# Patient Record
Sex: MALE | Race: WHITE | Employment: OTHER | URBAN - METROPOLITAN AREA
[De-identification: names, ages, dates, MRNs, and addresses within clinical notes are randomized per-mention and may not be internally consistent; named-entity substitution may affect disease eponyms.]

---

## 2019-04-09 ENCOUNTER — HOSPITAL ENCOUNTER (OUTPATIENT)
Age: 24
Setting detail: SPECIMEN
Discharge: HOME | End: 2019-04-09
Payer: COMMERCIAL

## 2019-04-09 ENCOUNTER — OFFICE VISIT (OUTPATIENT)
Dept: FAMILY MEDICINE CLINIC | Age: 24
End: 2019-04-09
Payer: COMMERCIAL

## 2019-04-09 VITALS
WEIGHT: 213 LBS | BODY MASS INDEX: 34.23 KG/M2 | DIASTOLIC BLOOD PRESSURE: 81 MMHG | HEIGHT: 66 IN | HEART RATE: 118 BPM | TEMPERATURE: 97.3 F | SYSTOLIC BLOOD PRESSURE: 130 MMHG

## 2019-04-09 DIAGNOSIS — E78.2 MIXED HYPERLIPIDEMIA: ICD-10-CM

## 2019-04-09 DIAGNOSIS — E55.9 VITAMIN D DEFICIENCY: ICD-10-CM

## 2019-04-09 DIAGNOSIS — E55.9 VITAMIN D DEFICIENCY: Primary | ICD-10-CM

## 2019-04-09 LAB — VITAMIN D 25-HYDROXY: 24.9 NG/ML (ref 30–100)

## 2019-04-09 PROCEDURE — 1036F TOBACCO NON-USER: CPT | Performed by: STUDENT IN AN ORGANIZED HEALTH CARE EDUCATION/TRAINING PROGRAM

## 2019-04-09 PROCEDURE — G8417 CALC BMI ABV UP PARAM F/U: HCPCS | Performed by: STUDENT IN AN ORGANIZED HEALTH CARE EDUCATION/TRAINING PROGRAM

## 2019-04-09 PROCEDURE — 99203 OFFICE O/P NEW LOW 30 MIN: CPT | Performed by: STUDENT IN AN ORGANIZED HEALTH CARE EDUCATION/TRAINING PROGRAM

## 2019-04-09 PROCEDURE — G8427 DOCREV CUR MEDS BY ELIG CLIN: HCPCS | Performed by: STUDENT IN AN ORGANIZED HEALTH CARE EDUCATION/TRAINING PROGRAM

## 2019-04-09 PROCEDURE — 90715 TDAP VACCINE 7 YRS/> IM: CPT | Performed by: STUDENT IN AN ORGANIZED HEALTH CARE EDUCATION/TRAINING PROGRAM

## 2019-04-09 RX ORDER — MELATONIN
1000 DAILY
Qty: 90 TABLET | Refills: 0 | Status: SHIPPED | OUTPATIENT
Start: 2019-04-09

## 2019-04-09 RX ORDER — ATORVASTATIN CALCIUM 10 MG/1
10 TABLET, FILM COATED ORAL DAILY
Qty: 90 TABLET | Refills: 1 | Status: SHIPPED | OUTPATIENT
Start: 2019-04-09 | End: 2019-10-04 | Stop reason: SDUPTHER

## 2019-04-09 ASSESSMENT — ENCOUNTER SYMPTOMS
CONSTIPATION: 0
SORE THROAT: 0
DIARRHEA: 0
TROUBLE SWALLOWING: 0
NAUSEA: 0
BACK PAIN: 1
ABDOMINAL PAIN: 0
SHORTNESS OF BREATH: 0
COLOR CHANGE: 0
CHEST TIGHTNESS: 0
COUGH: 0
FACIAL SWELLING: 0
VOMITING: 0

## 2019-04-09 ASSESSMENT — PATIENT HEALTH QUESTIONNAIRE - PHQ9
SUM OF ALL RESPONSES TO PHQ QUESTIONS 1-9: 0
SUM OF ALL RESPONSES TO PHQ QUESTIONS 1-9: 0
SUM OF ALL RESPONSES TO PHQ9 QUESTIONS 1 & 2: 0
2. FEELING DOWN, DEPRESSED OR HOPELESS: 0
1. LITTLE INTEREST OR PLEASURE IN DOING THINGS: 0

## 2019-04-09 NOTE — PROGRESS NOTES
Subjective:   Nancy Harmon is a 22 y.o. male with  has no past medical history on file. HPI    Pt is a 22 y.o. M with a sig PMH of ADHD, and mental delay presenting to the clinic to establish care. Pt follows with [facility] for psych meds   Metadate   Risperidone  Recent labs (11/18) revealed hyperlipidemia   Total chol: 224   LDL: 166  Pt has + paternal FH. Reports poor diet consisting mostly of processed foods.   Pt was counseled on lifestyle modifications  Social History     Socioeconomic History    Marital status: Single     Spouse name: Not on file    Number of children: Not on file    Years of education: Not on file    Highest education level: Not on file   Occupational History    Not on file   Social Needs    Financial resource strain: Not on file    Food insecurity:     Worry: Not on file     Inability: Not on file    Transportation needs:     Medical: Not on file     Non-medical: Not on file   Tobacco Use    Smoking status: Never Smoker    Smokeless tobacco: Never Used   Substance and Sexual Activity    Alcohol use: No    Drug use: No    Sexual activity: Never   Lifestyle    Physical activity:     Days per week: Not on file     Minutes per session: Not on file    Stress: Not on file   Relationships    Social connections:     Talks on phone: Not on file     Gets together: Not on file     Attends Jainism service: Not on file     Active member of club or organization: Not on file     Attends meetings of clubs or organizations: Not on file     Relationship status: Not on file    Intimate partner violence:     Fear of current or ex partner: Not on file     Emotionally abused: Not on file     Physically abused: Not on file     Forced sexual activity: Not on file   Other Topics Concern    Not on file   Social History Narrative    Not on file     Also reports having low Vit D levels, 18.5   Reports staying mostly indoors with little to no sun exposure  Pt also reports some mild back pain with phys activity that is alleviated with ibuprofen    ROS as below    Review of Systems   Constitutional: Negative for chills, fatigue and fever. HENT: Negative for ear discharge, ear pain, facial swelling, sore throat and trouble swallowing. Respiratory: Negative for cough, chest tightness and shortness of breath. Cardiovascular: Negative for chest pain and leg swelling. Gastrointestinal: Negative for abdominal pain, constipation, diarrhea, nausea and vomiting. Musculoskeletal: Positive for back pain. Negative for neck pain and neck stiffness. Skin: Negative for color change, rash and wound. Neurological: Negative for dizziness, speech difficulty and light-headedness. Objective:    /81 (Site: Right Upper Arm, Position: Sitting, Cuff Size: Medium Adult)   Pulse 118   Temp 97.3 °F (36.3 °C) (Oral)   Ht 5' 6\" (1.676 m)   Wt 213 lb (96.6 kg)   BMI 34.38 kg/m²    BP Readings from Last 3 Encounters:   04/09/19 130/81     Physical Exam  BP Readings from Last 3 Encounters:   04/09/19 130/81     /81 (Site: Right Upper Arm, Position: Sitting, Cuff Size: Medium Adult)   Pulse 118   Temp 97.3 °F (36.3 °C) (Oral)   Ht 5' 6\" (1.676 m)   Wt 213 lb (96.6 kg)   BMI 34.38 kg/m²   No results found for: WBC, HGB, HCT, PLT, CHOL, TRIG, HDL, LDLDIRECT, ALT, AST, NA, K, CL, CREATININE, BUN, CO2, TSH, PSA, INR, GLUF, LABA1C, LABMICR  No results found for: CALCIUM, PHOS  No results found for: LDLCALC, LDLCHOLESTEROL, LDLDIRECT    Assessment and Plan:    1. Vitamin D deficiency  Follow-up in 3 months  - Vitamin D 25 Hydroxy; Future  - Cholecalciferol (VITAMIN D3) 1000 units TABS; Take 1 tablet by mouth daily  Dispense: 90 tablet; Refill: 0    2. Mixed hyperlipidemia  Counseled on lifestyle modifications  - atorvastatin (LIPITOR) 10 MG tablet; Take 1 tablet by mouth daily  Dispense: 90 tablet;  Refill: 1      Alistair received counseling on the following healthy behaviors: nutrition, exercise and medication adherence  Reviewed prior labs and health maintenance  Continue current medications, diet and exercise. Discussed use, benefit, and side effects of prescribed medications. Barriers to medication compliance addressed. Patient given educational materials - see patient instructions  Was a self-tracking handout given in paper form or via [employer]? No    Requested Prescriptions     Signed Prescriptions Disp Refills    atorvastatin (LIPITOR) 10 MG tablet 90 tablet 1     Sig: Take 1 tablet by mouth daily    Cholecalciferol (VITAMIN D3) 1000 units TABS 90 tablet 0     Sig: Take 1 tablet by mouth daily       All patient questions answered. Patient voiced understanding. Quality Measures    Body mass index is 34.38 kg/m². Elevated. Weight control planned discussed Healthy diet and regular exercise. BP: 130/81 Blood pressure is normal. Treatment plan consists of No treatment change needed. No results found for: LDLCALC, LDLCHOLESTEROL, LDLDIRECT (goal LDL reduction with dx if diabetes is 50% LDL reduction)      PHQ Scores 4/9/2019   PHQ2 Score 0   PHQ9 Score 0     Interpretation of Total Score Depression Severity: 1-4 = Minimal depression, 5-9 = Mild depression, 10-14 = Moderate depression, 15-19 = Moderately severe depression, 20-27 = Severe depression    Requested Prescriptions     Signed Prescriptions Disp Refills    atorvastatin (LIPITOR) 10 MG tablet 90 tablet 1     Sig: Take 1 tablet by mouth daily    Cholecalciferol (VITAMIN D3) 1000 units TABS 90 tablet 0     Sig: Take 1 tablet by mouth daily       There are no discontinued medications.

## 2019-04-09 NOTE — PROGRESS NOTES
Attending Physician Statement  I have discussed the care of [facility], including pertinent history and exam findings,  with the resident. I have reviewed the key elements of all parts of the encounter with the resident. I agree with the assessment, plan and orders as documented by the resident.   (GE Modifier)

## 2019-10-04 DIAGNOSIS — E78.2 MIXED HYPERLIPIDEMIA: ICD-10-CM

## 2019-10-04 RX ORDER — ATORVASTATIN CALCIUM 10 MG/1
TABLET, FILM COATED ORAL
Qty: 90 TABLET | Refills: 0 | Status: SHIPPED | OUTPATIENT
Start: 2019-10-04 | End: 2020-01-07

## 2019-10-12 ENCOUNTER — APPOINTMENT (OUTPATIENT)
Dept: GENERAL RADIOLOGY | Age: 24
End: 2019-10-12
Payer: COMMERCIAL

## 2019-10-12 ENCOUNTER — HOSPITAL ENCOUNTER (EMERGENCY)
Age: 24
Discharge: HOME | End: 2019-10-12
Attending: EMERGENCY MEDICINE
Payer: COMMERCIAL

## 2019-10-12 VITALS
WEIGHT: 200 LBS | BODY MASS INDEX: 32.14 KG/M2 | HEART RATE: 78 BPM | RESPIRATION RATE: 14 BRPM | DIASTOLIC BLOOD PRESSURE: 71 MMHG | TEMPERATURE: 98.1 F | SYSTOLIC BLOOD PRESSURE: 129 MMHG | HEIGHT: 66 IN | OXYGEN SATURATION: 98 %

## 2019-10-12 DIAGNOSIS — S22.32XA CLOSED FRACTURE OF ONE RIB OF LEFT SIDE, INITIAL ENCOUNTER: Primary | ICD-10-CM

## 2019-10-12 PROCEDURE — 71046 X-RAY EXAM CHEST 2 VIEWS: CPT

## 2019-10-12 PROCEDURE — 99284 EMERGENCY DEPT VISIT MOD MDM: CPT

## 2019-10-12 PROCEDURE — 6370000000 HC RX 637 (ALT 250 FOR IP): Performed by: EMERGENCY MEDICINE

## 2019-10-12 RX ORDER — LIDOCAINE 50 MG/G
1 PATCH TOPICAL DAILY
Qty: 30 PATCH | Refills: 0 | Status: SHIPPED | OUTPATIENT
Start: 2019-10-12 | End: 2019-10-18

## 2019-10-12 RX ORDER — IBUPROFEN 800 MG/1
800 TABLET ORAL ONCE
Status: COMPLETED | OUTPATIENT
Start: 2019-10-12 | End: 2019-10-12

## 2019-10-12 RX ORDER — IBUPROFEN 800 MG/1
800 TABLET ORAL EVERY 8 HOURS PRN
Qty: 30 TABLET | Refills: 0 | Status: SHIPPED | OUTPATIENT
Start: 2019-10-12

## 2019-10-12 RX ORDER — LIDOCAINE 4 G/G
1 PATCH TOPICAL ONCE
Status: DISCONTINUED | OUTPATIENT
Start: 2019-10-12 | End: 2019-10-12 | Stop reason: HOSPADM

## 2019-10-12 RX ADMIN — IBUPROFEN 800 MG: 800 TABLET ORAL at 09:38

## 2019-10-12 ASSESSMENT — PAIN SCALES - GENERAL
PAINLEVEL_OUTOF10: 8
PAINLEVEL_OUTOF10: 8

## 2019-10-12 ASSESSMENT — PAIN DESCRIPTION - LOCATION: LOCATION: CHEST

## 2019-10-14 ENCOUNTER — TELEPHONE (OUTPATIENT)
Dept: FAMILY MEDICINE CLINIC | Age: 24
End: 2019-10-14

## 2019-10-15 ASSESSMENT — ENCOUNTER SYMPTOMS
BACK PAIN: 0
NAUSEA: 0
EYE REDNESS: 0
RHINORRHEA: 0
COUGH: 0
SINUS PAIN: 0
CHOKING: 0
VOMITING: 0
ABDOMINAL PAIN: 0
SHORTNESS OF BREATH: 0

## 2019-10-18 ENCOUNTER — HOSPITAL ENCOUNTER (OUTPATIENT)
Age: 24
Discharge: HOME | End: 2019-10-18
Payer: COMMERCIAL

## 2019-10-18 ENCOUNTER — OFFICE VISIT (OUTPATIENT)
Dept: FAMILY MEDICINE CLINIC | Age: 24
End: 2019-10-18
Payer: COMMERCIAL

## 2019-10-18 VITALS
SYSTOLIC BLOOD PRESSURE: 103 MMHG | HEART RATE: 69 BPM | TEMPERATURE: 97.7 F | DIASTOLIC BLOOD PRESSURE: 67 MMHG | WEIGHT: 212.6 LBS | BODY MASS INDEX: 34.31 KG/M2

## 2019-10-18 DIAGNOSIS — H61.23 BILATERAL IMPACTED CERUMEN: ICD-10-CM

## 2019-10-18 DIAGNOSIS — Z79.899 LONG TERM CURRENT USE OF ANTIPSYCHOTIC MEDICATION: ICD-10-CM

## 2019-10-18 DIAGNOSIS — E55.9 HYPOVITAMINOSIS D: ICD-10-CM

## 2019-10-18 DIAGNOSIS — Z13.220 SCREENING FOR HYPERLIPIDEMIA: ICD-10-CM

## 2019-10-18 DIAGNOSIS — E78.5 DYSLIPIDEMIA: ICD-10-CM

## 2019-10-18 DIAGNOSIS — R07.81 RIB PAIN ON LEFT SIDE: Primary | ICD-10-CM

## 2019-10-18 DIAGNOSIS — E66.3 OVERWEIGHT: ICD-10-CM

## 2019-10-18 LAB
ALBUMIN SERPL-MCNC: 4.3 G/DL (ref 3.5–5.2)
ALBUMIN/GLOBULIN RATIO: 1.3 (ref 1–2.5)
ALP BLD-CCNC: 96 U/L (ref 40–129)
ALT SERPL-CCNC: 25 U/L (ref 5–41)
ANION GAP SERPL CALCULATED.3IONS-SCNC: 10 MMOL/L (ref 9–17)
AST SERPL-CCNC: 17 U/L
BILIRUB SERPL-MCNC: 0.8 MG/DL (ref 0.3–1.2)
BILIRUBIN DIRECT: 0.14 MG/DL
BILIRUBIN, INDIRECT: 0.66 MG/DL (ref 0–1)
BUN BLDV-MCNC: 14 MG/DL (ref 6–20)
CALCIUM SERPL-MCNC: 9.7 MG/DL (ref 8.6–10.4)
CHLORIDE BLD-SCNC: 103 MMOL/L (ref 98–107)
CHOLESTEROL/HDL RATIO: 4.6
CHOLESTEROL: 176 MG/DL
CO2: 24 MMOL/L (ref 20–31)
CREAT SERPL-MCNC: 0.56 MG/DL (ref 0.7–1.2)
ESTIMATED AVERAGE GLUCOSE: 103 MG/DL
GFR AFRICAN AMERICAN: >60 ML/MIN
GFR NON-AFRICAN AMERICAN: >60 ML/MIN
GFR SERPL CREATININE-BSD FRML MDRD: ABNORMAL ML/MIN/{1.73_M2}
GFR SERPL CREATININE-BSD FRML MDRD: ABNORMAL ML/MIN/{1.73_M2}
GLUCOSE BLD-MCNC: 87 MG/DL (ref 70–99)
HBA1C MFR BLD: 5.2 % (ref 4–6)
HDLC SERPL-MCNC: 38 MG/DL
LDL CHOLESTEROL: 115 MG/DL (ref 0–130)
POTASSIUM SERPL-SCNC: 4.4 MMOL/L (ref 3.7–5.3)
SODIUM BLD-SCNC: 137 MMOL/L (ref 135–144)
TOTAL PROTEIN: 7.7 G/DL (ref 6.4–8.3)
TRIGL SERPL-MCNC: 113 MG/DL
VITAMIN D 25-HYDROXY: 52.3 NG/ML (ref 30–100)
VLDLC SERPL CALC-MCNC: ABNORMAL MG/DL (ref 1–30)

## 2019-10-18 PROCEDURE — 99211 OFF/OP EST MAY X REQ PHY/QHP: CPT | Performed by: FAMILY MEDICINE

## 2019-10-18 PROCEDURE — 90686 IIV4 VACC NO PRSV 0.5 ML IM: CPT | Performed by: FAMILY MEDICINE

## 2019-10-18 PROCEDURE — 99213 OFFICE O/P EST LOW 20 MIN: CPT | Performed by: STUDENT IN AN ORGANIZED HEALTH CARE EDUCATION/TRAINING PROGRAM

## 2019-10-18 PROCEDURE — G8482 FLU IMMUNIZE ORDER/ADMIN: HCPCS | Performed by: STUDENT IN AN ORGANIZED HEALTH CARE EDUCATION/TRAINING PROGRAM

## 2019-10-18 PROCEDURE — 1036F TOBACCO NON-USER: CPT | Performed by: STUDENT IN AN ORGANIZED HEALTH CARE EDUCATION/TRAINING PROGRAM

## 2019-10-18 PROCEDURE — G8417 CALC BMI ABV UP PARAM F/U: HCPCS | Performed by: STUDENT IN AN ORGANIZED HEALTH CARE EDUCATION/TRAINING PROGRAM

## 2019-10-18 PROCEDURE — G8427 DOCREV CUR MEDS BY ELIG CLIN: HCPCS | Performed by: STUDENT IN AN ORGANIZED HEALTH CARE EDUCATION/TRAINING PROGRAM

## 2019-11-17 PROBLEM — Z13.220 SCREENING FOR HYPERLIPIDEMIA: Status: RESOLVED | Noted: 2019-10-18 | Resolved: 2019-11-17

## 2020-01-07 RX ORDER — ATORVASTATIN CALCIUM 10 MG/1
TABLET, FILM COATED ORAL
Qty: 90 TABLET | Refills: 0 | Status: SHIPPED | OUTPATIENT
Start: 2020-01-07 | End: 2020-04-07

## 2020-01-07 NOTE — TELEPHONE ENCOUNTER
E-scribe request for atorvastatin. Please review and e-scribe if applicable.      Last Visit Date:  10-18-19  Next Visit Date:  Visit date not found    Hemoglobin A1C (%)   Date Value   10/18/2019 5.2             ( goal A1C is < 7)   No results found for: LABMICR  LDL Cholesterol (mg/dL)   Date Value   10/18/2019 115       (goal LDL is <100)   AST (U/L)   Date Value   10/18/2019 17     ALT (U/L)   Date Value   10/18/2019 25     BUN (mg/dL)   Date Value   10/18/2019 14     BP Readings from Last 3 Encounters:   10/18/19 103/67   10/12/19 129/71   04/09/19 130/81          (goal 120/80)        Patient Active Problem List:     Hypovitaminosis D     Rib pain on left side      ----Tomma Kicks

## 2020-04-07 RX ORDER — ATORVASTATIN CALCIUM 10 MG/1
TABLET, FILM COATED ORAL
Qty: 90 TABLET | Refills: 0 | Status: SHIPPED | OUTPATIENT
Start: 2020-04-07 | End: 2020-07-08

## 2020-07-06 NOTE — TELEPHONE ENCOUNTER
Please address the medication refill and close the encounter. If I can be of assistance, please route to the applicable pool. Thank you. Last visit: 10/18/19  Last Med refill: 04/07/20  Does patient have enough medication for 72 hours: No:     Next Visit Date:  No future appointments.     Health Maintenance   Topic Date Due    Varicella vaccine (1 of 2 - 2-dose childhood series) 03/15/1997    HPV vaccine (1 - Male 2-dose series) 03/15/2007    HIV screen  03/15/2011    Annual Wellness Visit (AWV)  06/23/2019    Flu vaccine (1) 09/01/2020    Lipid screen  10/18/2020    DTaP/Tdap/Td vaccine (8 - Td) 04/09/2029    Hepatitis B vaccine  Completed    Hib vaccine  Completed    Meningococcal (ACWY) vaccine  Completed    Hepatitis A vaccine  Aged Out    Pneumococcal 0-64 years Vaccine  Aged Out       Hemoglobin A1C (%)   Date Value   10/18/2019 5.2             ( goal A1C is < 7)   No results found for: LABMICR  LDL Cholesterol (mg/dL)   Date Value   10/18/2019 115       (goal LDL is <100)   AST (U/L)   Date Value   10/18/2019 17     ALT (U/L)   Date Value   10/18/2019 25     BUN (mg/dL)   Date Value   10/18/2019 14     BP Readings from Last 3 Encounters:   10/18/19 103/67   10/12/19 129/71   04/09/19 130/81          (goal 120/80)    All Future Testing planned in [facility]              Patient Active Problem List:     Hypovitaminosis D     Rib pain on left side

## 2020-07-08 RX ORDER — ATORVASTATIN CALCIUM 10 MG/1
TABLET, FILM COATED ORAL
Qty: 90 TABLET | Refills: 0 | Status: SHIPPED | OUTPATIENT
Start: 2020-07-08 | End: 2020-10-08

## 2020-10-07 NOTE — TELEPHONE ENCOUNTER
Last Visit Date:  10-18-19  Next Visit Date:  Visit date not found    Hemoglobin A1C (%)   Date Value   10/18/2019 5.2             ( goal A1C is < 7)   No results found for: LABMICR  LDL Cholesterol (mg/dL)   Date Value   10/18/2019 115       (goal LDL is <100)   AST (U/L)   Date Value   10/18/2019 17     ALT (U/L)   Date Value   10/18/2019 25     BUN (mg/dL)   Date Value   10/18/2019 14     BP Readings from Last 3 Encounters:   10/18/19 103/67   10/12/19 129/71   04/09/19 130/81          (goal 120/80)        Patient Active Problem List:     Hypovitaminosis D     Rib pain on left side      ----Monika Cook

## 2020-10-08 RX ORDER — ATORVASTATIN CALCIUM 10 MG/1
TABLET, FILM COATED ORAL
Qty: 90 TABLET | Refills: 0 | Status: SHIPPED | OUTPATIENT
Start: 2020-10-08 | End: 2021-01-12 | Stop reason: SDUPTHER

## 2021-02-09 DIAGNOSIS — E78.2 MIXED HYPERLIPIDEMIA: ICD-10-CM

## 2021-02-09 NOTE — TELEPHONE ENCOUNTER
Last visit: 10/18/19  Last Med refill: 1/12/2021  Does patient have enough medication for 72 hours: Yes    Next Visit Date:  No future appointments.     Health Maintenance   Topic Date Due    Hepatitis C screen  1996    Varicella vaccine (1 of 2 - 2-dose childhood series) 03/15/1997    HPV vaccine (1 - Male 2-dose series) 03/15/2007    HIV screen  03/15/2011    Annual Wellness Visit (AWV)  06/23/2019    Flu vaccine (1) 09/01/2020    Lipid screen  10/18/2020    DTaP/Tdap/Td vaccine (8 - Td) 04/09/2029    Hepatitis B vaccine  Completed    Hib vaccine  Completed    Meningococcal (ACWY) vaccine  Completed    Hepatitis A vaccine  Aged Out    Pneumococcal 0-64 years Vaccine  Aged Out       Hemoglobin A1C (%)   Date Value   10/18/2019 5.2             ( goal A1C is < 7)   No results found for: LABMICR  LDL Cholesterol (mg/dL)   Date Value   10/18/2019 115       (goal LDL is <100)   AST (U/L)   Date Value   10/18/2019 17     ALT (U/L)   Date Value   10/18/2019 25     BUN (mg/dL)   Date Value   10/18/2019 14     BP Readings from Last 3 Encounters:   10/18/19 103/67   10/12/19 129/71   04/09/19 130/81          (goal 120/80)    All Future Testing planned in [facility]              Patient Active Problem List:     Hypovitaminosis D     Rib pain on left side

## 2021-02-11 RX ORDER — ATORVASTATIN CALCIUM 10 MG/1
TABLET, FILM COATED ORAL
Qty: 30 TABLET | Refills: 0 | Status: SHIPPED | OUTPATIENT
Start: 2021-02-11 | End: 2021-03-11

## 2021-03-11 DIAGNOSIS — E78.2 MIXED HYPERLIPIDEMIA: ICD-10-CM

## 2021-03-11 RX ORDER — ATORVASTATIN CALCIUM 10 MG/1
TABLET, FILM COATED ORAL
Qty: 30 TABLET | Refills: 0 | Status: SHIPPED | OUTPATIENT
Start: 2021-03-11 | End: 2021-04-08

## 2021-04-08 DIAGNOSIS — E78.2 MIXED HYPERLIPIDEMIA: ICD-10-CM

## 2021-04-08 RX ORDER — ATORVASTATIN CALCIUM 10 MG/1
TABLET, FILM COATED ORAL
Qty: 30 TABLET | Refills: 0 | Status: SHIPPED | OUTPATIENT
Start: 2021-04-08 | End: 2021-05-05

## 2021-04-08 NOTE — TELEPHONE ENCOUNTER
Last visit:   Last Med refill:   Does patient have enough medication for 72 hours: No:     Next Visit Date:  No future appointments. Health Maintenance   Topic Date Due    Hepatitis C screen  Never done    Varicella vaccine (1 of 2 - 2-dose childhood series) Never done    HPV vaccine (1 - Male 2-dose series) Never done    HIV screen  Never done    COVID-19 Vaccine (1) Never done   [employer] Visit (AWV)  Never done    Lipid screen  10/18/2020    Flu vaccine (Season Ended) 09/01/2021    DTaP/Tdap/Td vaccine (8 - Td) 04/09/2029    Hepatitis B vaccine  Completed    Hib vaccine  Completed    Meningococcal (ACWY) vaccine  Completed    Hepatitis A vaccine  Aged Out    Pneumococcal 0-64 years Vaccine  Aged Out       Hemoglobin A1C (%)   Date Value   10/18/2019 5.2             ( goal A1C is < 7)   No results found for: LABMICR  LDL Cholesterol (mg/dL)   Date Value   10/18/2019 115       (goal LDL is <100)   AST (U/L)   Date Value   10/18/2019 17     ALT (U/L)   Date Value   10/18/2019 25     BUN (mg/dL)   Date Value   10/18/2019 14     BP Readings from Last 3 Encounters:   10/18/19 103/67   10/12/19 129/71   04/09/19 130/81          (goal 120/80)    All Future Testing planned in [facility]              Patient Active Problem List:     Hypovitaminosis D     Rib pain on left side           Please address the medication refill and close the encounter. If I can be of assistance, please route to the applicable pool. Thank you.

## 2023-03-09 ENCOUNTER — OFFICE VISIT (OUTPATIENT)
Dept: FAMILY MEDICINE CLINIC | Age: 28
End: 2023-03-09

## 2023-03-09 ENCOUNTER — HOSPITAL ENCOUNTER (EMERGENCY)
Age: 28
Discharge: HOME | End: 2023-03-09
Attending: EMERGENCY MEDICINE
Payer: COMMERCIAL

## 2023-03-09 VITALS
WEIGHT: 230 LBS | RESPIRATION RATE: 18 BRPM | TEMPERATURE: 98.2 F | OXYGEN SATURATION: 96 % | HEIGHT: 66 IN | BODY MASS INDEX: 36.96 KG/M2 | SYSTOLIC BLOOD PRESSURE: 144 MMHG | DIASTOLIC BLOOD PRESSURE: 88 MMHG | HEART RATE: 110 BPM

## 2023-03-09 VITALS
BODY MASS INDEX: 36.96 KG/M2 | SYSTOLIC BLOOD PRESSURE: 138 MMHG | WEIGHT: 230 LBS | DIASTOLIC BLOOD PRESSURE: 89 MMHG | HEART RATE: 125 BPM | HEIGHT: 66 IN

## 2023-03-09 DIAGNOSIS — Z00.00 HEALTHCARE MAINTENANCE: ICD-10-CM

## 2023-03-09 DIAGNOSIS — F20.1: Primary | ICD-10-CM

## 2023-03-09 DIAGNOSIS — F31.9 BIPOLAR 1 DISORDER (HCC): ICD-10-CM

## 2023-03-09 DIAGNOSIS — R44.3 HALLUCINATION: Primary | ICD-10-CM

## 2023-03-09 DIAGNOSIS — E78.2 MIXED HYPERLIPIDEMIA: ICD-10-CM

## 2023-03-09 PROCEDURE — 99282 EMERGENCY DEPT VISIT SF MDM: CPT

## 2023-03-09 RX ORDER — RISPERIDONE 3 MG/1
3 TABLET ORAL 2 TIMES DAILY
Status: ON HOLD | COMMUNITY
End: 2023-03-21

## 2023-03-09 SDOH — ECONOMIC STABILITY: FOOD INSECURITY: WITHIN THE PAST 12 MONTHS, YOU WORRIED THAT YOUR FOOD WOULD RUN OUT BEFORE YOU GOT MONEY TO BUY MORE.: NEVER TRUE

## 2023-03-09 SDOH — ECONOMIC STABILITY: FOOD INSECURITY: WITHIN THE PAST 12 MONTHS, THE FOOD YOU BOUGHT JUST DIDN'T LAST AND YOU DIDN'T HAVE MONEY TO GET MORE.: NEVER TRUE

## 2023-03-09 SDOH — ECONOMIC STABILITY: INCOME INSECURITY: HOW HARD IS IT FOR YOU TO PAY FOR THE VERY BASICS LIKE FOOD, HOUSING, MEDICAL CARE, AND HEATING?: NOT HARD AT ALL

## 2023-03-09 SDOH — ECONOMIC STABILITY: HOUSING INSECURITY
IN THE LAST 12 MONTHS, WAS THERE A TIME WHEN YOU DID NOT HAVE A STEADY PLACE TO SLEEP OR SLEPT IN A SHELTER (INCLUDING NOW)?: NO

## 2023-03-09 ASSESSMENT — ENCOUNTER SYMPTOMS
RHINORRHEA: 0
EYE REDNESS: 0
ABDOMINAL PAIN: 0
NAUSEA: 0
CONSTIPATION: 0
SHORTNESS OF BREATH: 0
VOMITING: 0
SORE THROAT: 0
COUGH: 0
DIARRHEA: 0

## 2023-03-09 ASSESSMENT — PAIN - FUNCTIONAL ASSESSMENT: PAIN_FUNCTIONAL_ASSESSMENT: NONE - DENIES PAIN

## 2023-03-09 ASSESSMENT — LIFESTYLE VARIABLES
HOW MANY STANDARD DRINKS CONTAINING ALCOHOL DO YOU HAVE ON A TYPICAL DAY: PATIENT DOES NOT DRINK
HOW OFTEN DO YOU HAVE A DRINK CONTAINING ALCOHOL: NEVER

## 2023-03-09 ASSESSMENT — PATIENT HEALTH QUESTIONNAIRE - PHQ9
2. FEELING DOWN, DEPRESSED OR HOPELESS: 0
SUM OF ALL RESPONSES TO PHQ QUESTIONS 1-9: 0
1. LITTLE INTEREST OR PLEASURE IN DOING THINGS: 0
SUM OF ALL RESPONSES TO PHQ QUESTIONS 1-9: 0
SUM OF ALL RESPONSES TO PHQ9 QUESTIONS 1 & 2: 0

## 2023-03-09 NOTE — PROGRESS NOTES
Attending Physician Statement  I  have discussed the care of Génesis Vines including pertinent history and exam findings with the resident. I agree with the assessment, plan and orders as documented by the resident. /89 (Site: Right Upper Arm, Position: Sitting)   Pulse (!) 125   Ht 5' 6\" (1.676 m)   Wt 230 lb (104.3 kg)   BMI 37.12 kg/m²    BP Readings from Last 3 Encounters:   03/09/23 138/89   10/18/19 103/67   10/12/19 129/71     Wt Readings from Last 3 Encounters:   03/09/23 230 lb (104.3 kg)   10/18/19 212 lb 9.6 oz (96.4 kg)   10/12/19 200 lb (90.7 kg)          Diagnosis Orders   1. Chronic disorganized schizophrenia (HCC)  Comprehensive Metabolic Panel      2. Bipolar 1 disorder (HCC)  Comprehensive Metabolic Panel    TSH      3. Mixed hyperlipidemia  Lipid Panel      4. Body mass index (BMI) of 37.0 to 37.9 in adult  Comprehensive Metabolic Panel      5. Diabetes mellitus screening        6.  Healthcare maintenance  HIV Screen    Hepatitis C Antibody          Shwetha Robin DO 3/9/2023 12:07 PM
Visit Information    Have you changed or started any medications since your last visit including any over-the-counter medicines, vitamins, or herbal medicines? no   Are you having any side effects from any of your medications? -  no  Have you stopped taking any of your medications? Is so, why? -  no    Have you seen any other physician or provider since your last visit? No  Have you had any other diagnostic tests since your last visit? No  Have you been seen in the emergency room and/or had an admission to a hospital since we last saw you? No  Have you had your routine dental cleaning in the past 6 months? no    Have you activated your [facility] account? If not, what are your barriers?  Yes     Patient Care Team:  Lio Spears MD as PCP - General    Medical History Review  Past Medical, Family, and Social History reviewed and does not contribute to the patient presenting condition    Health Maintenance   Topic Date Due    COVID-19 Vaccine (1) Never done    Varicella vaccine (1 of 2 - 2-dose childhood series) Never done    HPV vaccine (1 - Male 2-dose series) Never done    Depression Screen  Never done    HIV screen  Never done    Hepatitis C screen  Never done    Flu vaccine (1) 08/01/2022    DTaP/Tdap/Td vaccine (8 - Td or Tdap) 04/09/2029    Hib vaccine  Completed    Meningococcal (ACWY) vaccine  Completed    Hepatitis A vaccine  Aged Out    Pneumococcal 0-64 years Vaccine  Aged Out
Thought Content: Thought content normal. Thought content does not include homicidal or suicidal ideation. An electronic signature was used to authenticate this note.     --Celia He MD

## 2023-03-09 NOTE — PATIENT INSTRUCTIONS
Thank you for letting us take care of you today. We hope all your questions were addressed. If a question was overlooked or something else comes to mind after you return home, please contact a member of your Care Team listed below. Your Care Team at [place] [address] is Team #2  Rafael Diaz DO (Faculty)  Sandi Grove (Faculty)  Nalia Melendez MD (Resident)  Aura Robles MD (Resident)  Sweta Gresham MD (Resident)  Kris Guzmán MD (Resident)  Marcus Mejia., BRADY Flannery.,  MA  Kourtney Lopez., SATYAN  Chiquis Elizondo., [facility] office)  Angella Monterroso, [address] (Clinical Practice Manager)  Deja Best, [address] [place] [address] (Clinical Pharmacist)     Office phone number: [phone]    If you need to get in right away due to illness, please be advised we have \"Same Day\" appointments available Monday-Friday. Please call us at [phone] option #3 to schedule your \"Same Day\" appointment.

## 2023-03-10 NOTE — ED NOTES
SW assisted pt creating a safety plan. SW discussed with pt warning signs, coping skills, and people/agencies who pt can reach out for help when feeling suicidal. SW made pt aware of ways to make pt's environment safer in times of crisis. Pt provided with the National Suicide Prevention Line: 4-596-050-503-066-8373 or the text number 763768 and strongly encouraged to utilize this resource if needed.       Electronically signed by RAY Lee on 3/9/2023 at 7:44 PM

## 2023-03-10 NOTE — ED NOTES
Provisional Diagnosis:   Patient presents to the ED for a mental health evaluation. Psychosocial and Contextual Factors:       C-SSRS Summary:  Patient denies suicidal ideations. Patient:   Family:   Agency:     Substance Abuse:  None identified     Present Suicidal Behavior:  None identified    Verbal:     Attempt:    Past Suicidal Behavior: None identified     Verbal:    Attempt:      Self-Injurious/Self-Mutilation:None identified      Violence Current or Past: None identified      Trauma Identified:  None identified      Protective Factors:    Patient has stable housing. Patient has insurance. Risk Factors:    None identified      Clinical Summary:      Patient presented to the emergency department for a mental health evaluation. Patient denies suicidal ideations. Patient denies homicidal ideations. Patient reports that he stays in his \"room all day playing video games\". Patient lives at home with his grandmother and cousin. Patient states, \"I am feeling better with life\". When physician asked patient if he    Patient states that he is having hallucinations of the \"batman game\". Patient denies auditory hallucinations. Level of Care Disposition:    Writer consulted with  physician and patient will discharge home with a safely plan.

## 2023-03-10 NOTE — ED PROVIDER NOTES
[address]  Emergency Department Encounter  Emergency Medicine Resident     Pt Name:Alistair Hopkins  MRN: [id number]  [place] 1995  Date of evaluation: 3/9/23  PCP:  Cullen Rod MD  Note Started: 7:05 PM EST      CHIEF COMPLAINT       Chief Complaint   Patient presents with    Mental Health Problem       HISTORY OF PRESENT ILLNESS  (Location/Symptom, Timing/Onset, Context/Setting, Quality, Duration, Modifying Factors, Severity.)      Frank Justin is a 33 y.o. male who presents with complaints of auditory hallucination described as \"Batman\" speaking to them. Denied any command hallucinations. Denied any homicidal or suicidal ideation. Has been taking his medications as directed. Discussed case also with patient grandparents who brought him in today who noted patient has not been himself lately and has had more hallucinations than usual.  Notes patient had some vomiting on Sunday. Has been taking his medications sporadically. PAST MEDICAL / SURGICAL / SOCIAL / FAMILY HISTORY      has a past medical history of Fracture. Reviewed with patient     has a past surgical history that includes eye surgery and Tympanostomy tube placement.   Reviewed with patient    Social History     Socioeconomic History    Marital status: Single     Spouse name: Not on file    Number of children: Not on file    Years of education: Not on file    Highest education level: Not on file   Occupational History    Not on file   Tobacco Use    Smoking status: Never    Smokeless tobacco: Never   Substance and Sexual Activity    Alcohol use: No    Drug use: No    Sexual activity: Never   Other Topics Concern    Not on file   Social History Narrative    Not on file     Social Determinants of Health     Financial Resource Strain: Low Risk     Difficulty of Paying Living Expenses: Not hard at all   Food Insecurity: No Food Insecurity    Worried About [employer] in the Last Year: Never true Ran Out of Food in the Last Year: Never true   Transportation Needs: Unknown    Lack of Transportation (Medical): Not on file    Lack of Transportation (Non-Medical): No   Physical Activity: Not on file   Stress: Not on file   Social Connections: Not on file   Intimate Partner Violence: Not on file   Housing Stability: Unknown    Unable to Pay for Housing in the Last Year: Not on file    Number of Places Lived in the Last Year: Not on file    Unstable Housing in the Last Year: No       No family history on file. Allergies:  Chocolate    Home Medications:  Prior to Admission medications    Medication Sig Start Date End Date Taking? Authorizing Provider   risperiDONE (RISPERDAL) 3 MG tablet Take 3 mg by mouth 2 times daily    Historical Provider, MD   atorvastatin (LIPITOR) 10 MG tablet TAKE ONE TABLET BY MOUTH DAILY 5/5/21 6/4/21  Radha Boogie MD   Cholecalciferol (VITAMIN D3) 1000 units TABS Take 1 tablet by mouth daily  Patient taking differently: Take by mouth daily 4/9/19   Yue Moctezuma MD       REVIEW OF SYSTEMS    (2-9 systems for level 4, 10 or more for level 5)      Review of Systems   Constitutional:  Negative for chills and fever. HENT:  Negative for congestion and rhinorrhea. Eyes:  Negative for photophobia and visual disturbance. Respiratory:  Negative for shortness of breath and wheezing. Cardiovascular:  Negative for chest pain and palpitations. Gastrointestinal:  Negative for abdominal pain, nausea and vomiting. Genitourinary:  Negative for dysuria and frequency. Musculoskeletal:  Negative for back pain and neck pain. Skin:  Negative for rash and wound. Neurological:  Negative for dizziness and headaches.      PHYSICAL EXAM   (up to 7 for level 4, 8 or more for level 5)      INITIAL VITALS:   BP (!) 144/88   Pulse (!) 110   Temp 98.2 °F (36.8 °C) (Oral)   Resp 18   Ht 5' 6\" (1.676 m)   Wt 230 lb (104.3 kg)   SpO2 96%   BMI 37.12 kg/m²     Physical Exam  Vitals and nursing note reviewed. Constitutional:       General: He is not in acute distress. HENT:      Head: Atraumatic. Right Ear: External ear normal.      Left Ear: External ear normal.      Nose: Nose normal.      Mouth/Throat:      Mouth: Mucous membranes are moist.      Pharynx: Oropharynx is clear. Eyes:      Conjunctiva/sclera: Conjunctivae normal.   Cardiovascular:      Rate and Rhythm: Normal rate and regular rhythm. Pulses: Normal pulses. Pulmonary:      Effort: Pulmonary effort is normal. No respiratory distress. Breath sounds: Normal breath sounds. No wheezing. Abdominal:      Palpations: Abdomen is soft. Tenderness: There is no abdominal tenderness. Musculoskeletal:         General: Normal range of motion. Cervical back: Normal range of motion. Skin:     General: Skin is warm and dry. Capillary Refill: Capillary refill takes less than 2 seconds. Neurological:      General: No focal deficit present. Mental Status: He is alert and oriented to person, place, and time. DDX/DIAGNOSTIC RESULTS / EMERGENCY DEPARTMENT COURSE / MDM     Medical Decision Making  Schizophrenia, no evidence of SI or HI          EMERGENCY DEPARTMENT COURSE:  60-year-old male, history of schizophrenia, presented to ED with complaints of hallucination that is described as \"bad man\" but not command hallucinations. Patient grandparents who patient lives with also mentioned patient has been wandering off at night and has intermittently been taking his medications due to vomiting that he had a couple days ago is now improved. Patient denies any SI or HI. No illegal drug use or alcohol use. Patient notes he feels safe at home. Safety plan created with social work and patient discharged home with instruction to follow-up with mental health provider take medications as directed. PROCEDURES:  None    CONSULTS:  None      FINAL IMPRESSION      1.  Hallucination          DISPOSITION / PLAN     DISPOSITION Decision To Discharge 03/09/2023 07:34:32 PM      PATIENT REFERRED TO:  Blayne Mclean MD  [address] [facility] [address]    In 3 days      [facility] ED  [address]  [address] [address]  [phone]    As needed    DISCHARGE MEDICATIONS:  Discharge Medication List as of 3/9/2023  7:37 PM          Karen Morgan MD  Emergency Medicine Resident    (Please note that portions of thisnote were completed with a voice recognition program.  Efforts were made to edit the dictations but occasionally words are mis-transcribed.)        Gabriela Ibanez MD  Resident  03/10/23 [address] MD Guero  Resident  03/10/23 [address]     Pt Name: Veronica Parker  MRN: [id number]  [place] 1995  Date of evaluation: 3/11/23       Veronica Parker is a 33 y.o. male who presents with Mental Health Problem      MDM:       Vitals:   Vitals:    03/09/23 1901   BP: (!) 144/88   Pulse: (!) 110   Resp: 18   Temp: 98.2 °F (36.8 °C)   TempSrc: Oral   SpO2: 96%   Weight: 230 lb (104.3 kg)   Height: 5' 6\" (1.676 m)         I personally saw and examined the patient. I have reviewed and agree with the resident's findings, including all diagnostic interpretations and treatment plan as written. I was present for the key portions of any procedures performed and the inclusive time noted for any critical care statement.     Andi Muller DO  Attending Emergency Physician             Andi Muller DO  03/11/23 1410

## 2023-03-10 NOTE — DISCHARGE INSTRUCTIONS
Thank you for visiting [facility] Emergency Department. You need to call Janessa Matthews MD to make an appointment as directed for follow up. Resume your home psychiatric medications as directed. Refer to safety plan created today in the emergency department. Return to emergency department for any new or worsening symptoms including any vomiting, fever, thoughts of wanting hurt yourself or others.

## 2023-03-17 ENCOUNTER — HOSPITAL ENCOUNTER (OUTPATIENT)
Age: 28
Setting detail: SPECIMEN
Discharge: HOME | End: 2023-03-17

## 2023-03-17 DIAGNOSIS — Z00.00 HEALTHCARE MAINTENANCE: ICD-10-CM

## 2023-03-17 DIAGNOSIS — F20.1: ICD-10-CM

## 2023-03-17 DIAGNOSIS — F31.9 BIPOLAR 1 DISORDER (HCC): ICD-10-CM

## 2023-03-17 DIAGNOSIS — E78.2 MIXED HYPERLIPIDEMIA: ICD-10-CM

## 2023-03-17 LAB
ALBUMIN SERPL-MCNC: 4.1 G/DL (ref 3.5–5.2)
ALBUMIN/GLOBULIN RATIO: 1.4 (ref 1–2.5)
ALP SERPL-CCNC: 79 U/L (ref 40–129)
ALT SERPL-CCNC: 31 U/L (ref 5–41)
ANION GAP SERPL CALCULATED.3IONS-SCNC: 16 MMOL/L (ref 9–17)
AST SERPL-CCNC: 23 U/L
BILIRUB SERPL-MCNC: 0.9 MG/DL (ref 0.3–1.2)
BUN SERPL-MCNC: 11 MG/DL (ref 6–20)
CALCIUM SERPL-MCNC: 9.4 MG/DL (ref 8.6–10.4)
CHLORIDE SERPL-SCNC: 105 MMOL/L (ref 98–107)
CHOLEST SERPL-MCNC: 162 MG/DL
CHOLESTEROL/HDL RATIO: 4.4
CO2 SERPL-SCNC: 19 MMOL/L (ref 20–31)
CREAT SERPL-MCNC: 0.74 MG/DL (ref 0.7–1.2)
EST. AVERAGE GLUCOSE BLD GHB EST-MCNC: 97 MG/DL
GFR SERPL CREATININE-BSD FRML MDRD: >60 ML/MIN/1.73M2
GLUCOSE SERPL-MCNC: 86 MG/DL (ref 70–99)
HBA1C MFR BLD: 5 % (ref 4–6)
HCV AB SER QL: NONREACTIVE
HDLC SERPL-MCNC: 37 MG/DL
HIV 1+2 AB+HIV1 P24 AG SERPL QL IA: NONREACTIVE
LDLC SERPL CALC-MCNC: 111 MG/DL (ref 0–130)
POTASSIUM SERPL-SCNC: 4.1 MMOL/L (ref 3.7–5.3)
PROT SERPL-MCNC: 7.1 G/DL (ref 6.4–8.3)
SODIUM SERPL-SCNC: 140 MMOL/L (ref 135–144)
TRIGL SERPL-MCNC: 72 MG/DL
TSH SERPL-ACNC: 3.49 UIU/ML (ref 0.3–5)

## 2023-03-20 ENCOUNTER — HOSPITAL ENCOUNTER (INPATIENT)
Age: 28
LOS: 7 days | Discharge: HOME | DRG: 885 | End: 2023-03-27
Attending: EMERGENCY MEDICINE | Admitting: INTERNAL MEDICINE
Payer: COMMERCIAL

## 2023-03-20 DIAGNOSIS — F23 ACUTE PSYCHOSIS (HCC): Primary | ICD-10-CM

## 2023-03-20 PROCEDURE — 1240000000 HC EMOTIONAL WELLNESS R&B

## 2023-03-20 PROCEDURE — 99285 EMERGENCY DEPT VISIT HI MDM: CPT

## 2023-03-20 PROCEDURE — 6360000002 HC RX W HCPCS: Performed by: NURSE PRACTITIONER

## 2023-03-20 RX ORDER — OLANZAPINE 5 MG/1
10 TABLET ORAL NIGHTLY
Status: ON HOLD | COMMUNITY
End: 2023-03-27 | Stop reason: HOSPADM

## 2023-03-20 RX ORDER — LORAZEPAM 2 MG/ML
2 INJECTION INTRAMUSCULAR EVERY 6 HOURS PRN
Status: DISCONTINUED | OUTPATIENT
Start: 2023-03-20 | End: 2023-03-27 | Stop reason: HOSPADM

## 2023-03-20 RX ORDER — HALOPERIDOL 5 MG/1
5 TABLET ORAL EVERY 6 HOURS PRN
Status: DISCONTINUED | OUTPATIENT
Start: 2023-03-20 | End: 2023-03-27 | Stop reason: HOSPADM

## 2023-03-20 RX ORDER — POLYETHYLENE GLYCOL 3350 17 G/17G
17 POWDER, FOR SOLUTION ORAL DAILY PRN
Status: DISCONTINUED | OUTPATIENT
Start: 2023-03-20 | End: 2023-03-27 | Stop reason: HOSPADM

## 2023-03-20 RX ORDER — TRAZODONE HYDROCHLORIDE 50 MG/1
50 TABLET ORAL NIGHTLY PRN
Status: DISCONTINUED | OUTPATIENT
Start: 2023-03-20 | End: 2023-03-27 | Stop reason: HOSPADM

## 2023-03-20 RX ORDER — HALOPERIDOL 5 MG/ML
5 INJECTION INTRAMUSCULAR EVERY 6 HOURS PRN
Status: DISCONTINUED | OUTPATIENT
Start: 2023-03-20 | End: 2023-03-27 | Stop reason: HOSPADM

## 2023-03-20 RX ORDER — ACETAMINOPHEN 325 MG/1
650 TABLET ORAL EVERY 4 HOURS PRN
Status: DISCONTINUED | OUTPATIENT
Start: 2023-03-20 | End: 2023-03-27 | Stop reason: HOSPADM

## 2023-03-20 RX ORDER — LORAZEPAM 1 MG/1
2 TABLET ORAL EVERY 6 HOURS PRN
Status: DISCONTINUED | OUTPATIENT
Start: 2023-03-20 | End: 2023-03-27 | Stop reason: HOSPADM

## 2023-03-20 RX ORDER — MAGNESIUM HYDROXIDE/ALUMINUM HYDROXICE/SIMETHICONE 120; 1200; 1200 MG/30ML; MG/30ML; MG/30ML
30 SUSPENSION ORAL EVERY 6 HOURS PRN
Status: DISCONTINUED | OUTPATIENT
Start: 2023-03-20 | End: 2023-03-27 | Stop reason: HOSPADM

## 2023-03-20 RX ORDER — DIPHENHYDRAMINE HYDROCHLORIDE 50 MG/ML
50 INJECTION INTRAMUSCULAR; INTRAVENOUS EVERY 6 HOURS PRN
Status: DISCONTINUED | OUTPATIENT
Start: 2023-03-20 | End: 2023-03-27 | Stop reason: HOSPADM

## 2023-03-20 RX ORDER — HYDROXYZINE 50 MG/1
50 TABLET, FILM COATED ORAL 3 TIMES DAILY PRN
Status: DISCONTINUED | OUTPATIENT
Start: 2023-03-20 | End: 2023-03-27 | Stop reason: HOSPADM

## 2023-03-20 RX ORDER — LORATADINE 10 MG/1
10 TABLET ORAL DAILY
Status: ON HOLD | COMMUNITY
End: 2023-03-27 | Stop reason: HOSPADM

## 2023-03-20 RX ADMIN — LORAZEPAM 2 MG: 2 INJECTION INTRAMUSCULAR; INTRAVENOUS at 19:33

## 2023-03-20 RX ADMIN — HALOPERIDOL LACTATE 5 MG: 5 INJECTION, SOLUTION INTRAMUSCULAR at 19:33

## 2023-03-20 RX ADMIN — DIPHENHYDRAMINE HYDROCHLORIDE 50 MG: 50 INJECTION, SOLUTION INTRAMUSCULAR; INTRAVENOUS at 19:33

## 2023-03-20 ASSESSMENT — ENCOUNTER SYMPTOMS
BACK PAIN: 0
EYE REDNESS: 0
EYE PAIN: 0
SHORTNESS OF BREATH: 0
NAUSEA: 0
EYE DISCHARGE: 0
CONSTIPATION: 0
SINUS PRESSURE: 0
FACIAL SWELLING: 0
WHEEZING: 0
BLOOD IN STOOL: 0
SORE THROAT: 0
COLOR CHANGE: 0
COUGH: 0
VOMITING: 0
TROUBLE SWALLOWING: 0
RHINORRHEA: 0
ABDOMINAL PAIN: 0
CHEST TIGHTNESS: 0
DIARRHEA: 0

## 2023-03-20 ASSESSMENT — SLEEP AND FATIGUE QUESTIONNAIRES
DO YOU USE A SLEEP AID: NO
DO YOU HAVE DIFFICULTY SLEEPING: NO
AVERAGE NUMBER OF SLEEP HOURS: 6

## 2023-03-20 NOTE — ED NOTES
patient was not acting his current way patient was discharge. Grandmother stated that patient and her went to [facility] and changed medication on Friday and patient ended up declining. Patient started to hallucinated characters, become slightly aggressive, more hyper verbal.     Patient stated that he had overdose on medication \"awhile ago. \"     Patient presents with a developmental disability. Patient is redirectable and cooperative. Level of Care Disposition:    Writer consulted with Irlanda Jackson NP, who recommends inpatient psychiatric admission for safety and stabilization. Patients grandmother and witness voluntary signed in.

## 2023-03-20 NOTE — ED TRIAGE NOTES
Mode of arrival (squad #, walk in, police, etc) : Walk in         Chief complaint(s): Mental health problem         Arrival Note (brief scenario, treatment PTA, etc). : Pt states bebeto and dorian and the playstation people are chasing after him. Pt  states they are crawling on the walls. Pt states he is hurting himself by punching the walls to try to get the people away. C= \"Have you ever felt that you should Cut down on your drinking? \"  No  A= \"Have people Annoyed you by criticizing your drinking? \"  No  G= \"Have you ever felt bad or Guilty about your drinking? \"  No  E= \"Have you ever had a drink as an Eye-opener first thing in the morning to steady your nerves or to help a hangover? \"  No      Deferred []      Reason for deferring: N/A    *If yes to two or more: probable alcohol abuse. *

## 2023-03-20 NOTE — ED PROVIDER NOTES
MEDICATIONS GIVEN TO PATIENT THIS ENCOUNTER:  No orders of the defined types were placed in this encounter. DISCHARGE PRESCRIPTIONS:  New Prescriptions    No medications on file     PHYSICIAN CONSULTS ORDERED THIS ENCOUNTER:  None    FINAL IMPRESSION      1. Acute psychosis [facility])          DISPOSITION/PLAN   DISPOSITION Decision To Admit 03/20/2023 12:22:54 PM      PATIENT REFERREDTO:  No follow-up provider specified.     DISCHARGEMEDICATIONS:  New Prescriptions    No medications on file       (Please note that portions of this note were completed with a voice recognition program.  Efforts were made to edit thedictations but occasionally words are mis-transcribed.)    Ferd Hong MD  Attending Emergency Physician                        Fred Hong MD  03/20/23 8941

## 2023-03-21 PROBLEM — R62.50 DEVELOPMENTAL DELAY: Status: ACTIVE | Noted: 2023-03-21

## 2023-03-21 PROCEDURE — 99222 1ST HOSP IP/OBS MODERATE 55: CPT | Performed by: INTERNAL MEDICINE

## 2023-03-21 PROCEDURE — 1240000000 HC EMOTIONAL WELLNESS R&B

## 2023-03-21 PROCEDURE — 99223 1ST HOSP IP/OBS HIGH 75: CPT

## 2023-03-21 PROCEDURE — 6370000000 HC RX 637 (ALT 250 FOR IP)

## 2023-03-21 RX ORDER — OLANZAPINE 10 MG/1
10 TABLET ORAL NIGHTLY
Status: DISCONTINUED | OUTPATIENT
Start: 2023-03-21 | End: 2023-03-22

## 2023-03-21 RX ORDER — VITAMIN B COMPLEX
1000 TABLET ORAL DAILY
Status: DISCONTINUED | OUTPATIENT
Start: 2023-03-21 | End: 2023-03-27 | Stop reason: HOSPADM

## 2023-03-21 RX ADMIN — OLANZAPINE 10 MG: 10 TABLET, FILM COATED ORAL at 20:28

## 2023-03-21 RX ADMIN — Medication 1000 UNITS: at 15:08

## 2023-03-21 ASSESSMENT — LIFESTYLE VARIABLES
HOW OFTEN DO YOU HAVE A DRINK CONTAINING ALCOHOL: NEVER
HOW MANY STANDARD DRINKS CONTAINING ALCOHOL DO YOU HAVE ON A TYPICAL DAY: PATIENT DOES NOT DRINK
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
## 2023-03-21 NOTE — H&P
data reviewed    Assessment :      Primary Problem  Acute psychosis [facility])    Active Hospital Problems    Diagnosis Date Noted    Developmental delay [R62.50] 03/21/2023     Priority: Medium    Acute psychosis ([address] [F23] 03/20/2023     Priority: Medium       Plan:     Reason for consult: General medical management     Acute psychosis-management per psychiatry  Developmental delay-mental status at baseline  Recent labs including TSH, LFT reviewed and satisfactory    DVT prophylaxis-not required, patient is mobile    Consultations:   Ramon Moore MD  3/21/2023  12:18 PM    Copy sent to Abdon Nj MD    Please note that this chart was generated using voice recognition Dragon dictation software. Although every effort was made to ensure the accuracy of this automated transcription, some errors in transcription may have occurred.
endorses     PTSD: denies    Past Medical History:        Diagnosis Date    Fracture     leg       Past Surgical History:        Procedure Laterality Date    EYE SURGERY      TYMPANOSTOMY TUBE PLACEMENT         Allergies:  Chocolate         Social History:     Born in: [place]  Family: Patient reports mother and father raised him until his father left when he was in middle school. Patient reports grandmother and grandfather raised him for part of his life after. States he has 2 brothers and 2 sisters  Highest Level of Education: Graduated high school  Occupation: Reports grandmother supports him and he is going to work for [employer]" soon  Marital Status: Reports he is getting  and having kids to 2 people used to walk by on the sidewalk. Patient states he is marrying one of the girls. Children: Denies current  Residence: Lives at home with grandparents  Stressors: Insight, medication compliance, mental health  Patient Assets/Supportive Factors: Support and community         DRUG USE HISTORY  Social History     Tobacco Use   Smoking Status Never   Smokeless Tobacco Never     Social History     Substance and Sexual Activity   Alcohol Use No     Social History     Substance and Sexual Activity   Drug Use No       Patient denies depression and reports no suicidal ideation. Patient denies symptoms of nneka. When discussing alcohol consumption patient denies. When discussing illicit drug use patient denies. UDS pending upon admission. LEGAL HISTORY:   HISTORY OF INCARCERATION: [x] Yes [] No, patient reports she broke a cat's leg when it scratched him in middle school. Family History:   History reviewed. No pertinent family history. Psychiatric Family History  Patient denies psychiatric family history.      Suicides in family: [] Yes [x] No    Substance use in family: [] Yes [x] No         PHYSICAL EXAM:  Vitals:  /88   Pulse (!) 116   Temp 98 °F (36.7 °C) (Oral)   Resp 16   Ht 5'

## 2023-03-22 PROCEDURE — 1240000000 HC EMOTIONAL WELLNESS R&B

## 2023-03-22 PROCEDURE — 99232 SBSQ HOSP IP/OBS MODERATE 35: CPT | Performed by: NURSE PRACTITIONER

## 2023-03-22 PROCEDURE — 99231 SBSQ HOSP IP/OBS SF/LOW 25: CPT | Performed by: INTERNAL MEDICINE

## 2023-03-22 PROCEDURE — 6370000000 HC RX 637 (ALT 250 FOR IP)

## 2023-03-22 RX ORDER — METHOCARBAMOL 750 MG/1
750 TABLET, FILM COATED ORAL EVERY 6 HOURS PRN
Status: CANCELLED | OUTPATIENT
Start: 2023-03-22

## 2023-03-22 RX ORDER — BUPRENORPHINE HYDROCHLORIDE AND NALOXONE HYDROCHLORIDE DIHYDRATE 2; .5 MG/1; MG/1
2 TABLET SUBLINGUAL PRN
Status: CANCELLED | OUTPATIENT
Start: 2023-03-22 | End: 2023-03-25

## 2023-03-22 RX ORDER — OLANZAPINE 15 MG/1
15 TABLET ORAL NIGHTLY
Status: DISCONTINUED | OUTPATIENT
Start: 2023-03-23 | End: 2023-03-27 | Stop reason: HOSPADM

## 2023-03-22 RX ORDER — LOPERAMIDE HYDROCHLORIDE 2 MG/1
2 CAPSULE ORAL 4 TIMES DAILY PRN
Status: CANCELLED | OUTPATIENT
Start: 2023-03-22

## 2023-03-22 RX ORDER — CLONIDINE HYDROCHLORIDE 0.1 MG/1
0.1 TABLET ORAL EVERY 6 HOURS PRN
Status: CANCELLED | OUTPATIENT
Start: 2023-03-22

## 2023-03-22 RX ORDER — DICYCLOMINE HCL 20 MG
20 TABLET ORAL EVERY 6 HOURS PRN
Status: CANCELLED | OUTPATIENT
Start: 2023-03-22

## 2023-03-22 RX ADMIN — OLANZAPINE 10 MG: 10 TABLET, FILM COATED ORAL at 20:30

## 2023-03-22 RX ADMIN — Medication 1000 UNITS: at 08:28

## 2023-03-22 ASSESSMENT — PAIN SCALES - GENERAL: PAINLEVEL_OUTOF10: 0

## 2023-03-23 PROCEDURE — 99232 SBSQ HOSP IP/OBS MODERATE 35: CPT | Performed by: NURSE PRACTITIONER

## 2023-03-23 PROCEDURE — 6370000000 HC RX 637 (ALT 250 FOR IP): Performed by: NURSE PRACTITIONER

## 2023-03-23 PROCEDURE — 1240000000 HC EMOTIONAL WELLNESS R&B

## 2023-03-23 PROCEDURE — 6370000000 HC RX 637 (ALT 250 FOR IP)

## 2023-03-23 RX ADMIN — OLANZAPINE 15 MG: 15 TABLET, FILM COATED ORAL at 20:29

## 2023-03-23 RX ADMIN — Medication 1000 UNITS: at 08:30

## 2023-03-23 RX ADMIN — LORAZEPAM 2 MG: 1 TABLET ORAL at 14:01

## 2023-03-23 RX ADMIN — HALOPERIDOL 5 MG: 5 TABLET ORAL at 14:01

## 2023-03-24 PROCEDURE — 1240000000 HC EMOTIONAL WELLNESS R&B

## 2023-03-24 PROCEDURE — 6370000000 HC RX 637 (ALT 250 FOR IP): Performed by: NURSE PRACTITIONER

## 2023-03-24 PROCEDURE — 99232 SBSQ HOSP IP/OBS MODERATE 35: CPT | Performed by: NURSE PRACTITIONER

## 2023-03-24 PROCEDURE — 6370000000 HC RX 637 (ALT 250 FOR IP)

## 2023-03-24 RX ADMIN — Medication 1000 UNITS: at 07:44

## 2023-03-24 RX ADMIN — OLANZAPINE 15 MG: 15 TABLET, FILM COATED ORAL at 20:37

## 2023-03-25 PROCEDURE — 99232 SBSQ HOSP IP/OBS MODERATE 35: CPT

## 2023-03-25 PROCEDURE — 6370000000 HC RX 637 (ALT 250 FOR IP)

## 2023-03-25 PROCEDURE — 6370000000 HC RX 637 (ALT 250 FOR IP): Performed by: NURSE PRACTITIONER

## 2023-03-25 PROCEDURE — 1240000000 HC EMOTIONAL WELLNESS R&B

## 2023-03-25 RX ADMIN — OLANZAPINE 15 MG: 15 TABLET, FILM COATED ORAL at 20:50

## 2023-03-25 RX ADMIN — Medication 1000 UNITS: at 08:30

## 2023-03-26 PROCEDURE — 6370000000 HC RX 637 (ALT 250 FOR IP)

## 2023-03-26 PROCEDURE — 6370000000 HC RX 637 (ALT 250 FOR IP): Performed by: NURSE PRACTITIONER

## 2023-03-26 PROCEDURE — 1240000000 HC EMOTIONAL WELLNESS R&B

## 2023-03-26 RX ADMIN — Medication 1000 UNITS: at 08:08

## 2023-03-26 RX ADMIN — OLANZAPINE 15 MG: 15 TABLET, FILM COATED ORAL at 21:41

## 2023-03-27 VITALS
HEIGHT: 66 IN | WEIGHT: 230 LBS | OXYGEN SATURATION: 96 % | TEMPERATURE: 97.6 F | HEART RATE: 88 BPM | BODY MASS INDEX: 36.96 KG/M2 | DIASTOLIC BLOOD PRESSURE: 96 MMHG | SYSTOLIC BLOOD PRESSURE: 140 MMHG | RESPIRATION RATE: 16 BRPM

## 2023-03-27 PROCEDURE — 6370000000 HC RX 637 (ALT 250 FOR IP)

## 2023-03-27 RX ORDER — OLANZAPINE 15 MG/1
15 TABLET ORAL NIGHTLY
Qty: 30 TABLET | Refills: 0 | Status: SHIPPED | OUTPATIENT
Start: 2023-03-27

## 2023-03-27 RX ADMIN — Medication 1000 UNITS: at 09:01

## 2023-03-27 ASSESSMENT — PAIN SCALES - GENERAL: PAINLEVEL_OUTOF10: 0
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
## 2023-03-27 NOTE — CARE COORDINATION
Name: Melinda Loving    : 1995    Discharge Date: 3/27/23    Primary Auth/Cert #: [id number]    Destination: Private residence    Discharge Medications:      Medication List        CHANGE how you take these medications      OLANZapine 15 MG tablet  Commonly known as: ZYPREXA  Take 1 tablet by mouth nightly  What changed:   medication strength  how much to take  Notes to patient: Thought process            CONTINUE taking these medications      vitamin D3 25 MCG (1000 UT) Tabs tablet  Commonly known as: CHOLECALCIFEROL  Take 1 tablet by mouth daily  Notes to patient: Vitamin deficiency             STOP taking these medications      atorvastatin 10 MG tablet  Commonly known as: LIPITOR     Claritin 10 MG tablet  Generic drug: loratadine     risperiDONE 3 MG tablet  Commonly known as: RISPERDAL               Where to Get Your Medications        These medications were sent to Teri [facility]an [id number] [address]  [address] [facility], [address] [place] [address]      Phone: [phone]   OLANZapine 15 MG tablet         [facility] Bhavesh Matias[address]  [address]  [phone]  Go on 3/30/2023  You have an appointment at 3:00pm with Dr Millie Cho    Please discharge patient to:  [address]  Follow up on 3/27/2023  Patients legal guardian Sheryl Roach will pick patient up today at 4pm.
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
## 2023-03-27 NOTE — GROUP NOTE
Group Therapy Note    Date: 3/21/2023    Group Start Time: 1100  Group End Time: 5162  Group Topic: Music Therapy    YAYA URBINA    Ericka Haney        Group Therapy Note    Attendees: 4/9       Patient's Goal:  Patient's Goal:  Patients shared their preferred music, directed patients in questions to ask them about advice based on their music, or asked them directly had any advice in the lyrics. Goals to engage in peers support; Increase socialization; Demonstrate positive use of time; Normalization of the environment; Increase self-expression    Notes:  Patient attended and participated in group, having positive interactions with peers and staff throughout. Often talking to self throughout group. Multiple times during group, when a person would appear in the music video, patient would say \"Hi\" and wave to the television. Requested a power rangers songs. Asked patient why he liked power ranger, and patient stated \"they're superheroes! \" Asked patient if the power rangers work well as a team, and patient agreed. Asked patient who asks for help when he needs something, and patient stated his parents and grandparents. Patient stated there are some things he helps with at home, and some things his parents help with at home.      Status After Intervention:  Improved    Participation Level: Interactive    Participation Quality: Appropriate, Attentive, and Intrusive      Speech:  normal      Thought Process/Content: Flight of ideas      Affective Functioning: Exaggerated      Mood: elevated      Level of consciousness:  Alert      Response to Learning: Progressing to goal      Endings: None Reported    Modes of Intervention: Support, Socialization, Exploration, Activity, Media, and Reality-testing      Discipline Responsible: Psychoeducational Specialist      Signature:  Ericka Haney
Group Therapy Note    Date: 3/22/2023    Group Start Time: 0900  Group End Time: 0915  Group Topic: Community Meeting    YAYA GO CIARA Andres      Group Therapy Note    Attendees: 7/9    Patient's Goal: Patient will verbalize today's goals. Patient will also offer supportive listening and interact with peers to clarify and                             understand clearly set goals. Notes: Patient is making progress AEB participating in group discussion, actively listening, and supporting other group members. Status After Intervention: Improved      Participation Level:  Active Listener and Interactive      Participation Quality: Appropriate, Attentive, Sharing, and Supportive      Speech: normal      Thought Process/Content: Logical, Linear      Affective Functioning: Congruent      Mood: anxious      Level of consciousness: Oriented x4      Response to Learning: Able to verbalize current knowledge/experience, Able to verbalize/acknowledge new learning,  Able to                                         retain information, and Capable of insight      Endings: None Reported      Modes of Intervention: Education, Support, Socialization, and Problem-solving        Discipline Responsible: Behavorial Health Tech      Signature: Dot Andres
Group Therapy Note    Date: 3/22/2023    Group Start Time: 1430  Group End Time: 7071  Group Topic: Healthy Living/Wellness    YAYA Noriega, CTRS        Group Therapy Note    Attendees: 6/9     Topic: Social connections: helping others, exploring creative expression skills and community resources as healthy coping outlets. Goal of Group: To increase social interaction and making supportive social connections: helping others, exploring creative expression skills and community resources as healthy coping outlets. Comments:      Patient did not participate in Healthy Living and Wellness Group at 14:30, despite staff encouragement and explanation of benefits. Patient remains seclusive to self in room during group time . Q15 minute safety checks maintained for patient safety and will continue to encourage patient to attend unit programming.             Discipline Responsible: Psychoeducational Specialist        Signature:  Vianey Ambrosio
Group Therapy Note    Date: 3/23/2023    Group Start Time: 0800  Group End Time: 0815  Group Topic: Community Meeting    YAYA Bañuelos        Group Therapy Note    Attendees: 6/8       Patient's Goal:  Ask dr to go home so I can play video games and listen to my music    Notes:  talkative    Status After Intervention:  Unchanged    Participation Level: Interactive    Participation Quality: Appropriate      Speech:  pressured and loud      Thought Process/Content: Logical      Affective Functioning: Congruent      Mood: anxious      Level of consciousness:  Alert and Attentive      Response to Learning: Able to verbalize current knowledge/experience and Progressing to goal      Endings: None Reported    Modes of Intervention: Education, Support, and Socialization      Discipline Responsible: Behavorial Health Tech      Signature:   Joselo Bañuelos
Group Therapy Note    Date: 3/23/2023    Group Start Time: 1100  Group End Time: 9541  Group Topic: Cognitive Skills    YAYA Martinez, CTRS        Group Therapy Note    Attendees: 4/8     Topic: Socialization, practice communication and problem solving skills        Goal of Group: To increase social interaction and practice communication and problem solving skills         Comments:      Patient did not participate in Cognitive Skills Group at 11:00, despite staff encouragement and explanation of benefits. Patient remains seclusive to self in room during group time . Q15 minute safety checks maintained for patient safety and will continue to encourage patient to attend unit programming.             Discipline Responsible: Psychoeducational Specialist        Signature:  Piotr Johnson
Group Therapy Note    Date: 3/23/2023    Group Start Time: 1430  Group End Time: 0969  Group Topic: Cognitive Skills    NIKKI Harris        Group Therapy Note    Attendees: 3/7     Topic: Socialization, practice self expression, collaborative creative thinking, and communication skills        Goal of Group: To increase social interaction and practice self expression, collaborative creative thinking, and communication skills         Comments:      Patient did not participate in Cognitive Skills Group at 14:30, despite staff encouragement and explanation of benefits. Patient remains seclusive to self in room during group time . Q15 minute safety checks maintained for patient safety and will continue to encourage patient to attend unit programming.             Discipline Responsible: Psychoeducational Specialist        Signature:  Noel Milligan
Group Therapy Note    Date: 3/24/2023    Group Start Time: 0915  Group End Time: 0920  Group Topic: Community Meeting    YAYA GO CIARA Hernadez LPN        Group Therapy Note    Attendees: 2/8       Patient's Goal: Patient did not attend goals group when offered. Patient wanted to continue coloring in the dayroom. Q 15 minute checks maintained.      Discipline Responsible: Licensed Practical Nurse      Signature:  Priscilla Hernadez LPN
Group Therapy Note    Date: 3/24/2023    Group Start Time: 1100  Group End Time: 9722  Group Topic: Cognitive Skills    YAYA Sprague, CTRS        Group Therapy Note    Attendees: 2/9     Patient's Goal : To increase social interaction and to practice problem solving, and communication skills           Notes: Pt stated he would not participate in group task but would join group. Pt engaged in almost constant rambling monologue but did at times (x5) attend to problem and called out correct answer without prompt. Pt was able to practice problem solving, and communication skills. Pt was pleasant and cooperative. Pt joked with peer. Status After Intervention: Improved         Participation Level: Active Listener ,sharing ,supportive     Participation Quality:  Attentive , sharing , supportive        Speech:  Constant monologue but did give reality based answers r./t task x5, and joked with peer. Pt calls out to staff/peers on unit as family members and is childlike in greetings. Thought Process/Content: Logical r/t task x5, preoccupations with going home, taking a nap, and playing his video games. Affective Functioning: Blunted ,brightens     Mood: Pleasant, social,in behavioral control, childlike     Level of consciousness:  Alert, attentive to task at intervals     Response to Learning: Able to express current knowledge/experience , able to verbalize/acknowledge some new learning , and Progressing to goal        Endings: None Reported     Modes of Intervention: Education, Support, Socialization, Exploration, Clarifying and Problem-solving.         Discipline Responsible: Psychoeducational Specialist      Signature:  Bárbara Mendenhall
Group Therapy Note    Date: 3/24/2023    Group Start Time: 1430  Group End Time: 9739  Group Topic: Activity    [facility] [address] [facility], CTRS        Group Therapy Note    Attendees: 6/9     Patient's Goal : To increase social interaction and to practice concentration, creative expression, and coping skill discussion. Notes: Pt participated fin group by making a schedule/list of times and TV shows, things he wanted to do. Pt was very focused on this and used different colors to organize his schedule. Pt was able to practice concentration, and did at intervals engage in coping skill discussion. Pt was pleasant and cooperative. Status After Intervention: Improved         Participation Level: Active Listener ,sharing ,supportive     Participation Quality:  Attentive , sharing , supportive        Speech:  Pt started group with a constant, rapid and at times irritable monologue r/t his neighbors . As group progressed, and with calm activity ( and at times silence from peers and RT), pt speech did slow down slightly, and became more positively focused. Thought Process/Content: Initially irritable r/t his neighbors, rambling and constant monologue. Pt showed improvement as group progressed and did share with peers, and add reality based  comments relevant to group discussion. Pt engaged in positive social and coping skill discussion at intervals with RT and peers. Affective Functioning: Congruent, brightened      Mood: Euthymic as group progressed     Level of consciousness:  Alert, attentive     Response to Learning: Able to express current knowledge/experience , able to verbalize/acknowledge new learning at intervals with prompts, and Progressing to goal        Endings: None Reported     Modes of Intervention: Education, Support, Socialization, Exploration, Clarifying and Problem-solving.         Discipline Responsible:
Group Therapy Note    Date: 3/25/2023    Group Start Time: 1000  Group End Time: 1030  Group Topic: Cognitive Skills    [facility] [[email or web address] DONAVAN Crook, CTRS      Group Therapy Note    Attendees:4/9       Patient's Goal:  pt will demonstrate improved coping skills and improved concentration     Notes:   pt was pleasant but hyperverbal     Status After Intervention:  Improved    Participation Level:  Active Listener and Interactive    Participation Quality: Appropriate      Speech:  hyperverbal      Thought Process/Content: flight of ideas      Affective Functioning: congruent      Mood: euthymic       Level of consciousness:  Alert      Response to Learning: Able to verbalize current knowledge/experience and Progressing to goal      Endings: None Reported    Modes of Intervention: Education, Socialization, and Activity      Discipline Responsible: Psychoeducational Specialist      Signature:  Ora Ramos
Group Therapy Note    Date: 3/27/2023    Group Start Time: 1100  Group End Time: 9144  Group Topic: Cognitive Skills    YAYA XIOMYGABRIELE CIARA    Aurelia Manzo, CTRS        Group Therapy Note    Attendees: 4/10       Patient's Goal:  To improve interpersonal skills and decision-making through collaborating with peers and concentrating on a presented task. Notes:  Patient attended and observed group. Patient was hyper verbal and intrusive to peers. Patient was able to concentrate on his chosen task, patient was inattentive to group task. Status After Intervention:  Unchanged    Participation Level: Monopolizing - Active Listener and Interactive with prompting. Participation Quality: Intrusive. Inattentive to task. Speech:  pressured, hyper verbal       Thought Process/Content: Flight of ideas. Logical to task with prompting.        Affective Functioning: Exaggerated      Mood: elevated      Level of consciousness:  Alert and Preoccupied      Response to Learning: Able to verbalize current knowledge/experience and Able to retain information      Endings: None Reported    Modes of Intervention: Socialization, Exploration, and Activity      Discipline Responsible: Psychoeducational Specialist      Signature:  Aruelia Manzo, [address]
Group Therapy Note    Date: 3/27/2023    Group Start Time: 1330  Group End Time: 4406  Group Topic: Psychoeducation    [facility]        Group Therapy Note    Attendees: 3/8       Patient's Goal:  To improve interpersonal skills and coping skills awareness through collaborating with peers and demonstrating self-expression. Notes:  Patient attended and participated in group. Patient was able to collaborate with peers and demonstrate self-expression. Patient was hyper verbal and intrusive to peers at times -  responded to redirection. Patient was pleasant. Status After Intervention:  Improved    Participation Level: Active Listener and Interactive. Intermittently monopolizing - responded to redirection     Participation Quality: Attentive and Sharing. Intermittently intrusive - responded to redirection. Speech:  pressured, hyper verbal       Thought Process/Content: Flight of ideas. Logical and Linear to task.        Affective Functioning: Congruent      Mood: elevated      Level of consciousness:  Alert and Attentive      Response to Learning: Able to verbalize current knowledge/experience, Capable of insight, and Progressing to goal      Endings: None Reported    Modes of Intervention: Support, Socialization, and Exploration      Discipline Responsible: Psychoeducational Specialist      Signature:  Regan Rodney [place]
Recreation Group Note        Date: March 26, 2023 Start Time:  10:30am   End Time:  11:10am      Number of Participants in Group & Unit Census:  2/9    Topic: Leisure skills    Goal of Group:Patient will participate in leisure activity and identify benefits of leisure for coping and stress management. Comments:     Patient did not participate in Recreation group, despite staff encouragement and explanation of benefits. Patient remain seclusive to self. Q15 minute safety checks maintained for patient safety and will continue to encourage patient to attend unit programming.          Signature:  Tawana Kern, [address]
Wrap-Up Group Note      Date: March 26, 2023   Start Time: 8:30pm     End Time: 9:00     Participants & Unit Census: 3/10  Topic: HS Goal Wrap-Up    Goal of Group: Identify and discuss daily goals with group attendees. Comments:    Patient did not participate in Wrap-Up group, despite staff encouragement and explanation of benefits. Patient remains seclusive to self. Q15 minute safety checks maintained for patient safety and will continue to encourage patient to attend unit programming.
Socialization, Exploration, Clarifying and Problem-solving.         Discipline Responsible: Psychoeducational Specialist      Signature:  Ana Luisa Deleon

## 2023-03-27 NOTE — PROGRESS NOTES
03/21/23 1417   Encounter Summary   Encounter Overview/Reason  Spiritual/Emotional Needs   Service Provided For: Patient   Referral/Consult From: Rounding   Last Encounter  03/21/23   Complexity of Encounter Moderate   Begin Time 0130   End Time  0200   Total Time Calculated 30 min   Spiritual/Emotional needs   Type Spiritual Support   Behavioral Health    Type  Spirituality Group   Assessment/Intervention/Outcome   Assessment Calm   Intervention Active listening;Nurtured Hope   Outcome Engaged in conversation;Receptive
Behavioral Services                                              Medicare Re-Certification    I certify that the inpatient psychiatric hospital services furnished since the previous certification/re-certification were, and continue to be, medically necessary for;    [x] (1) Treatment which could reasonably be expected to improve the patient's condition,    [x] (2) Or for diagnostic study. Estimated length of stay/service 2-9 days    Plan for post-hospital care -outpatient care    This patient continues to need, on a daily basis, active treatment furnished directly by or requiring the supervision of inpatient psychiatric personnel.     Electronically signed by Margo Overton MD on 3/27/2023 at 9:15 AM
Behavioral Services  Medicare Certification Upon Admission    I certify that this patient's inpatient psychiatric hospital admission is medically necessary for:    [x] (1) Treatment which could reasonably be expected to improve this patient's condition,       [x] (2) Or for diagnostic study;     AND     [x](2) The inpatient psychiatric services are provided while the individual is under the care of a physician and are included in the individualized plan of care.     Estimated length of stay/service 2-9 days    Plan for post-hospital care -outpatient care    Electronically signed by Demar Vasquez MD on 3/21/2023 at 9:18 AM
Daily Progress Note  3/24/2023    Patient Name: Harsh Bauer    CHIEF COMPLAINT: Acute psychosis         SUBJECTIVE:      Patient seen face-to-face for follow-up assessment. He is sitting in the day area and is watching television. Patient is pleasant and cooperative with discussion. Thought process is somewhat linear, but he is easily excitable and is childlike. Appears to be talking about characters from a television show and is difficult to refocus to other topics. Patient reports sleeping well overnight and feels well rested. He states that he is eating all his meals and denies any problems with appetite. He does not feel it is  Tricks on him and denies any perceptual disturbances. Patient has not been agitated on the unit and does appear to be responding less to internal stimuli today. Patient's olanzapine was titrated to 15 mg the previous evening and he does appear to be tolerating this medication well. We reviewed for potential side effects and patient denies all. Patient symptoms are improving, but he has yet to demonstrate stability. He requires continued inpatient hospitalization at this time for safety.     Appetite:  [x] Adequate/Unchanged  [] Increased  [] Decreased      Sleep:       [] Adequate/Unchanged  [] Fair  [x] Poor      Group Attendance on Unit:   [] Yes   [] Selectively    [x] No    Compliant with scheduled medications: [x] Yes  [] No    Received emergency medications in past 24 hrs: [x] Yes   [] No    Medication Side Effects: Denies         Mental Status Exam  Level of consciousness: Alert and awake   Appearance: Sitting in day area, sitting in chair watching television, with poor grooming and hygiene   Behavior/Motor: Approachable, engages with interviewer, increased energy  Attitude toward examiner: Semicooperative, easily distracted, fair eye contact  Speech: Rapid, hyperverbal, loud volume at times, pleasant tone  Mood: Patient reports \"very good\"  Affect:
Daily Progress Note  3/26/2023    Patient Name: Frank Justin    CHIEF COMPLAINT: Acute psychosis         SUBJECTIVE:      Patient seen  for follow-up assessment. He is sitting in the day area and is watching television. Patient is pleasant and cooperative with discussion and insists that he was part of the characters in the movie that is currently playing. Thought process is somewhat linear, but he is easily excitable and childlike. He is extremely focused on characters from a television show and is difficult to redirect to other topics. Nursing team reports he is sleeping well overnight and he confirms that he feels well rested. He states that he is eating all his meals and denies any problems with appetite. He complements the food selections. Patient has maintain appropriate boundaries. He denies auditory hallucinations however appear to be responding less to internal stimuli today. Nursing team confirm he has exhibited much self dialogue. Patient's olanzapine was titrated to 15 mg the previous evening and he does appear to be tolerating this medication well and his family has offered a somewhat improvement in presentation. There are no notable abnormal psychomotor movements. Patient symptoms showing modest improvement however he has yet to demonstrate stability. He requires continued inpatient hospitalization at this time for safety. We will plan to reach out to speak with his grandmother tomorrow, Monday who is his legal guardian.     Appetite:  [x] Adequate/Unchanged  [] Increased  [] Decreased      Sleep:       [] Adequate/Unchanged  [] Fair  [x] Poor      Group Attendance on Unit:   [] Yes   [] Selectively    [x] No    Compliant with scheduled medications: [x] Yes  [] No    Received emergency medications in past 24 hrs: [x] Yes   [] No    Medication Side Effects: Denies         Mental Status Exam  Level of consciousness: Alert and awake   Appearance: Sitting in day area, sitting
Medication History completed:    New medications:  Olanzapine  Claritin    Medications discontinued: none    Medications flagged for review:  Atorvastatin- reports no longer taking  Risperidone- reports provider recently took him off of this    Changes to dosing: none    Stated allergies: as listed    Other pertinent information: medications confirmed with patients legal guardian Jacqueline. Reports these are all the medications that he is taking, reports no other prescription medications, OTCs, supplements, or illicit drugs.     Yuko Keller- PharmD Candidate [id number]
RT ASSESSMENT TREATMENT GOALS    [x]Pt Goal:  Pt will identify 1-2 positive coping skills by time of discharge. []Pt Goal:  Pt will identify 1-2 positive aspects of self by time of discharge. [x]Pt Goal:  Pt will remain on task/topic for 15-30 minutes during group by time of discharge. []Pt Goal:  Pt will identify 1-2 aspects of relapse prevention plan by time of discharge. []Pt Goal:  Pt will join in conversation with peers 1-2 times per group by time of discharge. []Pt Goal:  Pt will identify 1-2 new leisure interests by time of discharge. [x]Pt Goal:  Pt will not voice any delusional content by time of discharge.
childlike  Thought content: Denies homicidal ideation  Suicidal Ideation: Denies suicidal ideations  Delusions: Preoccupied with characters from a TV show. Presents with improvement in paranoia  Perceptual Disturbance: Self-talk improving. Denies any auditory or visual hallucinations. Cognition: Oriented to self,, location,, and time, and poor understanding of situation  Memory: Impaired recent memory  Insight: poor   Judgement: poor       Data   height is 5' 6\" (1.676 m) and weight is 230 lb (104.3 kg). His oral temperature is 98 °F (36.7 °C). His blood pressure is 138/89 and his pulse is 103 (abnormal). His respiration is 14 and oxygen saturation is 98%. Labs:   No visits with results within 2 Day(s) from this visit. Latest known visit with results is:   Hospital Outpatient Visit on 03/17/2023   Component Date Value Ref Range Status    HIV Ag/Ab 03/17/2023 NONREACTIVE  NONREACTIVE Final    Comment: No laboratory evidence of HIV infection. If acute HIV infection is suspected, consider   testing for HIV-1 RNA. Hepatitis C Ab 03/17/2023 NONREACTIVE  NONREACTIVE Final    Comment:       The hepatitis C procedure used in our laboratory is a Chemiluminescent test specific for   three recombinant HCV antigens. A negative anti-HCV result indicates that the antibodies to   hepatitis C virus are not present at this time. Individuals with reactive anti-HCV should be considered infected and infectious until proven   otherwise. Confirmation of all equivocal or reactive results is recommended by ordering   HCV RNA by PCR.       Cholesterol 03/17/2023 162  <200 mg/dL Final    Comment:    Cholesterol Guidelines:      <200  Desirable   200-240  Borderline      >240  Undesirable         HDL 03/17/2023 37 (A)  >40 mg/dL Final    Comment:    HDL Guidelines:    <40     Undesirable   40-59    Borderline    >59     Desirable         LDL Cholesterol 03/17/2023 111  0 - 130 mg/dL Final    Comment:    LDL Guidelines:     <100
100-129   Near to/above Desirable   130-159   Borderline      >159   Undesirable     Direct (measured) LDL and calculated LDL are not interchangeable tests. Chol/HDL Ratio 03/17/2023 4.4  <5 Final            Triglycerides 03/17/2023 72  <150 mg/dL Final    Comment:    Triglyceride Guidelines:     <150   Desirable   150-199  Borderline   200-499  High     >499   Very high   Based on AHA Guidelines for fasting triglyceride, October 2012. TSH 03/17/2023 3.49  0.30 - 5.00 uIU/mL Final    Glucose 03/17/2023 86  70 - 99 mg/dL Final    BUN 03/17/2023 11  6 - 20 mg/dL Final    Creatinine 03/17/2023 0.74  0.70 - 1.20 mg/dL Final    Est, Glom Filt Rate 03/17/2023 >60  >60 mL/min/1.73m2 Final    Comment:       These results are not intended for use in patients <25years of age. eGFR results are calculated without a race factor using the 2021 CKD-EPI equation. Careful clinical correlation is recommended, particularly when comparing to results   calculated using previous equations. The CKD-EPI equation is less accurate in patients with extremes of muscle mass, extra-renal   metabolism of creatine, excessive creatine ingestion, or following therapy that affects   renal tubular secretion.       Calcium 03/17/2023 9.4  8.6 - 10.4 mg/dL Final    Sodium 03/17/2023 140  135 - 144 mmol/L Final    Potassium 03/17/2023 4.1  3.7 - 5.3 mmol/L Final    Chloride 03/17/2023 105  98 - 107 mmol/L Final    CO2 03/17/2023 19 (A)  20 - 31 mmol/L Final    Anion Gap 03/17/2023 16  9 - 17 mmol/L Final    Alkaline Phosphatase 03/17/2023 79  40 - 129 U/L Final    ALT 03/17/2023 31  5 - 41 U/L Final    AST 03/17/2023 23  <40 U/L Final    Total Bilirubin 03/17/2023 0.9  0.3 - 1.2 mg/dL Final    Total Protein 03/17/2023 7.1  6.4 - 8.3 g/dL Final    Albumin 03/17/2023 4.1  3.5 - 5.2 g/dL Final    Albumin/Globulin Ratio 03/17/2023 1.4  1.0 - 2.5 Final    Hemoglobin A1C 03/17/2023 5.0  4.0 - 6.0 % Final    Estimated Avg Glucose
Desirable         LDL Cholesterol 03/17/2023 111  0 - 130 mg/dL Final    Comment:    LDL Guidelines:     <100    Desirable   100-129   Near to/above Desirable   130-159   Borderline      >159   Undesirable     Direct (measured) LDL and calculated LDL are not interchangeable tests. Chol/HDL Ratio 03/17/2023 4.4  <5 Final            Triglycerides 03/17/2023 72  <150 mg/dL Final    Comment:    Triglyceride Guidelines:     <150   Desirable   150-199  Borderline   200-499  High     >499   Very high   Based on AHA Guidelines for fasting triglyceride, October 2012. TSH 03/17/2023 3.49  0.30 - 5.00 uIU/mL Final    Glucose 03/17/2023 86  70 - 99 mg/dL Final    BUN 03/17/2023 11  6 - 20 mg/dL Final    Creatinine 03/17/2023 0.74  0.70 - 1.20 mg/dL Final    Est, Glom Filt Rate 03/17/2023 >60  >60 mL/min/1.73m2 Final    Comment:       These results are not intended for use in patients <25years of age. eGFR results are calculated without a race factor using the 2021 CKD-EPI equation. Careful clinical correlation is recommended, particularly when comparing to results   calculated using previous equations. The CKD-EPI equation is less accurate in patients with extremes of muscle mass, extra-renal   metabolism of creatine, excessive creatine ingestion, or following therapy that affects   renal tubular secretion.       Calcium 03/17/2023 9.4  8.6 - 10.4 mg/dL Final    Sodium 03/17/2023 140  135 - 144 mmol/L Final    Potassium 03/17/2023 4.1  3.7 - 5.3 mmol/L Final    Chloride 03/17/2023 105  98 - 107 mmol/L Final    CO2 03/17/2023 19 (A)  20 - 31 mmol/L Final    Anion Gap 03/17/2023 16  9 - 17 mmol/L Final    Alkaline Phosphatase 03/17/2023 79  40 - 129 U/L Final    ALT 03/17/2023 31  5 - 41 U/L Final    AST 03/17/2023 23  <40 U/L Final    Total Bilirubin 03/17/2023 0.9  0.3 - 1.2 mg/dL Final    Total Protein 03/17/2023 7.1  6.4 - 8.3 g/dL Final    Albumin 03/17/2023 4.1  3.5 - 5.2 g/dL Final
hour(s)). Imaging/Diagonstics:  Recent data reviewed    Assessment :      Primary Problem  Acute psychosis [facility])    Active Hospital Problems    Diagnosis Date Noted    Developmental delay [R62.50] 03/21/2023     Priority: Medium    Acute psychosis ([address] [F23] 03/20/2023     Priority: Medium       Plan:     Reason for consult: General medical management     Acute psychosis-management per psychiatry  Developmental delay-mental status at baseline  Recent labs including TSH, LFT reviewed and satisfactory    DVT prophylaxis-not required, patient is mobile    3/22  Patient doing well, no acute issues overnight  Denies any chest pain palpitations difficulty breathing or abdominal pain  Labs and vitals reviewed and satisfactory    Thank you for the consult. Medicine will sign off. Please do not hesitate to contact us for any issues or concerns. Consultations:   Fernandez Mims MD  3/22/2023  12:53 PM    Copy sent to Jhonatan Gatica MD    Please note that this chart was generated using voice recognition Dragon dictation software. Although every effort was made to ensure the accuracy of this automated transcription, some errors in transcription may have occurred.

## 2023-03-27 NOTE — DISCHARGE INSTRUCTIONS
Information:  Medications:   Medication summary provided   I understand that I should take only the medications on my list.     -why and when I need to take each medicine.     -which side effects to watch for.     -that I should carry my medication information at all times in case of     Emergency situations. I will take all of my medicines to follow up appointments.     -check with my physician or pharmacist before taking any new    Medication, over the counter product or drink alcohol.    -Ask about food, drug or dietary supplement interactions.    -discard old lists and update records with medication providers. Notify Physician:  Notify physician if you notice:   Always call 911 if you feel your life is in danger  In case of an emergency call 911 immediately! If 911 is not available call your local emergency medical system for help    Behavioral Health Follow Up:  Original Referral Source: [facility]  Discharge Diagnosis: Acute psychosis [facility]) [F23]  Recommendations for Level of Care: [address]  Patient status at discharge: Stable  My hospital  was: [facility]  Aftercare plan faxed: [address]   -faxed by: Staff   -date: 3/27/23   -time: 1600  Prescriptions: [address], to be picked up after discharge    Smoking: Quit Smoking. Call the [facility]'s smoking quitline at 7-165-78L-QUIT  Know the signs of a heart attack   If you have any of the following symptoms call 911 immediately, do not wait more    Than five minutes. 1. Pressure, fullness and/ or squeezing in the center of the chest spreading to    The jaw, neck or shoulder. 2. Chest discomfort with light headedness, fainting, sweating, nausea or    Shortness of breath. 3. Upper abdominal pressure or discomfort. 4. Lower chest pain, back pain, unusual fatigue, shortness of breath, nausea   Or dizziness.      General Information:   Questions regarding your bill: Call HELP program [phone]     Suicide Hotline ([address])
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
## 2024-08-01 NOTE — TELEPHONE ENCOUNTER
E-scribe request for atorvastatin. Please review and e-scribe if applicable.      Last Visit Date: 10/18/2019  Next Visit Date:  Visit date not found    Hemoglobin A1C (%)   Date Value   10/18/2019 5.2             ( goal A1C is < 7)   No results found for: LABMICR  LDL Cholesterol (mg/dL)   Date Value   10/18/2019 115       (goal LDL is <100)   AST (U/L)   Date Value   10/18/2019 17     ALT (U/L)   Date Value   10/18/2019 25     BUN (mg/dL)   Date Value   10/18/2019 14     BP Readings from Last 3 Encounters:   10/18/19 103/67   10/12/19 129/71   04/09/19 130/81          (goal 120/80)        Patient Active Problem List:     Hypovitaminosis D     Rib pain on left side
none

## 2024-08-22 ENCOUNTER — HOSPITAL ENCOUNTER (OUTPATIENT)
Age: 29
Setting detail: SPECIMEN
Discharge: HOME | End: 2024-08-22

## 2024-08-22 ENCOUNTER — OFFICE VISIT (OUTPATIENT)
Dept: FAMILY MEDICINE CLINIC | Age: 29
End: 2024-08-22
Payer: COMMERCIAL

## 2024-08-22 VITALS
HEIGHT: 66 IN | BODY MASS INDEX: 38.06 KG/M2 | WEIGHT: 236.8 LBS | SYSTOLIC BLOOD PRESSURE: 105 MMHG | HEART RATE: 83 BPM | DIASTOLIC BLOOD PRESSURE: 70 MMHG | OXYGEN SATURATION: 95 %

## 2024-08-22 DIAGNOSIS — E05.90 HYPERTHYROIDISM, SUBCLINICAL: ICD-10-CM

## 2024-08-22 DIAGNOSIS — E78.2 MODERATE MIXED HYPERLIPIDEMIA NOT REQUIRING STATIN THERAPY: ICD-10-CM

## 2024-08-22 DIAGNOSIS — E05.90 HYPERTHYROIDISM, SUBCLINICAL: Primary | ICD-10-CM

## 2024-08-22 DIAGNOSIS — E66.9 CLASS 2 OBESITY WITHOUT SERIOUS COMORBIDITY WITH BODY MASS INDEX (BMI) OF 38.0 TO 38.9 IN ADULT, UNSPECIFIED OBESITY TYPE: ICD-10-CM

## 2024-08-22 PROBLEM — E66.812 CLASS 2 OBESITY WITHOUT SERIOUS COMORBIDITY WITH BODY MASS INDEX (BMI) OF 38.0 TO 38.9 IN ADULT: Status: ACTIVE | Noted: 2024-08-22

## 2024-08-22 LAB
T4 FREE SERPL-MCNC: 1.4 NG/DL (ref 0.92–1.68)
TSH SERPL DL<=0.05 MIU/L-ACNC: 3.21 UIU/ML (ref 0.27–4.2)

## 2024-08-22 PROCEDURE — 99213 OFFICE O/P EST LOW 20 MIN: CPT

## 2024-08-22 PROCEDURE — G8427 DOCREV CUR MEDS BY ELIG CLIN: HCPCS

## 2024-08-22 PROCEDURE — 1036F TOBACCO NON-USER: CPT

## 2024-08-22 PROCEDURE — 99211 OFF/OP EST MAY X REQ PHY/QHP: CPT | Performed by: FAMILY MEDICINE

## 2024-08-22 PROCEDURE — G8417 CALC BMI ABV UP PARAM F/U: HCPCS

## 2024-08-22 RX ORDER — TOPIRAMATE 100 MG/1
TABLET, FILM COATED ORAL
COMMUNITY
Start: 2024-07-24

## 2024-08-22 RX ORDER — PALIPERIDONE PALMITATE 234 MG/1.5ML
INJECTION INTRAMUSCULAR
COMMUNITY
Start: 2024-08-21

## 2024-08-22 RX ORDER — TOPIRAMATE 25 MG/1
TABLET ORAL
COMMUNITY
Start: 2024-05-23

## 2024-08-22 RX ORDER — OXCARBAZEPINE 300 MG/1
TABLET, FILM COATED ORAL
COMMUNITY
Start: 2024-08-02

## 2024-08-22 SDOH — ECONOMIC STABILITY: FOOD INSECURITY: WITHIN THE PAST 12 MONTHS, YOU WORRIED THAT YOUR FOOD WOULD RUN OUT BEFORE YOU GOT MONEY TO BUY MORE.: NEVER TRUE

## 2024-08-22 SDOH — ECONOMIC STABILITY: FOOD INSECURITY: WITHIN THE PAST 12 MONTHS, THE FOOD YOU BOUGHT JUST DIDN'T LAST AND YOU DIDN'T HAVE MONEY TO GET MORE.: NEVER TRUE

## 2024-08-22 ASSESSMENT — ENCOUNTER SYMPTOMS
WHEEZING: 0
SHORTNESS OF BREATH: 0
BACK PAIN: 0
COUGH: 0
NAUSEA: 0
DIARRHEA: 0
ABDOMINAL PAIN: 0

## 2024-08-22 ASSESSMENT — PATIENT HEALTH QUESTIONNAIRE - PHQ9
8. MOVING OR SPEAKING SO SLOWLY THAT OTHER PEOPLE COULD HAVE NOTICED. OR THE OPPOSITE, BEING SO FIGETY OR RESTLESS THAT YOU HAVE BEEN MOVING AROUND A LOT MORE THAN USUAL: NOT AT ALL
3. TROUBLE FALLING OR STAYING ASLEEP: NOT AT ALL
SUM OF ALL RESPONSES TO PHQ QUESTIONS 1-9: 0
SUM OF ALL RESPONSES TO PHQ QUESTIONS 1-9: 0
9. THOUGHTS THAT YOU WOULD BE BETTER OFF DEAD, OR OF HURTING YOURSELF: NOT AT ALL
7. TROUBLE CONCENTRATING ON THINGS, SUCH AS READING THE NEWSPAPER OR WATCHING TELEVISION: NOT AT ALL
4. FEELING TIRED OR HAVING LITTLE ENERGY: NOT AT ALL
1. LITTLE INTEREST OR PLEASURE IN DOING THINGS: NOT AT ALL
5. POOR APPETITE OR OVEREATING: NOT AT ALL
2. FEELING DOWN, DEPRESSED OR HOPELESS: NOT AT ALL
SUM OF ALL RESPONSES TO PHQ QUESTIONS 1-9: 0
10. IF YOU CHECKED OFF ANY PROBLEMS, HOW DIFFICULT HAVE THESE PROBLEMS MADE IT FOR YOU TO DO YOUR WORK, TAKE CARE OF THINGS AT HOME, OR GET ALONG WITH OTHER PEOPLE: NOT DIFFICULT AT ALL
SUM OF ALL RESPONSES TO PHQ9 QUESTIONS 1 & 2: 0
6. FEELING BAD ABOUT YOURSELF - OR THAT YOU ARE A FAILURE OR HAVE LET YOURSELF OR YOUR FAMILY DOWN: NOT AT ALL
SUM OF ALL RESPONSES TO PHQ QUESTIONS 1-9: 0

## 2024-08-22 NOTE — PROGRESS NOTES
Attending Physician Statement  I have discussed the care of Alistair Hutchison, including pertinent history and exam findings,  with the resident. I have reviewed the key elements of all parts of the encounter with the resident.  I agree with the assessment, plan and orders as documented by the resident.  (GE Modifier)    Shaquille Simmons MD  
Visit Information    Have you changed or started any medications since your last visit including any over-the-counter medicines, vitamins, or herbal medicines? no   Have you stopped taking any of your medications? Is so, why? -  no  Are you having any side effects from any of your medications? - no    Have you seen any other physician or provider since your last visit?  no   Have you had any other diagnostic tests since your last visit?  no   Have you been seen in the emergency room and/or had an admission in a hospital since we last saw you?  no   Have you had your routine dental cleaning in the past 6 months?  no     Do you have an active MyChart account? If no, what is the barrier?  Yes    Patient Care Team:  Ashwin Burrows MD as PCP - General (Family Medicine)    Medical History Review  Past Medical, Family, and Social History reviewed and does not contribute to the patient presenting condition    Health Maintenance   Topic Date Due    Varicella vaccine (1 of 2 - 13+ 2-dose series) Never done    COVID-19 Vaccine (2 - 2023-24 season) 09/01/2023    Annual Wellness Visit (Medicare Advantage)  Never done    Depression Monitoring  03/09/2024    Flu vaccine (1) 08/01/2024    DTaP/Tdap/Td vaccine (8 - Td or Tdap) 04/09/2029    Hepatitis B vaccine  Completed    Hib vaccine  Completed    Polio vaccine  Completed    Meningococcal (ACWY) vaccine  Completed    Hepatitis C screen  Completed    HIV screen  Completed    Hepatitis A vaccine  Aged Out    HPV vaccine  Aged Out    Pneumococcal 0-64 years Vaccine  Aged Out    Lipids  Discontinued    Depression Screen  Discontinued             
self-injury, sleep disturbance and suicidal ideas.                  The patient has a No family history on file.    Objective:    /70 (Site: Left Upper Arm, Position: Sitting, Cuff Size: Medium Adult)   Pulse 83   Ht 1.676 m (5' 5.98\")   Wt 107.4 kg (236 lb 12.8 oz)   SpO2 95%   BMI 38.24 kg/m²    BP Readings from Last 3 Encounters:   08/22/24 105/70   04/10/23 114/77   03/09/23 (!) 144/88       Physical Exam  Constitutional:       Appearance: He is obese.   Cardiovascular:      Rate and Rhythm: Normal rate and regular rhythm.      Pulses: Normal pulses.      Heart sounds: Normal heart sounds. No murmur heard.        Lab Results   Component Value Date    CHOL 162 03/17/2023    TRIG 72 03/17/2023    HDL 37 (L) 03/17/2023    ALT 31 03/17/2023    AST 23 03/17/2023     03/17/2023    K 4.1 03/17/2023     03/17/2023    CREATININE 0.74 03/17/2023    BUN 11 03/17/2023    CO2 19 (L) 03/17/2023    TSH 3.49 03/17/2023    LABA1C 5.0 03/17/2023     Lab Results   Component Value Date    CALCIUM 9.4 03/17/2023     No results found for: \"LDLDIRECT\"    Assessment and Plan:    1. Hyperthyroidism, subclinical  - TSH; Future  - T4, Free; Future    2. Moderate mixed hyperlipidemia not requiring statin therapy  -Diet and exercise counseling provided  -Will try to work on weight    3.obesity  -Diet and exercise counseling provided        Requested Prescriptions      No prescriptions requested or ordered in this encounter       There are no discontinued medications.    Alistair received counseling on the following healthy behaviors: nutrition, exercise and medication adherence    Discussed use,benefit, and side effects of prescribed medications.  Barriers to medication compliance addressed.      All patient questions answered.  Pt voiced understanding.     No follow-ups on file.        Disclaimer: Some orall of this note was transcribed using voice-recognition software.This may cause typographical errors

## 2024-08-22 NOTE — PATIENT INSTRUCTIONS
Thank you for letting us take care of you today. We hope all your questions were addressed. If a question was overlooked or something else comes to mind after you return home, please contact a member of your Care Team listed below.      Your Care Team at [facility] is Team #  Josh Menendez M.D. (Faculty)  Roberth Garza M.D. (Resident)  Ashwin Burrows M.D. (Resident)   Kelsey Crouch M.D. (Resident)  Betito Workman M.D. (Resident)  Sharita Cornelius M.D. (Resident)  Alyson Sims., [facility]  Rose Marina, [facility]  Teresa Coto, [facility]  Rylan Rojas, CIARA [place], [facility]  Zora Edwards, [facility]  Luann Delgado, CIARA Ruelas (LJ) TAHMINA Goodwin (Clinical Practice Manager)  Viktoriya Arango [facility] (Clinical Pharmacist)     Office phone number: [phone]    If you need to get in right away due to illness, please be advised we have \"Same Day\" appointments available Monday-Friday. Please call us at [phone] option #3 to schedule your \"Same Day\" appointment.

## 2024-09-13 ENCOUNTER — HOSPITAL ENCOUNTER (EMERGENCY)
Age: 29
Discharge: PSYCHIATRIC OR SUBSTANCE USE FACILITY | End: 2024-09-13
Attending: EMERGENCY MEDICINE
Payer: COMMERCIAL

## 2024-09-13 ENCOUNTER — HOSPITAL ENCOUNTER (INPATIENT)
Age: 29
LOS: 19 days | Discharge: HOME | DRG: 751 | End: 2024-10-02
Attending: PSYCHIATRY & NEUROLOGY | Admitting: PSYCHIATRY & NEUROLOGY
Payer: COMMERCIAL

## 2024-09-13 VITALS
OXYGEN SATURATION: 93 % | BODY MASS INDEX: 40.37 KG/M2 | RESPIRATION RATE: 20 BRPM | WEIGHT: 250 LBS | HEART RATE: 86 BPM | SYSTOLIC BLOOD PRESSURE: 147 MMHG | DIASTOLIC BLOOD PRESSURE: 95 MMHG | TEMPERATURE: 97.9 F

## 2024-09-13 DIAGNOSIS — F23 ACUTE PSYCHOSIS (HCC): Primary | ICD-10-CM

## 2024-09-13 LAB
AMPHET UR QL SCN: NEGATIVE
ANION GAP SERPL CALCULATED.3IONS-SCNC: 11 MMOL/L (ref 9–17)
BARBITURATES UR QL SCN: NEGATIVE
BASOPHILS # BLD: 0.04 K/UL (ref 0–0.2)
BASOPHILS NFR BLD: 0 % (ref 0–2)
BENZODIAZ UR QL: NEGATIVE
BILIRUB UR QL STRIP: NEGATIVE
BUN SERPL-MCNC: 11 MG/DL (ref 6–20)
BUN/CREAT SERPL: 12 (ref 9–20)
CALCIUM SERPL-MCNC: 9.1 MG/DL (ref 8.6–10.4)
CANNABINOIDS UR QL SCN: NEGATIVE
CHLORIDE SERPL-SCNC: 107 MMOL/L (ref 98–107)
CLARITY UR: CLEAR
CO2 SERPL-SCNC: 20 MMOL/L (ref 20–31)
COCAINE UR QL SCN: NEGATIVE
COLOR UR: YELLOW
COMMENT: NORMAL
CREAT SERPL-MCNC: 0.9 MG/DL (ref 0.7–1.2)
EOSINOPHIL # BLD: 0.15 K/UL (ref 0–0.44)
EOSINOPHILS RELATIVE PERCENT: 2 % (ref 1–4)
ERYTHROCYTE [DISTWIDTH] IN BLOOD BY AUTOMATED COUNT: 13.2 % (ref 11.8–14.4)
ETHANOL PERCENT: <0.01 %
ETHANOLAMINE SERPL-MCNC: <10 MG/DL (ref 0–0.08)
FENTANYL UR QL: NEGATIVE
GFR, ESTIMATED: >90 ML/MIN/1.73M2
GLUCOSE SERPL-MCNC: 110 MG/DL (ref 70–99)
GLUCOSE UR STRIP-MCNC: NEGATIVE MG/DL
HCT VFR BLD AUTO: 38.4 % (ref 40.7–50.3)
HGB BLD-MCNC: 12.8 G/DL (ref 13–17)
HGB UR QL STRIP.AUTO: NEGATIVE
IMM GRANULOCYTES # BLD AUTO: 0.03 K/UL (ref 0–0.3)
IMM GRANULOCYTES NFR BLD: 0 %
KETONES UR STRIP-MCNC: NEGATIVE MG/DL
LEUKOCYTE ESTERASE UR QL STRIP: NEGATIVE
LYMPHOCYTES NFR BLD: 2.04 K/UL (ref 1.1–3.7)
LYMPHOCYTES RELATIVE PERCENT: 23 % (ref 24–43)
MCH RBC QN AUTO: 29.6 PG (ref 25.2–33.5)
MCHC RBC AUTO-ENTMCNC: 33.3 G/DL (ref 28.4–34.8)
MCV RBC AUTO: 88.7 FL (ref 82.6–102.9)
METHADONE UR QL: NEGATIVE
MONOCYTES NFR BLD: 0.67 K/UL (ref 0.1–1.2)
MONOCYTES NFR BLD: 7 % (ref 3–12)
NEUTROPHILS NFR BLD: 68 % (ref 36–65)
NEUTS SEG NFR BLD: 6.15 K/UL (ref 1.5–8.1)
NITRITE UR QL STRIP: NEGATIVE
NRBC BLD-RTO: 0 PER 100 WBC
OPIATES UR QL SCN: NEGATIVE
OXYCODONE UR QL SCN: NEGATIVE
PCP UR QL SCN: NEGATIVE
PH UR STRIP: 6.5 [PH] (ref 5–8)
PLATELET # BLD AUTO: 292 K/UL (ref 138–453)
PMV BLD AUTO: 9.4 FL (ref 8.1–13.5)
POTASSIUM SERPL-SCNC: 3.6 MMOL/L (ref 3.7–5.3)
PROT UR STRIP-MCNC: NEGATIVE MG/DL
RBC # BLD AUTO: 4.33 M/UL (ref 4.21–5.77)
SODIUM SERPL-SCNC: 138 MMOL/L (ref 135–144)
SP GR UR STRIP: 1.01 (ref 1–1.03)
TEST INFORMATION: NORMAL
UROBILINOGEN UR STRIP-ACNC: NORMAL EU/DL (ref 0–1)
WBC OTHER # BLD: 9.1 K/UL (ref 3.5–11.3)

## 2024-09-13 PROCEDURE — 81003 URINALYSIS AUTO W/O SCOPE: CPT

## 2024-09-13 PROCEDURE — 1240000000 HC EMOTIONAL WELLNESS R&B

## 2024-09-13 PROCEDURE — G0480 DRUG TEST DEF 1-7 CLASSES: HCPCS

## 2024-09-13 PROCEDURE — 99283 EMERGENCY DEPT VISIT LOW MDM: CPT

## 2024-09-13 PROCEDURE — 85025 COMPLETE CBC W/AUTO DIFF WBC: CPT

## 2024-09-13 PROCEDURE — 80307 DRUG TEST PRSMV CHEM ANLYZR: CPT

## 2024-09-13 PROCEDURE — 80048 BASIC METABOLIC PNL TOTAL CA: CPT

## 2024-09-13 ASSESSMENT — PATIENT HEALTH QUESTIONNAIRE - PHQ9
SUM OF ALL RESPONSES TO PHQ QUESTIONS 1-9: 0
5. POOR APPETITE OR OVEREATING: NOT AT ALL
2. FEELING DOWN, DEPRESSED OR HOPELESS: NOT AT ALL
SUM OF ALL RESPONSES TO PHQ9 QUESTIONS 1 & 2: 0
SUM OF ALL RESPONSES TO PHQ QUESTIONS 1-9: 0
7. TROUBLE CONCENTRATING ON THINGS, SUCH AS READING THE NEWSPAPER OR WATCHING TELEVISION: NOT AT ALL
6. FEELING BAD ABOUT YOURSELF - OR THAT YOU ARE A FAILURE OR HAVE LET YOURSELF OR YOUR FAMILY DOWN: NOT AT ALL
8. MOVING OR SPEAKING SO SLOWLY THAT OTHER PEOPLE COULD HAVE NOTICED. OR THE OPPOSITE, BEING SO FIGETY OR RESTLESS THAT YOU HAVE BEEN MOVING AROUND A LOT MORE THAN USUAL: NOT AT ALL
3. TROUBLE FALLING OR STAYING ASLEEP: NOT AT ALL
SUM OF ALL RESPONSES TO PHQ QUESTIONS 1-9: 0
10. IF YOU CHECKED OFF ANY PROBLEMS, HOW DIFFICULT HAVE THESE PROBLEMS MADE IT FOR YOU TO DO YOUR WORK, TAKE CARE OF THINGS AT HOME, OR GET ALONG WITH OTHER PEOPLE: NOT DIFFICULT AT ALL
1. LITTLE INTEREST OR PLEASURE IN DOING THINGS: NOT AT ALL
4. FEELING TIRED OR HAVING LITTLE ENERGY: NOT AT ALL
SUM OF ALL RESPONSES TO PHQ QUESTIONS 1-9: 0
9. THOUGHTS THAT YOU WOULD BE BETTER OFF DEAD, OR OF HURTING YOURSELF: NOT AT ALL

## 2024-09-13 ASSESSMENT — SLEEP AND FATIGUE QUESTIONNAIRES
DO YOU HAVE DIFFICULTY SLEEPING: YES
DO YOU USE A SLEEP AID: NO
SLEEP PATTERN: INSOMNIA
AVERAGE NUMBER OF SLEEP HOURS: 3

## 2024-09-13 ASSESSMENT — PAIN - FUNCTIONAL ASSESSMENT: PAIN_FUNCTIONAL_ASSESSMENT: NONE - DENIES PAIN

## 2024-09-14 PROCEDURE — 6370000000 HC RX 637 (ALT 250 FOR IP): Performed by: PSYCHIATRY & NEUROLOGY

## 2024-09-14 PROCEDURE — 99222 1ST HOSP IP/OBS MODERATE 55: CPT | Performed by: PSYCHIATRY & NEUROLOGY

## 2024-09-14 PROCEDURE — 6370000000 HC RX 637 (ALT 250 FOR IP): Performed by: INTERNAL MEDICINE

## 2024-09-14 PROCEDURE — 1240000000 HC EMOTIONAL WELLNESS R&B

## 2024-09-14 PROCEDURE — 6370000000 HC RX 637 (ALT 250 FOR IP)

## 2024-09-14 PROCEDURE — APPSS60 APP SPLIT SHARED TIME 46-60 MINUTES

## 2024-09-14 RX ORDER — HALOPERIDOL 5 MG/1
5 TABLET ORAL EVERY 6 HOURS PRN
Status: DISCONTINUED | OUTPATIENT
Start: 2024-09-14 | End: 2024-10-02 | Stop reason: HOSPADM

## 2024-09-14 RX ORDER — ZIPRASIDONE HYDROCHLORIDE 40 MG/1
40 CAPSULE ORAL 2 TIMES DAILY WITH MEALS
COMMUNITY

## 2024-09-14 RX ORDER — LORAZEPAM 1 MG/1
2 TABLET ORAL EVERY 6 HOURS PRN
Status: DISCONTINUED | OUTPATIENT
Start: 2024-09-14 | End: 2024-10-02 | Stop reason: HOSPADM

## 2024-09-14 RX ORDER — MAGNESIUM HYDROXIDE/ALUMINUM HYDROXICE/SIMETHICONE 120; 1200; 1200 MG/30ML; MG/30ML; MG/30ML
30 SUSPENSION ORAL EVERY 6 HOURS PRN
Status: DISCONTINUED | OUTPATIENT
Start: 2024-09-14 | End: 2024-10-02 | Stop reason: HOSPADM

## 2024-09-14 RX ORDER — OXCARBAZEPINE 600 MG/1
600 TABLET, FILM COATED ORAL NIGHTLY
Status: DISCONTINUED | OUTPATIENT
Start: 2024-09-14 | End: 2024-09-19

## 2024-09-14 RX ORDER — ACETAMINOPHEN 325 MG/1
650 TABLET ORAL EVERY 6 HOURS PRN
Status: DISCONTINUED | OUTPATIENT
Start: 2024-09-14 | End: 2024-10-02 | Stop reason: HOSPADM

## 2024-09-14 RX ORDER — IBUPROFEN 400 MG/1
400 TABLET, FILM COATED ORAL EVERY 6 HOURS PRN
Status: DISCONTINUED | OUTPATIENT
Start: 2024-09-14 | End: 2024-10-02 | Stop reason: HOSPADM

## 2024-09-14 RX ORDER — DIPHENHYDRAMINE HYDROCHLORIDE 50 MG/ML
50 INJECTION INTRAMUSCULAR; INTRAVENOUS EVERY 6 HOURS PRN
Status: DISCONTINUED | OUTPATIENT
Start: 2024-09-14 | End: 2024-10-02 | Stop reason: HOSPADM

## 2024-09-14 RX ORDER — POLYETHYLENE GLYCOL 3350 17 G
2 POWDER IN PACKET (EA) ORAL
Status: DISCONTINUED | OUTPATIENT
Start: 2024-09-14 | End: 2024-10-02 | Stop reason: HOSPADM

## 2024-09-14 RX ORDER — DIPHENHYDRAMINE HCL 25 MG
50 TABLET ORAL AS NEEDED
Status: ON HOLD | COMMUNITY
End: 2024-10-02 | Stop reason: HOSPADM

## 2024-09-14 RX ORDER — POLYETHYLENE GLYCOL 3350 17 G/17G
17 POWDER, FOR SOLUTION ORAL DAILY PRN
Status: DISCONTINUED | OUTPATIENT
Start: 2024-09-14 | End: 2024-10-02 | Stop reason: HOSPADM

## 2024-09-14 RX ORDER — TRAZODONE HYDROCHLORIDE 50 MG/1
50 TABLET, FILM COATED ORAL NIGHTLY PRN
Status: DISCONTINUED | OUTPATIENT
Start: 2024-09-14 | End: 2024-10-02 | Stop reason: HOSPADM

## 2024-09-14 RX ORDER — HYDROXYZINE HYDROCHLORIDE 50 MG/1
50 TABLET, FILM COATED ORAL 3 TIMES DAILY PRN
Status: DISCONTINUED | OUTPATIENT
Start: 2024-09-14 | End: 2024-10-02 | Stop reason: HOSPADM

## 2024-09-14 RX ORDER — HALOPERIDOL 5 MG/ML
5 INJECTION INTRAMUSCULAR EVERY 6 HOURS PRN
Status: DISCONTINUED | OUTPATIENT
Start: 2024-09-14 | End: 2024-10-02 | Stop reason: HOSPADM

## 2024-09-14 RX ORDER — LORAZEPAM 2 MG/ML
2 INJECTION INTRAMUSCULAR EVERY 6 HOURS PRN
Status: DISCONTINUED | OUTPATIENT
Start: 2024-09-14 | End: 2024-10-02 | Stop reason: HOSPADM

## 2024-09-14 RX ORDER — ZIPRASIDONE HYDROCHLORIDE 40 MG/1
40 CAPSULE ORAL 2 TIMES DAILY WITH MEALS
Status: DISCONTINUED | OUTPATIENT
Start: 2024-09-14 | End: 2024-10-02 | Stop reason: HOSPADM

## 2024-09-14 RX ORDER — TOPIRAMATE 100 MG/1
100 TABLET, FILM COATED ORAL 2 TIMES DAILY
Status: DISCONTINUED | OUTPATIENT
Start: 2024-09-14 | End: 2024-09-19

## 2024-09-14 RX ORDER — DIPHENHYDRAMINE HCL 25 MG
50 TABLET ORAL NIGHTLY
Status: ON HOLD | COMMUNITY
End: 2024-10-02 | Stop reason: HOSPADM

## 2024-09-14 RX ADMIN — ZIPRASIDONE HYDROCHLORIDE 40 MG: 40 CAPSULE ORAL at 17:12

## 2024-09-14 RX ADMIN — HYDROXYZINE HYDROCHLORIDE 50 MG: 50 TABLET, FILM COATED ORAL at 17:12

## 2024-09-14 RX ADMIN — TOPIRAMATE 100 MG: 100 TABLET, FILM COATED ORAL at 20:42

## 2024-09-14 RX ADMIN — OXCARBAZEPINE 600 MG: 600 TABLET, FILM COATED ORAL at 20:42

## 2024-09-14 ASSESSMENT — LIFESTYLE VARIABLES
HOW MANY STANDARD DRINKS CONTAINING ALCOHOL DO YOU HAVE ON A TYPICAL DAY: 1 OR 2
HOW OFTEN DO YOU HAVE A DRINK CONTAINING ALCOHOL: 2-4 TIMES A MONTH

## 2024-09-14 ASSESSMENT — PAIN SCALES - GENERAL: PAINLEVEL_OUTOF10: 0
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
## 2024-09-15 LAB
25(OH)D3 SERPL-MCNC: 51 NG/ML (ref 30–100)
CHOLEST SERPL-MCNC: 172 MG/DL
CHOLESTEROL/HDL RATIO: 5.4
HDLC SERPL-MCNC: 32 MG/DL
LDLC SERPL CALC-MCNC: 121 MG/DL (ref 0–130)
TRIGL SERPL-MCNC: 93 MG/DL

## 2024-09-15 PROCEDURE — 1240000000 HC EMOTIONAL WELLNESS R&B

## 2024-09-15 PROCEDURE — 99231 SBSQ HOSP IP/OBS SF/LOW 25: CPT | Performed by: INTERNAL MEDICINE

## 2024-09-15 PROCEDURE — APPSS30 APP SPLIT SHARED TIME 16-30 MINUTES

## 2024-09-15 PROCEDURE — 82306 VITAMIN D 25 HYDROXY: CPT

## 2024-09-15 PROCEDURE — 6370000000 HC RX 637 (ALT 250 FOR IP)

## 2024-09-15 PROCEDURE — 36415 COLL VENOUS BLD VENIPUNCTURE: CPT

## 2024-09-15 PROCEDURE — 6370000000 HC RX 637 (ALT 250 FOR IP): Performed by: INTERNAL MEDICINE

## 2024-09-15 PROCEDURE — 99232 SBSQ HOSP IP/OBS MODERATE 35: CPT | Performed by: PSYCHIATRY & NEUROLOGY

## 2024-09-15 PROCEDURE — 6370000000 HC RX 637 (ALT 250 FOR IP): Performed by: PSYCHIATRY & NEUROLOGY

## 2024-09-15 PROCEDURE — 80061 LIPID PANEL: CPT

## 2024-09-15 RX ADMIN — OXCARBAZEPINE 600 MG: 600 TABLET, FILM COATED ORAL at 21:03

## 2024-09-15 RX ADMIN — ZIPRASIDONE HYDROCHLORIDE 40 MG: 40 CAPSULE ORAL at 08:23

## 2024-09-15 RX ADMIN — TRAZODONE HYDROCHLORIDE 50 MG: 50 TABLET ORAL at 21:02

## 2024-09-15 RX ADMIN — ZIPRASIDONE HYDROCHLORIDE 40 MG: 40 CAPSULE ORAL at 18:18

## 2024-09-15 RX ADMIN — TOPIRAMATE 100 MG: 100 TABLET, FILM COATED ORAL at 08:23

## 2024-09-15 RX ADMIN — TOPIRAMATE 100 MG: 100 TABLET, FILM COATED ORAL at 21:02

## 2024-09-15 RX ADMIN — HYDROXYZINE HYDROCHLORIDE 50 MG: 50 TABLET, FILM COATED ORAL at 02:48

## 2024-09-15 ASSESSMENT — PAIN SCALES - GENERAL: PAINLEVEL_OUTOF10: 0

## 2024-09-16 PROCEDURE — 6370000000 HC RX 637 (ALT 250 FOR IP): Performed by: INTERNAL MEDICINE

## 2024-09-16 PROCEDURE — 99232 SBSQ HOSP IP/OBS MODERATE 35: CPT | Performed by: PSYCHIATRY & NEUROLOGY

## 2024-09-16 PROCEDURE — APPSS30 APP SPLIT SHARED TIME 16-30 MINUTES

## 2024-09-16 PROCEDURE — 6370000000 HC RX 637 (ALT 250 FOR IP)

## 2024-09-16 PROCEDURE — 6370000000 HC RX 637 (ALT 250 FOR IP): Performed by: PSYCHIATRY & NEUROLOGY

## 2024-09-16 PROCEDURE — 1240000000 HC EMOTIONAL WELLNESS R&B

## 2024-09-16 RX ADMIN — ZIPRASIDONE HYDROCHLORIDE 40 MG: 40 CAPSULE ORAL at 08:51

## 2024-09-16 RX ADMIN — HALOPERIDOL 5 MG: 5 TABLET ORAL at 23:20

## 2024-09-16 RX ADMIN — TOPIRAMATE 100 MG: 100 TABLET, FILM COATED ORAL at 21:19

## 2024-09-16 RX ADMIN — OXCARBAZEPINE 600 MG: 600 TABLET, FILM COATED ORAL at 21:19

## 2024-09-16 RX ADMIN — HYDROXYZINE HYDROCHLORIDE 50 MG: 50 TABLET, FILM COATED ORAL at 08:51

## 2024-09-16 RX ADMIN — ZIPRASIDONE HYDROCHLORIDE 40 MG: 40 CAPSULE ORAL at 18:30

## 2024-09-16 RX ADMIN — TOPIRAMATE 100 MG: 100 TABLET, FILM COATED ORAL at 08:51

## 2024-09-16 RX ADMIN — HYDROXYZINE HYDROCHLORIDE 50 MG: 50 TABLET, FILM COATED ORAL at 01:51

## 2024-09-16 RX ADMIN — HYDROXYZINE HYDROCHLORIDE 50 MG: 50 TABLET, FILM COATED ORAL at 21:19

## 2024-09-16 RX ADMIN — LORAZEPAM 2 MG: 1 TABLET ORAL at 23:20

## 2024-09-16 RX ADMIN — TRAZODONE HYDROCHLORIDE 50 MG: 50 TABLET ORAL at 21:19

## 2024-09-17 PROCEDURE — 6370000000 HC RX 637 (ALT 250 FOR IP)

## 2024-09-17 PROCEDURE — 99232 SBSQ HOSP IP/OBS MODERATE 35: CPT | Performed by: PSYCHIATRY & NEUROLOGY

## 2024-09-17 PROCEDURE — 1240000000 HC EMOTIONAL WELLNESS R&B

## 2024-09-17 PROCEDURE — 6370000000 HC RX 637 (ALT 250 FOR IP): Performed by: INTERNAL MEDICINE

## 2024-09-17 PROCEDURE — 90833 PSYTX W PT W E/M 30 MIN: CPT | Performed by: PSYCHIATRY & NEUROLOGY

## 2024-09-17 PROCEDURE — 6370000000 HC RX 637 (ALT 250 FOR IP): Performed by: PSYCHIATRY & NEUROLOGY

## 2024-09-17 PROCEDURE — APPSS30 APP SPLIT SHARED TIME 16-30 MINUTES

## 2024-09-17 RX ADMIN — HYDROXYZINE HYDROCHLORIDE 50 MG: 50 TABLET, FILM COATED ORAL at 21:18

## 2024-09-17 RX ADMIN — TRAZODONE HYDROCHLORIDE 50 MG: 50 TABLET ORAL at 21:18

## 2024-09-17 RX ADMIN — TOPIRAMATE 100 MG: 100 TABLET, FILM COATED ORAL at 07:29

## 2024-09-17 RX ADMIN — ZIPRASIDONE HYDROCHLORIDE 40 MG: 40 CAPSULE ORAL at 07:29

## 2024-09-17 RX ADMIN — IBUPROFEN 400 MG: 400 TABLET, FILM COATED ORAL at 15:47

## 2024-09-17 RX ADMIN — TOPIRAMATE 100 MG: 100 TABLET, FILM COATED ORAL at 21:18

## 2024-09-17 RX ADMIN — ZIPRASIDONE HYDROCHLORIDE 40 MG: 40 CAPSULE ORAL at 18:33

## 2024-09-17 RX ADMIN — OXCARBAZEPINE 600 MG: 600 TABLET, FILM COATED ORAL at 21:18

## 2024-09-17 ASSESSMENT — PAIN - FUNCTIONAL ASSESSMENT: PAIN_FUNCTIONAL_ASSESSMENT: ACTIVITIES ARE NOT PREVENTED

## 2024-09-17 ASSESSMENT — PAIN DESCRIPTION - LOCATION: LOCATION: FOOT;BACK

## 2024-09-17 ASSESSMENT — PAIN SCALES - GENERAL
PAINLEVEL_OUTOF10: 0
PAINLEVEL_OUTOF10: 3

## 2024-09-17 ASSESSMENT — PAIN DESCRIPTION - ORIENTATION: ORIENTATION: RIGHT;LEFT

## 2024-09-17 ASSESSMENT — PAIN DESCRIPTION - DESCRIPTORS: DESCRIPTORS: ACHING

## 2024-09-17 NOTE — GROUP NOTE
Group Therapy Note    Date: 9/17/2024    Group Start Time: 1000  Group End Time: 1030  Group Topic: Psychotherapy    [facility] L[facility]        Group Therapy Note    Attendees: 4/8       Patient's Goal:  Expression of feeling    Notes:  Therapeutic conversation cards provided and discussed.    Status After Intervention:  Unchanged    Participation Level: Monopolizing    Participation Quality: Sharing      Speech:  normal      Thought Process/Content: Flight of ideas      Affective Functioning: Congruent      Mood: euthymic      Level of consciousness:  Alert and Oriented x4      Response to Learning: Progressing to goal      Endings: None Reported    Modes of Intervention: Support      Discipline Responsible: /Counselor      Signature:  LEISA Martinez, RAY

## 2024-09-17 NOTE — GROUP NOTE
Group Therapy Note    Date: 9/17/2024    Group Start Time: 1000  Group End Time: 1030  Group Topic: Psychotherapy    [facility]        Group Therapy Note    Attendees: 3/7       Patient's Goal:  Expression of feeling    Notes:  Therapeutic conversation cards provided and discussed.    Status After Intervention:  Unchanged    Participation Level: Monopolizing    Participation Quality: Sharing      Speech:  normal      Thought Process/Content: Logical      Affective Functioning: Congruent      Mood: euthymic      Level of consciousness:  Alert and Oriented x4      Response to Learning: Progressing to goal      Endings: None Reported    Modes of Intervention: Support      Discipline Responsible: /Counselor      Signature:  LEISA Martinez, RAY

## 2024-09-17 NOTE — GROUP NOTE
Group Therapy Note    Date: 9/17/2024    Group Start Time: 1345  Group End Time: 1430  Group Topic: Psychoeducation    [facility] Ayse Lopez CTRS    Group Therapy Note    Attendees: 6/8     Patient's Goal:  Patient will demonstrate improved interpersonal skills    Notes:  Patient attended group and participated    Status After Intervention:  Unchanged    Participation Level: Active Listener and Interactive    Participation Quality: Appropriate, Attentive, and Sharing      Speech:  slurred      Thought Process/Content: Logical      Affective Functioning: Constricted/Restricted      Mood: euthymic      Level of consciousness:  Alert, Oriented x4, and Attentive      Response to Learning: Able to verbalize current knowledge/experience, Able to verbalize/acknowledge new learning, and Progressing to goal      Endings: None Reported    Modes of Intervention: Education, Support, Socialization, and Exploration      Discipline Responsible: Psychoeducational Specialist      Signature:  NIKKI Mensah

## 2024-09-18 PROCEDURE — 1240000000 HC EMOTIONAL WELLNESS R&B

## 2024-09-18 PROCEDURE — 6370000000 HC RX 637 (ALT 250 FOR IP): Performed by: PSYCHIATRY & NEUROLOGY

## 2024-09-18 PROCEDURE — 6370000000 HC RX 637 (ALT 250 FOR IP): Performed by: INTERNAL MEDICINE

## 2024-09-18 PROCEDURE — 6370000000 HC RX 637 (ALT 250 FOR IP)

## 2024-09-18 RX ORDER — OXCARBAZEPINE 600 MG/1
600 TABLET, FILM COATED ORAL NIGHTLY
Qty: 60 TABLET | Refills: 3 | Status: CANCELLED | OUTPATIENT
Start: 2024-09-18

## 2024-09-18 RX ORDER — TOPIRAMATE 100 MG/1
100 TABLET, FILM COATED ORAL 2 TIMES DAILY
Qty: 60 TABLET | Refills: 3 | Status: CANCELLED | OUTPATIENT
Start: 2024-09-18

## 2024-09-18 RX ADMIN — TOPIRAMATE 100 MG: 100 TABLET, FILM COATED ORAL at 22:58

## 2024-09-18 RX ADMIN — ZIPRASIDONE HYDROCHLORIDE 40 MG: 40 CAPSULE ORAL at 17:15

## 2024-09-18 RX ADMIN — ZIPRASIDONE HYDROCHLORIDE 40 MG: 40 CAPSULE ORAL at 08:33

## 2024-09-18 RX ADMIN — TRAZODONE HYDROCHLORIDE 50 MG: 50 TABLET ORAL at 22:58

## 2024-09-18 RX ADMIN — OXCARBAZEPINE 600 MG: 600 TABLET, FILM COATED ORAL at 22:58

## 2024-09-18 RX ADMIN — HYDROXYZINE HYDROCHLORIDE 50 MG: 50 TABLET, FILM COATED ORAL at 22:58

## 2024-09-18 RX ADMIN — HALOPERIDOL 5 MG: 5 TABLET ORAL at 22:58

## 2024-09-18 RX ADMIN — TOPIRAMATE 100 MG: 100 TABLET, FILM COATED ORAL at 08:31

## 2024-09-18 ASSESSMENT — LIFESTYLE VARIABLES
HOW OFTEN DO YOU HAVE A DRINK CONTAINING ALCOHOL: 2-4 TIMES A MONTH
HOW MANY STANDARD DRINKS CONTAINING ALCOHOL DO YOU HAVE ON A TYPICAL DAY: 1 OR 2

## 2024-09-18 NOTE — GROUP NOTE
Psych-Ed/Relapse Prevention Group Note        Date: September 18, 2024 Start Time:  10:30am   End Time:  11:15am      Number of Participants in Group & Unit Census:  5/9    Topic: Socialization skills    Goal of Group:Patient will demonstrate improved interpersonal skills      Comments:     Patient did not participate in Psych-Ed/Relapse Prevention group, despite staff encouragement and explanation of benefits.  Patient remain seclusive to self.  Q15 minute safety checks maintained for patient safety and will continue to encourage patient to attend unit programming.         Signature:  ELOINA MensahS

## 2024-09-18 NOTE — CARE COORDINATION
Writer spoke with Jacqueline, grandmother/legal guardian who confirmed previous observations of Alistair's grandfather. She states Mr. Chacko plans to visit Alistair on unit today and will assess how he believes Alistair is doing. Writer offered ongoing support.

## 2024-09-18 NOTE — CARE COORDINATION
Writer spoke with Mr. Chacko, Alistair's grandfather who states during visit yesterday he was concerned about Alistair's behavior; he states during the visit, he was mumbling throughout and was difficult to understand, he states Alistair would \"not focus on the visit,\" that it was difficult keeping his attention. Mr. Chacko reports this as concerning because prior to hospitalization, Alistair was leaving the home during the night and wandering in the neighborhood. MD updated. Writer also left voice mail for Mrs. Chacko [phone].

## 2024-09-19 DIAGNOSIS — F23 ACUTE PSYCHOSIS (HCC): Primary | ICD-10-CM

## 2024-09-19 PROCEDURE — 6370000000 HC RX 637 (ALT 250 FOR IP): Performed by: INTERNAL MEDICINE

## 2024-09-19 PROCEDURE — 99232 SBSQ HOSP IP/OBS MODERATE 35: CPT | Performed by: PSYCHIATRY & NEUROLOGY

## 2024-09-19 PROCEDURE — 6370000000 HC RX 637 (ALT 250 FOR IP)

## 2024-09-19 PROCEDURE — APPSS30 APP SPLIT SHARED TIME 16-30 MINUTES

## 2024-09-19 PROCEDURE — 90833 PSYTX W PT W E/M 30 MIN: CPT | Performed by: PSYCHIATRY & NEUROLOGY

## 2024-09-19 PROCEDURE — 6370000000 HC RX 637 (ALT 250 FOR IP): Performed by: PSYCHIATRY & NEUROLOGY

## 2024-09-19 PROCEDURE — 1240000000 HC EMOTIONAL WELLNESS R&B

## 2024-09-19 RX ADMIN — TRAZODONE HYDROCHLORIDE 50 MG: 50 TABLET ORAL at 21:54

## 2024-09-19 RX ADMIN — ZIPRASIDONE HYDROCHLORIDE 40 MG: 40 CAPSULE ORAL at 16:52

## 2024-09-19 RX ADMIN — HYDROXYZINE HYDROCHLORIDE 50 MG: 50 TABLET, FILM COATED ORAL at 21:54

## 2024-09-19 RX ADMIN — ALUMINUM HYDROXIDE, MAGNESIUM HYDROXIDE, AND SIMETHICONE 30 ML: 200; 200; 20 SUSPENSION ORAL at 20:32

## 2024-09-19 RX ADMIN — ZIPRASIDONE HYDROCHLORIDE 40 MG: 40 CAPSULE ORAL at 07:58

## 2024-09-19 RX ADMIN — TOPIRAMATE 100 MG: 100 TABLET, FILM COATED ORAL at 07:58

## 2024-09-19 ASSESSMENT — PAIN - FUNCTIONAL ASSESSMENT: PAIN_FUNCTIONAL_ASSESSMENT: ACTIVITIES ARE NOT PREVENTED

## 2024-09-19 NOTE — GROUP NOTE
Group Therapy Note    Date: 9/19/2024    Group Start Time: 1100  Group End Time: 1145  Group Topic: Psychoeducation    [facility]    Ayse Fung CTRS    Group Therapy Note    Attendees: 4/6     Patient's Goal:  Patient will identify benefits of leisure for coping and stress management    Notes:  Patient attended group and participated on and off but had difficulty maintaining attention.  Patient is loud and pressured.      Status After Intervention:  Unchanged    Participation Level:  Interactive    Participation Quality: Intrusive      Speech:  loud      Thought Process/Content: Flight of ideas      Affective Functioning: Constricted/Restricted      Mood: Labile      Level of consciousness:  Alert      Response to Learning: Able to verbalize current knowledge/experience     Endings: None Reported    Modes of Intervention: Education, Support, Socialization, and Exploration      Discipline Responsible: Psychoeducational Specialist      Signature:  NIKKI Mensah

## 2024-09-20 ENCOUNTER — ANESTHESIA (OUTPATIENT)
Dept: POSTOP/PACU | Age: 29
End: 2024-09-20
Payer: COMMERCIAL

## 2024-09-20 ENCOUNTER — ANESTHESIA EVENT (OUTPATIENT)
Dept: POSTOP/PACU | Age: 29
End: 2024-09-20
Payer: COMMERCIAL

## 2024-09-20 ENCOUNTER — APPOINTMENT (OUTPATIENT)
Dept: POSTOP/PACU | Age: 29
End: 2024-09-20
Payer: COMMERCIAL

## 2024-09-20 PROBLEM — F20.1 DISORGANIZED SCHIZOPHRENIA (HCC): Status: ACTIVE | Noted: 2024-09-20

## 2024-09-20 LAB
ALBUMIN SERPL-MCNC: 4.2 G/DL (ref 3.5–5.2)
ALP SERPL-CCNC: 84 U/L (ref 40–129)
ALT SERPL-CCNC: 36 U/L (ref 5–41)
ANION GAP SERPL CALCULATED.3IONS-SCNC: 12 MMOL/L (ref 9–17)
AST SERPL-CCNC: 17 U/L
BASOPHILS # BLD: 0 K/UL (ref 0–0.2)
BASOPHILS NFR BLD: 0 % (ref 0–2)
BILIRUB SERPL-MCNC: 0.3 MG/DL (ref 0.3–1.2)
BUN SERPL-MCNC: 15 MG/DL (ref 6–20)
CALCIUM SERPL-MCNC: 9.4 MG/DL (ref 8.6–10.4)
CHLORIDE SERPL-SCNC: 105 MMOL/L (ref 98–107)
CO2 SERPL-SCNC: 21 MMOL/L (ref 20–31)
CREAT SERPL-MCNC: 0.8 MG/DL (ref 0.7–1.2)
EOSINOPHIL # BLD: 0.2 K/UL (ref 0–0.4)
EOSINOPHILS RELATIVE PERCENT: 2 % (ref 0–4)
ERYTHROCYTE [DISTWIDTH] IN BLOOD BY AUTOMATED COUNT: 13.6 % (ref 11.5–14.9)
GFR, ESTIMATED: >90 ML/MIN/1.73M2
GLUCOSE BLD-MCNC: 101 MG/DL (ref 75–110)
GLUCOSE SERPL-MCNC: 104 MG/DL (ref 70–99)
HCT VFR BLD AUTO: 41.9 % (ref 41–53)
HGB BLD-MCNC: 14.2 G/DL (ref 13.5–17.5)
LYMPHOCYTES NFR BLD: 2 K/UL (ref 1–4.8)
LYMPHOCYTES RELATIVE PERCENT: 17 % (ref 24–44)
MCH RBC QN AUTO: 29.3 PG (ref 26–34)
MCHC RBC AUTO-ENTMCNC: 34 G/DL (ref 31–37)
MCV RBC AUTO: 86.3 FL (ref 80–100)
MONOCYTES NFR BLD: 1.2 K/UL (ref 0.1–1.3)
MONOCYTES NFR BLD: 11 % (ref 1–7)
NEUTROPHILS NFR BLD: 70 % (ref 36–66)
NEUTS SEG NFR BLD: 8.1 K/UL (ref 1.3–9.1)
PLATELET # BLD AUTO: 302 K/UL (ref 150–450)
PMV BLD AUTO: 7.2 FL (ref 6–12)
POTASSIUM SERPL-SCNC: 3.9 MMOL/L (ref 3.7–5.3)
PROT SERPL-MCNC: 7.3 G/DL (ref 6.4–8.3)
RBC # BLD AUTO: 4.86 M/UL (ref 4.5–5.9)
SODIUM SERPL-SCNC: 138 MMOL/L (ref 135–144)
WBC OTHER # BLD: 11.6 K/UL (ref 3.5–11)

## 2024-09-20 PROCEDURE — 90870 ELECTROCONVULSIVE THERAPY: CPT | Performed by: PSYCHIATRY & NEUROLOGY

## 2024-09-20 PROCEDURE — GZB2ZZZ ELECTROCONVULSIVE THERAPY, BILATERAL-SINGLE SEIZURE: ICD-10-PCS | Performed by: PSYCHIATRY & NEUROLOGY

## 2024-09-20 PROCEDURE — 6370000000 HC RX 637 (ALT 250 FOR IP): Performed by: PSYCHIATRY & NEUROLOGY

## 2024-09-20 PROCEDURE — 85025 COMPLETE CBC W/AUTO DIFF WBC: CPT

## 2024-09-20 PROCEDURE — 80053 COMPREHEN METABOLIC PANEL: CPT

## 2024-09-20 PROCEDURE — 3700000000 HC ANESTHESIA ATTENDED CARE: Performed by: ANESTHESIOLOGY

## 2024-09-20 PROCEDURE — APPSS30 APP SPLIT SHARED TIME 16-30 MINUTES

## 2024-09-20 PROCEDURE — 90870 ELECTROCONVULSIVE THERAPY: CPT | Performed by: ANESTHESIOLOGY

## 2024-09-20 PROCEDURE — 1240000000 HC EMOTIONAL WELLNESS R&B

## 2024-09-20 PROCEDURE — 2580000003 HC RX 258: Performed by: ANESTHESIOLOGY

## 2024-09-20 PROCEDURE — 99232 SBSQ HOSP IP/OBS MODERATE 35: CPT | Performed by: PSYCHIATRY & NEUROLOGY

## 2024-09-20 PROCEDURE — 6370000000 HC RX 637 (ALT 250 FOR IP)

## 2024-09-20 PROCEDURE — 7100000000 HC PACU RECOVERY - FIRST 15 MIN: Performed by: ANESTHESIOLOGY

## 2024-09-20 PROCEDURE — 36415 COLL VENOUS BLD VENIPUNCTURE: CPT

## 2024-09-20 PROCEDURE — 2500000003 HC RX 250 WO HCPCS: Performed by: NURSE ANESTHETIST, CERTIFIED REGISTERED

## 2024-09-20 PROCEDURE — 7100000001 HC PACU RECOVERY - ADDTL 15 MIN: Performed by: ANESTHESIOLOGY

## 2024-09-20 PROCEDURE — 82947 ASSAY GLUCOSE BLOOD QUANT: CPT

## 2024-09-20 RX ORDER — SODIUM CHLORIDE, SODIUM LACTATE, POTASSIUM CHLORIDE, CALCIUM CHLORIDE 600; 310; 30; 20 MG/100ML; MG/100ML; MG/100ML; MG/100ML
INJECTION, SOLUTION INTRAVENOUS CONTINUOUS
Status: DISCONTINUED | OUTPATIENT
Start: 2024-09-20 | End: 2024-10-02 | Stop reason: HOSPADM

## 2024-09-20 RX ORDER — SUCCINYLCHOLINE/SOD CL,ISO/PF 200MG/10ML
SYRINGE (ML) INTRAVENOUS
Status: COMPLETED
Start: 2024-09-20 | End: 2024-09-20

## 2024-09-20 RX ORDER — SUCCINYLCHOLINE/SOD CL,ISO/PF 200MG/10ML
SYRINGE (ML) INTRAVENOUS
Status: DISCONTINUED | OUTPATIENT
Start: 2024-09-20 | End: 2024-09-20 | Stop reason: SDUPTHER

## 2024-09-20 RX ORDER — ETOMIDATE 2 MG/ML
INJECTION INTRAVENOUS
Status: COMPLETED
Start: 2024-09-20 | End: 2024-09-20

## 2024-09-20 RX ORDER — ETOMIDATE 2 MG/ML
INJECTION INTRAVENOUS
Status: DISCONTINUED | OUTPATIENT
Start: 2024-09-20 | End: 2024-09-20 | Stop reason: SDUPTHER

## 2024-09-20 RX ADMIN — TRAZODONE HYDROCHLORIDE 50 MG: 50 TABLET ORAL at 22:30

## 2024-09-20 RX ADMIN — ZIPRASIDONE HYDROCHLORIDE 40 MG: 40 CAPSULE ORAL at 17:30

## 2024-09-20 RX ADMIN — Medication 120 MG: at 08:31

## 2024-09-20 RX ADMIN — SODIUM CHLORIDE, POTASSIUM CHLORIDE, SODIUM LACTATE AND CALCIUM CHLORIDE: 600; 310; 30; 20 INJECTION, SOLUTION INTRAVENOUS at 08:12

## 2024-09-20 RX ADMIN — ACETAMINOPHEN 650 MG: 325 TABLET ORAL at 06:22

## 2024-09-20 RX ADMIN — ZIPRASIDONE HYDROCHLORIDE 40 MG: 40 CAPSULE ORAL at 11:06

## 2024-09-20 RX ADMIN — ETOMIDATE INJECTION 20 MG: 2 SOLUTION INTRAVENOUS at 08:31

## 2024-09-20 RX ADMIN — HYDROXYZINE HYDROCHLORIDE 50 MG: 50 TABLET, FILM COATED ORAL at 22:30

## 2024-09-20 ASSESSMENT — PAIN - FUNCTIONAL ASSESSMENT
PAIN_FUNCTIONAL_ASSESSMENT: CRITICAL CARE PAIN OBSERVATION TOOL (CPOT)
PAIN_FUNCTIONAL_ASSESSMENT: 0-10

## 2024-09-20 ASSESSMENT — PAIN SCALES - GENERAL
PAINLEVEL_OUTOF10: 0

## 2024-09-20 NOTE — PROCEDURES
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)  Postprocedure diagnosis: SAME AS PRE-PROCEDURE DIAGNOSIS  Treatment Number: This is treatment # 1   Patient Status: BEHAVIOR IP   Type of ECT: Bilateral Brief Pulse  Medications  Preprocedure: Tylenol 650mg  Anesthetic: Etomidate 20 mg  Paralytic: Succinylcholine 120 mg  Post procedure: none needed   Cuff placement: Left Lower Extremity    MECTA Settings 1st Stimulus:     Pulse width: 1.0 ms   Frequency:  40 hz   Duration:   2.0 seconds   Static Impedance: 1057 ohms             Dynamic Impedance: 243 ohms             Motor Seizure Duration: 50 seconds  EEG Seizure Duration: 83 seconds             The patient's ECT treatment was performed using MECTA machine  .  Timeout:  Time out was performed using two patient identifiers and confirmation of procedure.     Patient Preparation: Preprocedure documentation, labs, H&P were all verified and reviewed.  The patient was placed in supine position.  EEG leads were placed for monitoring of seizure.   [place] areas bilaterally cleaned and prepped.  Conductive gel  was applied over stimulus electrode site.   The patient was pre-oxygenated.       Procedure in detail: Medications were administered by anesthesia using intravenous access at the doses listed previously in this summary.  Anesthetic administered and once confirmation the patient is anesthetized, the patient's blood pressure cuff on lower extremity was inflated to 100mmHg in excess of patient's blood pressure.  At this time, the neuromuscular blockade was administered intravenously by anesthesia.  Upper extremity fasciculation were verified followed by lower extremity fasciculation.  Once fasciculations terminated, confirmation of affective neuromuscular blockade demonstrated by absence of withdrawal reflex.  Bite blocks were put in place.

## 2024-09-21 PROCEDURE — 99232 SBSQ HOSP IP/OBS MODERATE 35: CPT | Performed by: PSYCHIATRY & NEUROLOGY

## 2024-09-21 PROCEDURE — APPSS30 APP SPLIT SHARED TIME 16-30 MINUTES

## 2024-09-21 PROCEDURE — 1240000000 HC EMOTIONAL WELLNESS R&B

## 2024-09-21 PROCEDURE — 6370000000 HC RX 637 (ALT 250 FOR IP): Performed by: PSYCHIATRY & NEUROLOGY

## 2024-09-21 PROCEDURE — 6370000000 HC RX 637 (ALT 250 FOR IP)

## 2024-09-21 RX ADMIN — HYOSCYAMINE SULFATE 0.12 MG: 0.12 TABLET SUBLINGUAL at 14:13

## 2024-09-21 RX ADMIN — IBUPROFEN 400 MG: 400 TABLET, FILM COATED ORAL at 08:12

## 2024-09-21 RX ADMIN — HYOSCYAMINE SULFATE 0.12 MG: 0.12 TABLET SUBLINGUAL at 21:01

## 2024-09-21 RX ADMIN — ZIPRASIDONE HYDROCHLORIDE 40 MG: 40 CAPSULE ORAL at 17:17

## 2024-09-21 RX ADMIN — HYDROXYZINE HYDROCHLORIDE 50 MG: 50 TABLET, FILM COATED ORAL at 08:12

## 2024-09-21 RX ADMIN — ZIPRASIDONE HYDROCHLORIDE 40 MG: 40 CAPSULE ORAL at 08:12

## 2024-09-21 ASSESSMENT — PAIN SCALES - GENERAL: PAINLEVEL_OUTOF10: 5

## 2024-09-22 PROCEDURE — 6370000000 HC RX 637 (ALT 250 FOR IP): Performed by: PSYCHIATRY & NEUROLOGY

## 2024-09-22 PROCEDURE — APPSS30 APP SPLIT SHARED TIME 16-30 MINUTES: Performed by: NURSE PRACTITIONER

## 2024-09-22 PROCEDURE — 6370000000 HC RX 637 (ALT 250 FOR IP)

## 2024-09-22 PROCEDURE — 1240000000 HC EMOTIONAL WELLNESS R&B

## 2024-09-22 PROCEDURE — 99232 SBSQ HOSP IP/OBS MODERATE 35: CPT | Performed by: PSYCHIATRY & NEUROLOGY

## 2024-09-22 RX ADMIN — HYOSCYAMINE SULFATE 0.12 MG: 0.12 TABLET SUBLINGUAL at 13:43

## 2024-09-22 RX ADMIN — HYDROXYZINE HYDROCHLORIDE 50 MG: 50 TABLET, FILM COATED ORAL at 10:16

## 2024-09-22 RX ADMIN — IBUPROFEN 400 MG: 400 TABLET, FILM COATED ORAL at 17:10

## 2024-09-22 RX ADMIN — HYOSCYAMINE SULFATE 0.12 MG: 0.12 TABLET SUBLINGUAL at 08:25

## 2024-09-22 RX ADMIN — TRAZODONE HYDROCHLORIDE 50 MG: 50 TABLET ORAL at 20:56

## 2024-09-22 RX ADMIN — HYOSCYAMINE SULFATE 0.12 MG: 0.12 TABLET SUBLINGUAL at 20:56

## 2024-09-22 RX ADMIN — HYDROXYZINE HYDROCHLORIDE 50 MG: 50 TABLET, FILM COATED ORAL at 20:56

## 2024-09-22 RX ADMIN — ACETAMINOPHEN 650 MG: 325 TABLET ORAL at 14:29

## 2024-09-22 RX ADMIN — ZIPRASIDONE HYDROCHLORIDE 40 MG: 40 CAPSULE ORAL at 17:10

## 2024-09-22 RX ADMIN — ZIPRASIDONE HYDROCHLORIDE 40 MG: 40 CAPSULE ORAL at 08:25

## 2024-09-22 ASSESSMENT — PAIN SCALES - GENERAL: PAINLEVEL_OUTOF10: 5

## 2024-09-23 ENCOUNTER — APPOINTMENT (OUTPATIENT)
Dept: POSTOP/PACU | Age: 29
DRG: 751 | End: 2024-09-23
Payer: COMMERCIAL

## 2024-09-23 ENCOUNTER — ANESTHESIA EVENT (OUTPATIENT)
Dept: POSTOP/PACU | Age: 29
End: 2024-09-23
Payer: COMMERCIAL

## 2024-09-23 ENCOUNTER — ANESTHESIA (OUTPATIENT)
Dept: POSTOP/PACU | Age: 29
End: 2024-09-23
Payer: COMMERCIAL

## 2024-09-23 DIAGNOSIS — F23 ACUTE PSYCHOSIS (HCC): Primary | ICD-10-CM

## 2024-09-23 LAB
GLUCOSE BLD-MCNC: 109 MG/DL (ref 75–110)
GLUCOSE BLD-MCNC: 95 MG/DL (ref 75–110)

## 2024-09-23 PROCEDURE — 6370000000 HC RX 637 (ALT 250 FOR IP): Performed by: PSYCHIATRY & NEUROLOGY

## 2024-09-23 PROCEDURE — 90870 ELECTROCONVULSIVE THERAPY: CPT

## 2024-09-23 PROCEDURE — 1240000000 HC EMOTIONAL WELLNESS R&B

## 2024-09-23 PROCEDURE — 6370000000 HC RX 637 (ALT 250 FOR IP)

## 2024-09-23 PROCEDURE — GZB2ZZZ ELECTROCONVULSIVE THERAPY, BILATERAL-SINGLE SEIZURE: ICD-10-PCS | Performed by: PSYCHIATRY & NEUROLOGY

## 2024-09-23 PROCEDURE — 7100000001 HC PACU RECOVERY - ADDTL 15 MIN: Performed by: ANESTHESIOLOGY

## 2024-09-23 PROCEDURE — 82947 ASSAY GLUCOSE BLOOD QUANT: CPT

## 2024-09-23 PROCEDURE — 3700000000 HC ANESTHESIA ATTENDED CARE: Performed by: ANESTHESIOLOGY

## 2024-09-23 PROCEDURE — 99232 SBSQ HOSP IP/OBS MODERATE 35: CPT | Performed by: PSYCHIATRY & NEUROLOGY

## 2024-09-23 PROCEDURE — 90870 ELECTROCONVULSIVE THERAPY: CPT | Performed by: PSYCHIATRY & NEUROLOGY

## 2024-09-23 PROCEDURE — 7100000000 HC PACU RECOVERY - FIRST 15 MIN: Performed by: ANESTHESIOLOGY

## 2024-09-23 PROCEDURE — 2580000003 HC RX 258: Performed by: ANESTHESIOLOGY

## 2024-09-23 PROCEDURE — APPSS30 APP SPLIT SHARED TIME 16-30 MINUTES: Performed by: NURSE PRACTITIONER

## 2024-09-23 PROCEDURE — 2500000003 HC RX 250 WO HCPCS: Performed by: NURSE ANESTHETIST, CERTIFIED REGISTERED

## 2024-09-23 RX ORDER — SUCCINYLCHOLINE/SOD CL,ISO/PF 200MG/10ML
SYRINGE (ML) INTRAVENOUS
Status: DISCONTINUED | OUTPATIENT
Start: 2024-09-23 | End: 2024-09-23 | Stop reason: SDUPTHER

## 2024-09-23 RX ORDER — ETOMIDATE 2 MG/ML
INJECTION INTRAVENOUS
Status: COMPLETED
Start: 2024-09-23 | End: 2024-09-23

## 2024-09-23 RX ORDER — ETOMIDATE 2 MG/ML
INJECTION INTRAVENOUS
Status: DISCONTINUED | OUTPATIENT
Start: 2024-09-23 | End: 2024-09-23 | Stop reason: SDUPTHER

## 2024-09-23 RX ORDER — SUCCINYLCHOLINE/SOD CL,ISO/PF 200MG/10ML
SYRINGE (ML) INTRAVENOUS
Status: COMPLETED
Start: 2024-09-23 | End: 2024-09-23

## 2024-09-23 RX ADMIN — HYDROXYZINE HYDROCHLORIDE 50 MG: 50 TABLET, FILM COATED ORAL at 05:42

## 2024-09-23 RX ADMIN — HYOSCYAMINE SULFATE 0.12 MG: 0.12 TABLET SUBLINGUAL at 10:13

## 2024-09-23 RX ADMIN — ETOMIDATE INJECTION 20 MG: 2 SOLUTION INTRAVENOUS at 09:13

## 2024-09-23 RX ADMIN — IBUPROFEN 400 MG: 400 TABLET, FILM COATED ORAL at 10:19

## 2024-09-23 RX ADMIN — ZIPRASIDONE HYDROCHLORIDE 40 MG: 40 CAPSULE ORAL at 16:36

## 2024-09-23 RX ADMIN — ACETAMINOPHEN 650 MG: 325 TABLET ORAL at 07:59

## 2024-09-23 RX ADMIN — Medication 120 MG: at 09:13

## 2024-09-23 RX ADMIN — HYOSCYAMINE SULFATE 0.12 MG: 0.12 TABLET SUBLINGUAL at 16:13

## 2024-09-23 RX ADMIN — ACETAMINOPHEN 650 MG: 325 TABLET ORAL at 16:36

## 2024-09-23 RX ADMIN — IBUPROFEN 400 MG: 400 TABLET, FILM COATED ORAL at 18:47

## 2024-09-23 RX ADMIN — ZIPRASIDONE HYDROCHLORIDE 40 MG: 40 CAPSULE ORAL at 10:13

## 2024-09-23 RX ADMIN — SODIUM CHLORIDE, POTASSIUM CHLORIDE, SODIUM LACTATE AND CALCIUM CHLORIDE: 600; 310; 30; 20 INJECTION, SOLUTION INTRAVENOUS at 09:00

## 2024-09-23 ASSESSMENT — PAIN SCALES - GENERAL
PAINLEVEL_OUTOF10: 4
PAINLEVEL_OUTOF10: 0
PAINLEVEL_OUTOF10: 5
PAINLEVEL_OUTOF10: 0

## 2024-09-23 ASSESSMENT — PAIN DESCRIPTION - LOCATION
LOCATION: HEAD
LOCATION: HEAD

## 2024-09-23 ASSESSMENT — ENCOUNTER SYMPTOMS: SHORTNESS OF BREATH: 0

## 2024-09-23 ASSESSMENT — PAIN DESCRIPTION - DESCRIPTORS: DESCRIPTORS: ACHING

## 2024-09-23 ASSESSMENT — PAIN - FUNCTIONAL ASSESSMENT
PAIN_FUNCTIONAL_ASSESSMENT: 0-10
PAIN_FUNCTIONAL_ASSESSMENT: 0-10

## 2024-09-23 NOTE — PROCEDURES
Behavioral Services                                              Medicare Re-Certification    I certify that the inpatient psychiatric hospital services furnished since the previous certification/re-certification were, and continue to be, medically necessary for;    [x] (1) Treatment which could reasonably be expected to improve the patient's condition,    [x] (2) Or for diagnostic study.    Estimated length of stay/service 7 to 10 days    Plan for post-hospital care home with outpatient follow-up and potential ECT follow-up    This patient continues to need, on a daily basis, active treatment furnished directly by or requiring the supervision of inpatient psychiatric personnel.    Electronically signed by HAROLDO BARNETT MD on 9/23/2024 at 9:07 AM

## 2024-09-23 NOTE — GROUP NOTE
Group Therapy Note    Date: 9/23/2024    Group Start Time: 1340  Group End Time: 1420  Group Topic: Cognitive Skills    Penny Agudelo CTRS        Group Therapy Note    Attendees: 4/8     Topic: Patient's Goal:  To increase socialization, practice self expression, and explore coping skills   using art and discussion.           Comments:   Patient did not participate in Cognitive Skills Group, at 13:40 despite staff encouragement   and explanation of benefits.   Pt remained seclusive to self and room for most of group, pt came out to group area briefly and listened to music and waved at RT then returned to his room despite invitation to join group..  Q15 minute safety checks maintained for patient safety and will   continue to encourage patient to attend unit programming.       Discipline Responsible: Psychoeducational Specialist   Signature: NIKKI MCFARLAND

## 2024-09-23 NOTE — PROCEDURES
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)  Postprocedure diagnosis: SAME AS PRE-PROCEDURE DIAGNOSIS  Treatment Number: This is treatment # 2   Patient Status: BEHAVIOR IP   Type of ECT: Bilateral Brief Pulse  Medications  Preprocedure: Tylenol 650mg  Anesthetic: Etomidate 20 mg  Paralytic: Succinylcholine 120 mg  Post procedure: none needed   Cuff placement: Left Lower Extremity    MECTA Settings 1st Stimulus:     Pulse width: 1.0 ms   Frequency:  40 hz   Duration:   2.0 seconds   Static Impedance: 1069 ohms             Dynamic Impedance: 215 ohms             Motor Seizure Duration: 35 seconds  EEG Seizure Duration: 47 seconds             The patient's ECT treatment was performed using MECTA machine  .  Timeout:  Time out was performed using two patient identifiers and confirmation of procedure.     Patient Preparation: Preprocedure documentation, labs, H&P were all verified and reviewed.  The patient was placed in supine position.  EEG leads were placed for monitoring of seizure.   [place] areas bilaterally cleaned and prepped.  Conductive gel  was applied over stimulus electrode site.   The patient was pre-oxygenated.       Procedure in detail: Medications were administered by anesthesia using intravenous access at the doses listed previously in this summary.  Anesthetic administered and once confirmation the patient is anesthetized, the patient's blood pressure cuff on lower extremity was inflated to 100mmHg in excess of patient's blood pressure.  At this time, the neuromuscular blockade was administered intravenously by anesthesia.  Upper extremity fasciculation were verified followed by lower extremity fasciculation.  Once fasciculations terminated, confirmation of affective neuromuscular blockade demonstrated by absence of withdrawal reflex.  Bite blocks were put in place.

## 2024-09-24 LAB
BILIRUB UR QL STRIP: NEGATIVE
CLARITY UR: CLEAR
COLOR UR: YELLOW
COMMENT: NORMAL
GLUCOSE UR STRIP-MCNC: NEGATIVE MG/DL
HGB UR QL STRIP.AUTO: NEGATIVE
KETONES UR STRIP-MCNC: NEGATIVE MG/DL
LEUKOCYTE ESTERASE UR QL STRIP: NEGATIVE
NITRITE UR QL STRIP: NEGATIVE
PH UR STRIP: 6.5 [PH] (ref 5–8)
PROT UR STRIP-MCNC: NEGATIVE MG/DL
SP GR UR STRIP: 1.02 (ref 1–1.03)
UROBILINOGEN UR STRIP-ACNC: NORMAL EU/DL (ref 0–1)

## 2024-09-24 PROCEDURE — 6370000000 HC RX 637 (ALT 250 FOR IP)

## 2024-09-24 PROCEDURE — 99232 SBSQ HOSP IP/OBS MODERATE 35: CPT | Performed by: PSYCHIATRY & NEUROLOGY

## 2024-09-24 PROCEDURE — 81003 URINALYSIS AUTO W/O SCOPE: CPT

## 2024-09-24 PROCEDURE — APPSS30 APP SPLIT SHARED TIME 16-30 MINUTES

## 2024-09-24 PROCEDURE — 6370000000 HC RX 637 (ALT 250 FOR IP): Performed by: PSYCHIATRY & NEUROLOGY

## 2024-09-24 PROCEDURE — 1240000000 HC EMOTIONAL WELLNESS R&B

## 2024-09-24 RX ADMIN — HALOPERIDOL 5 MG: 5 TABLET ORAL at 01:12

## 2024-09-24 RX ADMIN — HYOSCYAMINE SULFATE 0.12 MG: 0.12 TABLET SUBLINGUAL at 20:54

## 2024-09-24 RX ADMIN — HYDROXYZINE HYDROCHLORIDE 50 MG: 50 TABLET, FILM COATED ORAL at 15:57

## 2024-09-24 RX ADMIN — TRAZODONE HYDROCHLORIDE 50 MG: 50 TABLET ORAL at 20:54

## 2024-09-24 RX ADMIN — LORAZEPAM 2 MG: 1 TABLET ORAL at 01:12

## 2024-09-24 RX ADMIN — HYOSCYAMINE SULFATE 0.12 MG: 0.12 TABLET SUBLINGUAL at 00:11

## 2024-09-24 RX ADMIN — HYOSCYAMINE SULFATE 0.12 MG: 0.12 TABLET SUBLINGUAL at 14:33

## 2024-09-24 RX ADMIN — HYDROXYZINE HYDROCHLORIDE 50 MG: 50 TABLET, FILM COATED ORAL at 21:25

## 2024-09-24 RX ADMIN — HYDROXYZINE HYDROCHLORIDE 50 MG: 50 TABLET, FILM COATED ORAL at 00:11

## 2024-09-24 RX ADMIN — ZIPRASIDONE HYDROCHLORIDE 40 MG: 40 CAPSULE ORAL at 15:59

## 2024-09-24 RX ADMIN — ZIPRASIDONE HYDROCHLORIDE 40 MG: 40 CAPSULE ORAL at 07:50

## 2024-09-24 RX ADMIN — HYOSCYAMINE SULFATE 0.12 MG: 0.12 TABLET SUBLINGUAL at 07:50

## 2024-09-24 RX ADMIN — TRAZODONE HYDROCHLORIDE 50 MG: 50 TABLET ORAL at 00:11

## 2024-09-24 NOTE — GROUP NOTE
Group Therapy Note    Date: 9/24/2024    Group Start Time: 1010  Group End Time: 1030  Group Topic: Psychotherapy    [facility] L[facility]        Group Therapy Note    Attendees: 3/9       Patient's Goal:  Expression of feeling    Notes:  Therapeutic conversation cards provided and discussed. Group was terminated early.    Status After Intervention:  Unchanged    Participation Level: Monopolizing    Participation Quality: Intrusive      Speech:  slurred      Thought Process/Content: Flight of ideas      Affective Functioning: Incongruent      Mood: euphoric      Level of consciousness:  Preoccupied      Response to Learning: Progressing to goal      Endings: None Reported    Modes of Intervention: Support      Discipline Responsible: /Counselor      Signature:  LEISA Martinez LSW     "Assessment & Plan     1. Pharyngitis, unspecified etiology  Likely viral.   Push fluids, rest and ibuprofen or tylenol for comfort.    RTC for persistent or worsening sx.     - Streptococcus A Rapid Screen w/Reflex to PCR - Clinic Collect  - Group A Streptococcus PCR Throat Swab    [facility] Urgent [facility] URGENT CARE [place] GISSELLE King is a 22 year old female who presents to clinic today for the following health issues:  Chief Complaint   Patient presents with     Cough     Pharyngitis            HPI  URI 2 weeks ago,  Got better.  No ST primarily uri sx initially.    No fevers.    No nausea, vomiting.   No sob, difficulty breathing,  Now ST started the last few days    Tolerated po well.  No vomiting, diarrhea or rashes.      Review of Systems  Constitutional, HEENT, cardiovascular, pulmonary, gi and gu systems are negative, except as otherwise noted.      Objective    /68 (BP Location: Right arm, Patient Position: Sitting, Cuff Size: Adult Large)   Pulse 83   Temp 98  F (36.7  C) (Tympanic)   Resp 17   Ht 1.702 m (5' 7\")   Wt 62.1 kg (137 lb)   LMP  (LMP Unknown)   SpO2 100%   BMI 21.46 kg/m    Physical Exam   Pt is in no acute distress and appears well  Ears patent B:  TM s intact, non-injected. All land marks easily visibile    Nasal mucosa is non-edematous, no discharge.    Pharynx: non erythematous, tonsils non hypertrophied, No exudate   Neck supple: no adenopathy  Lungs: CTA  Heart: RRR, no murmur, no thrills or heaves   Ext: no edema  Skin: no rashes      Results for orders placed or performed in visit on 02/11/23   Streptococcus A Rapid Screen w/Reflex to PCR - Clinic Collect     Status: Normal    Specimen: Throat; Swab   Result Value Ref Range    Group A Strep antigen Negative Negative   Group A Streptococcus PCR Throat Swab     Status: Normal    Specimen: Throat; Swab   Result Value Ref Range    Group A strep by PCR Not Detected Not Detected    " Narrative    The Xpert Xpress Strep A test, performed on the [employer], is a rapid, qualitative in vitro diagnostic test for the detection of Streptococcus pyogenes (Group A ß-hemolytic Streptococcus, Strep A) in throat swab specimens from patients with signs and symptoms of pharyngitis. The Xpert Xpress Strep A test can be used as an aid in the diagnosis of Group A Streptococcal pharyngitis. The assay is not intended to monitor treatment for Group A Streptococcus infections. The Xpert Xpress Strep A test utilizes an automated real-time polymerase chain reaction (PCR) to detect Streptococcus pyogenes DNA.

## 2024-09-24 NOTE — GROUP NOTE
Group Therapy Note    Date: 9/24/2024    Group Start Time: 1100  Group End Time: 1150  Group Topic: Cognitive Skills    ePnny Agudelo CTRS        Group Therapy Note    Attendees: 3/9     Patient's Goal:  To increase socialization, practice self expression, and explore relaxation resources  using art, music, and discussion.       Notes: Pt was pleasant and cooperative and participated in group . Pt was able to practice self   expression, and explore using art in simple terms, music, and shared with RT and peers.         Status After Intervention:  Improved     Participation Level: Active Listener,  sharing , cooperative     Participation Quality: Appropriate,  Attentive, sharing , friendly     Speech:  Brightened tone ,difficult to decipher some content due to words jumbled together.     Thought Process/Content: Disorganized, bizarre, pt opens door to his room and talks to inside of room as if talking  to someone but no person in there.        Affective Functioning: Euthymic, brightened     Mood: Friendly, smiles and laughs with interaction from RT and supportive peers in group     Level of consciousness:  Alert, and Attentive      Response to Learning:  Attentive to new learning,  and Progressing to goal      Endings: None Reported     Modes of Intervention: Education, Support, Socialization, Exploration, Clarifying and Problem-solving      Discipline Responsible: Psychoeducational Specialist      Signature:  NIKKI MCFARLAND

## 2024-09-24 NOTE — GROUP NOTE
Group Therapy Note    Date: 9/24/2024    Group Start Time: 1340  Group End Time: 1430  Group Topic: Cognitive Skills    Penny Agudelo CTRS        Group Therapy Note    Attendees: 1/8     Topic: To increase socialization, practice decision making and concentraion.      Comments:   Patient did not participate in Cognitive Skills Group, at 13:40 despite staff encouragement   and explanation of benefits.   Pt remained seclusive to self and room.  Q15 minute safety checks maintained for patient safety and will   continue to encourage patient to attend unit programming.       Discipline Responsible: Psychoeducational Specialist   Signature: NIKKI MCFARLAND

## 2024-09-25 ENCOUNTER — ANESTHESIA (OUTPATIENT)
Dept: POSTOP/PACU | Age: 29
End: 2024-09-25
Payer: COMMERCIAL

## 2024-09-25 ENCOUNTER — APPOINTMENT (OUTPATIENT)
Dept: POSTOP/PACU | Age: 29
DRG: 751 | End: 2024-09-25
Payer: COMMERCIAL

## 2024-09-25 ENCOUNTER — ANESTHESIA EVENT (OUTPATIENT)
Dept: POSTOP/PACU | Age: 29
End: 2024-09-25
Payer: COMMERCIAL

## 2024-09-25 DIAGNOSIS — F20.1: Primary | ICD-10-CM

## 2024-09-25 LAB — GLUCOSE BLD-MCNC: 97 MG/DL (ref 75–110)

## 2024-09-25 PROCEDURE — 90870 ELECTROCONVULSIVE THERAPY: CPT | Performed by: PSYCHIATRY & NEUROLOGY

## 2024-09-25 PROCEDURE — 2580000003 HC RX 258: Performed by: ANESTHESIOLOGY

## 2024-09-25 PROCEDURE — 6370000000 HC RX 637 (ALT 250 FOR IP): Performed by: PSYCHIATRY & NEUROLOGY

## 2024-09-25 PROCEDURE — GZB2ZZZ ELECTROCONVULSIVE THERAPY, BILATERAL-SINGLE SEIZURE: ICD-10-PCS | Performed by: PSYCHIATRY & NEUROLOGY

## 2024-09-25 PROCEDURE — 3700000000 HC ANESTHESIA ATTENDED CARE: Performed by: ANESTHESIOLOGY

## 2024-09-25 PROCEDURE — 3700000001 HC ADD 15 MINUTES (ANESTHESIA): Performed by: ANESTHESIOLOGY

## 2024-09-25 PROCEDURE — 6370000000 HC RX 637 (ALT 250 FOR IP)

## 2024-09-25 PROCEDURE — 7100000001 HC PACU RECOVERY - ADDTL 15 MIN: Performed by: ANESTHESIOLOGY

## 2024-09-25 PROCEDURE — 7100000000 HC PACU RECOVERY - FIRST 15 MIN: Performed by: ANESTHESIOLOGY

## 2024-09-25 PROCEDURE — 2500000003 HC RX 250 WO HCPCS: Performed by: NURSE ANESTHETIST, CERTIFIED REGISTERED

## 2024-09-25 PROCEDURE — 82947 ASSAY GLUCOSE BLOOD QUANT: CPT

## 2024-09-25 PROCEDURE — 90870 ELECTROCONVULSIVE THERAPY: CPT

## 2024-09-25 PROCEDURE — 1240000000 HC EMOTIONAL WELLNESS R&B

## 2024-09-25 PROCEDURE — 99232 SBSQ HOSP IP/OBS MODERATE 35: CPT | Performed by: PSYCHIATRY & NEUROLOGY

## 2024-09-25 RX ORDER — LIDOCAINE HYDROCHLORIDE 10 MG/ML
1 INJECTION, SOLUTION EPIDURAL; INFILTRATION; INTRACAUDAL; PERINEURAL
Status: ACTIVE | OUTPATIENT
Start: 2024-09-25 | End: 2024-09-26

## 2024-09-25 RX ORDER — SODIUM CHLORIDE, SODIUM LACTATE, POTASSIUM CHLORIDE, CALCIUM CHLORIDE 600; 310; 30; 20 MG/100ML; MG/100ML; MG/100ML; MG/100ML
INJECTION, SOLUTION INTRAVENOUS CONTINUOUS
Status: DISCONTINUED | OUTPATIENT
Start: 2024-09-25 | End: 2024-10-02 | Stop reason: HOSPADM

## 2024-09-25 RX ORDER — SUCCINYLCHOLINE/SOD CL,ISO/PF 200MG/10ML
SYRINGE (ML) INTRAVENOUS
Status: DISCONTINUED | OUTPATIENT
Start: 2024-09-25 | End: 2024-09-25 | Stop reason: SDUPTHER

## 2024-09-25 RX ORDER — ETOMIDATE 2 MG/ML
INJECTION INTRAVENOUS
Status: DISCONTINUED | OUTPATIENT
Start: 2024-09-25 | End: 2024-09-25 | Stop reason: SDUPTHER

## 2024-09-25 RX ADMIN — ZIPRASIDONE HYDROCHLORIDE 40 MG: 40 CAPSULE ORAL at 17:37

## 2024-09-25 RX ADMIN — TRAZODONE HYDROCHLORIDE 50 MG: 50 TABLET ORAL at 21:25

## 2024-09-25 RX ADMIN — IBUPROFEN 400 MG: 400 TABLET, FILM COATED ORAL at 11:44

## 2024-09-25 RX ADMIN — HYOSCYAMINE SULFATE 0.12 MG: 0.12 TABLET SUBLINGUAL at 09:44

## 2024-09-25 RX ADMIN — HYOSCYAMINE SULFATE 0.12 MG: 0.12 TABLET SUBLINGUAL at 14:15

## 2024-09-25 RX ADMIN — ETOMIDATE INJECTION 20 MG: 2 SOLUTION INTRAVENOUS at 08:05

## 2024-09-25 RX ADMIN — ACETAMINOPHEN 650 MG: 325 TABLET ORAL at 06:44

## 2024-09-25 RX ADMIN — SODIUM CHLORIDE, POTASSIUM CHLORIDE, SODIUM LACTATE AND CALCIUM CHLORIDE: 600; 310; 30; 20 INJECTION, SOLUTION INTRAVENOUS at 07:40

## 2024-09-25 RX ADMIN — ZIPRASIDONE HYDROCHLORIDE 40 MG: 40 CAPSULE ORAL at 09:43

## 2024-09-25 RX ADMIN — HYOSCYAMINE SULFATE 0.12 MG: 0.12 TABLET SUBLINGUAL at 21:25

## 2024-09-25 RX ADMIN — Medication 120 MG: at 08:06

## 2024-09-25 ASSESSMENT — PAIN DESCRIPTION - DESCRIPTORS: DESCRIPTORS: ACHING

## 2024-09-25 ASSESSMENT — PAIN - FUNCTIONAL ASSESSMENT: PAIN_FUNCTIONAL_ASSESSMENT: 0-10

## 2024-09-25 ASSESSMENT — PAIN SCALES - GENERAL
PAINLEVEL_OUTOF10: 0
PAINLEVEL_OUTOF10: 0
PAINLEVEL_OUTOF10: 4
PAINLEVEL_OUTOF10: 0
PAINLEVEL_OUTOF10: 1
PAINLEVEL_OUTOF10: 0

## 2024-09-25 ASSESSMENT — PAIN DESCRIPTION - ORIENTATION: ORIENTATION: UPPER

## 2024-09-25 ASSESSMENT — PAIN DESCRIPTION - LOCATION: LOCATION: HEAD

## 2024-09-25 NOTE — GROUP NOTE
Group Therapy Note    Date: 9/25/2024    Group Start Time: 1100  Group End Time: 1140  Group Topic: Cognitive Skills    Penny Agudelo CTRS        Group Therapy Note    Attendees: 3/8     Topic: To increase socialization, practice problem solving, and communication skills       Comments:   Patient did not participate in Cognitive Skills Group, at 11:00 despite staff encouragement   and explanation of benefits.   Pt was lethargic, resting in room.  Q15 minute safety checks maintained for patient safety and will   continue to encourage patient to attend unit programming.       Discipline Responsible: Psychoeducational Specialist   Signature: NIKKI MCFARLAND

## 2024-09-25 NOTE — PROCEDURES
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)  Postprocedure diagnosis: SAME AS PRE-PROCEDURE DIAGNOSIS  Treatment Number: This is treatment # 3   Patient Status: BEHAVIOR IP   Type of ECT: Bilateral Brief Pulse  Medications  Preprocedure: Tylenol 650mg  Anesthetic: Etomidate 20 mg  Paralytic: Succinylcholine 120 mg  Post procedure: none needed   Cuff placement: Left Lower Extremity    MECTA Settings 1st Stimulus:     Pulse width: 1.0 ms   Frequency:  40 hz   Duration:   2.0 seconds   Static Impedance: 586 ohms             Dynamic Impedance: 194 ohms             Motor Seizure Duration: 40 seconds  EEG Seizure Duration: 45 seconds             The patient's ECT treatment was performed using MECTA machine  .  Timeout:  Time out was performed using two patient identifiers and confirmation of procedure.     Patient Preparation: Preprocedure documentation, labs, H&P were all verified and reviewed.  The patient was placed in supine position.  EEG leads were placed for monitoring of seizure.   Adventist areas bilaterally cleaned and prepped.  Conductive gel  was applied over stimulus electrode site.   The patient was pre-oxygenated.       Procedure in detail: Medications were administered by anesthesia using intravenous access at the doses listed previously in this summary.  Anesthetic administered and once confirmation the patient is anesthetized, the patient's blood pressure cuff on lower extremity was inflated to 100mmHg in excess of patient's blood pressure.  At this time, the neuromuscular blockade was administered intravenously by anesthesia.  Upper extremity fasciculation were verified followed by lower extremity fasciculation.  Once fasciculations terminated, confirmation of affective neuromuscular blockade demonstrated by absence of withdrawal reflex.  Bite blocks were put in place.

## 2024-09-25 NOTE — GROUP NOTE
Group Therapy Note    Date: 9/25/2024    Group Start Time: 1345  Group End Time: 1410  Group Topic: Recreational    [facility] Penny Lancaster CTRS        Group Therapy Note    Attendees: 1/7     Patient's GoalTo increase socialization, explore leisure interests, and practice communication skills         Notes: Pt was pleasant and cooperative and participated in group in simple terms . Pt explored leisure   interest with prompts by RT but concentration was impaired, and pt appears lethargic.          Status After Intervention:  Unchanged     Participation Level: Active Listener,  sharing , cooperative     Participation Quality: Appropriate,  Attentive, sharing , cooperative but lethargic     Speech:  Mumbled, difficult to understand at times     Thought Process/Content: Impaired concentration, thought blocking, states he wants to go home.        Affective Functioning: Blunted, brightened     Mood: pleasant, cooperative, lethargic.     Level of consciousness:  Alert, and Attentive at short intervals      Response to Learning:  Able to verbalize minimal current knowledge,attentive to    new learning at brief intervals,  and Progressing to goal      Endings: None Reported     Modes of Intervention: Education, Support, Socialization, Exploration, Clarifying and Problem-solving      Discipline Responsible: Psychoeducational Specialist      Signature:  NIKKI MCFARLAND

## 2024-09-25 NOTE — GROUP NOTE
Group Therapy Note    Date: 9/25/2024    Group Start Time: 1010  Group End Time: 1040  Group Topic: Psychoeducation    Genesis Castillo MSW, RAY        Group Therapy Note    Attendees: 4/8       Patient's Goal:  Expression of feeling     Notes:  Therapeutic conversation game provided and discussed. Patient left and returned to group several times.     Status After Intervention:  Unchanged    Participation Level: Monopolizing    Participation Quality: Intrusive      Speech:  normal      Thought Process/Content: Flight of ideas      Affective Functioning: Congruent      Mood: euthymic      Level of consciousness:  Alert and Oriented x4      Response to Learning: Progressing to goal      Endings: None Reported    Modes of Intervention: Support      Discipline Responsible: /Counselor      Signature:  LEISA Martinez, RAY

## 2024-09-26 PROCEDURE — 6370000000 HC RX 637 (ALT 250 FOR IP)

## 2024-09-26 PROCEDURE — 6370000000 HC RX 637 (ALT 250 FOR IP): Performed by: PSYCHIATRY & NEUROLOGY

## 2024-09-26 PROCEDURE — 1240000000 HC EMOTIONAL WELLNESS R&B

## 2024-09-26 PROCEDURE — 99232 SBSQ HOSP IP/OBS MODERATE 35: CPT | Performed by: PSYCHIATRY & NEUROLOGY

## 2024-09-26 RX ADMIN — HYOSCYAMINE SULFATE 0.12 MG: 0.12 TABLET SUBLINGUAL at 21:41

## 2024-09-26 RX ADMIN — ZIPRASIDONE HYDROCHLORIDE 40 MG: 40 CAPSULE ORAL at 16:22

## 2024-09-26 RX ADMIN — HYDROXYZINE HYDROCHLORIDE 50 MG: 50 TABLET, FILM COATED ORAL at 21:40

## 2024-09-26 RX ADMIN — HYOSCYAMINE SULFATE 0.12 MG: 0.12 TABLET SUBLINGUAL at 10:00

## 2024-09-26 RX ADMIN — HYOSCYAMINE SULFATE 0.12 MG: 0.12 TABLET SUBLINGUAL at 14:21

## 2024-09-26 RX ADMIN — ZIPRASIDONE HYDROCHLORIDE 40 MG: 40 CAPSULE ORAL at 08:04

## 2024-09-26 RX ADMIN — TRAZODONE HYDROCHLORIDE 50 MG: 50 TABLET ORAL at 21:41

## 2024-09-26 ASSESSMENT — PAIN SCALES - GENERAL
PAINLEVEL_OUTOF10: 0
PAINLEVEL_OUTOF10: 0

## 2024-09-26 NOTE — GROUP NOTE
Group Therapy Note    Date: 9/26/2024    Group Start Time: 1000  Group End Time: 1030  Group Topic: Psychoeducation    Amado Gaines        Group Therapy Note    Attendees: 2/9       Patient's Goal:  PT will participate actively in group discussion using self esteem cards.   Notes:  :  Patient is making progress, AEB participating in group discussion, actively listening, and supporting other group members.      Status After Intervention:  Unchanged    Participation Level: Active Listener and Interactive    Participation Quality: Sharing      Speech:  pressured      Thought Process/Content: Flight of ideas      Affective Functioning: Congruent      Mood: elevated      Level of consciousness:  Alert, Attentive, and Preoccupied      Response to Learning: Able to verbalize/acknowledge new learning and Progressing to goal      Endings: None Reported    Modes of Intervention: Education and Support      Discipline Responsible: /Counselor      Signature:  Amado Harrison

## 2024-09-27 ENCOUNTER — APPOINTMENT (OUTPATIENT)
Dept: POSTOP/PACU | Age: 29
DRG: 751 | End: 2024-09-27
Payer: COMMERCIAL

## 2024-09-27 ENCOUNTER — ANESTHESIA (OUTPATIENT)
Dept: POSTOP/PACU | Age: 29
End: 2024-09-27
Payer: COMMERCIAL

## 2024-09-27 ENCOUNTER — ANESTHESIA EVENT (OUTPATIENT)
Dept: POSTOP/PACU | Age: 29
End: 2024-09-27
Payer: COMMERCIAL

## 2024-09-27 LAB — GLUCOSE BLD-MCNC: 100 MG/DL (ref 75–110)

## 2024-09-27 PROCEDURE — 6360000002 HC RX W HCPCS: Performed by: NURSE PRACTITIONER

## 2024-09-27 PROCEDURE — 90870 ELECTROCONVULSIVE THERAPY: CPT

## 2024-09-27 PROCEDURE — 99232 SBSQ HOSP IP/OBS MODERATE 35: CPT | Performed by: PSYCHIATRY & NEUROLOGY

## 2024-09-27 PROCEDURE — 6370000000 HC RX 637 (ALT 250 FOR IP): Performed by: PSYCHIATRY & NEUROLOGY

## 2024-09-27 PROCEDURE — 90870 ELECTROCONVULSIVE THERAPY: CPT | Performed by: PSYCHIATRY & NEUROLOGY

## 2024-09-27 PROCEDURE — 1240000000 HC EMOTIONAL WELLNESS R&B

## 2024-09-27 PROCEDURE — 2500000003 HC RX 250 WO HCPCS: Performed by: NURSE ANESTHETIST, CERTIFIED REGISTERED

## 2024-09-27 PROCEDURE — 6370000000 HC RX 637 (ALT 250 FOR IP)

## 2024-09-27 PROCEDURE — 3700000001 HC ADD 15 MINUTES (ANESTHESIA): Performed by: ANESTHESIOLOGY

## 2024-09-27 PROCEDURE — APPSS30 APP SPLIT SHARED TIME 16-30 MINUTES: Performed by: NURSE PRACTITIONER

## 2024-09-27 PROCEDURE — 6360000002 HC RX W HCPCS: Performed by: NURSE ANESTHETIST, CERTIFIED REGISTERED

## 2024-09-27 PROCEDURE — GZB2ZZZ ELECTROCONVULSIVE THERAPY, BILATERAL-SINGLE SEIZURE: ICD-10-PCS | Performed by: PSYCHIATRY & NEUROLOGY

## 2024-09-27 PROCEDURE — 7100000000 HC PACU RECOVERY - FIRST 15 MIN: Performed by: ANESTHESIOLOGY

## 2024-09-27 PROCEDURE — 82947 ASSAY GLUCOSE BLOOD QUANT: CPT

## 2024-09-27 PROCEDURE — 7100000001 HC PACU RECOVERY - ADDTL 15 MIN: Performed by: ANESTHESIOLOGY

## 2024-09-27 PROCEDURE — 2580000003 HC RX 258: Performed by: ANESTHESIOLOGY

## 2024-09-27 PROCEDURE — 3700000000 HC ANESTHESIA ATTENDED CARE: Performed by: ANESTHESIOLOGY

## 2024-09-27 RX ORDER — KETOROLAC TROMETHAMINE 30 MG/ML
INJECTION, SOLUTION INTRAMUSCULAR; INTRAVENOUS
Status: COMPLETED
Start: 2024-09-27 | End: 2024-09-27

## 2024-09-27 RX ORDER — ETOMIDATE 2 MG/ML
INJECTION INTRAVENOUS
Status: COMPLETED
Start: 2024-09-27 | End: 2024-09-27

## 2024-09-27 RX ORDER — SUCCINYLCHOLINE/SOD CL,ISO/PF 200MG/10ML
SYRINGE (ML) INTRAVENOUS
Status: DISCONTINUED | OUTPATIENT
Start: 2024-09-27 | End: 2024-09-27 | Stop reason: SDUPTHER

## 2024-09-27 RX ORDER — PROMETHAZINE HYDROCHLORIDE 25 MG/ML
6.25 INJECTION, SOLUTION INTRAMUSCULAR; INTRAVENOUS EVERY 6 HOURS PRN
Status: DISCONTINUED | OUTPATIENT
Start: 2024-09-27 | End: 2024-10-02 | Stop reason: HOSPADM

## 2024-09-27 RX ORDER — ETOMIDATE 2 MG/ML
INJECTION INTRAVENOUS
Status: DISCONTINUED | OUTPATIENT
Start: 2024-09-27 | End: 2024-09-27 | Stop reason: SDUPTHER

## 2024-09-27 RX ORDER — SUCCINYLCHOLINE/SOD CL,ISO/PF 200MG/10ML
SYRINGE (ML) INTRAVENOUS
Status: COMPLETED
Start: 2024-09-27 | End: 2024-09-27

## 2024-09-27 RX ORDER — KETOROLAC TROMETHAMINE 30 MG/ML
INJECTION, SOLUTION INTRAMUSCULAR; INTRAVENOUS
Status: DISCONTINUED | OUTPATIENT
Start: 2024-09-27 | End: 2024-09-27 | Stop reason: SDUPTHER

## 2024-09-27 RX ADMIN — HYOSCYAMINE SULFATE 0.12 MG: 0.12 TABLET SUBLINGUAL at 10:36

## 2024-09-27 RX ADMIN — ACETAMINOPHEN 650 MG: 325 TABLET ORAL at 06:08

## 2024-09-27 RX ADMIN — SODIUM CHLORIDE, POTASSIUM CHLORIDE, SODIUM LACTATE AND CALCIUM CHLORIDE: 600; 310; 30; 20 INJECTION, SOLUTION INTRAVENOUS at 08:27

## 2024-09-27 RX ADMIN — ZIPRASIDONE HYDROCHLORIDE 40 MG: 40 CAPSULE ORAL at 17:56

## 2024-09-27 RX ADMIN — ZIPRASIDONE HYDROCHLORIDE 40 MG: 40 CAPSULE ORAL at 10:37

## 2024-09-27 RX ADMIN — KETOROLAC TROMETHAMINE 30 MG: 30 INJECTION, SOLUTION INTRAMUSCULAR at 08:55

## 2024-09-27 RX ADMIN — PROMETHAZINE HYDROCHLORIDE 6.25 MG: 25 INJECTION INTRAMUSCULAR; INTRAVENOUS at 19:10

## 2024-09-27 RX ADMIN — HYDROXYZINE HYDROCHLORIDE 50 MG: 50 TABLET, FILM COATED ORAL at 10:37

## 2024-09-27 RX ADMIN — HYDROXYZINE HYDROCHLORIDE 50 MG: 50 TABLET, FILM COATED ORAL at 20:29

## 2024-09-27 RX ADMIN — TRAZODONE HYDROCHLORIDE 50 MG: 50 TABLET ORAL at 20:29

## 2024-09-27 RX ADMIN — IBUPROFEN 400 MG: 400 TABLET, FILM COATED ORAL at 10:35

## 2024-09-27 RX ADMIN — Medication 120 MG: at 09:00

## 2024-09-27 RX ADMIN — ETOMIDATE INJECTION 20 MG: 2 SOLUTION INTRAVENOUS at 09:00

## 2024-09-27 RX ADMIN — HYOSCYAMINE SULFATE 0.12 MG: 0.12 TABLET SUBLINGUAL at 20:29

## 2024-09-27 ASSESSMENT — PAIN DESCRIPTION - LOCATION: LOCATION: GENERALIZED

## 2024-09-27 ASSESSMENT — PAIN - FUNCTIONAL ASSESSMENT
PAIN_FUNCTIONAL_ASSESSMENT: 0-10
PAIN_FUNCTIONAL_ASSESSMENT: 0-10

## 2024-09-27 ASSESSMENT — PAIN SCALES - GENERAL: PAINLEVEL_OUTOF10: 5

## 2024-09-27 NOTE — GROUP NOTE
Group Therapy Note    Date: 9/27/2024    Group Start Time: 1000  Group End Time: 1030  Group Topic: Psychoeducation    YAYA BHGenesis Bautista MSW, RAY        Group Therapy Note    Attendees: 0/8       Patient was offered group therapy today but declined to participate despite encouragement from staff.  1:1 was offered.       Signature:  LEISA Martinez, RAY

## 2024-09-27 NOTE — PROCEDURES
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)  Postprocedure diagnosis: SAME AS PRE-PROCEDURE DIAGNOSIS  Treatment Number: This is treatment # 4   Patient Status: BEHAVIOR IP   Type of ECT: Bilateral Brief Pulse  Medications  Preprocedure: Tylenol 650mg  Anesthetic: Etomidate 20 mg  Paralytic: Succinylcholine 120 mg  Post procedure: none needed   Cuff placement: Left Lower Extremity    MECTA Settings 1st Stimulus:     Pulse width: 1.0 ms   Frequency:  40 hz   Duration:   2.0 seconds   Static Impedance: 472 ohms             Dynamic Impedance: 196 ohms             Motor Seizure Duration: 30 seconds  EEG Seizure Duration: 44 seconds             The patient's ECT treatment was performed using MECTA machine  .  Timeout:  Time out was performed using two patient identifiers and confirmation of procedure.     Patient Preparation: Preprocedure documentation, labs, H&P were all verified and reviewed.  The patient was placed in supine position.  EEG leads were placed for monitoring of seizure.   Yazidism areas bilaterally cleaned and prepped.  Conductive gel  was applied over stimulus electrode site.   The patient was pre-oxygenated.       Procedure in detail: Medications were administered by anesthesia using intravenous access at the doses listed previously in this summary.  Anesthetic administered and once confirmation the patient is anesthetized, the patient's blood pressure cuff on lower extremity was inflated to 100mmHg in excess of patient's blood pressure.  At this time, the neuromuscular blockade was administered intravenously by anesthesia.  Upper extremity fasciculation were verified followed by lower extremity fasciculation.  Once fasciculations terminated, confirmation of affective neuromuscular blockade demonstrated by absence of withdrawal reflex.  Bite blocks were put in place.

## 2024-09-28 PROCEDURE — 6370000000 HC RX 637 (ALT 250 FOR IP): Performed by: PSYCHIATRY & NEUROLOGY

## 2024-09-28 PROCEDURE — 99232 SBSQ HOSP IP/OBS MODERATE 35: CPT | Performed by: PSYCHIATRY & NEUROLOGY

## 2024-09-28 PROCEDURE — 6370000000 HC RX 637 (ALT 250 FOR IP)

## 2024-09-28 PROCEDURE — 1240000000 HC EMOTIONAL WELLNESS R&B

## 2024-09-28 RX ADMIN — HYOSCYAMINE SULFATE 0.12 MG: 0.12 TABLET SUBLINGUAL at 20:47

## 2024-09-28 RX ADMIN — HYOSCYAMINE SULFATE 0.12 MG: 0.12 TABLET SUBLINGUAL at 15:33

## 2024-09-28 RX ADMIN — TRAZODONE HYDROCHLORIDE 50 MG: 50 TABLET ORAL at 20:47

## 2024-09-28 RX ADMIN — ZIPRASIDONE HYDROCHLORIDE 40 MG: 40 CAPSULE ORAL at 20:58

## 2024-09-28 RX ADMIN — ZIPRASIDONE HYDROCHLORIDE 40 MG: 40 CAPSULE ORAL at 08:42

## 2024-09-28 RX ADMIN — HYOSCYAMINE SULFATE 0.12 MG: 0.12 TABLET SUBLINGUAL at 08:42

## 2024-09-28 RX ADMIN — HYDROXYZINE HYDROCHLORIDE 50 MG: 50 TABLET, FILM COATED ORAL at 20:47

## 2024-09-28 ASSESSMENT — PAIN SCALES - GENERAL: PAINLEVEL_OUTOF10: 0

## 2024-09-29 PROCEDURE — 6370000000 HC RX 637 (ALT 250 FOR IP): Performed by: PSYCHIATRY & NEUROLOGY

## 2024-09-29 PROCEDURE — 1240000000 HC EMOTIONAL WELLNESS R&B

## 2024-09-29 PROCEDURE — 6370000000 HC RX 637 (ALT 250 FOR IP)

## 2024-09-29 PROCEDURE — 6360000002 HC RX W HCPCS: Performed by: NURSE PRACTITIONER

## 2024-09-29 RX ORDER — LOPERAMIDE HCL 2 MG
2 CAPSULE ORAL 4 TIMES DAILY PRN
Status: DISCONTINUED | OUTPATIENT
Start: 2024-09-29 | End: 2024-10-02 | Stop reason: HOSPADM

## 2024-09-29 RX ADMIN — HYOSCYAMINE SULFATE 0.12 MG: 0.12 TABLET SUBLINGUAL at 14:11

## 2024-09-29 RX ADMIN — HYOSCYAMINE SULFATE 0.12 MG: 0.12 TABLET SUBLINGUAL at 07:51

## 2024-09-29 RX ADMIN — IBUPROFEN 400 MG: 400 TABLET, FILM COATED ORAL at 14:11

## 2024-09-29 RX ADMIN — HYDROXYZINE HYDROCHLORIDE 50 MG: 50 TABLET, FILM COATED ORAL at 14:11

## 2024-09-29 RX ADMIN — ZIPRASIDONE HYDROCHLORIDE 40 MG: 40 CAPSULE ORAL at 07:51

## 2024-09-29 RX ADMIN — HYOSCYAMINE SULFATE 0.12 MG: 0.12 TABLET SUBLINGUAL at 21:52

## 2024-09-29 RX ADMIN — TRAZODONE HYDROCHLORIDE 50 MG: 50 TABLET ORAL at 21:52

## 2024-09-29 RX ADMIN — PROMETHAZINE HYDROCHLORIDE 6.25 MG: 25 INJECTION INTRAMUSCULAR; INTRAVENOUS at 14:36

## 2024-09-29 RX ADMIN — HYDROXYZINE HYDROCHLORIDE 50 MG: 50 TABLET, FILM COATED ORAL at 21:52

## 2024-09-29 RX ADMIN — ZIPRASIDONE HYDROCHLORIDE 40 MG: 40 CAPSULE ORAL at 17:17

## 2024-09-29 ASSESSMENT — LIFESTYLE VARIABLES
HOW MANY STANDARD DRINKS CONTAINING ALCOHOL DO YOU HAVE ON A TYPICAL DAY: PATIENT DOES NOT DRINK
HOW MANY STANDARD DRINKS CONTAINING ALCOHOL DO YOU HAVE ON A TYPICAL DAY: PATIENT DOES NOT DRINK
HOW OFTEN DO YOU HAVE A DRINK CONTAINING ALCOHOL: NEVER
HOW OFTEN DO YOU HAVE A DRINK CONTAINING ALCOHOL: NEVER

## 2024-09-29 ASSESSMENT — PAIN DESCRIPTION - LOCATION: LOCATION: HEAD

## 2024-09-29 ASSESSMENT — PAIN SCALES - GENERAL: PAINLEVEL_OUTOF10: 5

## 2024-09-29 NOTE — GROUP NOTE
Group Therapy Note    Date: 9/29/2024    Group Start Time: 1315  Group End Time: 1345  Group Topic: Psychoeducation    Genesis Castillo MSW, LSW        Group Therapy Note    Attendees: 4/9       Patient's Goal:  Expression of feeling    Notes:  Therapeutic card game provided and discussed.    Status After Intervention:  Improved    Participation Level: Active Listener and Interactive    Participation Quality: Sharing      Speech:  normal      Thought Process/Content: Logical      Affective Functioning: Congruent      Mood: euthymic      Level of consciousness:  Alert and Oriented x4      Response to Learning: Progressing to goal      Endings: None Reported    Modes of Intervention: Support      Discipline Responsible: /Counselor      Signature:  LEISA Martinez LSW

## 2024-09-30 ENCOUNTER — APPOINTMENT (OUTPATIENT)
Dept: POSTOP/PACU | Age: 29
End: 2024-09-30
Payer: MEDICARE

## 2024-09-30 ENCOUNTER — ANESTHESIA EVENT (OUTPATIENT)
Dept: POSTOP/PACU | Age: 29
DRG: 751 | End: 2024-09-30
Payer: COMMERCIAL

## 2024-09-30 ENCOUNTER — ANESTHESIA (OUTPATIENT)
Dept: POSTOP/PACU | Age: 29
DRG: 751 | End: 2024-09-30
Payer: COMMERCIAL

## 2024-09-30 DIAGNOSIS — F20.1 DISORGANIZED SCHIZOPHRENIA (HCC): Primary | ICD-10-CM

## 2024-09-30 LAB — GLUCOSE BLD-MCNC: 110 MG/DL (ref 75–110)

## 2024-09-30 PROCEDURE — 6370000000 HC RX 637 (ALT 250 FOR IP): Performed by: PSYCHIATRY & NEUROLOGY

## 2024-09-30 PROCEDURE — GZB2ZZZ ELECTROCONVULSIVE THERAPY, BILATERAL-SINGLE SEIZURE: ICD-10-PCS | Performed by: PSYCHIATRY & NEUROLOGY

## 2024-09-30 PROCEDURE — 90833 PSYTX W PT W E/M 30 MIN: CPT | Performed by: PSYCHIATRY & NEUROLOGY

## 2024-09-30 PROCEDURE — 7100000001 HC PACU RECOVERY - ADDTL 15 MIN: Performed by: ANESTHESIOLOGY

## 2024-09-30 PROCEDURE — 82947 ASSAY GLUCOSE BLOOD QUANT: CPT

## 2024-09-30 PROCEDURE — 7100000000 HC PACU RECOVERY - FIRST 15 MIN: Performed by: ANESTHESIOLOGY

## 2024-09-30 PROCEDURE — 90870 ELECTROCONVULSIVE THERAPY: CPT | Performed by: ANESTHESIOLOGY

## 2024-09-30 PROCEDURE — 2580000003 HC RX 258: Performed by: ANESTHESIOLOGY

## 2024-09-30 PROCEDURE — 3700000000 HC ANESTHESIA ATTENDED CARE: Performed by: ANESTHESIOLOGY

## 2024-09-30 PROCEDURE — 1240000000 HC EMOTIONAL WELLNESS R&B

## 2024-09-30 PROCEDURE — 99232 SBSQ HOSP IP/OBS MODERATE 35: CPT | Performed by: PSYCHIATRY & NEUROLOGY

## 2024-09-30 PROCEDURE — 90870 ELECTROCONVULSIVE THERAPY: CPT | Performed by: PSYCHIATRY & NEUROLOGY

## 2024-09-30 PROCEDURE — 6360000002 HC RX W HCPCS: Performed by: NURSE ANESTHETIST, CERTIFIED REGISTERED

## 2024-09-30 PROCEDURE — 2500000003 HC RX 250 WO HCPCS: Performed by: NURSE ANESTHETIST, CERTIFIED REGISTERED

## 2024-09-30 PROCEDURE — 6370000000 HC RX 637 (ALT 250 FOR IP)

## 2024-09-30 RX ORDER — ETOMIDATE 2 MG/ML
INJECTION INTRAVENOUS
Status: DISCONTINUED | OUTPATIENT
Start: 2024-09-30 | End: 2024-09-30 | Stop reason: SDUPTHER

## 2024-09-30 RX ORDER — KETOROLAC TROMETHAMINE 30 MG/ML
INJECTION, SOLUTION INTRAMUSCULAR; INTRAVENOUS
Status: COMPLETED
Start: 2024-09-30 | End: 2024-09-30

## 2024-09-30 RX ORDER — KETOROLAC TROMETHAMINE 30 MG/ML
INJECTION, SOLUTION INTRAMUSCULAR; INTRAVENOUS
Status: DISCONTINUED | OUTPATIENT
Start: 2024-09-30 | End: 2024-09-30 | Stop reason: SDUPTHER

## 2024-09-30 RX ORDER — ETOMIDATE 2 MG/ML
INJECTION INTRAVENOUS
Status: COMPLETED
Start: 2024-09-30 | End: 2024-09-30

## 2024-09-30 RX ORDER — SUCCINYLCHOLINE/SOD CL,ISO/PF 200MG/10ML
SYRINGE (ML) INTRAVENOUS
Status: COMPLETED
Start: 2024-09-30 | End: 2024-09-30

## 2024-09-30 RX ORDER — SUCCINYLCHOLINE/SOD CL,ISO/PF 200MG/10ML
SYRINGE (ML) INTRAVENOUS
Status: DISCONTINUED | OUTPATIENT
Start: 2024-09-30 | End: 2024-09-30 | Stop reason: SDUPTHER

## 2024-09-30 RX ADMIN — HYOSCYAMINE SULFATE 0.12 MG: 0.12 TABLET SUBLINGUAL at 21:10

## 2024-09-30 RX ADMIN — SODIUM CHLORIDE, POTASSIUM CHLORIDE, SODIUM LACTATE AND CALCIUM CHLORIDE: 600; 310; 30; 20 INJECTION, SOLUTION INTRAVENOUS at 06:48

## 2024-09-30 RX ADMIN — ZIPRASIDONE HYDROCHLORIDE 40 MG: 40 CAPSULE ORAL at 08:42

## 2024-09-30 RX ADMIN — HYDROXYZINE HYDROCHLORIDE 50 MG: 50 TABLET, FILM COATED ORAL at 21:10

## 2024-09-30 RX ADMIN — Medication 120 MG: at 07:27

## 2024-09-30 RX ADMIN — KETOROLAC TROMETHAMINE 30 MG: 30 INJECTION, SOLUTION INTRAMUSCULAR at 07:27

## 2024-09-30 RX ADMIN — ETOMIDATE INJECTION 20 MG: 2 SOLUTION INTRAVENOUS at 07:27

## 2024-09-30 RX ADMIN — TRAZODONE HYDROCHLORIDE 50 MG: 50 TABLET ORAL at 21:10

## 2024-09-30 RX ADMIN — ACETAMINOPHEN 650 MG: 325 TABLET ORAL at 05:14

## 2024-09-30 RX ADMIN — HYOSCYAMINE SULFATE 0.12 MG: 0.12 TABLET SUBLINGUAL at 08:42

## 2024-09-30 RX ADMIN — ZIPRASIDONE HYDROCHLORIDE 40 MG: 40 CAPSULE ORAL at 18:20

## 2024-09-30 ASSESSMENT — PAIN SCALES - GENERAL
PAINLEVEL_OUTOF10: 0
PAINLEVEL_OUTOF10: 0

## 2024-09-30 ASSESSMENT — PAIN - FUNCTIONAL ASSESSMENT: PAIN_FUNCTIONAL_ASSESSMENT: 0-10

## 2024-09-30 NOTE — ANESTHESIA POSTPROCEDURE EVALUATION
Department of Anesthesiology  Postprocedure Note    Patient: Alistair Hutchison  MRN: [id number]  YOB: 1995  Date of evaluation: 9/30/2024    Procedure Summary       Date: 09/30/24 Room / Location: [facility], [place] PACU    Anesthesia Start: 0724 Anesthesia Stop: 0736    Procedure: ECT W/ ANESTHESIA Diagnosis:     Scheduled Providers:  Responsible Provider: Zacarias Pandey MD    Anesthesia Type: general ASA Status: 3            Anesthesia Type: No value filed.    Amalia Phase I: Amalia Score: 10    Amalia Phase II:      Anesthesia Post Evaluation    Patient location during evaluation: PACU  Patient participation: complete - patient participated  Level of consciousness: awake and alert  Airway patency: patent  Nausea & Vomiting: no vomiting  Cardiovascular status: hemodynamically stable  Respiratory status: acceptable  Hydration status: euvolemic  Comments: POST- ANESTHESIA EVALUATION       Pt Name: Alistair Hutchison  MRN: [id number]  YOB: 1995  Date of evaluation: 9/30/2024  Time:  9:20 AM      /78   Pulse (!) 109   Temp 97.2 °F (36.2 °C)   Resp 14   Ht 1.651 m (5' 5\")   Wt 113.4 kg (250 lb)   SpO2 94%   BMI 41.60 kg/m²      Consciousness Level  Awake  Cardiopulmonary Status  Stable  Pain Adequately Treated YES  Nausea / Vomiting  NO  Adequate Hydration  YES  Anesthesia Related Complications NONE      Electronically signed by Zacarias Pandey MD on 9/30/2024 at 9:20 AM         Pain management: satisfactory to patient    No notable events documented.

## 2024-09-30 NOTE — ANESTHESIA PRE PROCEDURE
Department of Anesthesiology  Preprocedure Note       Name:  Alistair Hutchison   Age:  29 y.o.  :  1995                                          MRN:  [id number]         Date:  2024      Surgeon: * No surgeons listed *    Procedure: * No procedures listed *    Medications prior to admission:   Prior to Admission medications    Medication Sig Start Date End Date Taking? Authorizing Provider   diphenhydrAMINE (BENADRYL) 25 MG tablet Take 2 tablets by mouth as needed (1-2 Tablets as needed for muscle spasms) 1-2 Tablets as needed for muscle spasms   Yes ProviderShan MD   diphenhydrAMINE (BENADRYL) 25 MG tablet Take 2 tablets by mouth at bedtime   Yes ProviderShan MD   ziprasidone (GEODON) 40 MG capsule Take 1 capsule by mouth 2 times daily (with meals)   Yes ProviderShan MD   topiramate (TOPAMAX) 100 MG tablet  24  Yes Shan Curran MD   OXcarbazepine (TRILEPTAL) 300 MG tablet  24  Yes ProviderShan MD   Cholecalciferol (VITAMIN D3) 1000 units TABS Take 1 tablet by mouth daily 19  Yes Luis Garza MD   topiramate (TOPAMAX) 25 MG tablet  24   ProviderShan MD   INVEGA SUSTENNA 234 MG/1.5ML FRANHCESKA IM injection Inject 234 mg into the muscle every 30 days 24   ProviderShan MD   ARIPiprazole (ABILIFY) 20 MG tablet  23   ProviderShan MD   clonazePAM (KLONOPIN) 2 MG tablet  3/30/23   ProviderShan MD   OLANZapine (ZYPREXA) 15 MG tablet Take 1 tablet by mouth nightly  Patient not taking: Reported on 2024 3/27/23   Madeline Chung APRN - CNP       Current medications:    Current Facility-Administered Medications   Medication Dose Route Frequency Provider Last Rate Last Admin   • loperamide (IMODIUM) capsule 2 mg  2 mg Oral 4x Daily PRN Janina Purcell APRN - NP       • promethazine (PHENERGAN) injection 6.25 mg  6.25 mg IntraMUSCular Q6H PRN Olinda Barber APRN - CNP   6.25 mg at 24 1436   •

## 2024-09-30 NOTE — GROUP NOTE
Group Therapy Note    Date: 9/30/2024    Group Start Time: 1330  Group End Time: 1400  Group Topic: Cognitive Skills    Suzanne Sharma CTRS    Cognitive Skills Group Note        Date: September 30, 2024 Start Time: 1:30pm  End Time:  2:00pm      Number of Participants in Group & Unit Census:  0/7    Topic: cognitive skills     Goal of Group: pt will demonstrate improved coping skills and improved interpersonal relationships      Comments:     Patient did not participate in Cognitive Skills group, despite staff encouragement and explanation of benefits.  Patient remain seclusive to self.  Q15 minute safety checks maintained for patient safety and will continue to encourage patient to attend unit programming.              Signature:  NIKKI RAND

## 2024-09-30 NOTE — PROCEDURES
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)  Postprocedure diagnosis: SAME AS PRE-PROCEDURE DIAGNOSIS  Treatment Number: This is treatment # 5   Patient Status: BEHAVIOR IP   Type of ECT: Bilateral Brief Pulse  Medications  Preprocedure: Tylenol 650mg  Anesthetic: Etomidate 20 mg  Paralytic: Succinylcholine 120 mg  Post procedure: none needed   Cuff placement: Left Lower Extremity    MECTA Settings 1st Stimulus:     Pulse width: 1.0 ms   Frequency:  40 hz   Duration:   2.0 seconds   Static Impedance: 573 ohms             Dynamic Impedance: 212 ohms             Motor Seizure Duration: 38 seconds  EEG Seizure Duration: 44 seconds             The patient's ECT treatment was performed using MECTA machine  .  Timeout:  Time out was performed using two patient identifiers and confirmation of procedure.     Patient Preparation: Preprocedure documentation, labs, H&P were all verified and reviewed.  The patient was placed in supine position.  EEG leads were placed for monitoring of seizure.   Anabaptist areas bilaterally cleaned and prepped.  Conductive gel  was applied over stimulus electrode site.   The patient was pre-oxygenated.       Procedure in detail: Medications were administered by anesthesia using intravenous access at the doses listed previously in this summary.  Anesthetic administered and once confirmation the patient is anesthetized, the patient's blood pressure cuff on lower extremity was inflated to 100mmHg in excess of patient's blood pressure.  At this time, the neuromuscular blockade was administered intravenously by anesthesia.  Upper extremity fasciculation were verified followed by lower extremity fasciculation.  Once fasciculations terminated, confirmation of affective neuromuscular blockade demonstrated by absence of withdrawal reflex.  Bite blocks were put in place.

## 2024-10-01 PROCEDURE — 1240000000 HC EMOTIONAL WELLNESS R&B

## 2024-10-01 PROCEDURE — 6370000000 HC RX 637 (ALT 250 FOR IP): Performed by: PSYCHIATRY & NEUROLOGY

## 2024-10-01 PROCEDURE — 90833 PSYTX W PT W E/M 30 MIN: CPT | Performed by: PSYCHIATRY & NEUROLOGY

## 2024-10-01 PROCEDURE — 99232 SBSQ HOSP IP/OBS MODERATE 35: CPT | Performed by: PSYCHIATRY & NEUROLOGY

## 2024-10-01 PROCEDURE — 6370000000 HC RX 637 (ALT 250 FOR IP)

## 2024-10-01 RX ADMIN — HYDROXYZINE HYDROCHLORIDE 50 MG: 50 TABLET, FILM COATED ORAL at 20:56

## 2024-10-01 RX ADMIN — ZIPRASIDONE HYDROCHLORIDE 40 MG: 40 CAPSULE ORAL at 09:02

## 2024-10-01 RX ADMIN — ZIPRASIDONE HYDROCHLORIDE 40 MG: 40 CAPSULE ORAL at 18:19

## 2024-10-01 RX ADMIN — HYOSCYAMINE SULFATE 0.12 MG: 0.12 TABLET SUBLINGUAL at 14:40

## 2024-10-01 RX ADMIN — HYOSCYAMINE SULFATE 0.12 MG: 0.12 TABLET SUBLINGUAL at 09:02

## 2024-10-01 RX ADMIN — HYOSCYAMINE SULFATE 0.12 MG: 0.12 TABLET SUBLINGUAL at 20:56

## 2024-10-01 RX ADMIN — TRAZODONE HYDROCHLORIDE 50 MG: 50 TABLET ORAL at 20:56

## 2024-10-01 NOTE — GROUP NOTE
Group Therapy Note    Date: 10/1/2024    Group Start Time: 1000  Group End Time: 1030  Group Topic: Psychoeducation    [facility] Elizabeth Carpio LSW        Group Therapy Note    Attendees: 6/9       Patient's Goal:  identifying mood, relaxation techniques    Notes:  did well with redirection from interruption    Status After Intervention:  Improved    Participation Level: Monopolizing    Participation Quality: Intrusive      Speech:  normal      Thought Process/Content: Perseverating      Affective Functioning: Congruent      Mood: euthymic      Level of consciousness:  Alert and Oriented x4      Response to Learning: Progressing to goal      Endings: None Reported    Modes of Intervention: Education and Support      Discipline Responsible: /Counselor      Signature:  RAY Wray

## 2024-10-02 ENCOUNTER — ANESTHESIA (OUTPATIENT)
Dept: POSTOP/PACU | Age: 29
DRG: 751 | End: 2024-10-02
Payer: COMMERCIAL

## 2024-10-02 ENCOUNTER — ANESTHESIA EVENT (OUTPATIENT)
Dept: POSTOP/PACU | Age: 29
DRG: 751 | End: 2024-10-02
Payer: COMMERCIAL

## 2024-10-02 ENCOUNTER — APPOINTMENT (OUTPATIENT)
Dept: POSTOP/PACU | Age: 29
End: 2024-10-02
Payer: MEDICARE

## 2024-10-02 VITALS
WEIGHT: 250 LBS | SYSTOLIC BLOOD PRESSURE: 127 MMHG | HEART RATE: 91 BPM | HEIGHT: 65 IN | BODY MASS INDEX: 41.65 KG/M2 | TEMPERATURE: 98.7 F | DIASTOLIC BLOOD PRESSURE: 88 MMHG | OXYGEN SATURATION: 93 % | RESPIRATION RATE: 14 BRPM

## 2024-10-02 DIAGNOSIS — F20.1 DISORGANIZED SCHIZOPHRENIA (HCC): Primary | ICD-10-CM

## 2024-10-02 LAB — GLUCOSE BLD-MCNC: 86 MG/DL (ref 75–110)

## 2024-10-02 PROCEDURE — 7100000001 HC PACU RECOVERY - ADDTL 15 MIN: Performed by: ANESTHESIOLOGY

## 2024-10-02 PROCEDURE — 90870 ELECTROCONVULSIVE THERAPY: CPT | Performed by: PSYCHIATRY & NEUROLOGY

## 2024-10-02 PROCEDURE — 6370000000 HC RX 637 (ALT 250 FOR IP): Performed by: PSYCHIATRY & NEUROLOGY

## 2024-10-02 PROCEDURE — 82947 ASSAY GLUCOSE BLOOD QUANT: CPT

## 2024-10-02 PROCEDURE — 7100000000 HC PACU RECOVERY - FIRST 15 MIN: Performed by: ANESTHESIOLOGY

## 2024-10-02 PROCEDURE — 99239 HOSP IP/OBS DSCHRG MGMT >30: CPT | Performed by: PSYCHIATRY & NEUROLOGY

## 2024-10-02 PROCEDURE — 6360000002 HC RX W HCPCS: Performed by: NURSE ANESTHETIST, CERTIFIED REGISTERED

## 2024-10-02 PROCEDURE — 3700000000 HC ANESTHESIA ATTENDED CARE: Performed by: ANESTHESIOLOGY

## 2024-10-02 PROCEDURE — 2580000003 HC RX 258: Performed by: ANESTHESIOLOGY

## 2024-10-02 PROCEDURE — 6370000000 HC RX 637 (ALT 250 FOR IP)

## 2024-10-02 PROCEDURE — 90870 ELECTROCONVULSIVE THERAPY: CPT

## 2024-10-02 PROCEDURE — GZB2ZZZ ELECTROCONVULSIVE THERAPY, BILATERAL-SINGLE SEIZURE: ICD-10-PCS | Performed by: PSYCHIATRY & NEUROLOGY

## 2024-10-02 PROCEDURE — 2500000003 HC RX 250 WO HCPCS: Performed by: NURSE ANESTHETIST, CERTIFIED REGISTERED

## 2024-10-02 RX ORDER — METHOHEXITAL IN WATER/PF 100MG/10ML
SYRINGE (ML) INTRAVENOUS
Status: DISCONTINUED | OUTPATIENT
Start: 2024-10-02 | End: 2024-10-02 | Stop reason: SDUPTHER

## 2024-10-02 RX ORDER — KETOROLAC TROMETHAMINE 30 MG/ML
INJECTION, SOLUTION INTRAMUSCULAR; INTRAVENOUS
Status: DISCONTINUED | OUTPATIENT
Start: 2024-10-02 | End: 2024-10-02 | Stop reason: SDUPTHER

## 2024-10-02 RX ORDER — KETOROLAC TROMETHAMINE 30 MG/ML
INJECTION, SOLUTION INTRAMUSCULAR; INTRAVENOUS
Status: COMPLETED
Start: 2024-10-02 | End: 2024-10-02

## 2024-10-02 RX ORDER — SUCCINYLCHOLINE/SOD CL,ISO/PF 200MG/10ML
SYRINGE (ML) INTRAVENOUS
Status: DISCONTINUED | OUTPATIENT
Start: 2024-10-02 | End: 2024-10-02 | Stop reason: SDUPTHER

## 2024-10-02 RX ORDER — METHOHEXITAL IN WATER/PF 100MG/10ML
SYRINGE (ML) INTRAVENOUS
Status: COMPLETED
Start: 2024-10-02 | End: 2024-10-02

## 2024-10-02 RX ORDER — SUCCINYLCHOLINE/SOD CL,ISO/PF 200MG/10ML
SYRINGE (ML) INTRAVENOUS
Status: COMPLETED
Start: 2024-10-02 | End: 2024-10-02

## 2024-10-02 RX ORDER — TRAZODONE HYDROCHLORIDE 50 MG/1
50 TABLET, FILM COATED ORAL NIGHTLY PRN
Qty: 30 TABLET | Refills: 0 | Status: SHIPPED | OUTPATIENT
Start: 2024-10-02

## 2024-10-02 RX ORDER — HYDROXYZINE HYDROCHLORIDE 50 MG/1
50 TABLET, FILM COATED ORAL 3 TIMES DAILY PRN
Qty: 30 TABLET | Refills: 0 | Status: SHIPPED | OUTPATIENT
Start: 2024-10-02 | End: 2024-10-12

## 2024-10-02 RX ADMIN — HYOSCYAMINE SULFATE 0.12 MG: 0.12 TABLET SUBLINGUAL at 15:22

## 2024-10-02 RX ADMIN — ACETAMINOPHEN 650 MG: 325 TABLET ORAL at 06:41

## 2024-10-02 RX ADMIN — HYOSCYAMINE SULFATE 0.12 MG: 0.12 TABLET SUBLINGUAL at 09:33

## 2024-10-02 RX ADMIN — SODIUM CHLORIDE, POTASSIUM CHLORIDE, SODIUM LACTATE AND CALCIUM CHLORIDE: 600; 310; 30; 20 INJECTION, SOLUTION INTRAVENOUS at 08:04

## 2024-10-02 RX ADMIN — Medication 120 MG: at 08:16

## 2024-10-02 RX ADMIN — KETOROLAC TROMETHAMINE 30 MG: 30 INJECTION, SOLUTION INTRAMUSCULAR at 08:13

## 2024-10-02 RX ADMIN — ZIPRASIDONE HYDROCHLORIDE 40 MG: 40 CAPSULE ORAL at 09:34

## 2024-10-02 RX ADMIN — Medication 100 MG: at 08:16

## 2024-10-02 ASSESSMENT — PAIN SCALES - GENERAL: PAINLEVEL_OUTOF10: 0

## 2024-10-02 NOTE — ANESTHESIA POSTPROCEDURE EVALUATION
Department of Anesthesiology  Postprocedure Note    Patient: Alistair Hutchison  MRN: [id number]  YOB: 1995  Date of evaluation: 10/2/2024    Procedure Summary       Date: 10/02/24 Room / Location: [facility] PACU    Anesthesia Start: 0813 Anesthesia Stop: 0823    Procedure: ECT W/ ANESTHESIA Diagnosis:     Scheduled Providers:  Responsible Provider: Zacarias Pandey MD    Anesthesia Type: general ASA Status: 3            Anesthesia Type: No value filed.    Amalia Phase I: Amalia Score: 10    Amalia Phase II:      Anesthesia Post Evaluation    Patient location during evaluation: PACU  Patient participation: complete - patient participated  Level of consciousness: awake and alert  Airway patency: patent  Nausea & Vomiting: no vomiting  Cardiovascular status: hemodynamically stable  Respiratory status: acceptable  Hydration status: euvolemic  Comments: POST- ANESTHESIA EVALUATION       Pt Name: Alistair Hutchison  MRN: [id number]  YOB: 1995  Date of evaluation: 10/2/2024  Time:  2:34 PM      /88   Pulse 91   Temp 98.7 °F (37.1 °C) (Bladder)   Resp 14   Ht 1.651 m (5' 5\")   Wt 113.4 kg (250 lb)   SpO2 93%   BMI 41.60 kg/m²      Consciousness Level  Awake  Cardiopulmonary Status  Stable  Pain Adequately Treated YES  Nausea / Vomiting  NO  Adequate Hydration  YES  Anesthesia Related Complications NONE      Electronically signed by Zacarias Pandey MD on 10/2/2024 at 2:34 PM         Pain management: satisfactory to patient    No notable events documented.

## 2024-10-02 NOTE — BH NOTE
[facility]  Admission Note     Admission Type:   Admission Type: Involuntary    Reason for admission:  Reason for Admission: Per grandparents patients mood has been altered. Patient has been increasingly violent verbally and physically, hyperactive, and responding to internal stimuli.      Addictive Behavior:   Addictive Behavior  In the Past 3 Months, Have You Felt or Has Someone Told You That You Have a Problem With  : None    Medical Problems:   Past Medical History:   Diagnosis Date    Fracture     leg    Manic depression ([facility])     Oppositional defiant behavior     Schizophrenia ([facility])        Status EXAM:  Mental Status and Behavioral Exam  Normal: No  Level of Assistance: Independent/Self  Facial Expression: Brightened  Affect: Congruent  Level of Consciousness: Alert  Frequency of Checks: 4 times per hour, close  Mood:Normal: No  Mood: Anxious, Labile  Motor Activity:Normal: Yes  Eye Contact: Fair  Observed Behavior: Cooperative, Preoccupied  Sexual Misconduct History: Current - no  Preception: [place] to person, [place] to time, [place] to place  Attention:Normal: No  Attention: Distractible, Unable to concentrate  Thought Processes: Circumstantial  Thought Content:Normal: No  Thought Content: Preoccupations  Depression Symptoms: Impaired concentration, Increased irritability  Anxiety Symptoms: Generalized  Erica Symptoms: Poor judgment, Pressured speech, Labile  Hallucinations: Other (comment) (responding to internal stimuli)  Delusions: No  Memory:Normal: No  Memory: Poor recent, Poor remote  Insight and Judgment: No  Insight and Judgment: Poor judgment, Poor insight    Tobacco Screening:  Practical Counseling, on admission, matti X, if applicable and completed (first 3 are required if patient doesn't refuse):            ( ) Recognizing danger situations (included triggers and roadblocks)                    ( ) Coping skills (new ways to manage stress,relaxation techniques, changing routine, 
Activity  Group Note        Date: September 16, 2024 Start Time:  1600   End Time:  1700      Number of Participants in Group & Unit Census:  3/7    Topic: We played BINGO during group.    Goal of Group:Socialization      Comments:     Patient did not participate in  activity  group, despite staff encouragement and explanation of benefits.  Patient remain seclusive to self.  Q15 minute safety checks maintained for patient safety and will continue to encourage patient to attend unit programming.    
Arrival from ECT to [facility] Unit:    Patient arrived to the unit Time: 1003 and Via Wheelchair. Patient is person and place. Patient is calm, controlled    Physical and Mental assessment, Vitals x 2, and fall risk assessment completed and documented.     Post-op ECT checklist completed and placed in patients chart.    
Arrival from ECT to [facility] Unit:    Patient arrived to the unit Time: 0915 and Via Wheelchair. Patient is person, place, time/date, and situation. Patient is Calm and cooperative.     Physical and Mental assessment, Vitals x 2, and fall risk assessment completed and documented.     Post-op ECT checklist completed and placed in patients chart.    
Arrival from ECT to [facility] Unit:    Patient arrived to the unit Time: 0915 and Via Wheelchair. Patient is oriented to person, place, time/date, and situation. Patient is Calm and cooperative with assessments.    Physical and Mental assessment, Vitals x 2, and fall risk assessment completed and documented.     Post-op ECT checklist completed and placed in patients chart.    
Arrival from ECT to [facility] Unit:    Patient arrived to the unit Time: 0940 and Via Wheelchair. Patient is person. Patient is Calm and cooperative. Mumbling to self.    Physical and Mental assessment, Vitals x 2, and fall risk assessment completed and documented.     Post-op ECT checklist completed and placed in patients chart.    
Arrival from ECT to [facility] Unit:    Patient arrived to the unit Time: 0813 and Via Wheelchair. Patient is person, place, and time/date. Patient is calm and cooperative     Physical and Mental assessment, Vitals x 2, and fall risk assessment completed and documented.     Post-op ECT checklist completed and placed in patients chart.    
[facility]  Day [facility] Interdisciplinary Treatment Plan NOTE    Review Date & Time: 0944 9/16/24    Admission Type:   Admission Type: Involuntary    Reason for admission:  Reason for Admission: Per grandparents patients mood has been altered. Patient has been increasingly violent verbally and physically, hyperactive, and responding to internal stimuli.  Estimated Length of Stay:  5-7 days  Estimated Discharge Date Update:  to be determined by physician    PATIENT STRENGTHS:  Patient Strengths:   Patient Strengths and Limitations:Limitations: Difficult relationships / poor social skills, Difficulty problem solving/relies on others to help solve problems, Unrealistic self-view, Limited education -> difficulty reading or writing, Multiple barriers to leisure interests  Addictive Behavior:Addictive Behavior  In the Past 3 Months, Have You Felt or Has Someone Told You That You Have a Problem With  : None  Medical Problems:  Past Medical History:   Diagnosis Date    Fracture     leg    Manic depression ([facility])     Oppositional defiant behavior     Schizophrenia ([facility])        Risk:  Fall Risk   Caesar Scale Caesar Scale Score: 22  BVC    Change in scores:  No Changes to plan of Care:  No    Status EXAM:   Mental Status and Behavioral Exam  Normal: No  Level of Assistance: Independent/Self  Facial Expression: Brightened  Affect: Congruent  Level of Consciousness: Alert  Frequency of Checks: 4 times per hour, close  Mood:Normal: No  Mood: Anxious  Motor Activity:Normal: No  Motor Activity: Increased  Eye Contact: Fair  Observed Behavior: Cooperative, Friendly, Preoccupied  Sexual Misconduct History: Current - no  Preception: [place] to person, [place] to time, [place] to place  Attention:Normal: No  Attention: Distractible, Unable to concentrate  Thought Processes: Circumstantial, Tangential  Thought Content:Normal: No  Thought Content: Preoccupations  Depression Symptoms: Feelings of helplessness  Anxiety Symptoms: 
Departure from I Unit to ECT:    Pre-op ECT Checklist completed and placed by front of patients chart. Patient departed unit Time: 0603 and Via Wheelchair, accompanied by staff. Patient is alert and orientated at this time, as well as cooperative.      
Departure from I Unit to ECT:    Pre-op ECT Checklist completed and placed by front of patients chart. Patient departed unit Time: 0713 and Via Wheelchair, accompanied by [facility] staff. Patient is alert and orientated at this time, as well as calm, cooperative.     
Departure from I Unit to ECT:    Pre-op ECT Checklist completed and placed by front of patients chart. Patient departed unit Time: 0742 and Via Wheelchair, accompanied by staff. Patient is alert and orientated at this time, as well as Calm and cooperative.      
Departure from I Unit to ECT:    Pre-op ECT Checklist completed and placed by front of patients chart. Patient departed unit Time: 0800 and Via Wheelchair, accompanied by staff. Patient is alert and orientated at this time, as well as calm and controlled.     
Departure from I Unit to ECT:    Pre-op ECT Checklist completed and placed by front of patients chart. Patient departed unit Time: 0832 and Via Wheelchair, accompanied by staff. Patient is alert and orientated at this time, as well as Calm and cooperative.     
Departure from I Unit to ECT:    Pre-op ECT Checklist completed and placed by front of patients chart. Patient departed unit Time: 7:35 am and Via Wheelchair, accompanied by I staff. Patient is alert and orientated at this time, as well as Calm and cooperative.      
Emergency Medication Follow-Up Note:    PRN medication of Atarax 50 mg orally was effective as evidence by the patient appearing less anxious and regaining behavioral control. Pacing and self talk continues. The patient denies medication side effects. Will continue to monitor and provide support as needed.    
Emergency Medication Follow-Up Note:    PRN medication of was somewhat effective as evidenced by patient resting in bed, continuously talking. Patient denies medication side effects. Will continue to monitor and provide support as needed.    
Emergency PRN Medication Administration Note:      Patient is agitated as evidenced by not staying in his room, refusing staff redirection, and being loud and somewhat disruptive in the dayroom. Patient reports poor sleep for several days Staff attempted to find and relieve the distress by Talking to patient, Refocusing on new activity, and Offering suggestions Patient is currently  continuing to wander the dayroom. Medication Administered as prescribed: Haldol, 5 mg., Ativan, 2 mg., oral. Patient Tolerated medication administration.   Will continue to monitor, offer support, and reassess.   
Group therapy  Group Note        Date: September 20, 2024 Start Time:  1145   End Time:  1245      Number of Participants in Group & Unit Census:  4/7    Topic: Goals, heart map, daily affirmations    Goal of Group:Patients talked about their goals.      Comments:     Patient did not participate in  Group Therapy  group, despite staff encouragement and explanation of benefits.  Patient remain seclusive to self.  Q15 minute safety checks maintained for patient safety and will continue to encourage patient to attend unit programming.    
LG in to visit patient. Admissions paperwork signed by Guardian.   
OQ: [id number]   
PRN note   Patient agitated as evidenced by yelling, slamming doors, and not following direction. Staff attempted to talk with patient, offer distraction activity, and offer PRN medications. Patient states \"Yes I need to calm down, give it to me now!\" In response to medication. Patient given PO PRN medications at this time.   
PRN note  Patient agitated as evidenced by screaming, slamming doors, and not redirecting. Staff attempted to relieve distress by talking with patient, offering distraction activity, and offering PRN medications. Patient accepting of PO PRN medications.   
Patent observed vomiting in dayroom. Patient retreated to his room and continue to vomit. Will notify provider. Patient states \"I ate too much dinner\".  
Patient denies being a smoker and denies need for smoking cessation or education.  
Patient given tobacco quitline number 83857501063 at this time, refusing to call at this time, states \" I just dont want to quit now\"- patient given information as to the dangers of long term tobacco use. Continue to reinforce the importance of tobacco cessation.     
Patient transferred to [facility] unit, room 122. He was accompanied by 2 [facility] staff. Belongings were transferred with patient. Patient was oriented to unit and assigned room.   
RN has reviewed all LPN documentation.     
RN has reviewed all LPN documentation.     
RN has reviewed all Orienting RN documentation.     
Room safety check complete. No contraband found.  
The patient has been restless and some anxiousness noted. PRN Atarax 50 mg given as prescribed to help lessen anxiety. Writer will assess the patient for efficacy of the medication. Q15 minute checks to continue for safety.  
Verified current medications with Cameron jamison [facility] in [place], [place]. Med list updated.    
Writer called Jazz; kim payne and advised that William was ready to be picked up for his discharge. She noted she would be on her way shortly.   
Writer called Legal Guardian Jacqueline Chacko [phone] to sign admission consents/paperwork. LG stated \"I will come visit tomorrow and sign admission consents/paperwork and I will ask questions if needed.\"   
techniques in how to quit, available resources  ( ) [facility]                                                                                                                   ( ) Patient refused counseling  ( x) Patient refused referral  ( ) Patient refused prescription upon discharge  ( ) Patient has not smoked in the last 30 days    Metabolic Screening:    Lab Results   Component Value Date    LABA1C 5.0 03/17/2023       Lab Results   Component Value Date    CHOL 172 09/15/2024    CHOL 162 03/17/2023    CHOL 176 10/18/2019     Lab Results   Component Value Date    TRIG 93 09/15/2024    TRIG 72 03/17/2023    TRIG 113 10/18/2019     Lab Results   Component Value Date    HDL 32 (L) 09/15/2024    HDL 37 (L) 03/17/2023    HDL 38 (L) 10/18/2019     No components found for: \"LDLCAL\"  No components found for: \"LABVLDL\"      Patient was discharged to home with grandparents. Grandma picked up Mr. Hutchison to transport him home. All personal and valuable belongings were on his person at discharge.     Olinda Olson RN

## 2024-10-02 NOTE — GROUP NOTE
Group Therapy Note    Date: 10/1/2024    Group Start Time: 2000  Group End Time: 2020  Group Topic: Wrap-Up    ST[facility] Sonam Nicolas LPN        Group Therapy Note         Patient's Goal:  He would like to go to [place] and see a george show like [employer] or [employer].     Status After Intervention:  Unchanged    Participation Level: Minimal    Participation Quality: Appropriate      Speech:  pressured      Thought Process/Content: Logical      Affective Functioning: Flat      Mood: anxious      Level of consciousness:  Alert      Response to Learning: Able to verbalize current knowledge/experience and Able to retain information      Endings: None Reported    Modes of Intervention: Activity      Discipline Responsible: Licensed Practical Nurse      Signature:  Sonam Krishnan LPN

## 2024-10-02 NOTE — ANESTHESIA PRE PROCEDURE
Department of Anesthesiology  Preprocedure Note       Name:  Alistair Hutchison   Age:  29 y.o.  :  1995                                          MRN:  [id number]         Date:  10/2/2024      Surgeon: * No surgeons listed *    Procedure: * No procedures listed *    Medications prior to admission:   Prior to Admission medications    Medication Sig Start Date End Date Taking? Authorizing Provider   diphenhydrAMINE (BENADRYL) 25 MG tablet Take 2 tablets by mouth as needed (1-2 Tablets as needed for muscle spasms) 1-2 Tablets as needed for muscle spasms   Yes ProviderShan MD   diphenhydrAMINE (BENADRYL) 25 MG tablet Take 2 tablets by mouth at bedtime   Yes ProviderShan MD   ziprasidone (GEODON) 40 MG capsule Take 1 capsule by mouth 2 times daily (with meals)   Yes ProviderShan MD   topiramate (TOPAMAX) 100 MG tablet  24  Yes Shan Curran MD   OXcarbazepine (TRILEPTAL) 300 MG tablet  24  Yes ProviderShan MD   Cholecalciferol (VITAMIN D3) 1000 units TABS Take 1 tablet by mouth daily 19  Yes Luis Garza MD   topiramate (TOPAMAX) 25 MG tablet  24   ProviderShan MD   INVEGA SUSTENNA 234 MG/1.5ML FRANCHESKA IM injection Inject 234 mg into the muscle every 30 days 24   ProviderShan MD   ARIPiprazole (ABILIFY) 20 MG tablet  23   ProviderShan MD   clonazePAM (KLONOPIN) 2 MG tablet  3/30/23   ProviderShan MD   OLANZapine (ZYPREXA) 15 MG tablet Take 1 tablet by mouth nightly  Patient not taking: Reported on 2024 3/27/23   Madeline Chung APRN - CNP       Current medications:    Current Facility-Administered Medications   Medication Dose Route Frequency Provider Last Rate Last Admin   • loperamide (IMODIUM) capsule 2 mg  2 mg Oral 4x Daily PRN Janina Purcell APRN - NP       • promethazine (PHENERGAN) injection 6.25 mg  6.25 mg IntraMUSCular Q6H PRN Olinda Barber APRN - CNP   6.25 mg at 24 1436   •

## 2024-10-02 NOTE — TRANSITION OF CARE
[facility]  Follow up on 10/7/2024 You have a mental health appointment at 5:00 pm [address]  [facility] [address]               Advanced Directive:   Does the patient have an appointed surrogate decision maker? No  Does the patient have a Medical Advance Directive? No  Does the patient have a Psychiatric Advance Directive? No  If the patient does not have a surrogate or Medical Advance Directive AND Psychiatric Advance Directive, the patient was offered information on these advance directives Patient declined to complete    Patient Instructions: Please continue all medications until otherwise directed by physician.      Tobacco Cessation Discharge Plan:   Is the patient a tobacco user  and needs referral for tobacco cessation? Yes  Patient referred to the following for tobacco cessation with an appointment? No  Patient was offered medication to assist with tobacco cessation at discharge? No    Alcohol/Substance Abuse Discharge Plan:   Does the patient have a history of substance/alcohol abuse and requires a referral for treatment? No  Patient referred to the following for substance/alcohol abuse treatment with an appointment? No  Patient was offered medication to assist with substance/alcohol abuse cessation at discharge? No      Patient discharged to: Home; Transition record discussed with patient/caregiver and provided this record in hard copy or electronically

## 2024-10-02 NOTE — PROCEDURES
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)  Postprocedure diagnosis: SAME AS PRE-PROCEDURE DIAGNOSIS  Treatment Number: This is treatment # 6   Patient Status: BEHAVIOR IP   Type of ECT: Bilateral Brief Pulse  Medications  Preprocedure: Tylenol 650mg  Anesthetic: Methohexital 100 mg  Paralytic: Succinylcholine 120 mg  Post procedure: none needed   Cuff placement: Left Lower Extremity    MECTA Settings 1st Stimulus:     Pulse width: 1.0 ms   Frequency:  40 hz   Duration:   2.0 seconds   Static Impedance: 479 ohms             Dynamic Impedance: 196 ohms             Motor Seizure Duration: 35 seconds  EEG Seizure Duration: 47 seconds             The patient's ECT treatment was performed using MECTA machine  .  Timeout:  Time out was performed using two patient identifiers and confirmation of procedure.     Patient Preparation: Preprocedure documentation, labs, H&P were all verified and reviewed.  The patient was placed in supine position.  EEG leads were placed for monitoring of seizure.   Mandaen areas bilaterally cleaned and prepped.  Conductive gel  was applied over stimulus electrode site.   The patient was pre-oxygenated.       Procedure in detail: Medications were administered by anesthesia using intravenous access at the doses listed previously in this summary.  Anesthetic administered and once confirmation the patient is anesthetized, the patient's blood pressure cuff on lower extremity was inflated to 100mmHg in excess of patient's blood pressure.  At this time, the neuromuscular blockade was administered intravenously by anesthesia.  Upper extremity fasciculation were verified followed by lower extremity fasciculation.  Once fasciculations terminated, confirmation of affective neuromuscular blockade demonstrated by absence of withdrawal reflex.  Bite blocks were put in place.

## 2024-10-02 NOTE — PLAN OF CARE
Problem: Behavior  Goal: Pt/Family maintain appropriate behavior and adhere to behavioral management agreement, if implemented  Description: INTERVENTIONS:  1. Assess patient/family's coping skills and  non-compliant behavior (including use of illegal substances)  2. Notify security of behavior or suspected illegal substances which indicate the need for search of the family and/or belongings  3. Encourage verbalization of thoughts and concerns in a socially appropriate manner  4. Utilize positive, consistent limit setting strategies supporting safety of patient, staff and others  5. Encourage participation in the decision making process about the behavioral management agreement  6. If a visitor's behavior poses a threat to safety call refer to organization policy.  7. Initiate consult with , Psychosocial CNS, Spiritual Care as appropriate  10/1/2024 1220 by Lorena Altman LPN  Outcome: Progressing  Note: Patient denies all, focused on discharge and reports tomorrow after ECT is the plan. His ADL performance has improved, patient showered, brushed his teeth, and changed his clothing without staffs prompting. He is social and appropriate with his peers, less hyper-verbal and restless. Patient has remained in behavorial control while on unit and has not required emergency medications in the last 24 hours. Patient denies any suicidal/homicidal ideation at this time. Will continue to monitor, assess, and provide a safe environment through Q15 minute safety checks.   9/30/2024 2328 by Urszula Chu LPN  Outcome: Progressing  Flowsheets (Taken 9/30/2024 2328)  Patient/family maintains appropriate behavior and adheres to behavioral management agreement, if implemented:   Assess patient/family’s coping skills and  non-compliant behavior (including use of illegal substances)   Encourage verbalization of thoughts and concerns in a socially appropriate manner   Utilize positive, consistent limit setting 
  Problem: Behavior  Goal: Pt/Family maintain appropriate behavior and adhere to behavioral management agreement, if implemented  Description: INTERVENTIONS:  1. Assess patient/family's coping skills and  non-compliant behavior (including use of illegal substances)  2. Notify security of behavior or suspected illegal substances which indicate the need for search of the family and/or belongings  3. Encourage verbalization of thoughts and concerns in a socially appropriate manner  4. Utilize positive, consistent limit setting strategies supporting safety of patient, staff and others  5. Encourage participation in the decision making process about the behavioral management agreement  6. If a visitor's behavior poses a threat to safety call refer to organization policy.  7. Initiate consult with , Psychosocial CNS, Spiritual Care as appropriate  10/1/2024 2133 by Sonam Krishnan LPN  Outcome: Progressing  Note: Patient is selectively social out in the dayroom this shift. Patient is restless unable to sit or do an activity for little bits at a time. Patient cooperative with assessments and medications this shift. Patient educated and agrees to come to staff if needed this shift. Patient monitored every 15 minutes with environmental safety checks this shift.      Problem: Pain  Goal: Verbalizes/displays adequate comfort level or baseline comfort level  Outcome: Progressing     
  Problem: Behavior  Goal: Pt/Family maintain appropriate behavior and adhere to behavioral management agreement, if implemented  Description: INTERVENTIONS:  1. Assess patient/family's coping skills and  non-compliant behavior (including use of illegal substances)  2. Notify security of behavior or suspected illegal substances which indicate the need for search of the family and/or belongings  3. Encourage verbalization of thoughts and concerns in a socially appropriate manner  4. Utilize positive, consistent limit setting strategies supporting safety of patient, staff and others  5. Encourage participation in the decision making process about the behavioral management agreement  6. If a visitor's behavior poses a threat to safety call refer to organization policy.  7. Initiate consult with , Psychosocial CNS, Spiritual Care as appropriate  9/20/2024 1005 by Teresa Kessler RN  Outcome: Progressing   1:1 with pt x ten minutes.  Pt encouraged to attend unit programming and interact with peers and staff.  Pt also encouraged to tend to hygiene and ADLs.  Pt encouraged to discuss feelings with staff and feedback and reassurance provided.    Pt denies thoughts of self harm and is agreeable to seeking out should thoughts of self harm arise.  Safe environment maintained.  Q15 minute checks for safety cont per unit policy.  Will cont to monitor for safety and provides support and reassurance as needed.    Pt had ECT this AM. Oriented to person et place only. Pt has constant self talk present. Some drooling. Thoughts are disorganized. Does accept redirection.   
  Problem: Behavior  Goal: Pt/Family maintain appropriate behavior and adhere to behavioral management agreement, if implemented  Description: INTERVENTIONS:  1. Assess patient/family's coping skills and  non-compliant behavior (including use of illegal substances)  2. Notify security of behavior or suspected illegal substances which indicate the need for search of the family and/or belongings  3. Encourage verbalization of thoughts and concerns in a socially appropriate manner  4. Utilize positive, consistent limit setting strategies supporting safety of patient, staff and others  5. Encourage participation in the decision making process about the behavioral management agreement  6. If a visitor's behavior poses a threat to safety call refer to organization policy.  7. Initiate consult with , Psychosocial CNS, Spiritual Care as appropriate  9/20/2024 2014 by Laure Gray LPN  Outcome: Progressing     Problem: Risk for Elopement  Goal: Patient will not exit the unit/facility without proper excort  9/20/2024 2014 by Laure Gray LPN  Outcome: Progressing   Patient shows no s/sx of suicidal ideations at this time. Patient showsno signs of elopement Q15min safety checks continue.  
  Problem: Behavior  Goal: Pt/Family maintain appropriate behavior and adhere to behavioral management agreement, if implemented  Description: INTERVENTIONS:  1. Assess patient/family's coping skills and  non-compliant behavior (including use of illegal substances)  2. Notify security of behavior or suspected illegal substances which indicate the need for search of the family and/or belongings  3. Encourage verbalization of thoughts and concerns in a socially appropriate manner  4. Utilize positive, consistent limit setting strategies supporting safety of patient, staff and others  5. Encourage participation in the decision making process about the behavioral management agreement  6. If a visitor's behavior poses a threat to safety call refer to organization policy.  7. Initiate consult with , Psychosocial CNS, Spiritual Care as appropriate  9/23/2024 1028 by Teresa Kessler RN  Outcome: Progressing   1:1 with pt x ten minutes.  Pt encouraged to attend unit programming and interact with peers and staff.  Pt also encouraged to tend to hygiene and ADLs.  Pt encouraged to discuss feelings with staff and feedback and reassurance provided.    Pt denies thoughts of self harm and is agreeable to seeking out should thoughts of self harm arise.  Safe environment maintained.  Q15 minute checks for safety cont per unit policy.  Will cont to monitor for safety and provides support and reassurance as needed.    Pt was compliant with ECT and medications. Still has periods of drooling. Constant self talk and mumbling while pacing hallway. Poor concentration.  
  Problem: Behavior  Goal: Pt/Family maintain appropriate behavior and adhere to behavioral management agreement, if implemented  Description: INTERVENTIONS:  1. Assess patient/family's coping skills and  non-compliant behavior (including use of illegal substances)  2. Notify security of behavior or suspected illegal substances which indicate the need for search of the family and/or belongings  3. Encourage verbalization of thoughts and concerns in a socially appropriate manner  4. Utilize positive, consistent limit setting strategies supporting safety of patient, staff and others  5. Encourage participation in the decision making process about the behavioral management agreement  6. If a visitor's behavior poses a threat to safety call refer to organization policy.  7. Initiate consult with , Psychosocial CNS, Spiritual Care as appropriate  9/24/2024 1207 by Lorena Altman LPN  Outcome: Progressing  Note: Patient denies all, however remains hyper-verbal, restless, and demonstrates flight of ideas during conversation. He reports not getting enough sleep last night, said he was too anxious about discharge. Patient has remained in behavorial control while on unit and has not required emergency medications in the last 24 hours. Patient denies any suicidal/homicidal ideation at this time. Will continue to monitor, assess, and provide a safe environment through Q15 minute safety checks.   9/23/2024 6927 by Bj Centeno RN  Outcome: Not Progressing     
  Problem: Behavior  Goal: Pt/Family maintain appropriate behavior and adhere to behavioral management agreement, if implemented  Description: INTERVENTIONS:  1. Assess patient/family's coping skills and  non-compliant behavior (including use of illegal substances)  2. Notify security of behavior or suspected illegal substances which indicate the need for search of the family and/or belongings  3. Encourage verbalization of thoughts and concerns in a socially appropriate manner  4. Utilize positive, consistent limit setting strategies supporting safety of patient, staff and others  5. Encourage participation in the decision making process about the behavioral management agreement  6. If a visitor's behavior poses a threat to safety call refer to organization policy.  7. Initiate consult with , Psychosocial CNS, Spiritual Care as appropriate  9/24/2024 2157 by Sonam Krishnan LPN  Outcome: Progressing  Note: Patient is selectively social out in the dayroom, pacing, engaging in self talk this shift. Patient is restless unable to sit or do an activity for little bits at a time. Patient cooperative with assessments and medications this shift. Patient educated and agrees to come to staff if needed this shift. Patient monitored every 15 minutes with environmental safety checks.      Problem: Pain  Goal: Verbalizes/displays adequate comfort level or baseline comfort level  9/24/2024 2157 by Sonam Krishnan LPN  Outcome: Progressing  Note: Patient denies pain this shift.      
  Problem: Behavior  Goal: Pt/Family maintain appropriate behavior and adhere to behavioral management agreement, if implemented  Description: INTERVENTIONS:  1. Assess patient/family's coping skills and  non-compliant behavior (including use of illegal substances)  2. Notify security of behavior or suspected illegal substances which indicate the need for search of the family and/or belongings  3. Encourage verbalization of thoughts and concerns in a socially appropriate manner  4. Utilize positive, consistent limit setting strategies supporting safety of patient, staff and others  5. Encourage participation in the decision making process about the behavioral management agreement  6. If a visitor's behavior poses a threat to safety call refer to organization policy.  7. Initiate consult with , Psychosocial CNS, Spiritual Care as appropriate  9/25/2024 0937 by Lay Sheikh RN  Outcome: Progressing  Flowsheets (Taken 9/25/2024 0915)  Patient/family maintains appropriate behavior and adheres to behavioral management agreement, if implemented: Assess patient/family’s coping skills and  non-compliant behavior (including use of illegal substances)    Patient selectively social and out in day room area. Patient very vocal. Communicates wants and needs. Will provide enouragement and education on appropriate behavior.      
  Problem: Behavior  Goal: Pt/Family maintain appropriate behavior and adhere to behavioral management agreement, if implemented  Description: INTERVENTIONS:  1. Assess patient/family's coping skills and  non-compliant behavior (including use of illegal substances)  2. Notify security of behavior or suspected illegal substances which indicate the need for search of the family and/or belongings  3. Encourage verbalization of thoughts and concerns in a socially appropriate manner  4. Utilize positive, consistent limit setting strategies supporting safety of patient, staff and others  5. Encourage participation in the decision making process about the behavioral management agreement  6. If a visitor's behavior poses a threat to safety call refer to organization policy.  7. Initiate consult with , Psychosocial CNS, Spiritual Care as appropriate  9/26/2024 1100 by Teresa Kessler RN  Outcome: Progressing   1:1 with pt x ten minutes.  Pt encouraged to attend unit programming and interact with peers and staff.  Pt also encouraged to tend to hygiene and ADLs.  Pt encouraged to discuss feelings with staff and feedback and reassurance provided.    Pt denies thoughts of self harm and is agreeable to seeking out should thoughts of self harm arise.  Safe environment maintained.  Q15 minute checks for safety cont per unit policy.  Will cont to monitor for safety and provides support and reassurance as needed.    Pt paces hallways with constant self talk. Exhibits drooling. Preoccupied with own thoughts.   
  Problem: Behavior  Goal: Pt/Family maintain appropriate behavior and adhere to behavioral management agreement, if implemented  Description: INTERVENTIONS:  1. Assess patient/family's coping skills and  non-compliant behavior (including use of illegal substances)  2. Notify security of behavior or suspected illegal substances which indicate the need for search of the family and/or belongings  3. Encourage verbalization of thoughts and concerns in a socially appropriate manner  4. Utilize positive, consistent limit setting strategies supporting safety of patient, staff and others  5. Encourage participation in the decision making process about the behavioral management agreement  6. If a visitor's behavior poses a threat to safety call refer to organization policy.  7. Initiate consult with , Psychosocial CNS, Spiritual Care as appropriate  9/27/2024 1046 by Cristel Prater RN  Outcome: Progressing   Patient is calm and medication compliant. Patient denies suicidal ideations, is oriented to person, place and time and can be hyperverbal with flights of ideas; however is redirectable. Patient tolerated ECT procedure well and reports eating and sleeping adequately with safety checks Q15min and at irregular intervals,   Problem: Pain  Goal: Verbalizes/displays adequate comfort level or baseline comfort level  9/27/2024 1046 by Cristel Prater, RN  Outcome: Progressing   Pain denies pain  
  Problem: Behavior  Goal: Pt/Family maintain appropriate behavior and adhere to behavioral management agreement, if implemented  Description: INTERVENTIONS:  1. Assess patient/family's coping skills and  non-compliant behavior (including use of illegal substances)  2. Notify security of behavior or suspected illegal substances which indicate the need for search of the family and/or belongings  3. Encourage verbalization of thoughts and concerns in a socially appropriate manner  4. Utilize positive, consistent limit setting strategies supporting safety of patient, staff and others  5. Encourage participation in the decision making process about the behavioral management agreement  6. If a visitor's behavior poses a threat to safety call refer to organization policy.  7. Initiate consult with , Psychosocial CNS, Spiritual Care as appropriate  9/27/2024 2202 by Almita Horton, RN  Outcome: Progressing  Note: Patient denied all feelings of anxiety and depression. Patient presents flight of ideas and struggles to concentrate. Patient paces shea and is at nurses station frequently. Patient repeating he wants to go home. He is redirectable. Patient was medication compliant and ate snack.      Problem: Pain  Goal: Verbalizes/displays adequate comfort level or baseline comfort level  9/27/2024 2202 by Almita Horton, RN  Outcome: Progressing     
  Problem: Behavior  Goal: Pt/Family maintain appropriate behavior and adhere to behavioral management agreement, if implemented  Description: INTERVENTIONS:  1. Assess patient/family's coping skills and  non-compliant behavior (including use of illegal substances)  2. Notify security of behavior or suspected illegal substances which indicate the need for search of the family and/or belongings  3. Encourage verbalization of thoughts and concerns in a socially appropriate manner  4. Utilize positive, consistent limit setting strategies supporting safety of patient, staff and others  5. Encourage participation in the decision making process about the behavioral management agreement  6. If a visitor's behavior poses a threat to safety call refer to organization policy.  7. Initiate consult with , Psychosocial CNS, Spiritual Care as appropriate  9/28/2024 1948 by Slime Heart RN  Outcome: Progressing  Note: Pt has maintained appropriate behavior (behavior controlled), will continue to monitor, q 15 min safety checks maintained.     Problem: Pain  Goal: Verbalizes/displays adequate comfort level or baseline comfort level  9/28/2024 1948 by Slime Heart, RN  Note: Pt reports pain level 0/10, will continue to reassess, q 15 min safety checks mainatained.  9/28/2024 1824 by Olinda Olson, RN  Note: Mr. Hutchison denies pain during this shift.      Problem: Pain  Goal: Verbalizes/displays adequate comfort level or baseline comfort level  9/28/2024 1954 by Slime Heart, RN  Outcome: Progressing  Note: Pt reports pain level 0/10 at this time, will continue to monitor. Q 15 min safety checks maintained.     Problem: Behavior  Goal: Pt/Family maintain appropriate behavior and adhere to behavioral management agreement, if implemented  Description: INTERVENTIONS:  1. Assess patient/family's coping skills and  non-compliant behavior (including use of illegal substances)  2. Notify security of behavior or suspected 
  Problem: Behavior  Goal: Pt/Family maintain appropriate behavior and adhere to behavioral management agreement, if implemented  Description: INTERVENTIONS:  1. Assess patient/family's coping skills and  non-compliant behavior (including use of illegal substances)  2. Notify security of behavior or suspected illegal substances which indicate the need for search of the family and/or belongings  3. Encourage verbalization of thoughts and concerns in a socially appropriate manner  4. Utilize positive, consistent limit setting strategies supporting safety of patient, staff and others  5. Encourage participation in the decision making process about the behavioral management agreement  6. If a visitor's behavior poses a threat to safety call refer to organization policy.  7. Initiate consult with , Psychosocial CNS, Spiritual Care as appropriate  9/29/2024 2015 by Slime Heart RN  Outcome: Progressing  Note: Pt calm, cooperative and behavior controlled, will continue to monitor, q 15 min safety checks maintained.     Problem: Pain  Goal: Verbalizes/displays adequate comfort level or baseline comfort level  9/29/2024 2015 by Slime Heart, RN  Outcome: Progressing  Note: Pt reports pain level 0/10 at this time, will continue to reassess, q 15 min safety checks maintained.     
  Problem: Behavior  Goal: Pt/Family maintain appropriate behavior and adhere to behavioral management agreement, if implemented  Description: INTERVENTIONS:  1. Assess patient/family's coping skills and  non-compliant behavior (including use of illegal substances)  2. Notify security of behavior or suspected illegal substances which indicate the need for search of the family and/or belongings  3. Encourage verbalization of thoughts and concerns in a socially appropriate manner  4. Utilize positive, consistent limit setting strategies supporting safety of patient, staff and others  5. Encourage participation in the decision making process about the behavioral management agreement  6. If a visitor's behavior poses a threat to safety call refer to organization policy.  7. Initiate consult with , Psychosocial CNS, Spiritual Care as appropriate  9/30/2024 0921 by Cristel Prater, RN  Outcome: Progressing   Patient is calm and medication compliant. Patient denies suicidal ideations, is oriented to person, place and time and can be hyperverbal with flights of ideas; however is redirectable. Patient tolerated ECT procedure well and reports eating and sleeping adequately with safety checks Q15min and at irregular intervals,   Problem: Pain  Goal: Verbalizes/displays adequate comfort level or baseline comfort level  9/30/2024 0921 by Cristel Prater, RN  Outcome: Progressing   Managed with PRN medications   
  Problem: Behavior  Goal: Pt/Family maintain appropriate behavior and adhere to behavioral management agreement, if implemented  Description: INTERVENTIONS:  1. Assess patient/family's coping skills and  non-compliant behavior (including use of illegal substances)  2. Notify security of behavior or suspected illegal substances which indicate the need for search of the family and/or belongings  3. Encourage verbalization of thoughts and concerns in a socially appropriate manner  4. Utilize positive, consistent limit setting strategies supporting safety of patient, staff and others  5. Encourage participation in the decision making process about the behavioral management agreement  6. If a visitor's behavior poses a threat to safety call refer to organization policy.  7. Initiate consult with , Psychosocial CNS, Spiritual Care as appropriate  Note: Mr. Hutchison is at the nurses station and pacing in front of the nurses station for most of this shift. He self talks in mumbles. Mr. Hutchison is discharge focused and notes that \"today is my last radiation treatment\". He is pleasant and cooperative with his prescribed medication. He denies feeling depressed and anxious. Mr. Hutchison lifts his shirt above his chest a number of times and requires staff to remind him to lower his shift while in the day area. He follows direction then will repeat the action shortly after. He eventually ceased doing this.      Problem: Pain  Goal: Verbalizes/displays adequate comfort level or baseline comfort level  Note: Mr. Hutchison denies pain during this shift.      
  Problem: Behavior  Goal: Pt/Family maintain appropriate behavior and adhere to behavioral management agreement, if implemented  Description: INTERVENTIONS:  1. Assess patient/family's coping skills and  non-compliant behavior (including use of illegal substances)  2. Notify security of behavior or suspected illegal substances which indicate the need for search of the family and/or belongings  3. Encourage verbalization of thoughts and concerns in a socially appropriate manner  4. Utilize positive, consistent limit setting strategies supporting safety of patient, staff and others  5. Encourage participation in the decision making process about the behavioral management agreement  6. If a visitor's behavior poses a threat to safety call refer to organization policy.  7. Initiate consult with , Psychosocial CNS, Spiritual Care as appropriate  Outcome: Progressing     Problem: Pain  Goal: Verbalizes/displays adequate comfort level or baseline comfort level  Outcome: Progressing     
  Problem: Behavior  Goal: Pt/Family maintain appropriate behavior and adhere to behavioral management agreement, if implemented  Description: INTERVENTIONS:  1. Assess patient/family's coping skills and  non-compliant behavior (including use of illegal substances)  2. Notify security of behavior or suspected illegal substances which indicate the need for search of the family and/or belongings  3. Encourage verbalization of thoughts and concerns in a socially appropriate manner  4. Utilize positive, consistent limit setting strategies supporting safety of patient, staff and others  5. Encourage participation in the decision making process about the behavioral management agreement  6. If a visitor's behavior poses a threat to safety call refer to organization policy.  7. Initiate consult with , Psychosocial CNS, Spiritual Care as appropriate  Outcome: Progressing   Patient anxious and restless. Patient continues with loud self talk. Patient received PO PRNs due to yelling, slamming doors, and not redirecting.   
  Problem: Behavior  Goal: Pt/Family maintain appropriate behavior and adhere to behavioral management agreement, if implemented  Description: INTERVENTIONS:  1. Assess patient/family's coping skills and  non-compliant behavior (including use of illegal substances)  2. Notify security of behavior or suspected illegal substances which indicate the need for search of the family and/or belongings  3. Encourage verbalization of thoughts and concerns in a socially appropriate manner  4. Utilize positive, consistent limit setting strategies supporting safety of patient, staff and others  5. Encourage participation in the decision making process about the behavioral management agreement  6. If a visitor's behavior poses a threat to safety call refer to organization policy.  7. Initiate consult with , Psychosocial CNS, Spiritual Care as appropriate  Outcome: Progressing   Patient is restless. Patient frequently pacing between day room and room. Self talk is constant. Patient is demanding at times. Patient needs redirection. Patient received PRN medications due to slamming doors and yelling. Patient is compliant with his medications.   
  Problem: Behavior  Goal: Pt/Family maintain appropriate behavior and adhere to behavioral management agreement, if implemented  Description: INTERVENTIONS:  1. Assess patient/family's coping skills and  non-compliant behavior (including use of illegal substances)  2. Notify security of behavior or suspected illegal substances which indicate the need for search of the family and/or belongings  3. Encourage verbalization of thoughts and concerns in a socially appropriate manner  4. Utilize positive, consistent limit setting strategies supporting safety of patient, staff and others  5. Encourage participation in the decision making process about the behavioral management agreement  6. If a visitor's behavior poses a threat to safety call refer to organization policy.  7. Initiate consult with , Psychosocial CNS, Spiritual Care as appropriate  Outcome: Progressing  Flowsheets (Taken 9/30/2024 8949)  Patient/family maintains appropriate behavior and adheres to behavioral management agreement, if implemented:   Assess patient/family’s coping skills and  non-compliant behavior (including use of illegal substances)   Encourage verbalization of thoughts and concerns in a socially appropriate manner   Utilize positive, consistent limit setting strategies supporting safety of patient, staff and others   Encourage participation in the decision making process about the behavioral management agreement  Note: Patient was found out in unit day room during shift today.  Patient's behavior meets unit expectations.  Patient has been friendly and cooperative with staff and peers.  Remains moderately verbal, and able to answer questions, and voice needs appropriately.     Problem: Pain  Goal: Verbalizes/displays adequate comfort level or baseline comfort level  Outcome: Progressing  Flowsheets (Taken 9/27/2024 0107)  Verbalizes/displays adequate comfort level or baseline comfort level:   Encourage patient to monitor pain 
  Problem: Behavior  Goal: Pt/Family maintain appropriate behavior and adhere to behavioral management agreement, if implemented  Description: INTERVENTIONS:  1. Assess patient/family's coping skills and  non-compliant behavior (including use of illegal substances)  2. Notify security of behavior or suspected illegal substances which indicate the need for search of the family and/or belongings  3. Encourage verbalization of thoughts and concerns in a socially appropriate manner  4. Utilize positive, consistent limit setting strategies supporting safety of patient, staff and others  5. Encourage participation in the decision making process about the behavioral management agreement  6. If a visitor's behavior poses a threat to safety call refer to organization policy.  7. Initiate consult with , Psychosocial CNS, Spiritual Care as appropriate  Outcome: Progressing  Note: Patient behavior in line with unit expectations.  Was mostly sleeping during shift, but was out in day room occasionally interacting appropriately with peers and staff.  Compliant with all behavioral health medications.     Problem: Pain  Goal: Verbalizes/displays adequate comfort level or baseline comfort level  Outcome: Progressing  Flowsheets (Taken 9/27/2024 0107)  Verbalizes/displays adequate comfort level or baseline comfort level:   Encourage patient to monitor pain and request assistance   Administer analgesics based on type and severity of pain and evaluate response   Consider cultural and social influences on pain and pain management   Assess pain using appropriate pain scale   Implement non-pharmacological measures as appropriate and evaluate response   Notify Licensed Independent Practitioner if interventions unsuccessful or patient reports new pain  Note: Patient asked if he had any pain, and patient was able to verbalize a denial of experiencing any pain at this time.     
  Problem: Risk for Elopement  Goal: Patient will not exit the unit/facility without proper excort  9/14/2024 2115 by Adolph Stephens, RN  Outcome: Progressing   Does not talk about or attempt to leave unit.  Problem: Behavior  Goal: Pt/Family maintain appropriate behavior and adhere to behavioral management agreement, if implemented  Description: INTERVENTIONS:  1. Assess patient/family's coping skills and  non-compliant behavior (including use of illegal substances)  2. Notify security of behavior or suspected illegal substances which indicate the need for search of the family and/or belongings  3. Encourage verbalization of thoughts and concerns in a socially appropriate manner  4. Utilize positive, consistent limit setting strategies supporting safety of patient, staff and others  5. Encourage participation in the decision making process about the behavioral management agreement  6. If a visitor's behavior poses a threat to safety call refer to organization policy.  7. Initiate consult with , Psychosocial CNS, Spiritual Care as appropriate  9/14/2024 2115 by Adolph Stephens, RN  Outcome: Progressing   Labile but receptive to support/reassurance. Does not require limits. Takesa med's without issue & ate snack,.  Problem: Pain  Goal: Verbalizes/displays adequate comfort level or baseline comfort level  9/14/2024 2115 by Adolph Stephens, RN  Outcome: Progressing   Denies pain or discomfort.  
  Problem: Risk for Elopement  Goal: Patient will not exit the unit/facility without proper excort  9/17/2024 0735 by Breann Munoz, RN  Outcome: Progressing  Note: Has made no gestures, nor has he verbalized desire to elope.      Problem: Behavior  Goal: Pt/Family maintain appropriate behavior and adhere to behavioral management agreement, if implemented  Description: INTERVENTIONS:  1. Assess patient/family's coping skills and  non-compliant behavior (including use of illegal substances)  2. Notify security of behavior or suspected illegal substances which indicate the need for search of the family and/or belongings  3. Encourage verbalization of thoughts and concerns in a socially appropriate manner  4. Utilize positive, consistent limit setting strategies supporting safety of patient, staff and others  5. Encourage participation in the decision making process about the behavioral management agreement  6. If a visitor's behavior poses a threat to safety call refer to organization policy.  7. Initiate consult with , Psychosocial CNS, Spiritual Care as appropriate  9/17/2024 0735 by Breann Munoz, RN  Outcome: Progressing  Note: Patient is currently in behavioral control. Verbalizes his intention is to remain in behavioral control . Is compliant with AM medications and assessment.      Problem: Pain  Goal: Verbalizes/displays adequate comfort level or baseline comfort level  9/17/2024 0735 by Breann Munoz, RN  Outcome: Progressing  Note: Denies discomfort and displays no sign of acute distress.      
  Problem: Risk for Elopement  Goal: Patient will not exit the unit/facility without proper excort  9/18/2024 0839 by Ira Muller, RN  Outcome: Progressing  Note: Patient has not talked about leaving the unit and remains a safe distance from all exits. Patient is able to be redirected and is medication compliant. Patient is calm and cooperative with assessments.   9/18/2024 0138 by Joy Miles  Outcome: Progressing  Note: Patient not attempting or talking about leaving the unit at this time. Safety checks maintained q15min and irregular rounding.      Problem: Behavior  Goal: Pt/Family maintain appropriate behavior and adhere to behavioral management agreement, if implemented  Description: INTERVENTIONS:  1. Assess patient/family's coping skills and  non-compliant behavior (including use of illegal substances)  2. Notify security of behavior or suspected illegal substances which indicate the need for search of the family and/or belongings  3. Encourage verbalization of thoughts and concerns in a socially appropriate manner  4. Utilize positive, consistent limit setting strategies supporting safety of patient, staff and others  5. Encourage participation in the decision making process about the behavioral management agreement  6. If a visitor's behavior poses a threat to safety call refer to organization policy.  7. Initiate consult with , Psychosocial CNS, Spiritual Care as appropriate  9/18/2024 0839 by Ira Muller, RN  Outcome: Progressing  Note: Patient gets easily elevated and is able to be easily redirected. Patient is medication compliant and is calm and cooperative with assessments.   9/18/2024 0138 by Joy Miles  Outcome: Progressing  Note: Patient was out in the milieu, aloof. Self talk noted in dayroom and in his room alone. Patient is in control of behavior and cooperative. Compliant with all prescribed medications for this shift. Pt denies thoughts of self harm and 
  Problem: Risk for Elopement  Goal: Patient will not exit the unit/facility without proper excort  9/19/2024 0805 by Lorena Rankin RN  Outcome: Progressing  Note: Mr. Hutchison does not display elopement risk behavior during this shift. Patient is redirectable, obeys commands and is pleasant with assessment and peers.   9/19/2024 0618 by Lacho DAVISON RN  Outcome: Progressing     Problem: Behavior  Goal: Pt/Family maintain appropriate behavior and adhere to behavioral management agreement, if implemented  Description: INTERVENTIONS:  1. Assess patient/family's coping skills and  non-compliant behavior (including use of illegal substances)  2. Notify security of behavior or suspected illegal substances which indicate the need for search of the family and/or belongings  3. Encourage verbalization of thoughts and concerns in a socially appropriate manner  4. Utilize positive, consistent limit setting strategies supporting safety of patient, staff and others  5. Encourage participation in the decision making process about the behavioral management agreement  6. If a visitor's behavior poses a threat to safety call refer to organization policy.  7. Initiate consult with , Psychosocial CNS, Spiritual Care as appropriate  9/19/2024 0805 by Lorena Rankin, RN  Outcome: Progressing  Note: Mr. Hutchison is noted to be responding throughout this shift. Patient was out in the milieu, aloof. Self talk noted in dayroom and in his room alone. Patient is in control of behavior and cooperative. Compliant with all prescribed medications for this shift. Pt denies thoughts of self harm and is agreeable to seeking out should thoughts of self harm arise. Safe environment maintained. Q15 minute checks for safety cont per unit policy. Will cont to monitor for safety and provides support and reassurance as needed.   9/19/2024 0618 by Lacho DAVISON, RN  Outcome: Progressing     Problem: Pain  Goal: Verbalizes/displays 
  Problem: Risk for Elopement  Goal: Patient will not exit the unit/facility without proper excort  9/21/2024 1007 by Elizabeth Yip LPN  Outcome: Progressing  Note: Patient does not attempt elope this shift      Problem: Behavior  Goal: Pt/Family maintain appropriate behavior and adhere to behavioral management agreement, if implemented  Description: INTERVENTIONS:  1. Assess patient/family's coping skills and  non-compliant behavior (including use of illegal substances)  2. Notify security of behavior or suspected illegal substances which indicate the need for search of the family and/or belongings  3. Encourage verbalization of thoughts and concerns in a socially appropriate manner  4. Utilize positive, consistent limit setting strategies supporting safety of patient, staff and others  5. Encourage participation in the decision making process about the behavioral management agreement  6. If a visitor's behavior poses a threat to safety call refer to organization policy.  7. Initiate consult with , Psychosocial CNS, Spiritual Care as appropriate  9/21/2024 1007 by Elizabeth Yip LPN  Outcome: Progressing  Note: Patient is hyper-verbal and slurred speech patient is forgetful and needs frequent redirection. Patient has excessive salivation       Problem: Pain  Goal: Verbalizes/displays adequate comfort level or baseline comfort level  Outcome: Progressing  Note: Patient denies pains this shift but is offered PRN ibuprofen      
  Problem: Risk for Elopement  Goal: Patient will not exit the unit/facility without proper excort  9/21/2024 1007 by Elizabeth Yip LPN  Outcome: Progressing  Note: Patient does not attempt elope this shift      Problem: Behavior  Goal: Pt/Family maintain appropriate behavior and adhere to behavioral management agreement, if implemented  Description: INTERVENTIONS:  1. Assess patient/family's coping skills and  non-compliant behavior (including use of illegal substances)  2. Notify security of behavior or suspected illegal substances which indicate the need for search of the family and/or belongings  3. Encourage verbalization of thoughts and concerns in a socially appropriate manner  4. Utilize positive, consistent limit setting strategies supporting safety of patient, staff and others  5. Encourage participation in the decision making process about the behavioral management agreement  6. If a visitor's behavior poses a threat to safety call refer to organization policy.  7. Initiate consult with , Psychosocial CNS, Spiritual Care as appropriate  9/21/2024 1007 by Elizabeth Yip LPN  Outcome: Progressing  Note: Patient is hyper-verbal and slurred speech patient is forgetful and needs frequent redirection. Patient has excessive salivation. 15 min safety check      
  Problem: Risk for Elopement  Goal: Patient will not exit the unit/facility without proper excort  9/22/2024 0921 by Trish Baez LPN  Outcome: Progressing   Patient denies both suicidal and homicidal ideations. Patient paces unit with constant self talk. Patient is independent with ADLs and is encouraged to shower. Safety checks Q15 minutes and at irregular intervals.   Problem: Behavior  Goal: Pt/Family maintain appropriate behavior and adhere to behavioral management agreement, if implemented  Description: INTERVENTIONS:  1. Assess patient/family's coping skills and  non-compliant behavior (including use of illegal substances)  2. Notify security of behavior or suspected illegal substances which indicate the need for search of the family and/or belongings  3. Encourage verbalization of thoughts and concerns in a socially appropriate manner  4. Utilize positive, consistent limit setting strategies supporting safety of patient, staff and others  5. Encourage participation in the decision making process about the behavioral management agreement  6. If a visitor's behavior poses a threat to safety call refer to organization policy.  7. Initiate consult with , Psychosocial CNS, Spiritual Care as appropriate  9/22/2024 0921 by Trish Baez LPN  Outcome: Progressing    Patient denies both suicidal and homicidal ideations. Patient paces unit with constant self talk. Patient is independent with ADLs and is encouraged to shower. Safety checks Q15 minutes and at irregular intervals.   Problem: Pain  Goal: Verbalizes/displays adequate comfort level or baseline comfort level  Outcome: Progressing    Patient denies both suicidal and homicidal ideations. Patient paces unit with constant self talk. Patient is independent with ADLs and is encouraged to shower. Safety checks Q15 minutes and at irregular intervals.   
  Problem: Risk for Elopement  Goal: Patient will not exit the unit/facility without proper excort  9/23/2024 2237 by Bj Centeno, RN  Outcome: Progressing  Patient displays no elopement behaviors at this time.       Problem: Pain  Goal: Verbalizes/displays adequate comfort level or baseline comfort level  9/23/2024 2237 by Bj Centeno, RN  Outcome: Progressing  Patient denies pain at this time.      Problem: Behavior  Goal: Pt/Family maintain appropriate behavior and adhere to behavioral management agreement, if implemented  Description: INTERVENTIONS:  1. Assess patient/family's coping skills and  non-compliant behavior (including use of illegal substances)  2. Notify security of behavior or suspected illegal substances which indicate the need for search of the family and/or belongings  3. Encourage verbalization of thoughts and concerns in a socially appropriate manner  4. Utilize positive, consistent limit setting strategies supporting safety of patient, staff and others  5. Encourage participation in the decision making process about the behavioral management agreement  6. If a visitor's behavior poses a threat to safety call refer to organization policy.  7. Initiate consult with , Psychosocial CNS, Spiritual Care as appropriate  9/23/2024 2237 by Bj Centeno, RN  Outcome: Not Progressing  Patient continues to pace, and mumble loudly and constantly. His behavior is unchanged. He has poor boundaries and seems oblivious to the frustration of his peers. Patient denies suicidal ideation, homicidal ideation and hallucinations at this time. He is oriented to self and place. He frequently states that the patient in 107 is his father. Safety plan reviewed with patient, agrees to approach staff when feeling upset.  15 minute and random checks maintained for safety.  No violent or escalating behaviors noted during this shift. Patient is currently calm, and medication-compliant.     
  Problem: Risk for Elopement  Goal: Patient will not exit the unit/facility without proper excort  Outcome: Progressing     Problem: Behavior  Goal: Pt/Family maintain appropriate behavior and adhere to behavioral management agreement, if implemented  Description: INTERVENTIONS:  1. Assess patient/family's coping skills and  non-compliant behavior (including use of illegal substances)  2. Notify security of behavior or suspected illegal substances which indicate the need for search of the family and/or belongings  3. Encourage verbalization of thoughts and concerns in a socially appropriate manner  4. Utilize positive, consistent limit setting strategies supporting safety of patient, staff and others  5. Encourage participation in the decision making process about the behavioral management agreement  6. If a visitor's behavior poses a threat to safety call refer to organization policy.  7. Initiate consult with , Psychosocial CNS, Spiritual Care as appropriate  Outcome: Progressing     Problem: Pain  Goal: Verbalizes/displays adequate comfort level or baseline comfort level  Outcome: Progressing     Patient was cooperative with daily assessment. Patient is alert and oriented to person, place, time, and situation. Patient is capable of communicating their thoughts and feelings with staff. Based on patient's responses, their thoughts are delusions, hallucinations, and preoccupations. Patient appears tearful and preoccupied, congruent with their stated thoughts and feelings. Patient behaves inappropriately at times on the unit but responds well to redirection and follows verbal commands. Patient has been observed being out in dayroom at times but labile and responding loudly in room and dayroom.    Patient has  no scheduled medications at this time . Patient relates that they are not experiencing any physical pain at this time. Patient states that they are not experiencing any trouble sleeping at this 
  Problem: Risk for Elopement  Goal: Patient will not exit the unit/facility without proper excort  Outcome: Progressing   Focused on going home but doesn't attempt to leave & is accepting of waiting for MD to discharge.  Problem: Behavior  Goal: Pt/Family maintain appropriate behavior and adhere to behavioral management agreement, if implemented  Description: INTERVENTIONS:  1. Assess patient/family's coping skills and  non-compliant behavior (including use of illegal substances)  2. Notify security of behavior or suspected illegal substances which indicate the need for search of the family and/or belongings  3. Encourage verbalization of thoughts and concerns in a socially appropriate manner  4. Utilize positive, consistent limit setting strategies supporting safety of patient, staff and others  5. Encourage participation in the decision making process about the behavioral management agreement  6. If a visitor's behavior poses a threat to safety call refer to organization policy.  7. Initiate consult with , Psychosocial CNS, Spiritual Care as appropriate  9/15/2024 2031 by Adolph Stephens, RN  Outcome: Progressing   Less anxious tonight with affect brightening during interactions. Does not require limits or redirection.  Problem: Pain  Goal: Verbalizes/displays adequate comfort level or baseline comfort level  Outcome: Progressing   Denies pain or discomfort.  
  Problem: Risk for Elopement  Goal: Patient will not exit the unit/facility without proper excort  Outcome: Progressing  Note: Patient not attempting or talking about leaving the unit at this time. Safety checks maintained q15min and irregular rounding.      Problem: Behavior  Goal: Pt/Family maintain appropriate behavior and adhere to behavioral management agreement, if implemented  Description: INTERVENTIONS:  1. Assess patient/family's coping skills and  non-compliant behavior (including use of illegal substances)  2. Notify security of behavior or suspected illegal substances which indicate the need for search of the family and/or belongings  3. Encourage verbalization of thoughts and concerns in a socially appropriate manner  4. Utilize positive, consistent limit setting strategies supporting safety of patient, staff and others  5. Encourage participation in the decision making process about the behavioral management agreement  6. If a visitor's behavior poses a threat to safety call refer to organization policy.  7. Initiate consult with , Psychosocial CNS, Spiritual Care as appropriate  Outcome: Progressing  Note: Patient was out in the milieu, aloof. Self talk noted in dayroom and in his room alone. Patient is in control of behavior and cooperative. Compliant with all prescribed medications for this shift. Pt denies thoughts of self harm and is agreeable to seeking out should thoughts of self harm arise.  Safe environment maintained.  Q15 minute checks for safety cont per unit policy.  Will cont to monitor for safety and provides support and reassurance as needed.       
  Problem: Risk for Elopement  Goal: Patient will not exit the unit/facility without proper excort  Outcome: Progressing  Patient displays no elopement behaviors at this time.       Problem: Behavior  Goal: Pt/Family maintain appropriate behavior and adhere to behavioral management agreement, if implemented  Description: INTERVENTIONS:  1. Assess patient/family's coping skills and  non-compliant behavior (including use of illegal substances)  2. Notify security of behavior or suspected illegal substances which indicate the need for search of the family and/or belongings  3. Encourage verbalization of thoughts and concerns in a socially appropriate manner  4. Utilize positive, consistent limit setting strategies supporting safety of patient, staff and others  5. Encourage participation in the decision making process about the behavioral management agreement  6. If a visitor's behavior poses a threat to safety call refer to organization policy.  7. Initiate consult with , Psychosocial CNS, Spiritual Care as appropriate  Outcome: Progressing  Patient is child-like, pleasant, cooperative, somewhat social but very hard to understand. He denies hallucinations but engages in constant self-talk. He states his depression and anxiety symptoms are improving. Patient denies suicidal ideation, homicidal ideation and hallucinations at this time.  Safety plan reviewed with patient, agrees to approach staff when feeling upset.  15 minute and random checks maintained for safety.  No violent or escalating behaviors noted during this shift. Patient is currently calm, controlled and medication-compliant.      Problem: Pain  Goal: Verbalizes/displays adequate comfort level or baseline comfort level  Outcome: Progressing  Patient denies pain at this time.         
[facility]  Initial Interdisciplinary Treatment Plan NO      Original treatment plan Date & Time: 9/14/24 0858    Admission Type:  Admission Type: Involuntary    Reason for admission:   Reason for Admission: Per grandparents patients mood has been altered. Patient has been increasingly violent verbally and physically, hyperactive, and responding to internal stimuli.    Estimated Length of Stay:  5-7days  Estimated Discharge Date: to be determined by physician    PATIENT STRENGTHS:  Patient Strengths:   Patient Strengths and Limitations:   Addictive Behavior: Addictive Behavior  In the Past 3 Months, Have You Felt or Has Someone Told You That You Have a Problem With  : None  Medical Problems:  Past Medical History:   Diagnosis Date    Fracture     leg    Manic depression ([facility])     Oppositional defiant behavior     Schizophrenia ([facility])      Status EXAM:Mental Status and Behavioral Exam  Normal: No  Level of Assistance: Independent/Self  Facial Expression: Exaggerated  Affect: Incongruent, Unstable  Level of Consciousness: Alert  Frequency of Checks: 4 times per hour, close  Mood:Normal: No  Mood: Anxious, Labile, Sad  Motor Activity:Normal: Yes  Eye Contact: Fair  Observed Behavior: Cooperative, Preoccupied  Sexual Misconduct History: Current - no  Preception: [place] to person, [place] to time, [place] to place  Attention:Normal: No  Attention: Distractible, Unable to concentrate  Thought Processes: Circumstantial  Thought Content:Normal: No  Thought Content: Preoccupations  Depression Symptoms: Impaired concentration, Increased irritability  Anxiety Symptoms: Generalized  Erica Symptoms: Pressured speech, Labile  Hallucinations: Other (comment) (Patient denies all but is seen responding to internal stimuli)  Delusions: No  Memory:Normal: No  Memory: Poor recent, Poor remote  Insight and Judgment: No  Insight and Judgment: Poor judgment, Poor insight    EDUCATION:   Learner Progress Toward Treatment 
[facility]  Week Interdisciplinary Treatment Plan Note     Review Date & Time: 9/21/24 0900    Admission Type:   Admission Type: Involuntary    Reason for admission:  Reason for Admission: Per grandparents patients mood has been altered. Patient has been increasingly violent verbally and physically, hyperactive, and responding to internal stimuli.    Estimated Length of Stay:  8-14 days  Estimated Discharge Date Update:   to be determined by physician    PATIENT STRENGTHS:  Patient Strengths:   Patient Strengths and Limitations:Limitations: Difficult relationships / poor social skills, Difficulty problem solving/relies on others to help solve problems, Unrealistic self-view, Limited education -> difficulty reading or writing, Multiple barriers to leisure interests  Addictive Behavior:Addictive Behavior  In the Past 3 Months, Have You Felt or Has Someone Told You That You Have a Problem With  : None  Medical Problems:   Past Medical History:   Diagnosis Date    Fracture     leg    Manic depression ([facility])     Oppositional defiant behavior     Schizophrenia ([facility])        Risk:  Fall Risk   Caesar Scale Caesar Scale Score: 22  BVC      Change in scores:  No. Changes to plan of Care:  No    Status EXAM:   Mental Status and Behavioral Exam  Normal: No  Level of Assistance: Independent/Self  Facial Expression: Flat  Affect: Blunt  Level of Consciousness: Alert  Frequency of Checks: 4 times per hour, close  Mood:Normal: No  Mood: Depressed  Motor Activity:Normal: Yes  Motor Activity: Repetitive acts  Eye Contact: Good  Observed Behavior: Preoccupied  Sexual Misconduct History: Current - no  Preception: [place] to person, [place] to place  Attention:Normal: No  Attention: Distractible  Thought Processes: Loose association  Thought Content:Normal: No  Thought Content: Preoccupations  Depression Symptoms: Impaired concentration  Anxiety Symptoms: Generalized  Erica Symptoms: Poor judgment  Hallucinations: Auditory 
Problem: Behavior  Goal: Pt/Family maintain appropriate behavior and adhere to behavioral management agreement, if implemented  Description: INTERVENTIONS:  1. Assess patient/family's coping skills and  non-compliant behavior (including use of illegal substances)  2. Notify security of behavior or suspected illegal substances which indicate the need for search of the family and/or belongings  3. Encourage verbalization of thoughts and concerns in a socially appropriate manner  4. Utilize positive, consistent limit setting strategies supporting safety of patient, staff and others  5. Encourage participation in the decision making process about the behavioral management agreement  6. If a visitor's behavior poses a threat to safety call refer to organization policy.  7. Initiate consult with , Psychosocial CNS, Spiritual Care as appropriate  Outcome: Progressing     Problem: Pain  Goal: Verbalizes/displays adequate comfort level or baseline comfort level  Outcome: Progressing     The patient continues to pace the unit and mumble/self talk. He denies experiencing anxiety or depression. He denies endorsing current thoughts of self harm. Some verbal redirection required when the patient approaches peers in their personal space. He is redirectable. He denies pain. Writer will continue to offer emotional support. Q15 minute checks to continue for safety and for as needed.   
Problem: Behavior  Goal: Pt/Family maintain appropriate behavior and adhere to behavioral management agreement, if implemented  Description: INTERVENTIONS:  1. Assess patient/family's coping skills and  non-compliant behavior (including use of illegal substances)  2. Notify security of behavior or suspected illegal substances which indicate the need for search of the family and/or belongings  3. Encourage verbalization of thoughts and concerns in a socially appropriate manner  4. Utilize positive, consistent limit setting strategies supporting safety of patient, staff and others  5. Encourage participation in the decision making process about the behavioral management agreement  6. If a visitor's behavior poses a threat to safety call refer to organization policy.  7. Initiate consult with , Psychosocial CNS, Spiritual Care as appropriate  Outcome: Progressing  Patient is alert, observed in halls. Patient is flat on approach, evasive with assessment questions. Patient is currently denying thoughts of wanting to harm self or others. Patient denies having any anxiety or depression. Patient has been visible on unit, aloof of peers, talks to self constantly in the day area disrupting other patients on unit. Patient eats all meals in the day area and reports fair sleep. Patient is discharged focused, wants to go home, upset with his Guardian when she visited, had outburst but was able to calm down. Patient is medication compliant denies having any side effects. Patient's hygiene is poor, is encouraged to shower. No further concerns voiced. Will continue to monitor.        
Problem: Behavior  Goal: Pt/Family maintain appropriate behavior and adhere to behavioral management agreement, if implemented  Description: INTERVENTIONS:  1. Assess patient/family's coping skills and  non-compliant behavior (including use of illegal substances)  2. Notify security of behavior or suspected illegal substances which indicate the need for search of the family and/or belongings  3. Encourage verbalization of thoughts and concerns in a socially appropriate manner  4. Utilize positive, consistent limit setting strategies supporting safety of patient, staff and others  5. Encourage participation in the decision making process about the behavioral management agreement  6. If a visitor's behavior poses a threat to safety call refer to organization policy.  7. Initiate consult with , Psychosocial CNS, Spiritual Care as appropriate  Outcome: Progressing  Patient is alert, observed in room. Patient is currently denying thoughts of wanting to harm self or others. Patient has been visible on unit, social with peers, watching TV. Patient continues to talk to self and loud on unit. Patient eats all meals in the day area, and reports fair sleep. Patient's hygiene remains poor, is encouraged to shower\, and was compliant with taking shower. Patient is medication compliant denies having any side effects. No further concerns voiced, will continue to monitor and ensure safety.      
Problem: Risk for Elopement  Goal: Patient will not exit the unit/facility without proper excort  9/22/2024 0004 by America Grider RN  Outcome: Progressing     Problem: Behavior  Goal: Pt/Family maintain appropriate behavior and adhere to behavioral management agreement, if implemented  Description: INTERVENTIONS:  1. Assess patient/family's coping skills and  non-compliant behavior (including use of illegal substances)  2. Notify security of behavior or suspected illegal substances which indicate the need for search of the family and/or belongings  3. Encourage verbalization of thoughts and concerns in a socially appropriate manner  4. Utilize positive, consistent limit setting strategies supporting safety of patient, staff and others  5. Encourage participation in the decision making process about the behavioral management agreement  6. If a visitor's behavior poses a threat to safety call refer to organization policy.  7. Initiate consult with , Psychosocial CNS, Spiritual Care as appropriate  9/22/2024 0004 by America Grider RN  Outcome: Progressing      The patient has been in and out of his room for the evening. He requires verbal redirection at times to be mindful of other's personal space. He is easily redirectable. He remains in behavioral control. He is compliant with medications. No risks for elopement or exit seeking noted. He has some slurring of speech and will at times mumble. He is able to express needs. Writer will continue to offer emotional support. Q15 minute checks to continue for safety.     
Progressing  9/28/2024 1948 by Slime Heart, RN  Note: Pt reports pain level 0/10, will continue to reassess, q 15 min safety checks mainatained.     
Short-Term Goals: 8-14days     LONG-TERM GOALS UPDATE:  Time frame for Long-Term Goals: 6 months  Members Present in Team Meeting: See Signature Sheet    Ronn Gloria RN

## 2024-10-02 NOTE — PROCEDURES
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)  Postprocedure diagnosis: SAME AS PRE-PROCEDURE DIAGNOSIS  Treatment Number: This is treatment # 6   Patient Status: BEHAVIOR IP   Type of ECT: Bilateral Brief Pulse  Medications  Preprocedure: Tylenol 650mg  Anesthetic: Etomidate 20 mg  Paralytic: Succinylcholine 120 mg  Post procedure: none needed   Cuff placement: Left Lower Extremity    MECTA Settings 1st Stimulus:     Pulse width: 1.0 ms   Frequency:  40 hz   Duration:   2.0 seconds   Static Impedance: 573 ohms             Dynamic Impedance: 212 ohms             Motor Seizure Duration: 38 seconds  EEG Seizure Duration: 44 seconds             The patient's ECT treatment was performed using MECTA machine  .  Timeout:  Time out was performed using two patient identifiers and confirmation of procedure.     Patient Preparation: Preprocedure documentation, labs, H&P were all verified and reviewed.  The patient was placed in supine position.  EEG leads were placed for monitoring of seizure.   Judaism areas bilaterally cleaned and prepped.  Conductive gel  was applied over stimulus electrode site.   The patient was pre-oxygenated.       Procedure in detail: Medications were administered by anesthesia using intravenous access at the doses listed previously in this summary.  Anesthetic administered and once confirmation the patient is anesthetized, the patient's blood pressure cuff on lower extremity was inflated to 100mmHg in excess of patient's blood pressure.  At this time, the neuromuscular blockade was administered intravenously by anesthesia.  Upper extremity fasciculation were verified followed by lower extremity fasciculation.  Once fasciculations terminated, confirmation of affective neuromuscular blockade demonstrated by absence of withdrawal reflex.  Bite blocks were put in place.

## 2024-10-02 NOTE — DISCHARGE INSTRUCTIONS
Information:   Questions regarding your bill: Call HELP program [phone]     Suicide Hotline (rescue crisis) [phone]

## 2024-10-03 ENCOUNTER — ANESTHESIA EVENT (OUTPATIENT)
Dept: POSTOP/PACU | Age: 29
End: 2024-10-03
Payer: MEDICARE

## 2024-10-03 NOTE — PROGRESS NOTES
Behavioral Services                                              Medicare Re-Certification    I certify that the inpatient psychiatric hospital services furnished since the previous certification/re-certification were, and continue to be, medically necessary for;    [x] (1) Treatment which could reasonably be expected to improve the patient's condition,    [x] (2) Or for diagnostic study.    Estimated length of stay/service 4-7 days    Plan for post-hospital care home with outpatient [facility] f/u    This patient continues to need, on a daily basis, active treatment furnished directly by or requiring the supervision of inpatient psychiatric personnel.    Electronically signed by HAROLDO BARNETT MD on 9/28/2024 at 6:01 PM   
      Behavioral Services                                              Medicare Re-Certification    I certify that the inpatient psychiatric hospital services furnished since the previous certification/re-certification were, and continue to be, medically necessary for;    [x] (1) Treatment which could reasonably be expected to improve the patient's condition,    [x] (2) Or for diagnostic study.    Estimated length of stay/service 7 to 10 days    Plan for post-hospital care home with outpatient follow-up and potential ECT follow-up    This patient continues to need, on a daily basis, active treatment furnished directly by or requiring the supervision of inpatient psychiatric personnel.    Electronically signed by HAROLDO BARNETT MD on 9/20/2024 at 10:56 PM   
      Behavioral Services  Medicare Certification Upon Admission    I certify that this patient's inpatient psychiatric hospital admission is medically necessary for:    [x] (1) Treatment which could reasonably be expected to improve this patient's condition,       [x] (2) Or for diagnostic study;     AND     [x](2) The inpatient psychiatric services are provided while the individual is under the care of a physician and are included in the individualized plan of care.    Estimated length of stay/service 3-5 days    Plan for post-hospital care hc    Electronically signed by Cristobal Casey MD on 9/14/2024 at 10:28 AM      
   09/16/24 1513   Encounter Summary   Encounter Overview/Reason Behavioral Health   Service Provided For Patient   Last Encounter  09/16/24   Behavioral Health    Type  Spirituality Group   Assessment/Intervention/Outcome   Assessment Anxious;Compromised coping;Impaired resilience;Impaired social interaction   Intervention Active listening;Confronted/Challenged;Discussed illness injury and it’s impact;Discussed meaning/purpose;Empowerment;Explored/Affirmed feelings, thoughts, concerns;Explored Coping Skills/Resources;Nurtured Hope;Prayer (assurance of)/[place];Sustaining Presence/Ministry of presence   Outcome Encouraged;Engaged in conversation;Expressed feelings, needs, and concerns;Expressed feelings of [place], Peace and/or Love;Expressed Gratitude;Receptive       
   09/18/24 1526   Encounter Summary   Encounter Overview/Reason Behavioral Health   Service Provided For Patient   Last Encounter  09/18/24   Behavioral Health    Type  [facility]   Assessment/Intervention/Outcome   Assessment Angry;Anxious;Compromised coping;Impaired resilience;Impaired social interaction   Intervention Active listening;Sustaining Presence/Ministry of presence;Prayer (assurance of)/[place]   Outcome Receptive       
   10/02/24 1455   Encounter Summary   Encounter Overview/Reason Behavioral Health   Service Provided For Patient   Last Encounter  10/02/24   Behavioral Health    Type  [facility]   Assessment/Intervention/Outcome   Assessment Compromised coping;Impaired resilience;Impaired social interaction   Intervention Sustaining Presence/Ministry of presence;Read/Provided Scripture;Prayer (assurance of)/[place];Nurtured Hope;Empowerment;Discussed relationship with God;Discussed meaning/purpose;Active listening   Outcome Receptive;Expressed feelings, needs, and concerns       
  Daily Progress Note  9/16/2024    Patient Name: Alistair Hutchison    CHIEF COMPLAINT: Acute psychosis         SUBJECTIVE:    Patient is seen today for a follow up assessment.  He is found sitting in the day room holding paper bag of personal belongings.  He agrees to relocate and conduct interview in a private room with door open.  He is pleasant describes mood as \"better\".  He continues to lack insight to reason for hospitalization.  He denies episodes of aggression or violence towards household members.  He denies depression.  He endorses anxiety about going home.  He is discharged focused this morning and intermittently distracted with discussion of going home.  He reports sleeping well overnight and have a good appetite.  He denies suicidal or homicidal ideation.  He denies paranoia or delusions.  He does not appear internally stimulated.  Nursing staff denies behavioral disturbances or use of emergency medications during hospitalization.  Staff reports self talking behaviors which can be loud and disruptive.  His hygiene is reported as poor.  Nursing staff continues to encourage patient to shower.  He is reported as social with peers. Patient is compliant with taking scheduled psychotropic medications.  No adverse effects reported.  Patient is without adjustments of home medications at this time. Medication management and discharge planning per attending.      Appetite:  [x] Adequate/Unchanged  [] Increased  [] Decreased      Sleep:       [x] Adequate/Unchanged  [] Fair  [] Poor      Group Attendance on Unit:   [] Yes   [] Selectively    [x] No    Compliant with scheduled medications: [x] Yes  [] No    Received emergency medications in past 24 hrs: [] Yes   [x] No    Medication Side Effects: Denies         Mental Status Exam  Level of consciousness: Alert and awake   Appearance: Appropriate attire for setting, seated in chair, with poor grooming and hygiene   Behavior/Motor: Approachable, engages with 
  Daily Progress Note  9/20/2024    Patient Name: Alistair Hutchison    CHIEF COMPLAINT: Acute psychosis         SUBJECTIVE:    Patient is seen today for a follow up assessment.  He is found sitting in day room amongst peers.  He agrees to relocate and conduct interview in a private room with door open.  He presents with mostly unintelligible speech and increased oral secretions.  He is oriented to self and hospital setting.  He describes his mood as \"better\".  He does denies symptoms of depression or anxiety.  He denies suicidal or homicidal ideation.  Denies paranoia or delusions.  He is often observed responding to internal stimuli and engaging in self talk.  He has been without behavioral disturbances or use of emergency medications in the past 24 hours.  He is compliant with taking scheduled psychotropic medication.  No adverse effects reported.  He is attending select groups and requires prompting to perform routine hygiene needs.  He completed ECT treatment this morning, reported to tolerate procedure well per staff.  He reports improvement in sleep quality and appetite.  Unit nursing staff reports poor sleep quality.  Patient continues to utilize trazodone 50 mg nightly for insomnia.  Sleep hygiene discussed with patient.  He is discharged focused this morning, endorses readiness to return home.   Vital reviewed, no abnormal findings this morning. He lacks insight to his psychiatric symptoms or reason for hospitalization.  At this time, the patient has not demonstrated stability in behavior control.  He continues to require ongoing hospitalization for symptom monitoring, safety, and stabilization.    Appetite:  [x] Adequate/Unchanged  [] Increased  [] Decreased      Sleep:       [] Adequate/Unchanged  [] Fair  [x] Poor      Group Attendance on Unit:   [] Yes   [x] Selectively    [] No    Compliant with scheduled medications: [x] Yes  [] No    Received emergency medications in past 24 hrs: [] Yes   [x] 
  Daily Progress Note  9/21/2024    Patient Name: Alistair Hutchison    CHIEF COMPLAINT: Acute psychosis         SUBJECTIVE:    Patient is seen today for a follow up assessment.  He is found standing in the day room.  He agrees to conduct interview and private room with door open.  He describes his mood as \"great\".  His speech is rambling, slurred, and unintelligible at times.  He lies on bed extending arms and legs stating  \"I just got back with my high school girlfriend\".  He denies communication with reported girlfriend, reports regular visits from his grandparents.  He denies depression or anxiety.  He denies hallucinations or paranoia.  He reports sleeping well evident good appetite.  He denies adverse effects from ECT treatment.  He is observed with excessive oral secretions.  Hyoscyamine 0.125 mg 3 times daily per attending.  Nursing staff denies behavioral disturbances or use of emergency medications in the past 24 hours.  He is compliant with taking scheduled psychotropic medications.  No adverse effects reported.  No reports of self talk this morning.  Patient sleep quality reported as poor, he denies decreased need for sleep.  He continues to utilize as needed trazodone nightly.    Appetite:  [x] Adequate/Unchanged  [] Increased  [] Decreased      Sleep:       [] Adequate/Unchanged  [] Fair  [x] Poor      Group Attendance on Unit:   [x] Yes   [] Selectively    [] No    Compliant with scheduled medications: [x] Yes  [] No    Received emergency medications in past 24 hrs: [] Yes   [x] No    Medication Side Effects: Denies         Mental Status Exam  Level of consciousness: Alert and awake   Appearance: Appropriate attire for setting, resting in bed, with poor grooming and hygiene   Behavior/Motor: Approachable, engages with interviewer, no psychomotor abnormalities   Attitude toward examiner: Cooperative, attentive, good eye contact  Speech: slurred and rambling  Mood: \"Great\"  Affect: 
  Daily Progress Note  9/28/2024    Patient Name: Alistair Hutchison    CHIEF COMPLAINT: Acute psychosis         SUBJECTIVE:    Alistair was seen for follow-up assessment today.  He has been compliant with scheduled medications and behaviorally in control.  Nursing staff report patient returned from ECT this morning without incident and is doing well.  He continues to be observed engaging in self talk.  He has denied any suicidal or homicidal ideation.  He continues to pace the unit periodically.  Patient was approached in his room where he was found to be resting in bed but awake.  He sat up to engage in conversation with writer.  Patient described his mood as \"great\".  Patient was asked to identify ways in which he feels he is improving and he was able to verbalize feeling \"less depressed; better concentration\".  His speech is a little more articulate.  He denied any side effects from ECT treatment today.  Patient appears to be improving but not yet at baseline per family.    Appetite:  [x] Adequate/Unchanged  [] Increased  [] Decreased      Sleep:       [] Adequate/Unchanged  [] Fair  [x] Poor      Group Attendance on Unit:   [x] Yes   [] Selectively    [] No    Compliant with scheduled medications: [x] Yes  [] No    Received emergency medications in past 24 hrs: [] Yes   [x] No    Medication Side Effects: Denies         Mental Status Exam  Level of consciousness: Alert and awake   Appearance: Appropriate attire for setting, seated on bed, with fair  grooming and hygiene   Behavior/Motor: Approachable, engages with interviewer, calm  Attitude toward examiner: cooperative, good eye contact  Speech: Somewhat disorganized and garbled, improving  Mood: \"Great\"  Affect: Euthymic  Thought processes: Flight of ideas and tangential   Thought content:  Denies homicidal ideation  Suicidal Ideation: Denies suicidal ideations, contracts for safety on the unit.   Delusions: No evidence of delusions.   Perceptual 
Arrival from ECT to [facility] Unit:    Patient arrived to the unit Time: 1000 and [address] Wheelchair. Patient is person and place. Patient is Calm and  cooperative.     Physical and Mental assessment, Vitals x 2, and fall risk assessment completed and documented.     Post-op ECT checklist completed and placed in patients chart.     
CLINICAL PHARMACY NOTE: MEDS TO BEDS    Total # of Prescriptions Filled: 3   The following medications were delivered to the patient:  Hydroxyzine 50mg  Trazodone 50mg  Hyoscyamine Sulfate 0.125mg    Additional Documentation:  Delivered medications to [facility] station   
Daily Progress Note  Corby Drake MD  10/1/2024  CHIEF COMPLAINT:  psychosis    Reviewed patient's current plan of care and vital signs with nursing staff.  Sleep:  several hours last night  Attending groups: Yes    SUBJECTIVE:    Patient continues to show improvement with ECT.  Denying suicidal or homicidal ideation intent or plan.  Denying voices or vision.   Patient denying side effects to medication.  Denying problems with sleep or appetite.  Reports mood is good. Denies ah/vh.  Engaged in supportive psychotherapy    Mental Status Exam  Level of consciousness:  Within normal limits  Appearance: Hospital attire, seated in chair, with good grooming and hygiene   Behavior/Motor: No abnormalities noted  Attitude toward examiner:  Cooperative, attentive, good eye contact  Speech:  spontaneous, normal rate, normal volume and well articulated  Mood: \"Good\"  Affect: Congruent  Thought processes:  linear, goal directed and coherent to brief close ended questions  Thought content:  denies homicidal ideation  Suicidal Ideation: Denies suicidal ideation  Delusions:  no evidence of delusions  Perceptual Disturbance:  denies any perceptual disturbance  Cognition:  Oriented to self, location, time, and situation  Memory: age appropriate  Insight & Judgement: improving  Medication side effects:  denies       Data   height is 1.651 m (5' 5\") and weight is 113.4 kg (250 lb). His oral temperature is 98.3 °F (36.8 °C). His blood pressure is 124/88 and his pulse is 99. His respiration is 14 and oxygen saturation is 100%.   Labs:   Admission on 09/13/2024   Component Date Value Ref Range Status    Vit D, 25-Hydroxy 09/15/2024 51.0  30.0 - 100.0 ng/mL Final    Comment:    Reference Range:  Vitamin D status         Range   Deficiency              <20 ng/mL   Mild Deficiency       20-30 ng/mL   Sufficiency           ng/mL   Toxicity               >100 ng/mL      Cholesterol, Total 09/15/2024 172  <200 mg/dL Final    Comment:  
Group Therapy Note    Date: 10/2/2024    Group Start Time: 1330  Group End Time: 1425  Group Topic: Recreational    [facility]        Group Therapy Note    Attendees: 7/8     Patient's Goal: To increase socialization, practice self expression through creative expression, and   share preferences r/t music and relate to peers through music and discussion.      Notes: Pt was pleasant and cooperative and participated in group fully. Pt was able to practice self   expression through creative expression, and shared preferences r/t music and related to peers through   music  .       Status After Intervention:  Improved     Participation Level: Active Listener,  sharing , supportive     Participation Quality: Appropriate,  Attentive, sharing , supportive     Speech:  brief but relevant comments     Thought Process/Content: Logical , linear r/t task and topic -pt was able to remember/sing lyrics to some songs he knew.     Affective Functioning: Congruent, brightened     Mood: Euthymic     Level of consciousness:  Alert, and Attentive      Response to Learning:  Able to verbalize some current knowledge r/t his choices of music (lyrics), attentive to  new learning,  and Progressing to goal      Endings: None Reported     Modes of Intervention: Education, Support, Socialization, Exploration, Clarifying and Problem-solving      Discipline Responsible: Psychoeducational Specialist      Signature:  NIKKI MCFARLAND    
I have noted that the patient had ECT yesterday.  He is taking his prescribed ziprasidone and has not required any as needed medication for agitation.  He has been receiving as needed hydroxyzine.  The patient was seen face-to-face.  He is disheveled and is drooling.  He has loose associations and rambling speech.  Hyoscyamine 0.125 MG 3 times daily has been added for the patient.  
Pre ECT Assessment Note  Psychiatry  10/2/2024      Alistair Hutchison  1995  [id number]      Subjective:     Patient is a 29 y.o.  male seen for an evaluation prior to today's electroconvulsive therapy treatment. Today is treatment number 6, utilizing bilateral.  This is course number 1.      Alistair is evaluated today bedside prior to his ECT treatment, he confirms the plan to discharge home today with a transition to outpatient ECT.  He reports that he is a little anxious about returning home, though he does not provide a reliable reason for these feelings.  He reports plans to engage in basketball at [employer] once he returns home.  While his thought process is more linear, his responses are at times illogical.  His thought process has slowed, his overall affect is less elevated.  He does not appear to attend to internal stimuli at present.  We will attempt to get him prior off for outpatient ECT on Friday, if we are unable to do so his next treatment will be outpatient on Monday.    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    Hyperthyroidism, subclinical 08/22/2024    Moderate mixed hyperlipidemia not requiring statin therapy 08/22/2024    Class 2 obesity without serious comorbidity with body mass index (BMI) of 38.0 to 38.9 in adult 08/22/2024    Hypovitaminosis D 10/18/2019    Rib pain on left side 10/18/2019     Past Medical History:   Diagnosis Date    Fracture     leg    Manic depression ([facility])     Oppositional defiant behavior     Schizophrenia ([facility])       Past Surgical History:   Procedure Laterality Date    EYE SURGERY      TYMPANOSTOMY TUBE PLACEMENT        Medications Prior to Admission: diphenhydrAMINE (BENADRYL) 25 MG tablet, Take 2 tablets by mouth as needed (1-2 Tablets as needed for muscle spasms) 1-2 Tablets as needed for muscle spasms  diphenhydrAMINE 
Pre ECT Assessment Note  Psychiatry  9/23/2024      Alistair Hutchison  1995  [id number]      Subjective:     Patient is a 29 y.o.  male seen for an evaluation prior to today's electroconvulsive therapy treatment. Today is treatment number 2, utilizing bilateral.  This is course number 1.      Alistair is interviewed today bedside, staff reports no significant improvement in mood.  He remains hyperverbal, at times nonsensical though he does respond to questions in a linear and appropriate manner.  He continues to have racing thoughts.  He continues to pace on the unit and engage in self talk.  We will plan to continue with ECT 3 times this week and monitor for tolerance and efficacy.      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    Hyperthyroidism, subclinical 08/22/2024    Moderate mixed hyperlipidemia not requiring statin therapy 08/22/2024    Class 2 obesity without serious comorbidity with body mass index (BMI) of 38.0 to 38.9 in adult 08/22/2024    Hypovitaminosis D 10/18/2019    Rib pain on left side 10/18/2019     Past Medical History:   Diagnosis Date    Fracture     leg    Manic depression ([facility])     Oppositional defiant behavior     Schizophrenia ([facility])       Past Surgical History:   Procedure Laterality Date    EYE SURGERY      TYMPANOSTOMY TUBE PLACEMENT        Medications Prior to Admission: diphenhydrAMINE (BENADRYL) 25 MG tablet, Take 2 tablets by mouth as needed (1-2 Tablets as needed for muscle spasms) 1-2 Tablets as needed for muscle spasms  diphenhydrAMINE (BENADRYL) 25 MG tablet, Take 2 tablets by mouth at bedtime  ziprasidone (GEODON) 40 MG capsule, Take 1 capsule by mouth 2 times daily (with meals)  topiramate (TOPAMAX) 100 MG tablet,   OXcarbazepine (TRILEPTAL) 300 MG tablet,   Cholecalciferol (VITAMIN D3) 1000 units TABS, Take 1 tablet by mouth 
Pre ECT Assessment Note  Psychiatry  9/30/2024      Alistair Hutchison  1995  [id number]      Subjective:     Patient is a 29 y.o.  male seen for an evaluation prior to today's electroconvulsive therapy treatment. Today is treatment number 5, utilizing bilateral.  This is course number 1.      Alistair continues to report improvement in mood with ECT.  He reports that he is not as sad, he acknowledges that he is attending group.  He reports that upon discharge he plans to return home with his grandparents, he reports that they have been visiting him.  Staff present deny incident however they have not been routinely working with him on the inpatient unit.  He denies any significant cognitive side effects, he does present as more calm, rapid racing thoughts have improved.  He makes no delusional statements though he is tangential in thought process.      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    Hyperthyroidism, subclinical 08/22/2024    Moderate mixed hyperlipidemia not requiring statin therapy 08/22/2024    Class 2 obesity without serious comorbidity with body mass index (BMI) of 38.0 to 38.9 in adult 08/22/2024    Hypovitaminosis D 10/18/2019    Rib pain on left side 10/18/2019     Past Medical History:   Diagnosis Date    Fracture     leg    Manic depression ([facility])     Oppositional defiant behavior     Schizophrenia ([facility])       Past Surgical History:   Procedure Laterality Date    EYE SURGERY      TYMPANOSTOMY TUBE PLACEMENT        Medications Prior to Admission: diphenhydrAMINE (BENADRYL) 25 MG tablet, Take 2 tablets by mouth as needed (1-2 Tablets as needed for muscle spasms) 1-2 Tablets as needed for muscle spasms  diphenhydrAMINE (BENADRYL) 25 MG tablet, Take 2 tablets by mouth at bedtime  ziprasidone (GEODON) 40 MG capsule, Take 1 capsule by mouth 2 times daily (with 
RT ASSESSMENT TREATMENT GOALS    [x]Pt Goal: Pt will identify 1-2 positive coping skills by time of discharge.    []Pt Goal: Pt will identify 1-2 positive aspects of self by time of discharge.    []Pt Goal: Pt will remain on task/topic for 15-30 minutes during group by time of discharge.    []Pt Goal: Pt will identify 1-2 aspects of relapse prevention plan by time of discharge.    [x]Pt Goal: Pt will join in harmonious conversation with peers 1-2 times per group by time of discharge.    []Pt Goal: Pt will identify 1-2 new leisure interests by time of discharge.    []Pt Goal: Pt will not voice any delusional content by time of discharge.    
meals)  topiramate (TOPAMAX) 100 MG tablet,   OXcarbazepine (TRILEPTAL) 300 MG tablet,   Cholecalciferol (VITAMIN D3) 1000 units TABS, Take 1 tablet by mouth daily  topiramate (TOPAMAX) 25 MG tablet,   INVEGA SUSTENNA 234 MG/1.5ML FRANCHESKA IM injection, Inject 234 mg into the muscle every 30 days  ARIPiprazole (ABILIFY) 20 MG tablet,   clonazePAM (KLONOPIN) 2 MG tablet,   OLANZapine (ZYPREXA) 15 MG tablet, Take 1 tablet by mouth nightly (Patient not taking: Reported on 9/14/2024)  Allergies   Allergen Reactions    Chocolate Diarrhea      Social History     Tobacco Use    Smoking status: Never    Smokeless tobacco: Never   Substance Use Topics    Alcohol use: No      No family history on file.       Objective:       Mental Status Evaluation:  Appearance:  Wearing gown, on stretcher, fairly groomed   Behavior:  Cooperative, animated, engages somewhat with interviewer   Speech:  Rapid rate, loud volume and tone   Mood:  Good   Affect:  Incongruent, elevated   Thought Process:  Rapid, mostly coherent   Thought Content:  No suicidal or homicidal ideation   Sensorium:  Does not appear to attend to internal stimuli   Cognition:  Oriented to person, place and general circumstance   Insight:  Poor   Judgment:  Poor     Assessment:     Diagnosis: Acute psychosis    Plan:     Continue bilateral ECT 3 times weekly  Monitor for stability in symptoms  Taper treatment frequency as tolerated    Update consent and H&P every 30 days while undergoing ECT treatment, update labs every 6 months.   Patient verbalizes understanding of risks/benefits/alternatives to ECT.  Last informed consent signed on  9/19/2024 .       
% Final    Lymphocytes % 09/20/2024 17 (L)  24 - 44 % Final    Monocytes % 09/20/2024 11 (H)  1 - 7 % Final    Eosinophils % 09/20/2024 2  0 - 4 % Final    Basophils % 09/20/2024 0  0 - 2 % Final    Neutrophils Absolute 09/20/2024 8.10  1.3 - 9.1 k/uL Final    Lymphocytes Absolute 09/20/2024 2.00  1.0 - 4.8 k/uL Final    Monocytes Absolute 09/20/2024 1.20  0.1 - 1.3 k/uL Final    Eosinophils Absolute 09/20/2024 0.20  0.0 - 0.4 k/uL Final    Basophils Absolute 09/20/2024 0.00  0.0 - 0.2 k/uL Final    Sodium 09/20/2024 138  135 - 144 mmol/L Final    Potassium 09/20/2024 3.9  3.7 - 5.3 mmol/L Final    Chloride 09/20/2024 105  98 - 107 mmol/L Final    CO2 09/20/2024 21  20 - 31 mmol/L Final    Anion Gap 09/20/2024 12  9 - 17 mmol/L Final    Glucose 09/20/2024 104 (H)  70 - 99 mg/dL Final    BUN 09/20/2024 15  6 - 20 mg/dL Final    Creatinine 09/20/2024 0.8  0.7 - 1.2 mg/dL Final    Est, Glom Filt Rate 09/20/2024 >90  >60 mL/min/1.73m2 Final    Comment:       These results are not intended for use in patients <18 years of age.        eGFR results are calculated without a race factor using the 2021 CKD-EPI equation.  Careful clinical correlation is recommended, particularly when comparing to results   calculated using previous equations.  The CKD-EPI equation is less accurate in patients with extremes of muscle mass, extra-renal   metabolism of creatine, excessive creatine ingestion, or following therapy that affects   renal tubular secretion.      Calcium 09/20/2024 9.4  8.6 - 10.4 mg/dL Final    Total Protein 09/20/2024 7.3  6.4 - 8.3 g/dL Final    Albumin 09/20/2024 4.2  3.5 - 5.2 g/dL Final    Total Bilirubin 09/20/2024 0.3  0.3 - 1.2 mg/dL Final    Alkaline Phosphatase 09/20/2024 84  40 - 129 U/L Final    ALT 09/20/2024 36  5 - 41 U/L Final    AST 09/20/2024 17  <40 U/L Final    POC Glucose 09/20/2024 101  75 - 110 mg/dL Final    POC Glucose 09/23/2024 95  75 - 110 mg/dL Final    POC Glucose 09/23/2024 109  75 - 110 
09/20/2024 7.2  6.0 - 12.0 fL Final    Neutrophils % 09/20/2024 70 (H)  36 - 66 % Final    Lymphocytes % 09/20/2024 17 (L)  24 - 44 % Final    Monocytes % 09/20/2024 11 (H)  1 - 7 % Final    Eosinophils % 09/20/2024 2  0 - 4 % Final    Basophils % 09/20/2024 0  0 - 2 % Final    Neutrophils Absolute 09/20/2024 8.10  1.3 - 9.1 k/uL Final    Lymphocytes Absolute 09/20/2024 2.00  1.0 - 4.8 k/uL Final    Monocytes Absolute 09/20/2024 1.20  0.1 - 1.3 k/uL Final    Eosinophils Absolute 09/20/2024 0.20  0.0 - 0.4 k/uL Final    Basophils Absolute 09/20/2024 0.00  0.0 - 0.2 k/uL Final    Sodium 09/20/2024 138  135 - 144 mmol/L Final    Potassium 09/20/2024 3.9  3.7 - 5.3 mmol/L Final    Chloride 09/20/2024 105  98 - 107 mmol/L Final    CO2 09/20/2024 21  20 - 31 mmol/L Final    Anion Gap 09/20/2024 12  9 - 17 mmol/L Final    Glucose 09/20/2024 104 (H)  70 - 99 mg/dL Final    BUN 09/20/2024 15  6 - 20 mg/dL Final    Creatinine 09/20/2024 0.8  0.7 - 1.2 mg/dL Final    Est, Glom Filt Rate 09/20/2024 >90  >60 mL/min/1.73m2 Final    Comment:       These results are not intended for use in patients <18 years of age.        eGFR results are calculated without a race factor using the 2021 CKD-EPI equation.  Careful clinical correlation is recommended, particularly when comparing to results   calculated using previous equations.  The CKD-EPI equation is less accurate in patients with extremes of muscle mass, extra-renal   metabolism of creatine, excessive creatine ingestion, or following therapy that affects   renal tubular secretion.      Calcium 09/20/2024 9.4  8.6 - 10.4 mg/dL Final    Total Protein 09/20/2024 7.3  6.4 - 8.3 g/dL Final    Albumin 09/20/2024 4.2  3.5 - 5.2 g/dL Final    Total Bilirubin 09/20/2024 0.3  0.3 - 1.2 mg/dL Final    Alkaline Phosphatase 09/20/2024 84  40 - 129 U/L Final    ALT 09/20/2024 36  5 - 41 U/L Final    AST 09/20/2024 17  <40 U/L Final    POC Glucose 09/20/2024 101  75 - 110 mg/dL Final    POC 
staff.    Labs reviewed: [x] Yes      Medications  Current Facility-Administered Medications: acetaminophen (TYLENOL) tablet 650 mg, 650 mg, Oral, Q6H PRN  ibuprofen (ADVIL;MOTRIN) tablet 400 mg, 400 mg, Oral, Q6H PRN  hydrOXYzine HCl (ATARAX) tablet 50 mg, 50 mg, Oral, TID PRN  traZODone (DESYREL) tablet 50 mg, 50 mg, Oral, Nightly PRN  polyethylene glycol (GLYCOLAX) packet 17 g, 17 g, Oral, Daily PRN  aluminum & magnesium hydroxide-simethicone (MAALOX) 200-200-20 MG/5ML suspension 30 mL, 30 mL, Oral, Q6H PRN  nicotine polacrilex (COMMIT) lozenge 2 mg, 2 mg, Oral, Q2H PRN  haloperidol lactate (HALDOL) injection 5 mg, 5 mg, IntraMUSCular, Q6H PRN **AND** LORazepam (ATIVAN) injection 2 mg, 2 mg, IntraMUSCular, Q6H PRN **AND** diphenhydrAMINE (BENADRYL) injection 50 mg, 50 mg, IntraMUSCular, Q6H PRN  haloperidol (HALDOL) tablet 5 mg, 5 mg, Oral, Q6H PRN **AND** LORazepam (ATIVAN) tablet 2 mg, 2 mg, Oral, Q6H PRN  topiramate (TOPAMAX) tablet 100 mg, 100 mg, Oral, BID  OXcarbazepine (TRILEPTAL) tablet 600 mg, 600 mg, Oral, Nightly  ziprasidone (GEODON) capsule 40 mg, 40 mg, Oral, BID WC    ASSESSMENT  Acute psychosis (HCC)         HANDOFF  Patient symptoms are: Modestly Improving.  Medications as determined by attending physician  Monitor need and frequency of PRN medications.  Encourage participation in groups and milieu.  Probable discharge is to be determined by MD.     Electronically signed by GIORGI Hou CNP on 9/17/2024 at 12:42 PM    **This report has been created using voice recognition software. It may contain minor errors which are inherent in voice recognition technology.**    I independently saw and evaluated the patient.  I reviewed the nurse practitioners documentation above.  Principle diagnosis we are treating for is Acute psychosis (HCC).  Any additional comments or changes to the nurse practitioners documentation are stated below otherwise agree with assessment.  Plan will be as 
([facility]).  Any additional comments or changes to the nurse practitioners documentation are stated below otherwise agree with assessment. Spent 17 minutes with the patient in supportive psychotherapy.  Plan will be as follows:  Significant concern expressed from patient's guardian and grandfather (guardian his grandmother).  Patient at baseline is able to function at home independently.  Patient is far from that baseline.  Patient demonstrating manic like symptoms.  After discussion of risk benefits and alternatives which included ECT versus Clazuril, guardian is requesting to go forward with ECT treatments.  Consented.  Main concerns with Clazuril were metabolic syndrome, potential worsening of prediabetes into developing full-blown diabetic syndrome.  Patient willing to move forward with procedure tomorrow    PLAN  Patient s symptoms   show no change  ECT in the morning  Discontinue antiepileptic medication  Attempt to develop insight  Psycho-education conducted.  Supportive Therapy conducted.  Probable discharge is next week  Follow-up daily while on inpatient unit          
09/23/2024 109  75 - 110 mg/dL Final    POC Glucose 09/25/2024 97  75 - 110 mg/dL Final            Medications  Current Facility-Administered Medications: lactated ringers IV soln infusion, , IntraVENous, Continuous  lidocaine PF 1 % injection 1 mL, 1 mL, IntraDERmal, Once PRN  paliperidone palmitate ER (INVEGA SUSTENNA) IM injection 234 mg, 234 mg, IntraMUSCular, Q30 Days  hyoscyamine (LEVSIN/SL) sublingual tablet 0.125 mg, 0.125 mg, SubLINGual, TID  lactated ringers IV soln infusion, , IntraVENous, Continuous  acetaminophen (TYLENOL) tablet 650 mg, 650 mg, Oral, Q6H PRN  ibuprofen (ADVIL;MOTRIN) tablet 400 mg, 400 mg, Oral, Q6H PRN  hydrOXYzine HCl (ATARAX) tablet 50 mg, 50 mg, Oral, TID PRN  traZODone (DESYREL) tablet 50 mg, 50 mg, Oral, Nightly PRN  polyethylene glycol (GLYCOLAX) packet 17 g, 17 g, Oral, Daily PRN  aluminum & magnesium hydroxide-simethicone (MAALOX) 200-200-20 MG/5ML suspension 30 mL, 30 mL, Oral, Q6H PRN  nicotine polacrilex (COMMIT) lozenge 2 mg, 2 mg, Oral, Q2H PRN  haloperidol lactate (HALDOL) injection 5 mg, 5 mg, IntraMUSCular, Q6H PRN **AND** LORazepam (ATIVAN) injection 2 mg, 2 mg, IntraMUSCular, Q6H PRN **AND** diphenhydrAMINE (BENADRYL) injection 50 mg, 50 mg, IntraMUSCular, Q6H PRN  haloperidol (HALDOL) tablet 5 mg, 5 mg, Oral, Q6H PRN **AND** LORazepam (ATIVAN) tablet 2 mg, 2 mg, Oral, Q6H PRN  ziprasidone (GEODON) capsule 40 mg, 40 mg, Oral, BID WC    ASSESSMENT  Acute psychosis (HCC)     PLAN  Patient s symptoms   are improving slowly  No Medication Changes Today   Continue with current meds and ECT for now  Attempt to develop insight  Psycho-education conducted.  Supportive Therapy conducted.  Probable discharge is undetermined at this time  Follow-up daily while on the inpatient unit        Electronically signed by HAROLDO BARNETT MD on 9/25/24 at 7:24 PM EDT    **This report has been created using voice recognition software. It may contain minor errors which are inherent in 
Diagnosis Date Noted    Acute psychosis (HCC) [F23] 03/20/2023     Priority: Medium       Plan:     Acute psychosis, getting managed by psychiatrist  Morbid obesity, BMI of 41, patient need to lose weight  History of vitamin D deficiency, will recheck vitamin D levels  Patient has history of hyperlipidemia checking lipid panel  Resuming home dose of Topamax, confirmed with pharmacy   9/15   Reviewed recent lab work  Vitamin D, lipid panel is okay  Will sign off please call with questions      Shree Hu MD  9/15/2024  6:16 PM    Copy sent to Ashwin Lew MD    Please note that this chart was generated using voice recognition Dragon dictation software.  Although every effort was made to ensure the accuracy of this automated transcription, some errors in transcription may have occurred.  
Susana APRN - CNP on 9/15/2024 at 12:57 PM    **This report has been created using voice recognition software. It may contain minor errors which are inherent in voice recognition technology.**                                          Psychiatry Attending Attestation     I independently saw and evaluated the patient.  I reviewed the Advance Practice Provider's documentation above.  Any additional comments or changes to the Advance Practice Provider's documentation are stated below otherwise agree with assessment.    Patient continues to respond actively to internal stimuli.  Has been able to show behavioral control and has not been threatening or been agitated here on the unit.  Has been compliant on his medications.  Denies making any threats towards grandparents who he has been living with.  Will continue same medication today and observe.     ASSESSMENT  Acute psychosis (HCC)  PLAN  Patient's symptoms are improving  Medications risks, benefits and alternatives were discussed with the patient  Attempt to develop insight  Psycho-education conducted.  Supportive Therapy conducted.  Probable discharge is tbd  Follow-up TBD    Electronically signed by Cristobal Casey MD on 9/15/24 at 4:57 PM EDT    
Today   Continue with current medication for now  Attempt to develop insight  Psycho-education conducted.  Supportive Therapy conducted.  Probable discharge is Wednesday after ECT  Follow-up daily while in the inpatient unit    Spent 17 minutes or more with the patient and/or his grandmother/guardian in  Supportive psychotherapy and education.      Electronically signed by HAROLDO BARNETT MD on 9/30/24 at 8:27 PM EDT    **This report has been created using voice recognition software. It may contain minor errors which are inherent in voice recognition technology.**    
hydroxide-simethicone (MAALOX) 200-200-20 MG/5ML suspension 30 mL, 30 mL, Oral, Q6H PRN  nicotine polacrilex (COMMIT) lozenge 2 mg, 2 mg, Oral, Q2H PRN  haloperidol lactate (HALDOL) injection 5 mg, 5 mg, IntraMUSCular, Q6H PRN **AND** LORazepam (ATIVAN) injection 2 mg, 2 mg, IntraMUSCular, Q6H PRN **AND** diphenhydrAMINE (BENADRYL) injection 50 mg, 50 mg, IntraMUSCular, Q6H PRN  haloperidol (HALDOL) tablet 5 mg, 5 mg, Oral, Q6H PRN **AND** LORazepam (ATIVAN) tablet 2 mg, 2 mg, Oral, Q6H PRN  ziprasidone (GEODON) capsule 40 mg, 40 mg, Oral, BID WC    ASSESSMENT  Acute psychosis (HCC)         PATIENT HANDOFF  Patient symptoms are: showing some improvement   Monitor need and frequency of PRN medications.  Encourage participation in groups and milieu.  Medication changes and discharge planning per attending  Follow-up daily while inpatient.     Patient continues to be monitored in the inpatient psychiatric facility at [facility] for safety and stabilization. Patient continues to need, on a daily basis, active treatment furnished directly by or requiring the supervision of inpatient psychiatric personnel.    Electronically signed by GIORGI Tavarez CNP on 9/27/2024 at 1:51 PM    **This report has been created using voice recognition software. It may contain minor errors which are inherent in voice recognition technology.**     I independently saw and evaluated the patient.  I reviewed the nurse practitioners documentation above.  Principle diagnosis we are treating for is Acute psychosis (HCC). Any additional comments or changes to the nurse practitioners documentation are stated below otherwise agree with assessment.  Plan will be as follows:    PLAN  Patient s symptoms   slowly improving  Continue with ect tx's  Attempt to develop insight  Psycho-education conducted.  Supportive Therapy conducted.  Probable discharge is next week  Follow-up daily while on inpatient unit        
supervision of inpatient psychiatric personnel.    Electronically signed by GIORGI Alvarez CNP on 9/22/2024 at 12:46 PM    **This report has been created using voice recognition software. It may contain minor errors which are inherent in voice recognition technology.**   I independently saw and evaluated the patient.  I reviewed the  documentation above    .  Any additional comments or changes to the   documentation are stated below otherwise agree with assessment.      No results found for: \"QTC\"    Vitals:    09/20/24 1930 09/21/24 0730 09/21/24 1930 09/22/24 0732   BP: 114/70 124/70 136/70 114/66   Pulse: (!) 125 (!) 123 (!) 112 98   Resp: 16 16 15    Temp: 98 °F (36.7 °C) 97.6 °F (36.4 °C) 97.4 °F (36.3 °C)    TempSrc: Oral Oral Temporal    SpO2:       Weight:       Height:             Current Facility-Administered Medications   Medication Dose Route Frequency Provider Last Rate Last Admin    hyoscyamine (LEVSIN/SL) sublingual tablet 0.125 mg  0.125 mg SubLINGual TID Dalton Dumont MD   0.125 mg at 09/22/24 1343    lactated ringers IV soln infusion   IntraVENous Continuous Zacarias Pandey MD   Stopped at 09/20/24 0836    acetaminophen (TYLENOL) tablet 650 mg  650 mg Oral Q6H PRN Corby Drake MD   650 mg at 09/22/24 1429    ibuprofen (ADVIL;MOTRIN) tablet 400 mg  400 mg Oral Q6H PRN Corby Drake MD   400 mg at 09/21/24 0812    hydrOXYzine HCl (ATARAX) tablet 50 mg  50 mg Oral TID PRN Corby Drake MD   50 mg at 09/22/24 1016    traZODone (DESYREL) tablet 50 mg  50 mg Oral Nightly PRN Corby Drake MD   50 mg at 09/20/24 2230    polyethylene glycol (GLYCOLAX) packet 17 g  17 g Oral Daily PRN Corby Drake MD        aluminum & magnesium hydroxide-simethicone (MAALOX) 200-200-20 MG/5ML suspension 30 mL  30 mL Oral Q6H PRN Corby Drake MD   30 mL at 09/19/24 2032    nicotine polacrilex (COMMIT) lozenge 2 mg  2 mg Oral Q2H PRN Corby Drake MD        haloperidol lactate (HALDOL) 
supervision of inpatient psychiatric personnel.    Electronically signed by GIORGI De La Torre CNP on 9/24/2024 at 2:03 PM    **This report has been created using voice recognition software. It may contain minor errors which are inherent in voice recognition technology.**     I independently saw and evaluated the patient.  I reviewed the nurse practitioners documentation above.  Principle diagnosis we are treating for is Acute psychosis (HCC). Any additional comments or changes to the nurse practitioners documentation are stated below otherwise agree with assessment.  Plan will be as follows:  Documentation is for DOS 9-24-24.  Patient showing gradual signs of improvement but  not yet stable for discharge.  Continue with current course of treatment including ECT  PLAN  Patient s symptoms   showing gradual signs of improvement but  not yet stable for discharge  Continue with current course of tx  Attempt to develop insight  Psycho-education conducted.  Supportive Therapy conducted.  Probable discharge is likely after 2-4 additional inpatient ECT tx's  Follow-up daily while on inpatient unit

## 2024-10-03 NOTE — CARE COORDINATION
Name: Alistair Hutchison    : 1995    Auth number: [id number]     Discharge Date: 10/2/24    Destination: Private residence    Discharge Medications:      Medication List        START taking these medications      hydrOXYzine HCl 50 MG tablet  Commonly known as: ATARAX  Take 1 tablet by mouth 3 times daily as needed for Anxiety  Notes to patient: Anxiety     hyoscyamine 0.125 MG sublingual tablet  Commonly known as: LEVSIN/SL  Place 1 tablet under the tongue 3 times daily  Notes to patient: Excessive oral secretions     traZODone 50 MG tablet  Commonly known as: DESYREL  Take 1 tablet by mouth nightly as needed for Sleep  Notes to patient: Sleep aid            CONTINUE taking these medications      paliperidone palmitate  MG/1.5ML Stephany IM injection  Commonly known as: INVEGA SUSTENNA  Inject 234 mg into the muscle every 30 days  Start taking on: 2024  Notes to patient: Clear thoughts     ziprasidone 40 MG capsule  Commonly known as: GEODON  Notes to patient: Clear thoughts            STOP taking these medications      ARIPiprazole 20 MG tablet  Commonly known as: ABILIFY     clonazePAM 2 MG tablet  Commonly known as: KLONOPIN     diphenhydrAMINE 25 MG tablet  Commonly known as: BENADRYL     OLANZapine 15 MG tablet  Commonly known as: ZYPREXA     OXcarbazepine 300 MG tablet  Commonly known as: TRILEPTAL     topiramate 100 MG tablet  Commonly known as: TOPAMAX     topiramate 25 MG tablet  Commonly known as: TOPAMAX     vitamin D3 25 MCG (1000 UT) Tabs tablet  Commonly known as: CHOLECALCIFEROL               Where to Get Your Medications        These medications were sent to [facility] #[facility] - [address] [phone] - F [phone]  [address] [place] [address]      Phone: [phone]   hydrOXYzine HCl 50 MG tablet  hyoscyamine 0.125 MG sublingual tablet  paliperidone palmitate  MG/1.5ML Stephany IM injection  traZODone 50 MG tablet
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
## 2024-10-04 ENCOUNTER — HOSPITAL ENCOUNTER (OUTPATIENT)
Dept: POSTOP/PACU | Age: 29
Discharge: HOME | End: 2024-10-04
Payer: MEDICARE

## 2024-10-04 ENCOUNTER — ANESTHESIA (OUTPATIENT)
Dept: POSTOP/PACU | Age: 29
End: 2024-10-04
Payer: MEDICARE

## 2024-10-04 VITALS
DIASTOLIC BLOOD PRESSURE: 83 MMHG | RESPIRATION RATE: 18 BRPM | SYSTOLIC BLOOD PRESSURE: 127 MMHG | BODY MASS INDEX: 41.65 KG/M2 | OXYGEN SATURATION: 95 % | WEIGHT: 250 LBS | TEMPERATURE: 98.7 F | HEIGHT: 65 IN | HEART RATE: 95 BPM

## 2024-10-04 PROCEDURE — 3700000000 HC ANESTHESIA ATTENDED CARE: Performed by: ANESTHESIOLOGY

## 2024-10-04 PROCEDURE — 6360000002 HC RX W HCPCS: Performed by: NURSE ANESTHETIST, CERTIFIED REGISTERED

## 2024-10-04 PROCEDURE — 90870 ELECTROCONVULSIVE THERAPY: CPT | Performed by: PSYCHIATRY & NEUROLOGY

## 2024-10-04 PROCEDURE — 90870 ELECTROCONVULSIVE THERAPY: CPT | Performed by: ANESTHESIOLOGY

## 2024-10-04 PROCEDURE — 2500000003 HC RX 250 WO HCPCS: Performed by: NURSE ANESTHETIST, CERTIFIED REGISTERED

## 2024-10-04 PROCEDURE — 2580000003 HC RX 258: Performed by: ANESTHESIOLOGY

## 2024-10-04 PROCEDURE — 7100000001 HC PACU RECOVERY - ADDTL 15 MIN: Performed by: ANESTHESIOLOGY

## 2024-10-04 PROCEDURE — 7100000000 HC PACU RECOVERY - FIRST 15 MIN: Performed by: ANESTHESIOLOGY

## 2024-10-04 RX ORDER — LIDOCAINE HYDROCHLORIDE 10 MG/ML
1 INJECTION, SOLUTION EPIDURAL; INFILTRATION; INTRACAUDAL; PERINEURAL
Status: DISCONTINUED | OUTPATIENT
Start: 2024-10-04 | End: 2024-10-05 | Stop reason: HOSPADM

## 2024-10-04 RX ORDER — SODIUM CHLORIDE, SODIUM LACTATE, POTASSIUM CHLORIDE, CALCIUM CHLORIDE 600; 310; 30; 20 MG/100ML; MG/100ML; MG/100ML; MG/100ML
INJECTION, SOLUTION INTRAVENOUS CONTINUOUS
Status: DISCONTINUED | OUTPATIENT
Start: 2024-10-04 | End: 2024-10-05 | Stop reason: HOSPADM

## 2024-10-04 RX ORDER — SODIUM CHLORIDE 0.9 % (FLUSH) 0.9 %
5-40 SYRINGE (ML) INJECTION PRN
Status: DISCONTINUED | OUTPATIENT
Start: 2024-10-04 | End: 2024-10-05 | Stop reason: HOSPADM

## 2024-10-04 RX ORDER — METHOHEXITAL IN WATER/PF 100MG/10ML
SYRINGE (ML) INTRAVENOUS
Status: DISCONTINUED | OUTPATIENT
Start: 2024-10-04 | End: 2024-10-04 | Stop reason: SDUPTHER

## 2024-10-04 RX ORDER — KETOROLAC TROMETHAMINE 30 MG/ML
INJECTION, SOLUTION INTRAMUSCULAR; INTRAVENOUS
Status: COMPLETED
Start: 2024-10-04 | End: 2024-10-04

## 2024-10-04 RX ORDER — SUCCINYLCHOLINE/SOD CL,ISO/PF 200MG/10ML
SYRINGE (ML) INTRAVENOUS
Status: DISCONTINUED | OUTPATIENT
Start: 2024-10-04 | End: 2024-10-04 | Stop reason: SDUPTHER

## 2024-10-04 RX ORDER — KETOROLAC TROMETHAMINE 30 MG/ML
INJECTION, SOLUTION INTRAMUSCULAR; INTRAVENOUS
Status: DISCONTINUED | OUTPATIENT
Start: 2024-10-04 | End: 2024-10-04 | Stop reason: SDUPTHER

## 2024-10-04 RX ORDER — SODIUM CHLORIDE 0.9 % (FLUSH) 0.9 %
5-40 SYRINGE (ML) INJECTION EVERY 12 HOURS SCHEDULED
Status: DISCONTINUED | OUTPATIENT
Start: 2024-10-04 | End: 2024-10-05 | Stop reason: HOSPADM

## 2024-10-04 RX ORDER — SODIUM CHLORIDE 9 MG/ML
INJECTION, SOLUTION INTRAVENOUS PRN
Status: DISCONTINUED | OUTPATIENT
Start: 2024-10-04 | End: 2024-10-05 | Stop reason: HOSPADM

## 2024-10-04 RX ORDER — METHOHEXITAL IN WATER/PF 100MG/10ML
SYRINGE (ML) INTRAVENOUS
Status: COMPLETED
Start: 2024-10-04 | End: 2024-10-04

## 2024-10-04 RX ADMIN — Medication 100 MG: at 08:57

## 2024-10-04 RX ADMIN — SODIUM CHLORIDE, POTASSIUM CHLORIDE, SODIUM LACTATE AND CALCIUM CHLORIDE: 600; 310; 30; 20 INJECTION, SOLUTION INTRAVENOUS at 08:49

## 2024-10-04 RX ADMIN — Medication 120 MG: at 08:58

## 2024-10-04 RX ADMIN — KETOROLAC TROMETHAMINE 30 MG: 30 INJECTION, SOLUTION INTRAMUSCULAR at 08:56

## 2024-10-04 ASSESSMENT — ENCOUNTER SYMPTOMS
SINUS PRESSURE: 0
NAUSEA: 0
SHORTNESS OF BREATH: 0
ABDOMINAL PAIN: 0

## 2024-10-04 ASSESSMENT — PAIN - FUNCTIONAL ASSESSMENT
PAIN_FUNCTIONAL_ASSESSMENT: 0-10
PAIN_FUNCTIONAL_ASSESSMENT: CRITICAL CARE PAIN OBSERVATION TOOL (CPOT)

## 2024-10-04 NOTE — ANESTHESIA POSTPROCEDURE EVALUATION
Department of Anesthesiology  Postprocedure Note    Patient: Alistair Hutchison  MRN: [id number]  YOB: 1995  Date of evaluation: 10/4/2024    Procedure Summary       Date: 10/04/24 Room / Location: [facility] PACU    Anesthesia Start: 0855 Anesthesia Stop: 0907    Procedure: ECT W/ ANESTHESIA Diagnosis: Disorganized schizophrenia    Scheduled Providers:  Responsible Provider: Zacarias Pandey MD    Anesthesia Type: General ASA Status: 3            Anesthesia Type: General    Amalia Phase I: Amalia Score: 10    Amalia Phase II:      Anesthesia Post Evaluation    Patient location during evaluation: PACU  Patient participation: complete - patient participated  Level of consciousness: awake and alert  Airway patency: patent  Nausea & Vomiting: no vomiting  Cardiovascular status: hemodynamically stable  Respiratory status: acceptable  Hydration status: euvolemic  Comments: POST- ANESTHESIA EVALUATION       Pt Name: Alistair Hutchison  MRN: [id number]  YOB: 1995  Date of evaluation: 10/4/2024  Time:  10:18 AM      /83   Pulse 95   Temp 98.7 °F (37.1 °C)   Resp 18   Ht 1.651 m (5' 5\")   Wt 113.4 kg (250 lb)   SpO2 95%   BMI 41.60 kg/m²      Consciousness Level  Awake  Cardiopulmonary Status  Stable  Pain Adequately Treated YES  Nausea / Vomiting  NO  Adequate Hydration  YES  Anesthesia Related Complications NONE      Electronically signed by Zacarias Pandey MD on 10/4/2024 at 10:18 AM         Pain management: satisfactory to patient    No notable events documented.
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
## 2024-10-04 NOTE — PROCEDURES
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)  Postprocedure diagnosis: SAME AS PRE-PROCEDURE DIAGNOSIS  Treatment Number: This is treatment # 7   Patient Status: Outpatient   Type of ECT: Bilateral Brief Pulse  Medications  Preprocedure: Tylenol 650mg  Anesthetic: Methohexital 100 mg  Paralytic: Succinylcholine 120 mg  Post procedure: none needed   Cuff placement: Left Lower Extremity    MECTA Settings 1st Stimulus:     Pulse width: 1.0 ms   Frequency:  40 hz   Duration:   2.0 seconds   Static Impedance: 707 ohms             Dynamic Impedance: 225 ohms             Motor Seizure Duration: 32 seconds  EEG Seizure Duration: 42 seconds             The patient's ECT treatment was performed using MECTA machine  .  Timeout:  Time out was performed using two patient identifiers and confirmation of procedure.     Patient Preparation: Preprocedure documentation, labs, H&P were all verified and reviewed.  The patient was placed in supine position.  EEG leads were placed for monitoring of seizure.   [place] areas bilaterally cleaned and prepped.  Conductive gel  was applied over stimulus electrode site.   The patient was pre-oxygenated.       Procedure in detail: Medications were administered by anesthesia using intravenous access at the doses listed previously in this summary.  Anesthetic administered and once confirmation the patient is anesthetized, the patient's blood pressure cuff on lower extremity was inflated to 100mmHg in excess of patient's blood pressure.  At this time, the neuromuscular blockade was administered intravenously by anesthesia.  Upper extremity fasciculation were verified followed by lower extremity fasciculation.  Once fasciculations terminated, confirmation of affective neuromuscular blockade demonstrated by absence of withdrawal reflex.  Bite blocks were put in place.
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
## 2024-10-04 NOTE — ANESTHESIA PRE PROCEDURE
03/17/2023 08:51 AM    GLUCOSE 104 09/20/2024 06:18 AM    CALCIUM 9.4 09/20/2024 06:18 AM    BILITOT 0.3 09/20/2024 06:18 AM    ALKPHOS 84 09/20/2024 06:18 AM    AST 17 09/20/2024 06:18 AM    ALT 36 09/20/2024 06:18 AM       POC Tests:   Recent Labs     10/02/24  0640   POCGLU 86       Coags: No results found for: \"PROTIME\", \"INR\", \"APTT\"    HCG (If Applicable): No results found for: \"PREGTESTUR\", \"PREGSERUM\", \"HCG\", \"HCGQUANT\"     ABGs: No results found for: \"PHART\", \"PO2ART\", \"MRW3KMA\", \"TJR6UQI\", \"BEART\", \"X0ZDCJTK\"     Type & Screen (If Applicable):  No results found for: \"ABORH\", \"LABANTI\"    Drug/Infectious Status (If Applicable):  Lab Results   Component Value Date/Time    HEPCAB NONREACTIVE 03/17/2023 08:51 AM       COVID-19 Screening (If Applicable): No results found for: \"COVID19\"        Anesthesia Evaluation  Patient summary reviewed and Nursing notes reviewed   no history of anesthetic complications:   Airway: Mallampati: III  TM distance: >3 FB   Neck ROM: full  Mouth opening: > = 3 FB   Dental: normal exam         Pulmonary:Negative Pulmonary ROS breath sounds clear to auscultation                             Cardiovascular:Negative CV ROS            Rhythm: regular                      Neuro/Psych:   (+) psychiatric history:            GI/Hepatic/Renal:   (+) morbid obesity          Endo/Other:    (+) hyperthyroidism::..                 Abdominal:             Vascular:          Other Findings:       Anesthesia Plan      general     ASA 3       Induction: intravenous.    MIPS: Prophylactic antiemetics administered.  Anesthetic plan and risks discussed with legal guardian.      Plan discussed with CRNA.                Zacarias Pandey MD   10/4/2024

## 2024-10-04 NOTE — H&P (VIEW-ONLY)
reviewed and are negative.        GENERAL PHYSICAL EXAM     Vitals: Review vitals per RN flowsheet.                              Physical Exam  Constitutional:       General: He is not in acute distress.     Appearance: He is obese.   HENT:      Head: Normocephalic.      Right Ear: External ear normal.      Left Ear: External ear normal.      Nose: Nose normal.      Mouth/Throat:      Pharynx: No posterior oropharyngeal erythema.   Eyes:      General:         Right eye: No discharge.         Left eye: No discharge.   Cardiovascular:      Rate and Rhythm: Normal rate and regular rhythm.      Heart sounds: Normal heart sounds.   Pulmonary:      Breath sounds: Normal breath sounds.      Comments: Poor inspiratory effort  Abdominal:      General: Bowel sounds are normal.      Tenderness: There is no abdominal tenderness. There is no guarding.      Comments: obese   Neurological:      Mental Status: He is alert and oriented to person, place, and time.   Psychiatric:         Mood and Affect: Mood normal.        PROVISIONAL DIAGNOSES / SURGERY:       Acute psychosis      ECT W ANESTHESIA    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    Hyperthyroidism, subclinical 08/22/2024    Moderate mixed hyperlipidemia not requiring statin therapy 08/22/2024    Class 2 obesity without serious comorbidity with body mass index (BMI) of 38.0 to 38.9 in adult 08/22/2024    Hypovitaminosis D 10/18/2019    Rib pain on left side 10/18/2019           SIDNEY KNOTT, GIORGI - CNP on 10/4/2024 at 7:59 AM
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
## 2024-10-07 ENCOUNTER — ANESTHESIA EVENT (OUTPATIENT)
Dept: POSTOP/PACU | Age: 29
End: 2024-10-07
Payer: COMMERCIAL

## 2024-10-08 ENCOUNTER — ANESTHESIA (OUTPATIENT)
Dept: POSTOP/PACU | Age: 29
End: 2024-10-08
Payer: COMMERCIAL

## 2024-10-08 ENCOUNTER — HOSPITAL ENCOUNTER (OUTPATIENT)
Dept: POSTOP/PACU | Age: 29
Discharge: HOME | End: 2024-10-08
Payer: COMMERCIAL

## 2024-10-08 VITALS
RESPIRATION RATE: 16 BRPM | HEIGHT: 65 IN | SYSTOLIC BLOOD PRESSURE: 136 MMHG | WEIGHT: 250 LBS | TEMPERATURE: 98.6 F | HEART RATE: 104 BPM | BODY MASS INDEX: 41.65 KG/M2 | DIASTOLIC BLOOD PRESSURE: 98 MMHG | OXYGEN SATURATION: 96 %

## 2024-10-08 DIAGNOSIS — F20.1 DISORGANIZED SCHIZOPHRENIA (HCC): ICD-10-CM

## 2024-10-08 DIAGNOSIS — F23 ACUTE PSYCHOSIS (HCC): Primary | ICD-10-CM

## 2024-10-08 PROCEDURE — 7100000000 HC PACU RECOVERY - FIRST 15 MIN: Performed by: ANESTHESIOLOGY

## 2024-10-08 PROCEDURE — 3700000000 HC ANESTHESIA ATTENDED CARE: Performed by: ANESTHESIOLOGY

## 2024-10-08 PROCEDURE — 2580000003 HC RX 258: Performed by: ANESTHESIOLOGY

## 2024-10-08 PROCEDURE — 7100000001 HC PACU RECOVERY - ADDTL 15 MIN: Performed by: ANESTHESIOLOGY

## 2024-10-08 PROCEDURE — 90870 ELECTROCONVULSIVE THERAPY: CPT | Performed by: PSYCHIATRY & NEUROLOGY

## 2024-10-08 PROCEDURE — 2500000003 HC RX 250 WO HCPCS: Performed by: ANESTHESIOLOGY

## 2024-10-08 PROCEDURE — 6360000002 HC RX W HCPCS: Performed by: NURSE ANESTHETIST, CERTIFIED REGISTERED

## 2024-10-08 PROCEDURE — 2500000003 HC RX 250 WO HCPCS: Performed by: NURSE ANESTHETIST, CERTIFIED REGISTERED

## 2024-10-08 PROCEDURE — 90870 ELECTROCONVULSIVE THERAPY: CPT

## 2024-10-08 RX ORDER — KETOROLAC TROMETHAMINE 30 MG/ML
INJECTION, SOLUTION INTRAMUSCULAR; INTRAVENOUS
Status: DISCONTINUED | OUTPATIENT
Start: 2024-10-08 | End: 2024-10-08 | Stop reason: SDUPTHER

## 2024-10-08 RX ORDER — SUCCINYLCHOLINE/SOD CL,ISO/PF 200MG/10ML
SYRINGE (ML) INTRAVENOUS
Status: DISCONTINUED | OUTPATIENT
Start: 2024-10-08 | End: 2024-10-08 | Stop reason: SDUPTHER

## 2024-10-08 RX ORDER — SODIUM CHLORIDE 0.9 % (FLUSH) 0.9 %
5-40 SYRINGE (ML) INJECTION PRN
Status: DISCONTINUED | OUTPATIENT
Start: 2024-10-08 | End: 2024-10-09 | Stop reason: HOSPADM

## 2024-10-08 RX ORDER — SODIUM CHLORIDE, SODIUM LACTATE, POTASSIUM CHLORIDE, CALCIUM CHLORIDE 600; 310; 30; 20 MG/100ML; MG/100ML; MG/100ML; MG/100ML
INJECTION, SOLUTION INTRAVENOUS CONTINUOUS
Status: DISCONTINUED | OUTPATIENT
Start: 2024-10-08 | End: 2024-10-09 | Stop reason: HOSPADM

## 2024-10-08 RX ORDER — METHOHEXITAL IN WATER/PF 100MG/10ML
SYRINGE (ML) INTRAVENOUS
Status: DISCONTINUED | OUTPATIENT
Start: 2024-10-08 | End: 2024-10-08 | Stop reason: SDUPTHER

## 2024-10-08 RX ORDER — SUCCINYLCHOLINE/SOD CL,ISO/PF 200MG/10ML
SYRINGE (ML) INTRAVENOUS
Status: COMPLETED
Start: 2024-10-08 | End: 2024-10-08

## 2024-10-08 RX ORDER — KETOROLAC TROMETHAMINE 30 MG/ML
INJECTION, SOLUTION INTRAMUSCULAR; INTRAVENOUS
Status: COMPLETED
Start: 2024-10-08 | End: 2024-10-08

## 2024-10-08 RX ORDER — SODIUM CHLORIDE 9 MG/ML
INJECTION, SOLUTION INTRAVENOUS PRN
Status: DISCONTINUED | OUTPATIENT
Start: 2024-10-08 | End: 2024-10-09 | Stop reason: HOSPADM

## 2024-10-08 RX ORDER — METHOHEXITAL IN WATER/PF 100MG/10ML
SYRINGE (ML) INTRAVENOUS
Status: COMPLETED
Start: 2024-10-08 | End: 2024-10-08

## 2024-10-08 RX ORDER — LIDOCAINE HYDROCHLORIDE 10 MG/ML
1 INJECTION, SOLUTION EPIDURAL; INFILTRATION; INTRACAUDAL; PERINEURAL
Status: COMPLETED | OUTPATIENT
Start: 2024-10-08 | End: 2024-10-08

## 2024-10-08 RX ORDER — SODIUM CHLORIDE 0.9 % (FLUSH) 0.9 %
5-40 SYRINGE (ML) INJECTION EVERY 12 HOURS SCHEDULED
Status: DISCONTINUED | OUTPATIENT
Start: 2024-10-08 | End: 2024-10-09 | Stop reason: HOSPADM

## 2024-10-08 RX ADMIN — Medication 120 MG: at 07:34

## 2024-10-08 RX ADMIN — LIDOCAINE HYDROCHLORIDE 1 ML: 10 INJECTION, SOLUTION EPIDURAL; INFILTRATION; INTRACAUDAL; PERINEURAL at 07:03

## 2024-10-08 RX ADMIN — Medication 10 ML: at 07:34

## 2024-10-08 RX ADMIN — Medication 100 MG: at 07:34

## 2024-10-08 RX ADMIN — KETOROLAC TROMETHAMINE 30 MG: 30 INJECTION, SOLUTION INTRAMUSCULAR at 07:34

## 2024-10-08 ASSESSMENT — PAIN SCALES - GENERAL: PAINLEVEL_OUTOF10: 0

## 2024-10-08 ASSESSMENT — ENCOUNTER SYMPTOMS: SHORTNESS OF BREATH: 0

## 2024-10-08 ASSESSMENT — PAIN - FUNCTIONAL ASSESSMENT: PAIN_FUNCTIONAL_ASSESSMENT: 0-10

## 2024-10-08 NOTE — H&P
HISTORY and PHYSICAL  [facility]       NAME:  Alistair Hutchison  MRN: [id number]   YOB: 1995   Date: 10/8/2024   Age: 29 y.o.  Gender: male     H&P Update Note    H&P from 10/4/2024 reviewed and updated. Patient examined.     INTERVAL HISTORY:     Alistair Hutchison is 29 y.o.,  male, here for ECT treatment. Present with his grandmother.     Last ECT treatment was 10/4/24 . Pt tolerated last treatment well. Patient reports that her symptoms are improving.  Patient has complaints of some short term memory loss  Pt has had multiple failed treatment modalities. Pt has history of schizophrenia, bipolar disorder,acute psychosis.    Mood is rated at 10 /10  with 10 is the best mood. Pt denies feeling of hopelessness, helplessness, low energy.   Patient's sleep has been good . Appetite has been good .   Pt denies feeling of  suicidal/ homicidal. Pt has no auditory/visual hallucinations.   Pt has been taking psychiatric medications as prescribed.   Pt does not have any other somatic complaints at this time.    Patient is feeling well today, denies any fever/chills, chest pain, shortness of breath.  No interval changes.   Pt NPO since the past midnight, no am mediation today.    No interval changes to past medical history, social history, family history.  Review of systems as stated above and otherwise negative.    PHYSICAL EXAM:     Vitals: See nursing flow sheet for vital sings     Patient is alert and oriented, in no distress. . Heart rate and rhythm are regular. Lungs clear to auscultation bilaterally. Abdomen is soft, non tender. No pedal edema.     No interval changes. I concur with the findings.     GIORGI Baugh CNP on 10/8/2024 at 6:02 AM          HISTORY and PHYSICAL  [facility]         NAME:  Alistair Hutchison  MRN: [id number]   YOB: 1995   Date: 10/4/2024   Age: 29 y.o.  Gender: male         COMPLAINT AND PRESENT HISTORY:

## 2024-10-08 NOTE — PROCEDURES
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)  Postprocedure diagnosis: SAME AS PRE-PROCEDURE DIAGNOSIS  Treatment Number: This is treatment # 8   Patient Status: Outpatient   Type of ECT: Bilateral Brief Pulse  Medications  Preprocedure: Tylenol 650mg  Anesthetic: Methohexital 100 mg  Paralytic: Succinylcholine 120 mg  Post procedure: none needed   Cuff placement: Left Lower Extremity    MECTA Settings 1st Stimulus:     Pulse width: 1.0 ms   Frequency:  40 hz   Duration:   2.0 seconds   Static Impedance: 788 ohms             Dynamic Impedance: 222 ohms             Motor Seizure Duration: 32 seconds  EEG Seizure Duration: 46 seconds             The patient's ECT treatment was performed using MECTA machine  .  Timeout:  Time out was performed using two patient identifiers and confirmation of procedure.     Patient Preparation: Preprocedure documentation, labs, H&P were all verified and reviewed.  The patient was placed in supine position.  EEG leads were placed for monitoring of seizure.   [place] areas bilaterally cleaned and prepped.  Conductive gel  was applied over stimulus electrode site.   The patient was pre-oxygenated.       Procedure in detail: Medications were administered by anesthesia using intravenous access at the doses listed previously in this summary.  Anesthetic administered and once confirmation the patient is anesthetized, the patient's blood pressure cuff on lower extremity was inflated to 100mmHg in excess of patient's blood pressure.  At this time, the neuromuscular blockade was administered intravenously by anesthesia.  Upper extremity fasciculation were verified followed by lower extremity fasciculation.  Once fasciculations terminated, confirmation of affective neuromuscular blockade demonstrated by absence of withdrawal reflex.  Bite blocks were put in place.

## 2024-10-08 NOTE — DISCHARGE INSTRUCTIONS
ELECTROCONVULSIVE THERAPY DISCHARGE INSTRUCTIONS         1. Activity - Rest today. The anesthetic agents you have received can make you feel drowsy or tired.  A responsible person must drive you home and stay with you for 8 hours after discharge from the Hospital. Do not drive a motor vehicle or operate any machinery for 24 hours. .   *Do not make any complex decisions or execute any legal documents until 3 weeks AFTER your last treatment.  If you have any questions regarding this, speak with your psychiatrist first.*    2. Diet - Nothing to eat or drink after midnight the night of your ECT treatment. After ECT, start with clear liquids, if you can drink without coughing, you may proceed with gradually progressing to your usual diet.    No alcoholic beverages for 24 hours.    3. Medications - Take your daily medications as prescribed, after you return home from today's ECT. If you take a Beta Blocker or have been prescribed Imitrex, you may be instructed to take these medications the morning of ECT. If you are unsure please ask the physician.     Take with these medication the morning of ECT with one Tablespoon of water.   Tylenol 650 mg  All blood pressure medications  ***    4. You may experience the following after your treatment:   a. Poor memory   b. Poor balance   c. Headaches   d. Confusion   e. Muscle soreness   f. Nausea      5. Special Precautions - Contact you physician immediately if these occur:   a. Signs of infection: redness, swelling, heat, red streaks at the site of the IV   b. Excessive pain unrelieved by prescription pain medications   c. Nausea and vomiting that persists beyond the first day after your procedure    6. Next Procedure Date:   Your next ECT treatment is scheduled for 7:10am on 10/11/2024.  Please arrive at 5:40am.      **You are also invited to join us at our monthly Electroconvulsive Therapy Peer Support Group.  This support group is held once a month, on the last Monday of the

## 2024-10-08 NOTE — ANESTHESIA POSTPROCEDURE EVALUATION
Department of Anesthesiology  Postprocedure Note    Patient: Alistair Hutchison  MRN: [id number]  YOB: 1995  Date of evaluation: 10/8/2024    Procedure Summary       Date: 10/08/24 Room / Location: [facility] PACU    Anesthesia Start: 0728 Anesthesia Stop: 0739    Procedure: ECT W/ ANESTHESIA Diagnosis: Disorganized schizophrenia    Scheduled Providers:  Responsible Provider: Barby Leonard MD    Anesthesia Type: general ASA Status: 3            Anesthesia Type: No value filed.    Amalia Phase I: Amalia Score: 10    Amalia Phase II:      Anesthesia Post Evaluation    Comments: POST- ANESTHESIA EVALUATION       Pt Name: Alistair Hutchison  MRN: [id number]  YOB: 1995  Date of evaluation: 10/8/2024  Time:  10:46 AM      BP (!) 136/98   Pulse (!) 104   Temp 98.6 °F (37 °C)   Resp 16   Ht 1.651 m (5' 5\")   Wt 113.4 kg (250 lb)   SpO2 96%   BMI 41.60 kg/m²      Consciousness Level  Awake  Cardiopulmonary Status  Stable  Pain Adequately Treated YES  Nausea / Vomiting  NO  Adequate Hydration  YES  Anesthesia Related Complications NONE      Electronically signed by Barby Leonard MD on 10/8/2024 at 10:46 AM           No notable events documented.

## 2024-10-08 NOTE — ANESTHESIA PRE PROCEDURE
AM    RBC 4.86 09/20/2024 06:18 AM    HGB 14.2 09/20/2024 06:18 AM    HCT 41.9 09/20/2024 06:18 AM    MCV 86.3 09/20/2024 06:18 AM    RDW 13.6 09/20/2024 06:18 AM     09/20/2024 06:18 AM       CMP:   Lab Results   Component Value Date/Time     09/20/2024 06:18 AM    K 3.9 09/20/2024 06:18 AM     09/20/2024 06:18 AM    CO2 21 09/20/2024 06:18 AM    BUN 15 09/20/2024 06:18 AM    CREATININE 0.8 09/20/2024 06:18 AM    GFRAA >60 10/18/2019 10:05 AM    LABGLOM >90 09/20/2024 06:18 AM    LABGLOM >60 03/17/2023 08:51 AM    GLUCOSE 104 09/20/2024 06:18 AM    CALCIUM 9.4 09/20/2024 06:18 AM    BILITOT 0.3 09/20/2024 06:18 AM    ALKPHOS 84 09/20/2024 06:18 AM    AST 17 09/20/2024 06:18 AM    ALT 36 09/20/2024 06:18 AM       POC Tests:   No results for input(s): \"POCGLU\", \"POCNA\", \"POCK\", \"POCCL\", \"POCBUN\", \"POCHEMO\", \"POCHCT\" in the last 72 hours.      Coags: No results found for: \"PROTIME\", \"INR\", \"APTT\"    HCG (If Applicable): No results found for: \"PREGTESTUR\", \"PREGSERUM\", \"HCG\", \"HCGQUANT\"     ABGs: No results found for: \"PHART\", \"PO2ART\", \"RGG2CCZ\", \"VSC5MEH\", \"BEART\", \"I6DHBAYS\"     Type & Screen (If Applicable):  No results found for: \"ABORH\", \"LABANTI\"    Drug/Infectious Status (If Applicable):  Lab Results   Component Value Date/Time    HEPCAB NONREACTIVE 03/17/2023 08:51 AM       COVID-19 Screening (If Applicable): No results found for: \"COVID19\"        Anesthesia Evaluation  Patient summary reviewed and Nursing notes reviewed   no history of anesthetic complications:   Airway: Mallampati: III  TM distance: >3 FB   Neck ROM: full  Mouth opening: < 3 FB   Dental: normal exam         Pulmonary:Negative Pulmonary ROS and normal exam  breath sounds clear to auscultation      (-) shortness of breath                           Cardiovascular:Negative CV ROS        (-)  angina      Rhythm: regular  Rate: normal                    Neuro/Psych:   (+) psychiatric history:depression/anxiety

## 2024-10-10 ENCOUNTER — ANESTHESIA EVENT (OUTPATIENT)
Dept: POSTOP/PACU | Age: 29
End: 2024-10-10
Payer: COMMERCIAL

## 2024-10-11 ENCOUNTER — ANESTHESIA (OUTPATIENT)
Dept: POSTOP/PACU | Age: 29
End: 2024-10-11
Payer: COMMERCIAL

## 2024-10-11 ENCOUNTER — HOSPITAL ENCOUNTER (OUTPATIENT)
Dept: POSTOP/PACU | Age: 29
Discharge: HOME | End: 2024-10-11
Payer: COMMERCIAL

## 2024-10-11 VITALS
OXYGEN SATURATION: 96 % | RESPIRATION RATE: 20 BRPM | HEART RATE: 92 BPM | SYSTOLIC BLOOD PRESSURE: 129 MMHG | DIASTOLIC BLOOD PRESSURE: 79 MMHG | TEMPERATURE: 98.6 F

## 2024-10-11 PROCEDURE — 90870 ELECTROCONVULSIVE THERAPY: CPT

## 2024-10-11 PROCEDURE — 2580000003 HC RX 258: Performed by: ANESTHESIOLOGY

## 2024-10-11 PROCEDURE — 2500000003 HC RX 250 WO HCPCS: Performed by: NURSE ANESTHETIST, CERTIFIED REGISTERED

## 2024-10-11 PROCEDURE — 2500000003 HC RX 250 WO HCPCS: Performed by: ANESTHESIOLOGY

## 2024-10-11 PROCEDURE — 7100000001 HC PACU RECOVERY - ADDTL 15 MIN: Performed by: ANESTHESIOLOGY

## 2024-10-11 PROCEDURE — 3700000000 HC ANESTHESIA ATTENDED CARE: Performed by: ANESTHESIOLOGY

## 2024-10-11 PROCEDURE — 90870 ELECTROCONVULSIVE THERAPY: CPT | Performed by: PSYCHIATRY & NEUROLOGY

## 2024-10-11 PROCEDURE — 7100000000 HC PACU RECOVERY - FIRST 15 MIN: Performed by: ANESTHESIOLOGY

## 2024-10-11 PROCEDURE — 6360000002 HC RX W HCPCS: Performed by: NURSE ANESTHETIST, CERTIFIED REGISTERED

## 2024-10-11 RX ORDER — SODIUM CHLORIDE 9 MG/ML
INJECTION, SOLUTION INTRAVENOUS PRN
Status: DISCONTINUED | OUTPATIENT
Start: 2024-10-11 | End: 2024-10-12 | Stop reason: HOSPADM

## 2024-10-11 RX ORDER — SODIUM CHLORIDE, SODIUM LACTATE, POTASSIUM CHLORIDE, CALCIUM CHLORIDE 600; 310; 30; 20 MG/100ML; MG/100ML; MG/100ML; MG/100ML
INJECTION, SOLUTION INTRAVENOUS CONTINUOUS
Status: DISCONTINUED | OUTPATIENT
Start: 2024-10-11 | End: 2024-10-12 | Stop reason: HOSPADM

## 2024-10-11 RX ORDER — SODIUM CHLORIDE 0.9 % (FLUSH) 0.9 %
5-40 SYRINGE (ML) INJECTION EVERY 12 HOURS SCHEDULED
Status: DISCONTINUED | OUTPATIENT
Start: 2024-10-11 | End: 2024-10-12 | Stop reason: HOSPADM

## 2024-10-11 RX ORDER — KETOROLAC TROMETHAMINE 30 MG/ML
INJECTION, SOLUTION INTRAMUSCULAR; INTRAVENOUS
Status: DISCONTINUED | OUTPATIENT
Start: 2024-10-11 | End: 2024-10-11 | Stop reason: SDUPTHER

## 2024-10-11 RX ORDER — SUCCINYLCHOLINE/SOD CL,ISO/PF 200MG/10ML
SYRINGE (ML) INTRAVENOUS
Status: DISCONTINUED | OUTPATIENT
Start: 2024-10-11 | End: 2024-10-11 | Stop reason: SDUPTHER

## 2024-10-11 RX ORDER — SUCCINYLCHOLINE/SOD CL,ISO/PF 200MG/10ML
SYRINGE (ML) INTRAVENOUS
Status: COMPLETED
Start: 2024-10-11 | End: 2024-10-11

## 2024-10-11 RX ORDER — METHOHEXITAL IN WATER/PF 100MG/10ML
SYRINGE (ML) INTRAVENOUS
Status: COMPLETED
Start: 2024-10-11 | End: 2024-10-11

## 2024-10-11 RX ORDER — LIDOCAINE HYDROCHLORIDE 10 MG/ML
1 INJECTION, SOLUTION EPIDURAL; INFILTRATION; INTRACAUDAL; PERINEURAL
Status: COMPLETED | OUTPATIENT
Start: 2024-10-11 | End: 2024-10-11

## 2024-10-11 RX ORDER — SODIUM CHLORIDE 0.9 % (FLUSH) 0.9 %
5-40 SYRINGE (ML) INJECTION PRN
Status: DISCONTINUED | OUTPATIENT
Start: 2024-10-11 | End: 2024-10-12 | Stop reason: HOSPADM

## 2024-10-11 RX ORDER — METHOHEXITAL IN WATER/PF 100MG/10ML
SYRINGE (ML) INTRAVENOUS
Status: DISCONTINUED | OUTPATIENT
Start: 2024-10-11 | End: 2024-10-11 | Stop reason: SDUPTHER

## 2024-10-11 RX ORDER — KETOROLAC TROMETHAMINE 30 MG/ML
INJECTION, SOLUTION INTRAMUSCULAR; INTRAVENOUS
Status: COMPLETED
Start: 2024-10-11 | End: 2024-10-11

## 2024-10-11 RX ADMIN — Medication 100 MG: at 07:28

## 2024-10-11 RX ADMIN — SODIUM CHLORIDE, PRESERVATIVE FREE 20 ML: 5 INJECTION INTRAVENOUS at 07:28

## 2024-10-11 RX ADMIN — KETOROLAC TROMETHAMINE 30 MG: 30 INJECTION, SOLUTION INTRAMUSCULAR at 07:28

## 2024-10-11 RX ADMIN — LIDOCAINE HYDROCHLORIDE 1 ML: 10 INJECTION, SOLUTION EPIDURAL; INFILTRATION; INTRACAUDAL; PERINEURAL at 06:37

## 2024-10-11 RX ADMIN — SODIUM CHLORIDE, PRESERVATIVE FREE 10 ML: 5 INJECTION INTRAVENOUS at 06:37

## 2024-10-11 RX ADMIN — Medication 120 MG: at 07:28

## 2024-10-11 ASSESSMENT — PAIN - FUNCTIONAL ASSESSMENT
PAIN_FUNCTIONAL_ASSESSMENT: CRITICAL CARE PAIN OBSERVATION TOOL (CPOT)
PAIN_FUNCTIONAL_ASSESSMENT: NONE - DENIES PAIN

## 2024-10-11 NOTE — INTERVAL H&P NOTE
Update History & Physical    The patient's History and Physical of October 4, 2024 was reviewed with the patient and I examined the patient. Any changes are as documented below.    Alistair Hutchison is 29 y.o., male, here for ECT treatment. Last ECT treatment was 10/08/24. Jacqueline reports Pt has associated memory loss. Pt denies any other post ECT related symptoms including headaches. Pt tolerated last treatment well and has voiced improvement. Sleep and appetite are good. Denies suicidal or homicidal ideation. Denies hallucinations. Pt has been taking all medications as prescribed. Pt has no interval changes since last treatment visit.     Pt presents today with Jacqueline, legal guardian, grandparent. Pt AAO x 3 in NAD. HRRR. No adventitious lung sounds. No respiratory distress. NPO p MN. Took no medications this am. Denies recent or current chest pain/pressure, palpitations, SOB, recent URI, fever or chills. Review vitals per RN flowsheet.       Electronically signed by GIORGI Shah CNP on 10/11/2024 at 6:14 AM

## 2024-10-11 NOTE — PROCEDURES
Stimulus leads then applied to stimulus electrode site bilaterally with the center of the lead placed approximately 1 inch superior to the midpoint of line between canthus and the tragus bilaterally. Static impedance was tested and within appropriate limits. MECTA settings were confirmed with nursing staff.   Staff was cleared from the patient and dose of ECT stimulus was delivered.  Motor seizure activity  was observed at duration noted and terminated.  EEG seizure activity was observed of duration noted that was confirmed via monitoring EEG and terminated with appropriate postictal suppression noted on EEG.  Static impedance and dynamic impedance were documented. Tourniquet on  lower extremity was released and recovery procedures initiated.      The patient was mask ventilated during the procedure with no significant drops in oxygenation saturation and tolerated the procedure well without any notable adverse effects.   Condition: The patient was in stable condition with stable vital signs and able to follow commands when moved to recovery.

## 2024-10-11 NOTE — ANESTHESIA PRE PROCEDURE
Department of Anesthesiology  Preprocedure Note       Name:  Alistair Hutchison   Age:  29 y.o.  :  1995                                          MRN:  [id number]         Date:  10/11/2024      Surgeon: * No surgeons listed *    Procedure: * No procedures listed *    Medications prior to admission:   Prior to Admission medications    Medication Sig Start Date End Date Taking? Authorizing Provider   hydrOXYzine HCl (ATARAX) 50 MG tablet Take 1 tablet by mouth 3 times daily as needed for Anxiety 10/2/24 10/12/24 Yes Corby Drake MD   traZODone (DESYREL) 50 MG tablet Take 1 tablet by mouth nightly as needed for Sleep 10/2/24  Yes Corby Drake MD   hyoscyamine (LEVSIN/SL) 0.125 MG sublingual tablet Place 1 tablet under the tongue 3 times daily 10/2/24  Yes Corby Drake MD   paliperidone palmitate ER (INVEGA SUSTENNA) 234 MG/1.5ML FRANCHESKA IM injection Inject 234 mg into the muscle every 30 days 10/24/24  Yes Corby Drake MD   ziprasidone (GEODON) 40 MG capsule Take 1 capsule by mouth 2 times daily (with meals)   Yes Provider, MD Shan       Current medications:    Current Outpatient Medications   Medication Sig Dispense Refill   • hydrOXYzine HCl (ATARAX) 50 MG tablet Take 1 tablet by mouth 3 times daily as needed for Anxiety 30 tablet 0   • traZODone (DESYREL) 50 MG tablet Take 1 tablet by mouth nightly as needed for Sleep 30 tablet 0   • hyoscyamine (LEVSIN/SL) 0.125 MG sublingual tablet Place 1 tablet under the tongue 3 times daily 90 tablet 3   • [START ON 10/24/2024] paliperidone palmitate ER (INVEGA SUSTENNA) 234 MG/1.5ML FRANCHESKA IM injection Inject 234 mg into the muscle every 30 days 1 each 0   • ziprasidone (GEODON) 40 MG capsule Take 1 capsule by mouth 2 times daily (with meals)       Current Facility-Administered Medications   Medication Dose Route Frequency Provider Last Rate Last Admin   • lactated ringers IV soln infusion   IntraVENous Continuous Jewel Ji MD       •

## 2024-10-11 NOTE — ANESTHESIA POSTPROCEDURE EVALUATION
Department of Anesthesiology  Postprocedure Note    Patient: Alistair Hutchison  MRN: [id number]  YOB: 1995  Date of evaluation: 10/11/2024    Procedure Summary       Date: 10/11/24 Room / Location: [facility] PACU    Anesthesia Start: 0723 Anesthesia Stop: 0734    Procedure: ECT W/ ANESTHESIA Diagnosis: Disorganized schizophrenia    Scheduled Providers:  Responsible Provider: Zacarias Pandey MD    Anesthesia Type: general ASA Status: 3            Anesthesia Type: No value filed.    Amalia Phase I: Amalia Score: 10    Amalia Phase II:      Anesthesia Post Evaluation    Patient location during evaluation: PACU  Patient participation: complete - patient participated  Level of consciousness: awake and alert  Airway patency: patent  Nausea & Vomiting: no vomiting  Cardiovascular status: hemodynamically stable  Respiratory status: acceptable  Hydration status: euvolemic  Comments: POST- ANESTHESIA EVALUATION       Pt Name: Alistair Hutchison  MRN: [id number]  YOB: 1995  Date of evaluation: 10/11/2024  Time:  10:16 AM      /79   Pulse 92   Temp 98.6 °F (37 °C) (Infrared)   Resp 20   SpO2 96%      Consciousness Level  Awake  Cardiopulmonary Status  Stable  Pain Adequately Treated YES  Nausea / Vomiting  NO  Adequate Hydration  YES  Anesthesia Related Complications NONE      Electronically signed by Zacarias Pandey MD on 10/11/2024 at 10:16 AM         Pain management: satisfactory to patient    No notable events documented.

## 2024-10-11 NOTE — DISCHARGE INSTRUCTIONS
ELECTROCONVULSIVE THERAPY DISCHARGE INSTRUCTIONS         1. Activity - Rest today. The anesthetic agents you have received can make you feel drowsy or tired.  A responsible person must drive you home and stay with you for 8 hours after discharge from the Hospital. Do not drive a motor vehicle or operate any machinery for 24 hours. .   *Do not make any complex decisions or execute any legal documents until 3 weeks AFTER your last treatment.  If you have any questions regarding this, speak with your psychiatrist first.*    2. Diet - Nothing to eat or drink after midnight the night of your ECT treatment. After ECT, start with clear liquids, if you can drink without coughing, you may proceed with gradually progressing to your usual diet.    No alcoholic beverages for 24 hours.    3. Medications - Take your daily medications as prescribed, after you return home from today's ECT. If you take a Beta Blocker or have been prescribed Imitrex, you may be instructed to take these medications the morning of ECT. If you are unsure please ask the physician.     Take with these medication the morning of ECT with one Tablespoon of water.   Tylenol 650 mg  All blood pressure medications  ***    4. You may experience the following after your treatment:   a. Poor memory   b. Poor balance   c. Headaches   d. Confusion   e. Muscle soreness   f. Nausea      5. Special Precautions - Contact you physician immediately if these occur:   a. Signs of infection: redness, swelling, heat, red streaks at the site of the IV   b. Excessive pain unrelieved by prescription pain medications   c. Nausea and vomiting that persists beyond the first day after your procedure    6. Next Procedure Date:   Your next ECT treatment is scheduled for 7:20 am on Tues. Oct. 15, 2024.  Please arrive at 6:00 am.    **You are also invited to join us at our monthly Electroconvulsive Therapy Peer Support Group.  This support group is held once a month, on the last Monday

## 2024-10-14 ENCOUNTER — ANESTHESIA EVENT (OUTPATIENT)
Dept: POSTOP/PACU | Age: 29
End: 2024-10-14
Payer: MEDICARE

## 2024-10-15 ENCOUNTER — HOSPITAL ENCOUNTER (OUTPATIENT)
Dept: POSTOP/PACU | Age: 29
Discharge: HOME | End: 2024-10-15
Payer: MEDICARE

## 2024-10-15 ENCOUNTER — ANESTHESIA (OUTPATIENT)
Dept: POSTOP/PACU | Age: 29
End: 2024-10-15
Payer: MEDICARE

## 2024-10-15 VITALS
SYSTOLIC BLOOD PRESSURE: 137 MMHG | RESPIRATION RATE: 21 BRPM | DIASTOLIC BLOOD PRESSURE: 98 MMHG | HEART RATE: 98 BPM | OXYGEN SATURATION: 93 % | TEMPERATURE: 98.8 F

## 2024-10-15 DIAGNOSIS — F23 ACUTE PSYCHOSIS (HCC): Primary | ICD-10-CM

## 2024-10-15 DIAGNOSIS — F20.1 DISORGANIZED SCHIZOPHRENIA (HCC): ICD-10-CM

## 2024-10-15 PROCEDURE — 3700000000 HC ANESTHESIA ATTENDED CARE: Performed by: ANESTHESIOLOGY

## 2024-10-15 PROCEDURE — 7100000000 HC PACU RECOVERY - FIRST 15 MIN: Performed by: ANESTHESIOLOGY

## 2024-10-15 PROCEDURE — 2500000003 HC RX 250 WO HCPCS: Performed by: ANESTHESIOLOGY

## 2024-10-15 PROCEDURE — 7100000001 HC PACU RECOVERY - ADDTL 15 MIN: Performed by: ANESTHESIOLOGY

## 2024-10-15 PROCEDURE — 90870 ELECTROCONVULSIVE THERAPY: CPT | Performed by: PSYCHIATRY & NEUROLOGY

## 2024-10-15 PROCEDURE — 90870 ELECTROCONVULSIVE THERAPY: CPT

## 2024-10-15 PROCEDURE — 2500000003 HC RX 250 WO HCPCS: Performed by: NURSE ANESTHETIST, CERTIFIED REGISTERED

## 2024-10-15 PROCEDURE — 6360000002 HC RX W HCPCS: Performed by: NURSE ANESTHETIST, CERTIFIED REGISTERED

## 2024-10-15 RX ORDER — SUCCINYLCHOLINE/SOD CL,ISO/PF 200MG/10ML
SYRINGE (ML) INTRAVENOUS
Status: DISCONTINUED | OUTPATIENT
Start: 2024-10-15 | End: 2024-10-15 | Stop reason: SDUPTHER

## 2024-10-15 RX ORDER — KETOROLAC TROMETHAMINE 30 MG/ML
INJECTION, SOLUTION INTRAMUSCULAR; INTRAVENOUS
Status: COMPLETED
Start: 2024-10-15 | End: 2024-10-15

## 2024-10-15 RX ORDER — SODIUM CHLORIDE 9 MG/ML
INJECTION, SOLUTION INTRAVENOUS PRN
Status: DISCONTINUED | OUTPATIENT
Start: 2024-10-15 | End: 2024-10-16 | Stop reason: HOSPADM

## 2024-10-15 RX ORDER — METHOHEXITAL IN WATER/PF 100MG/10ML
SYRINGE (ML) INTRAVENOUS
Status: COMPLETED
Start: 2024-10-15 | End: 2024-10-15

## 2024-10-15 RX ORDER — SUCCINYLCHOLINE/SOD CL,ISO/PF 200MG/10ML
SYRINGE (ML) INTRAVENOUS
Status: COMPLETED
Start: 2024-10-15 | End: 2024-10-15

## 2024-10-15 RX ORDER — KETOROLAC TROMETHAMINE 30 MG/ML
INJECTION, SOLUTION INTRAMUSCULAR; INTRAVENOUS
Status: DISCONTINUED | OUTPATIENT
Start: 2024-10-15 | End: 2024-10-15 | Stop reason: SDUPTHER

## 2024-10-15 RX ORDER — SODIUM CHLORIDE 0.9 % (FLUSH) 0.9 %
5-40 SYRINGE (ML) INJECTION EVERY 12 HOURS SCHEDULED
Status: DISCONTINUED | OUTPATIENT
Start: 2024-10-15 | End: 2024-10-16 | Stop reason: HOSPADM

## 2024-10-15 RX ORDER — LIDOCAINE HYDROCHLORIDE 10 MG/ML
1 INJECTION, SOLUTION EPIDURAL; INFILTRATION; INTRACAUDAL; PERINEURAL
Status: COMPLETED | OUTPATIENT
Start: 2024-10-15 | End: 2024-10-15

## 2024-10-15 RX ORDER — METHOHEXITAL IN WATER/PF 100MG/10ML
SYRINGE (ML) INTRAVENOUS
Status: DISCONTINUED | OUTPATIENT
Start: 2024-10-15 | End: 2024-10-15 | Stop reason: SDUPTHER

## 2024-10-15 RX ORDER — SODIUM CHLORIDE 0.9 % (FLUSH) 0.9 %
5-40 SYRINGE (ML) INJECTION PRN
Status: DISCONTINUED | OUTPATIENT
Start: 2024-10-15 | End: 2024-10-16 | Stop reason: HOSPADM

## 2024-10-15 RX ADMIN — Medication 120 MG: at 07:28

## 2024-10-15 RX ADMIN — Medication 100 MG: at 07:28

## 2024-10-15 RX ADMIN — LIDOCAINE HYDROCHLORIDE 1 ML: 10 INJECTION, SOLUTION EPIDURAL; INFILTRATION; INTRACAUDAL; PERINEURAL at 06:54

## 2024-10-15 RX ADMIN — KETOROLAC TROMETHAMINE 30 MG: 30 INJECTION, SOLUTION INTRAMUSCULAR at 07:27

## 2024-10-15 ASSESSMENT — PAIN - FUNCTIONAL ASSESSMENT
PAIN_FUNCTIONAL_ASSESSMENT: NONE - DENIES PAIN
PAIN_FUNCTIONAL_ASSESSMENT: CRITICAL CARE PAIN OBSERVATION TOOL (CPOT)

## 2024-10-15 ASSESSMENT — ENCOUNTER SYMPTOMS: SHORTNESS OF BREATH: 0

## 2024-10-15 NOTE — ANESTHESIA POSTPROCEDURE EVALUATION
Department of Anesthesiology  Postprocedure Note    Patient: Alistair Hutchison  MRN: [id number]  YOB: 1995  Date of evaluation: 10/15/2024    Procedure Summary       Date: 10/15/24 Room / Location: [facility] PACU    Anesthesia Start: 0724 Anesthesia Stop: 0738    Procedure: ECT W/ ANESTHESIA Diagnosis:     Scheduled Providers:  Responsible Provider: Barby Leonard MD    Anesthesia Type: general ASA Status: 3            Anesthesia Type: No value filed.    Amalia Phase I: Amalia Score: 10    Amalia Phase II:      Anesthesia Post Evaluation    Comments: POST- ANESTHESIA EVALUATION       Pt Name: Alistair Hutchison  MRN: [id number]  YOB: 1995  Date of evaluation: 10/15/2024  Time:  11:01 AM      BP (!) 137/98   Pulse 98   Temp 98.8 °F (37.1 °C) (Infrared)   Resp 21   SpO2 93%      Consciousness Level  Awake  Cardiopulmonary Status  Stable  Pain Adequately Treated YES  Nausea / Vomiting  NO  Adequate Hydration  YES  Anesthesia Related Complications NONE      Electronically signed by Barby Leonard MD on 10/15/2024 at 11:01 AM           No notable events documented.

## 2024-10-15 NOTE — PROCEDURES
Bilateral ECT Procedure Report  Alistair Hutchison   10/15/2024  1995         Attending:Corby Drake MD  Preprocedure diagnosis: Disorganized schizophrenia (F20.1)  Postprocedure diagnosis: SAME AS PRE-PROCEDURE DIAGNOSIS  Treatment Number: This is treatment # 10   Patient Status: Outpatient   Type of ECT: Bilateral Brief Pulse  Medications  Preprocedure: Tylenol 650mg  Anesthetic: Methohexital 100 mg  Paralytic: Succinylcholine 120 mg  Post procedure: none needed   Cuff placement: Left Lower Extremity    MECTA Settings 1st Stimulus:     Pulse width: 1.0 ms   Frequency:  40 hz   Duration:   2.0 seconds   Static Impedance: 649 ohms             Dynamic Impedance: 227 ohms             Motor Seizure Duration: 37 seconds  EEG Seizure Duration: 43 seconds             The patient's ECT treatment was performed using MECTA machine  .  Timeout:  Time out was performed using two patient identifiers and confirmation of procedure.     Patient Preparation: Preprocedure documentation, labs, H&P were all verified and reviewed.  The patient was placed in supine position.  EEG leads were placed for monitoring of seizure.   Mandaeism areas bilaterally cleaned and prepped.  Conductive gel  was applied over stimulus electrode site.   The patient was pre-oxygenated.       Procedure in detail: Medications were administered by anesthesia using intravenous access at the doses listed previously in this summary.  Anesthetic administered and once confirmation the patient is anesthetized, the patient's blood pressure cuff on lower extremity was inflated to 100mmHg in excess of patient's blood pressure.  At this time, the neuromuscular blockade was administered intravenously by anesthesia.  Upper extremity fasciculation were verified followed by lower extremity fasciculation.  Once fasciculations terminated, confirmation of affective neuromuscular blockade demonstrated by absence of withdrawal reflex.  Bite blocks were put in

## 2024-10-15 NOTE — INTERVAL H&P NOTE
Update History & Physical    The patient's History and Physical of October 4, 2024 was reviewed with the patient and I examined the patient. Any changes are as documented below.     Alistair Hutchison is 29 y.o., male, here for ECT treatment. Last ECT treatment was 10/11/24. Pt tolerated last treatment well and has voiced improvement. Presents today with Jacqueline, Grandmother and guardian, who reports pt has been irritable and quick to anger. Has had some short term memory deficit associated with ECT. Sleep and appetite are good. Denies suicidal or homicidal ideation. Denies hallucinations. Pt has been taking all medications as prescribed. Pt has no interval changes since last treatment visit.     Pt AAO x 3 in NAD. HRRR. No adventitious lung sounds. No respiratory distress. NPO p MN. Took no medications this am with sip of water. Pt has current congestion. Denies recent or current chest pain/pressure, palpitations, SOB, fever or chills. Review vitals per RN flowsheet.       Electronically signed by GIORGI Shah CNP on 10/15/2024 at 6:25 AM

## 2024-10-15 NOTE — DISCHARGE INSTRUCTIONS
ELECTROCONVULSIVE THERAPY DISCHARGE INSTRUCTIONS         1. Activity - Rest today. The anesthetic agents you have received can make you feel drowsy or tired.  A responsible person must drive you home and stay with you for 8 hours after discharge from the Hospital. Do not drive a motor vehicle or operate any machinery for 24 hours. .   *Do not make any complex decisions or execute any legal documents until 3 weeks AFTER your last treatment.  If you have any questions regarding this, speak with your psychiatrist first.*    2. Diet - Nothing to eat or drink after midnight the night of your ECT treatment. After ECT, start with clear liquids, if you can drink without coughing, you may proceed with gradually progressing to your usual diet.    No alcoholic beverages for 24 hours.    3. Medications - Take your daily medications as prescribed, after you return home from today's ECT. If you take a Beta Blocker or have been prescribed Imitrex, you may be instructed to take these medications the morning of ECT. If you are unsure please ask the physician.     Take with these medication the morning of ECT with one Tablespoon of water.   Tylenol 650 mg  All blood pressure medications  ***    4. You may experience the following after your treatment:   a. Poor memory   b. Poor balance   c. Headaches   d. Confusion   e. Muscle soreness   f. Nausea      5. Special Precautions - Contact you physician immediately if these occur:   a. Signs of infection: redness, swelling, heat, red streaks at the site of the IV   b. Excessive pain unrelieved by prescription pain medications   c. Nausea and vomiting that persists beyond the first day after your procedure    6. Next Procedure Date:   Your next ECT treatment is scheduled for 7:30 am on Friday, Oct. 18, 2024.  Please arrive at 6:00 am.    **You are also invited to join us at our monthly Electroconvulsive Therapy Peer Support Group.  This support group is held once a month, on the last

## 2024-10-15 NOTE — ANESTHESIA PRE PROCEDURE
86.3 09/20/2024 06:18 AM    RDW 13.6 09/20/2024 06:18 AM     09/20/2024 06:18 AM       CMP:   Lab Results   Component Value Date/Time     09/20/2024 06:18 AM    K 3.9 09/20/2024 06:18 AM     09/20/2024 06:18 AM    CO2 21 09/20/2024 06:18 AM    BUN 15 09/20/2024 06:18 AM    CREATININE 0.8 09/20/2024 06:18 AM    GFRAA >60 10/18/2019 10:05 AM    LABGLOM >90 09/20/2024 06:18 AM    LABGLOM >60 03/17/2023 08:51 AM    GLUCOSE 104 09/20/2024 06:18 AM    CALCIUM 9.4 09/20/2024 06:18 AM    BILITOT 0.3 09/20/2024 06:18 AM    ALKPHOS 84 09/20/2024 06:18 AM    AST 17 09/20/2024 06:18 AM    ALT 36 09/20/2024 06:18 AM       POC Tests:   No results for input(s): \"POCGLU\", \"POCNA\", \"POCK\", \"POCCL\", \"POCBUN\", \"POCHEMO\", \"POCHCT\" in the last 72 hours.      Coags: No results found for: \"PROTIME\", \"INR\", \"APTT\"    HCG (If Applicable): No results found for: \"PREGTESTUR\", \"PREGSERUM\", \"HCG\", \"HCGQUANT\"     ABGs: No results found for: \"PHART\", \"PO2ART\", \"YSE3JEA\", \"CLY4VVN\", \"BEART\", \"U7JUBVRI\"     Type & Screen (If Applicable):  No results found for: \"ABORH\", \"LABANTI\"    Drug/Infectious Status (If Applicable):  Lab Results   Component Value Date/Time    HEPCAB NONREACTIVE 03/17/2023 08:51 AM       COVID-19 Screening (If Applicable): No results found for: \"COVID19\"        Anesthesia Evaluation  Patient summary reviewed and Nursing notes reviewed   no history of anesthetic complications:   Airway: Mallampati: III  TM distance: >3 FB   Neck ROM: full  Mouth opening: < 3 FB   Dental: normal exam         Pulmonary:Negative Pulmonary ROS and normal exam  breath sounds clear to auscultation      (-) shortness of breath                           Cardiovascular:Negative CV ROS        (-)  angina      Rhythm: regular  Rate: normal                    Neuro/Psych:   (+) psychiatric history:depression/anxiety             GI/Hepatic/Renal:   (+) morbid obesity          Endo/Other: Negative Endo/Other ROS

## 2024-10-16 NOTE — PROGRESS NOTES
Pre ECT Assessment Note  Psychiatry  10/15/2024      Alistair Hutchison  1995  [id number]      Subjective:     Patient is a 29 y.o.  male seen for an evaluation prior to today's electroconvulsive therapy treatment. Today is treatment number 10, utilizing bilateral.  This is course number 1.      Alistair is evaluated today bedside prior to his ECT treatment, his grandmother who is also his legal guardian is present.  She reports that he is not getting out of the house much though does acknowledge that he did go to [employer] this past weekend.  Alistair reports that he continues to play his video games at home otherwise isolates.  He reports that he did have fun at [employer] and even attended [employer] study.  He plans on getting out of the house again this week.  His grandmother reports historically that he used to walk the neighborhood and was less isolative, she is hopeful that he will resume more social behavior.  Overall she feels he is doing much better than he had been.  He denies any depression or suicidal thoughts.  We will plan to taper frequency of treatment to once a week after Friday.    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    Hyperthyroidism, subclinical 08/22/2024    Moderate mixed hyperlipidemia not requiring statin therapy 08/22/2024    Class 2 obesity without serious comorbidity with body mass index (BMI) of 38.0 to 38.9 in adult 08/22/2024    Hypovitaminosis D 10/18/2019    Rib pain on left side 10/18/2019     Past Medical History:   Diagnosis Date    Fracture     leg    Manic depression ([facility])     Oppositional defiant behavior     PONV (postoperative nausea and vomiting)     Schizophrenia ([facility])       Past Surgical History:   Procedure Laterality Date    EYE SURGERY      TYMPANOSTOMY TUBE PLACEMENT        Not in a hospital admission.  Allergies   Allergen

## 2024-10-17 ENCOUNTER — ANESTHESIA EVENT (OUTPATIENT)
Dept: POSTOP/PACU | Age: 29
End: 2024-10-17
Payer: MEDICARE

## 2024-10-18 ENCOUNTER — ANESTHESIA (OUTPATIENT)
Dept: POSTOP/PACU | Age: 29
End: 2024-10-18
Payer: MEDICARE

## 2024-10-18 ENCOUNTER — HOSPITAL ENCOUNTER (OUTPATIENT)
Dept: POSTOP/PACU | Age: 29
Discharge: HOME | End: 2024-10-18
Payer: MEDICARE

## 2024-10-18 VITALS
TEMPERATURE: 98 F | SYSTOLIC BLOOD PRESSURE: 140 MMHG | HEART RATE: 102 BPM | OXYGEN SATURATION: 96 % | RESPIRATION RATE: 16 BRPM | DIASTOLIC BLOOD PRESSURE: 97 MMHG

## 2024-10-18 PROCEDURE — 2500000003 HC RX 250 WO HCPCS: Performed by: ANESTHESIOLOGY

## 2024-10-18 PROCEDURE — 3700000000 HC ANESTHESIA ATTENDED CARE: Performed by: ANESTHESIOLOGY

## 2024-10-18 PROCEDURE — 6360000002 HC RX W HCPCS: Performed by: NURSE ANESTHETIST, CERTIFIED REGISTERED

## 2024-10-18 PROCEDURE — 2500000003 HC RX 250 WO HCPCS: Performed by: NURSE ANESTHETIST, CERTIFIED REGISTERED

## 2024-10-18 PROCEDURE — 7100000001 HC PACU RECOVERY - ADDTL 15 MIN: Performed by: ANESTHESIOLOGY

## 2024-10-18 PROCEDURE — 7100000000 HC PACU RECOVERY - FIRST 15 MIN: Performed by: ANESTHESIOLOGY

## 2024-10-18 PROCEDURE — 90870 ELECTROCONVULSIVE THERAPY: CPT | Performed by: PSYCHIATRY & NEUROLOGY

## 2024-10-18 PROCEDURE — 90870 ELECTROCONVULSIVE THERAPY: CPT | Performed by: ANESTHESIOLOGY

## 2024-10-18 RX ORDER — SODIUM CHLORIDE 9 MG/ML
INJECTION, SOLUTION INTRAVENOUS PRN
Status: DISCONTINUED | OUTPATIENT
Start: 2024-10-18 | End: 2024-10-19 | Stop reason: HOSPADM

## 2024-10-18 RX ORDER — SODIUM CHLORIDE 0.9 % (FLUSH) 0.9 %
5-40 SYRINGE (ML) INJECTION PRN
Status: DISCONTINUED | OUTPATIENT
Start: 2024-10-18 | End: 2024-10-19 | Stop reason: HOSPADM

## 2024-10-18 RX ORDER — SODIUM CHLORIDE, SODIUM LACTATE, POTASSIUM CHLORIDE, CALCIUM CHLORIDE 600; 310; 30; 20 MG/100ML; MG/100ML; MG/100ML; MG/100ML
INJECTION, SOLUTION INTRAVENOUS CONTINUOUS
Status: DISCONTINUED | OUTPATIENT
Start: 2024-10-18 | End: 2024-10-19 | Stop reason: HOSPADM

## 2024-10-18 RX ORDER — SUCCINYLCHOLINE/SOD CL,ISO/PF 200MG/10ML
SYRINGE (ML) INTRAVENOUS
Status: COMPLETED
Start: 2024-10-18 | End: 2024-10-18

## 2024-10-18 RX ORDER — SUCCINYLCHOLINE/SOD CL,ISO/PF 200MG/10ML
SYRINGE (ML) INTRAVENOUS
Status: DISCONTINUED | OUTPATIENT
Start: 2024-10-18 | End: 2024-10-18 | Stop reason: SDUPTHER

## 2024-10-18 RX ORDER — SODIUM CHLORIDE 0.9 % (FLUSH) 0.9 %
5-40 SYRINGE (ML) INJECTION EVERY 12 HOURS SCHEDULED
Status: DISCONTINUED | OUTPATIENT
Start: 2024-10-18 | End: 2024-10-19 | Stop reason: HOSPADM

## 2024-10-18 RX ORDER — KETOROLAC TROMETHAMINE 30 MG/ML
INJECTION, SOLUTION INTRAMUSCULAR; INTRAVENOUS
Status: COMPLETED
Start: 2024-10-18 | End: 2024-10-18

## 2024-10-18 RX ORDER — LIDOCAINE HYDROCHLORIDE 10 MG/ML
1 INJECTION, SOLUTION EPIDURAL; INFILTRATION; INTRACAUDAL; PERINEURAL
Status: COMPLETED | OUTPATIENT
Start: 2024-10-18 | End: 2024-10-18

## 2024-10-18 RX ORDER — METHOHEXITAL IN WATER/PF 100MG/10ML
SYRINGE (ML) INTRAVENOUS
Status: DISCONTINUED | OUTPATIENT
Start: 2024-10-18 | End: 2024-10-18 | Stop reason: SDUPTHER

## 2024-10-18 RX ORDER — METHOHEXITAL IN WATER/PF 100MG/10ML
SYRINGE (ML) INTRAVENOUS
Status: COMPLETED
Start: 2024-10-18 | End: 2024-10-18

## 2024-10-18 RX ORDER — KETOROLAC TROMETHAMINE 30 MG/ML
INJECTION, SOLUTION INTRAMUSCULAR; INTRAVENOUS
Status: DISCONTINUED | OUTPATIENT
Start: 2024-10-18 | End: 2024-10-18 | Stop reason: SDUPTHER

## 2024-10-18 RX ADMIN — Medication 100 MG: at 07:41

## 2024-10-18 RX ADMIN — KETOROLAC TROMETHAMINE 30 MG: 30 INJECTION, SOLUTION INTRAMUSCULAR at 07:41

## 2024-10-18 RX ADMIN — Medication 120 MG: at 07:41

## 2024-10-18 RX ADMIN — LIDOCAINE HYDROCHLORIDE 1 ML: 10 INJECTION, SOLUTION EPIDURAL; INFILTRATION; INTRACAUDAL; PERINEURAL at 06:38

## 2024-10-18 ASSESSMENT — ENCOUNTER SYMPTOMS: SHORTNESS OF BREATH: 0

## 2024-10-18 ASSESSMENT — PAIN - FUNCTIONAL ASSESSMENT: PAIN_FUNCTIONAL_ASSESSMENT: NONE - DENIES PAIN

## 2024-10-18 NOTE — ANESTHESIA PRE PROCEDURE
HPI   Chief Complaint   Patient presents with    Shortness of Breath    Weakness, Gen       HPI  Patient is a va 85-year-old male with a past medical history significant for hypertension who presented to the emergency room with a chief complaint of shortness of breath and weakness for a couple of days.  He lives at home but is normally more active than he has been.  Family describes him as not being able to get out of his chair which is unusual for him.  He normally does not require oxygen but has been using 4 L since EMS picked him up as he was saturating around 82%.  He denies any chest pain, nausea, vomiting, fever or chills.  He has been coughing but does not know what color was coming up.      PMHx: Diabetes, hypertension, mixed hyperlipidemia, atrial fibrillation status post ablation and CKD stage III.  PSHx: Denies pertinent  FamilyHx: Denies pertinent  SocialHx: Quit smoking  Allergies: NKDA  Medications: See Medication Reconciliation     ROS  As above but otherwise denies    Physical Exam    GENERAL: Awake and Alert, No Acute Distress  HEENT: AT/NC, PERRL, EOMI, Normal Oropharynx, No Signs of Dehydration  NECK: Normal Inspection, No JVD  CARDIOVASCULAR: RRR, No M/R/G  RESPIRATORY: CTA Bilaterally, No Wheezes, Rales or Rhonchi, Chest Wall Non-tender  ABDOMEN: Soft, non-tender abdomen, Normal Bowel Sounds, No Distention  BACK: No CVA Tenderness  SKIN: Normal Color, Warm, Dry, No Rashes   EXTREMITIES: Non-Tender, Full ROM, No Pedal Edema  NEURO: A&O x 3, Normal Motor and Sensation, Normal Mood and Affect    Nursing Assessment and Vitals Reviewed    EKG showed a sinus rhythm at 73 bpm.  No ischemic ST changes.  There is left axis deviation.  There are T wave inversions in lead I and aVL.  EKG is similar to prior.    Medical Decision  Patient is a va 85-year-old male with a past medical history significant for hypertension who presented to the emergency room with a chief complaint of shortness of breath  and weakness for a couple of days.  He lives at home but is normally more active than he has been.  Family describes him as not being able to get out of his chair which is unusual for him.  He normally does not require oxygen but has been using 4 L since EMS picked him up as he was saturating around 82%.  He denies any chest pain, nausea, vomiting, fever or chills.  He has been coughing but does not know what color was coming up.    On evaluation patient is smiling and in no acute distress but requiring oxygen due to hypoxia.  Workup included labs that revealed significantly elevated BNP which is similar to prior.  He also has an elevated BUN and creatinine consistent with history but slightly worse.  He has no leukocytosis or significant anemia but his D-dimer is markedly elevated.  Urine did not show any findings concerning for infection.  Chest x-ray did show signs of pneumonia and he started on antibiotics.  Ultrasound of the lower extremity showed no evidence of DVT and VQ scan did not find significant probability of PE.  Patient has remained in stable condition in the ED and he is admitted for further workup and management.                            No data recorded                   Patient History   Past Medical History:   Diagnosis Date    Diabetes mellitus (CMS/[facility])     Hypertension     Mixed hyperlipidemia 02/13/2013    Paroxysmal atrial fibrillation (CMS/[facility]) 03/08/2018    -s/p cardioversion and ablation   -AC on Eliquis     S/P ablation of atrial fibrillation 05/03/2019    Stage 3b chronic kidney disease (CMS/[facility]) 12/29/2023     Past Surgical History:   Procedure Laterality Date    CARDIAC ELECTROPHYSIOLOGY MAPPING AND ABLATION      CARDIOVERSION       No family history on file.  Social History     Tobacco Use    Smoking status: Former     Types: Cigarettes    Smokeless tobacco: Former   Substance Use Topics    Alcohol use: Not on file    Drug use: Not on file       Physical Exam   ED Triage Vitals  [03/22/24 1150]   Temperature Heart Rate Respirations BP   36.6 °C (97.9 °F) 89 20 (!) 171/92      Pulse Ox Temp src Heart Rate Source Patient Position   96 % -- -- --      BP Location FiO2 (%)     -- --       Physical Exam    ED Course & MDM   Diagnoses as of 03/22/24 1833   Pneumonia of right lung due to infectious organism, unspecified part of lung   SOB (shortness of breath)       Medical Decision Making      Procedure  Procedures     Annabelle Ash MD  03/25/24 1110     cont levothyroxin Endo/Other ROS                    Abdominal:             Vascular: negative vascular ROS.         Other Findings:       Anesthesia Plan      general and TIVA     ASA 3     (TIVA)  Induction: intravenous.    MIPS: Prophylactic antiemetics administered.  Anesthetic plan and risks discussed with patient.      Plan discussed with CRNA.                Kristel Alvarado MD   10/18/2024             cont levothyroxin cont levothyroxin cont levothyroxin cont levothyroxin cont levothyroxin

## 2024-10-18 NOTE — PROCEDURES
Bilateral ECT Procedure Report  Alistair Hutchison   10/18/2024  1995         Attending:Corby Drake MD  Preprocedure diagnosis: Disorganized schizophrenia (F20.1)  Postprocedure diagnosis: SAME AS PRE-PROCEDURE DIAGNOSIS  Treatment Number: This is treatment # 11   Patient Status: Outpatient   Type of ECT: Bilateral Brief Pulse  Medications  Preprocedure: Tylenol 650mg  Anesthetic: Methohexital 100 mg  Paralytic: Succinylcholine 120 mg  Post procedure: none needed   Cuff placement: Left Lower Extremity    MECTA Settings 1st Stimulus:     Pulse width: 1.0 ms   Frequency:  40 hz   Duration:   2.0 seconds   Static Impedance: 550 ohms             Dynamic Impedance: 231 ohms             Motor Seizure Duration: 31 seconds  EEG Seizure Duration: 33 seconds             The patient's ECT treatment was performed using MECTA machine  .  Timeout:  Time out was performed using two patient identifiers and confirmation of procedure.     Patient Preparation: Preprocedure documentation, labs, H&P were all verified and reviewed.  The patient was placed in supine position.  EEG leads were placed for monitoring of seizure.   Hoahaoism areas bilaterally cleaned and prepped.  Conductive gel  was applied over stimulus electrode site.   The patient was pre-oxygenated.       Procedure in detail: Medications were administered by anesthesia using intravenous access at the doses listed previously in this summary.  Anesthetic administered and once confirmation the patient is anesthetized, the patient's blood pressure cuff on lower extremity was inflated to 100mmHg in excess of patient's blood pressure.  At this time, the neuromuscular blockade was administered intravenously by anesthesia.  Upper extremity fasciculation were verified followed by lower extremity fasciculation.  Once fasciculations terminated, confirmation of affective neuromuscular blockade demonstrated by absence of withdrawal reflex.  Bite blocks were put in

## 2024-10-18 NOTE — ANESTHESIA POSTPROCEDURE EVALUATION
Department of Anesthesiology  Postprocedure Note    Patient: Alistair Hutchison  MRN: [id number]  YOB: 1995  Date of evaluation: 10/18/2024    Procedure Summary       Date: 10/18/24 Room / Location: [facility] PACU    Anesthesia Start: 0737 Anesthesia Stop: 0749    Procedure: ECT W/ ANESTHESIA Diagnosis: Disorganized schizophrenia    Scheduled Providers:  Responsible Provider: Kristel Alvarado MD    Anesthesia Type: general, TIVA ASA Status: 3            Anesthesia Type: No value filed.    Amalia Phase I: Amalia Score: 10    Amalia Phase II:      Anesthesia Post Evaluation    Comments: POST- ANESTHESIA EVALUATION       Pt Name: Alistair Hutchison  MRN: [id number]  YOB: 1995  Date of evaluation: 10/18/2024  Time:  10:52 AM      BP (!) 140/97   Pulse (!) 102   Temp 98 °F (36.7 °C)   Resp 16   SpO2 96%      Consciousness Level  Awake  Cardiopulmonary Status  Stable  Pain Adequately Treated YES  Nausea / Vomiting  NO  Adequate Hydration  YES  Anesthesia Related Complications NONE      Electronically signed by Kristel Alvarado MD on 10/18/2024 at 10:52 AM      No notable events documented.

## 2024-10-18 NOTE — INTERVAL H&P NOTE
Update History & Physical    The patient's History and Physical of October 4, 2024 was reviewed with the patient and I examined the patient. Any changes are as documented below.      Alistair Hutchison is 29 y.o., male, here for ECT treatment. Presents today with Jacqueline, guardian. Jacqueline reports Alistair's irritability has improved. He is no longer easily angered. Last ECT treatment was 10/15/24. Pt tolerated last treatment well and has voiced improvement. Sleep and appetite are good. Denies suicidal or homicidal ideation. Denies hallucinations. Pt has been taking all medications as prescribed. Pt has no interval changes since last treatment visit.     Pt AAO x 3 in NAD. HRRR. No adventitious lung sounds. No respiratory distress. NPO p MN. Took no medications this am. Denies recent or current chest pain/pressure, palpitations, SOB, recent URI, fever or chills. Review vitals per RN flowsheet.       Electronically signed by GIORGI Shah CNP on 10/18/2024 at 6:16 AM

## 2024-10-18 NOTE — PROGRESS NOTES
Pre ECT Assessment Note  Psychiatry  10/18/24       Alistair Hutchison  1995  [id number]      Subjective:     Patient is a 29 y.o.  male seen for an evaluation prior to today's electroconvulsive therapy treatment. Today is treatment number 11, utilizing bilateral.  This is course number 1.      Alistair is evaluated today bedside prior to his ECT treatment, his grandmother who is also his legal guardian is present.  Alistair reports that he continues to play his video games at home otherwise isolates.  He has been bright and pleasant at home. Smiles during assessment.  Overall she feels he is doing much better than he had been.  He denies any depression or suicidal thoughts.  We will plan to continue treatment once weekly.     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    Hyperthyroidism, subclinical 08/22/2024    Moderate mixed hyperlipidemia not requiring statin therapy 08/22/2024    Class 2 obesity without serious comorbidity with body mass index (BMI) of 38.0 to 38.9 in adult 08/22/2024    Hypovitaminosis D 10/18/2019    Rib pain on left side 10/18/2019     Past Medical History:   Diagnosis Date    Fracture     leg    Manic depression ([facility])     Oppositional defiant behavior     PONV (postoperative nausea and vomiting)     Schizophrenia ([facility])       Past Surgical History:   Procedure Laterality Date    EYE SURGERY      TYMPANOSTOMY TUBE PLACEMENT        Not in a hospital admission.  Allergies   Allergen Reactions    Chocolate Diarrhea      Social History     Tobacco Use    Smoking status: Never    Smokeless tobacco: Never   Substance Use Topics    Alcohol use: No      History reviewed. No pertinent family history.       Objective:       Mental Status Evaluation:  Appearance:  Wearing gown, on stretcher, fairly groomed   Behavior:  Engages with interviewer   Speech:  Slow

## 2024-10-18 NOTE — DISCHARGE INSTRUCTIONS
ELECTROCONVULSIVE THERAPY DISCHARGE INSTRUCTIONS         1. Activity - Rest today. The anesthetic agents you have received can make you feel drowsy or tired.  A responsible person must drive you home and stay with you for 8 hours after discharge from the Hospital. Do not drive a motor vehicle or operate any machinery for 24 hours. .   *Do not make any complex decisions or execute any legal documents until 3 weeks AFTER your last treatment.  If you have any questions regarding this, speak with your psychiatrist first.*    2. Diet - Nothing to eat or drink after midnight the night of your ECT treatment. After ECT, start with clear liquids, if you can drink without coughing, you may proceed with gradually progressing to your usual diet.    No alcoholic beverages for 24 hours.    3. Medications - Take your daily medications as prescribed, after you return home from today's ECT. If you take a Beta Blocker or have been prescribed Imitrex, you may be instructed to take these medications the morning of ECT. If you are unsure please ask the physician.     Take with these medication the morning of ECT with one Tablespoon of water.   Tylenol 650 mg  All blood pressure medications  ***    4. You may experience the following after your treatment:   a. Poor memory   b. Poor balance   c. Headaches   d. Confusion   e. Muscle soreness   f. Nausea      5. Special Precautions - Contact you physician immediately if these occur:   a. Signs of infection: redness, swelling, heat, red streaks at the site of the IV   b. Excessive pain unrelieved by prescription pain medications   c. Nausea and vomiting that persists beyond the first day after your procedure    6. Next Procedure Date:   Your next ECT treatment is scheduled for 810 AM on 10/23/24.  ARRIVE  AM    **You are also invited to join us at our monthly Electroconvulsive Therapy Peer Support Group.  This support group is held once a month, on the last Monday of the month with

## 2024-10-22 ENCOUNTER — ANESTHESIA EVENT (OUTPATIENT)
Dept: POSTOP/PACU | Age: 29
End: 2024-10-22
Payer: MEDICARE

## 2024-10-23 ENCOUNTER — ANESTHESIA (OUTPATIENT)
Dept: POSTOP/PACU | Age: 29
End: 2024-10-23
Payer: MEDICARE

## 2024-10-23 ENCOUNTER — HOSPITAL ENCOUNTER (OUTPATIENT)
Dept: POSTOP/PACU | Age: 29
Discharge: HOME | End: 2024-10-23
Payer: MEDICARE

## 2024-10-23 VITALS
HEIGHT: 65 IN | TEMPERATURE: 98 F | HEART RATE: 103 BPM | WEIGHT: 250 LBS | DIASTOLIC BLOOD PRESSURE: 114 MMHG | OXYGEN SATURATION: 94 % | BODY MASS INDEX: 41.65 KG/M2 | RESPIRATION RATE: 12 BRPM | SYSTOLIC BLOOD PRESSURE: 136 MMHG

## 2024-10-23 PROCEDURE — 2500000003 HC RX 250 WO HCPCS: Performed by: ANESTHESIOLOGY

## 2024-10-23 PROCEDURE — 2580000003 HC RX 258: Performed by: ANESTHESIOLOGY

## 2024-10-23 PROCEDURE — 6360000002 HC RX W HCPCS: Performed by: NURSE ANESTHETIST, CERTIFIED REGISTERED

## 2024-10-23 PROCEDURE — 90870 ELECTROCONVULSIVE THERAPY: CPT | Performed by: PSYCHIATRY & NEUROLOGY

## 2024-10-23 PROCEDURE — 3700000001 HC ADD 15 MINUTES (ANESTHESIA): Performed by: ANESTHESIOLOGY

## 2024-10-23 PROCEDURE — 7100000001 HC PACU RECOVERY - ADDTL 15 MIN: Performed by: ANESTHESIOLOGY

## 2024-10-23 PROCEDURE — 90870 ELECTROCONVULSIVE THERAPY: CPT

## 2024-10-23 PROCEDURE — 7100000000 HC PACU RECOVERY - FIRST 15 MIN: Performed by: ANESTHESIOLOGY

## 2024-10-23 PROCEDURE — 2500000003 HC RX 250 WO HCPCS: Performed by: NURSE ANESTHETIST, CERTIFIED REGISTERED

## 2024-10-23 PROCEDURE — 3700000000 HC ANESTHESIA ATTENDED CARE: Performed by: ANESTHESIOLOGY

## 2024-10-23 RX ORDER — HYDROXYZINE HYDROCHLORIDE 50 MG/1
TABLET, FILM COATED ORAL
COMMUNITY
Start: 2024-10-09

## 2024-10-23 RX ORDER — KETOROLAC TROMETHAMINE 30 MG/ML
INJECTION, SOLUTION INTRAMUSCULAR; INTRAVENOUS
Status: COMPLETED
Start: 2024-10-23 | End: 2024-10-23

## 2024-10-23 RX ORDER — KETOROLAC TROMETHAMINE 30 MG/ML
INJECTION, SOLUTION INTRAMUSCULAR; INTRAVENOUS
Status: DISCONTINUED | OUTPATIENT
Start: 2024-10-23 | End: 2024-10-23 | Stop reason: SDUPTHER

## 2024-10-23 RX ORDER — METHOHEXITAL IN WATER/PF 100MG/10ML
SYRINGE (ML) INTRAVENOUS
Status: COMPLETED
Start: 2024-10-23 | End: 2024-10-23

## 2024-10-23 RX ORDER — METHOHEXITAL IN WATER/PF 100MG/10ML
SYRINGE (ML) INTRAVENOUS
Status: DISCONTINUED | OUTPATIENT
Start: 2024-10-23 | End: 2024-10-23 | Stop reason: SDUPTHER

## 2024-10-23 RX ORDER — SODIUM CHLORIDE 0.9 % (FLUSH) 0.9 %
5-40 SYRINGE (ML) INJECTION PRN
Status: DISCONTINUED | OUTPATIENT
Start: 2024-10-23 | End: 2024-10-24 | Stop reason: HOSPADM

## 2024-10-23 RX ORDER — SUCCINYLCHOLINE/SOD CL,ISO/PF 200MG/10ML
SYRINGE (ML) INTRAVENOUS
Status: COMPLETED
Start: 2024-10-23 | End: 2024-10-23

## 2024-10-23 RX ORDER — SUCCINYLCHOLINE/SOD CL,ISO/PF 200MG/10ML
SYRINGE (ML) INTRAVENOUS
Status: DISCONTINUED | OUTPATIENT
Start: 2024-10-23 | End: 2024-10-23 | Stop reason: SDUPTHER

## 2024-10-23 RX ORDER — SODIUM CHLORIDE 9 MG/ML
INJECTION, SOLUTION INTRAVENOUS PRN
Status: DISCONTINUED | OUTPATIENT
Start: 2024-10-23 | End: 2024-10-24 | Stop reason: HOSPADM

## 2024-10-23 RX ORDER — LIDOCAINE HYDROCHLORIDE 10 MG/ML
1 INJECTION, SOLUTION EPIDURAL; INFILTRATION; INTRACAUDAL; PERINEURAL
Status: COMPLETED | OUTPATIENT
Start: 2024-10-23 | End: 2024-10-23

## 2024-10-23 RX ORDER — SODIUM CHLORIDE 0.9 % (FLUSH) 0.9 %
5-40 SYRINGE (ML) INJECTION EVERY 12 HOURS SCHEDULED
Status: DISCONTINUED | OUTPATIENT
Start: 2024-10-23 | End: 2024-10-24 | Stop reason: HOSPADM

## 2024-10-23 RX ADMIN — KETOROLAC TROMETHAMINE 30 MG: 30 INJECTION, SOLUTION INTRAMUSCULAR at 08:26

## 2024-10-23 RX ADMIN — SODIUM CHLORIDE, PRESERVATIVE FREE 10 ML: 5 INJECTION INTRAVENOUS at 07:50

## 2024-10-23 RX ADMIN — LIDOCAINE HYDROCHLORIDE 1 ML: 10 INJECTION, SOLUTION EPIDURAL; INFILTRATION; INTRACAUDAL; PERINEURAL at 07:50

## 2024-10-23 RX ADMIN — Medication 100 MG: at 08:26

## 2024-10-23 RX ADMIN — SODIUM CHLORIDE, PRESERVATIVE FREE 20 ML: 5 INJECTION INTRAVENOUS at 08:26

## 2024-10-23 RX ADMIN — KETOROLAC TROMETHAMINE 30 MG: 30 INJECTION, SOLUTION INTRAMUSCULAR at 08:19

## 2024-10-23 RX ADMIN — Medication 120 MG: at 08:26

## 2024-10-23 ASSESSMENT — PAIN - FUNCTIONAL ASSESSMENT
PAIN_FUNCTIONAL_ASSESSMENT: 0-10
PAIN_FUNCTIONAL_ASSESSMENT: NONE - DENIES PAIN

## 2024-10-23 NOTE — INTERVAL H&P NOTE
Update History & Physical    The patient's History and Physical of October 4, 2024 was reviewed with the patient and I examined the patient.     Alistair Hutchison is 29 y.o., male, here for ECT treatment. Presents today with Jacqueline, guardian. Jacqueline reports   Pt has hx of Acute psychosis   and coming once a week now.   Alistair's  has had irritability and verbally combative.and has been easily angered. Last ECT treatment was 10/18/24.   Pt tolerated last treatment well and has voiced improvement. Sleep and appetite are good. Denies suicidal or homicidal ideation. Denies hallucinations. Pt has been taking all medications as prescribed. Pt has no interval changes since last treatment visit.      Pt AAO x 3 in NAD. HRRR. No adventitious lung sounds. No respiratory distress. Pt has today allergy symptoms.      NPO p MN. Took no medications this am.   Denies recent or current chest pain/pressure, palpitations, SOB, recent URI, fever or chills. Review vitals per RN flowsheet.      Electronically signed by GIORGI BREWER CNP on 10/23/2024 at 7:02 AM

## 2024-10-23 NOTE — ANESTHESIA PRE PROCEDURE
5-40 mL  5-40 mL IntraVENous 2 times per day Kristel Alvarado MD       • sodium chloride flush 0.9 % injection 5-40 mL  5-40 mL IntraVENous PRN Kristel Alvarado MD       • 0.9 % sodium chloride infusion   IntraVENous PRN Kristel Alvarado MD       • succinylcholine (ANECTINE) 200 MG/10ML injection            • ketorolac (TORADOL) 30 MG/ML injection            • methohexital (BREVITAL SODIUM) 100 MG/10ML injection                Allergies:    Allergies   Allergen Reactions   • Chocolate Diarrhea       Problem List:    Patient Active Problem List   Diagnosis Code   • Hypovitaminosis D E55.9   • Rib pain on left side R07.81   • Acute psychosis (HCC) F23   • Developmental delay R62.50   • Hyperthyroidism, subclinical E05.90   • Moderate mixed hyperlipidemia not requiring statin therapy E78.2   • Class 2 obesity without serious comorbidity with body mass index (BMI) of 38.0 to 38.9 in adult E66.812, Z68.38   • Disorganized schizophrenia (HCC) F20.1       Past Medical History:        Diagnosis Date   • Fracture     leg   • Manic depression (HCC)    • Oppositional defiant behavior    • PONV (postoperative nausea and vomiting)    • Schizophrenia (HCC)        Past Surgical History:        Procedure Laterality Date   • EYE SURGERY     • TYMPANOSTOMY TUBE PLACEMENT         Social History:    Social History     Tobacco Use   • Smoking status: Never   • Smokeless tobacco: Never   Substance Use Topics   • Alcohol use: No                                Counseling given: Not Answered      Vital Signs (Current):   Vitals:    10/23/24 0723   BP: (!) 147/94   Pulse: 94   Resp: 16   Temp: 97.5 °F (36.4 °C)   TempSrc: Infrared   SpO2: 97%   Weight: 113.4 kg (250 lb)   Height: 1.651 m (5' 5\")                                                BP Readings from Last 3 Encounters:   10/23/24 (!) 147/94   10/18/24 (!) 140/97   10/15/24 (!) 137/98       NPO Status: Time of last liquid consumption: 2359                        Time of last solid

## 2024-10-23 NOTE — PROCEDURES
Bilateral ECT Procedure Report  Alistair Hutchison   10/23/2024  1995         Attending:Corby Drake MD  Preprocedure diagnosis: Disorganized schizophrenia (F20.1)  Postprocedure diagnosis: SAME AS PRE-PROCEDURE DIAGNOSIS  Treatment Number: This is treatment # 12   Patient Status: Outpatient   Type of ECT: Bilateral Brief Pulse  Medications  Preprocedure: Tylenol 650mg  Anesthetic: Methohexital 100 mg  Paralytic: Succinylcholine 120 mg  Post procedure: none needed   Cuff placement: Left Lower Extremity    MECTA Settings 1st Stimulus:     Pulse width: 1.0 ms   Frequency:  40 hz   Duration:   2.0 seconds   Static Impedance: 547 ohms             Dynamic Impedance: 218 ohms             Motor Seizure Duration: 38 seconds  EEG Seizure Duration: 32 seconds             The patient's ECT treatment was performed using MECTA machine  .  Timeout:  Time out was performed using two patient identifiers and confirmation of procedure.     Patient Preparation: Preprocedure documentation, labs, H&P were all verified and reviewed.  The patient was placed in supine position.  EEG leads were placed for monitoring of seizure.   Taoism areas bilaterally cleaned and prepped.  Conductive gel  was applied over stimulus electrode site.   The patient was pre-oxygenated.       Procedure in detail: Medications were administered by anesthesia using intravenous access at the doses listed previously in this summary.  Anesthetic administered and once confirmation the patient is anesthetized, the patient's blood pressure cuff on lower extremity was inflated to 100mmHg in excess of patient's blood pressure.  At this time, the neuromuscular blockade was administered intravenously by anesthesia.  Upper extremity fasciculation were verified followed by lower extremity fasciculation.  Once fasciculations terminated, confirmation of affective neuromuscular blockade demonstrated by absence of withdrawal reflex.  Bite blocks were put in

## 2024-10-23 NOTE — ANESTHESIA POSTPROCEDURE EVALUATION
Department of Anesthesiology  Postprocedure Note    Patient: Alistair Hutchison  MRN: [id number]  YOB: 1995  Date of evaluation: 10/23/2024    Procedure Summary       Date: 10/23/24 Room / Location: [facility] PACU    Anesthesia Start: 0818 Anesthesia Stop: 0833    Procedure: ECT W/ ANESTHESIA Diagnosis:     Scheduled Providers:  Responsible Provider: Barby Leonard MD    Anesthesia Type: general ASA Status: 3            Anesthesia Type: No value filed.    Amalia Phase I: Amalia Score: 8    Amalia Phase II:      Anesthesia Post Evaluation    Comments: POST- ANESTHESIA EVALUATION       Pt Name: Alistair Hutchison  MRN: [id number]  YOB: 1995  Date of evaluation: 10/23/2024  Time:  1:17 PM      BP (!) 138/90   Pulse (!) 103   Temp 98 °F (36.7 °C) (Infrared)   Resp 12   Ht 1.651 m (5' 5\")   Wt 113.4 kg (250 lb)   SpO2 94%   BMI 41.60 kg/m²      Consciousness Level  Awake  Cardiopulmonary Status  Stable  Pain Adequately Treated YES  Nausea / Vomiting  NO  Adequate Hydration  YES  Anesthesia Related Complications NONE      Electronically signed by Barby Leonard MD on 10/23/2024 at 1:17 PM           No notable events documented.

## 2024-10-23 NOTE — DISCHARGE INSTRUCTIONS
ELECTROCONVULSIVE THERAPY DISCHARGE INSTRUCTIONS         1. Activity - Rest today. The anesthetic agents you have received can make you feel drowsy or tired.  A responsible person must drive you home and stay with you for 8 hours after discharge from the Hospital. Do not drive a motor vehicle or operate any machinery for 24 hours. .   *Do not make any complex decisions or execute any legal documents until 3 weeks AFTER your last treatment.  If you have any questions regarding this, speak with your psychiatrist first.*    2. Diet - Nothing to eat or drink after midnight the night of your ECT treatment. After ECT, start with clear liquids, if you can drink without coughing, you may proceed with gradually progressing to your usual diet.    No alcoholic beverages for 24 hours.    3. Medications - Take your daily medications as prescribed, after you return home from today's ECT. If you take a Beta Blocker or have been prescribed Imitrex, you may be instructed to take these medications the morning of ECT. If you are unsure please ask the physician.     Take with these medication the morning of ECT with one Tablespoon of water.   Tylenol 650 mg  All blood pressure medications  ***    4. You may experience the following after your treatment:   a. Poor memory   b. Poor balance   c. Headaches   d. Confusion   e. Muscle soreness   f. Nausea      5. Special Precautions - Contact you physician immediately if these occur:   a. Signs of infection: redness, swelling, heat, red streaks at the site of the IV   b. Excessive pain unrelieved by prescription pain medications   c. Nausea and vomiting that persists beyond the first day after your procedure    6. Next Procedure Date:   Your next ECT treatment is scheduled for 800 am on October 30th.  Please arrive to the hospital by 630 am that morning.    **You are also invited to join us at our monthly Electroconvulsive Therapy Peer Support Group.  This support group is held once a month,

## 2024-10-24 DIAGNOSIS — F20.1 DISORGANIZED SCHIZOPHRENIA (HCC): ICD-10-CM

## 2024-10-24 DIAGNOSIS — F23 ACUTE PSYCHOSIS (HCC): Primary | ICD-10-CM

## 2024-10-29 ENCOUNTER — ANESTHESIA EVENT (OUTPATIENT)
Dept: POSTOP/PACU | Age: 29
End: 2024-10-29
Payer: COMMERCIAL

## 2024-10-30 ENCOUNTER — HOSPITAL ENCOUNTER (OUTPATIENT)
Dept: POSTOP/PACU | Age: 29
Discharge: HOME | End: 2024-10-30
Payer: COMMERCIAL

## 2024-10-30 ENCOUNTER — ANESTHESIA (OUTPATIENT)
Dept: POSTOP/PACU | Age: 29
End: 2024-10-30
Payer: COMMERCIAL

## 2024-10-30 VITALS
HEIGHT: 65 IN | RESPIRATION RATE: 18 BRPM | WEIGHT: 250 LBS | BODY MASS INDEX: 41.65 KG/M2 | TEMPERATURE: 98.5 F | DIASTOLIC BLOOD PRESSURE: 88 MMHG | SYSTOLIC BLOOD PRESSURE: 131 MMHG | HEART RATE: 97 BPM | OXYGEN SATURATION: 95 %

## 2024-10-30 DIAGNOSIS — F20.1 DISORGANIZED SCHIZOPHRENIA (HCC): Primary | ICD-10-CM

## 2024-10-30 PROCEDURE — 3700000000 HC ANESTHESIA ATTENDED CARE: Performed by: ANESTHESIOLOGY

## 2024-10-30 PROCEDURE — 90870 ELECTROCONVULSIVE THERAPY: CPT | Performed by: PSYCHIATRY & NEUROLOGY

## 2024-10-30 PROCEDURE — 2580000003 HC RX 258: Performed by: ANESTHESIOLOGY

## 2024-10-30 PROCEDURE — 6360000002 HC RX W HCPCS: Performed by: NURSE ANESTHETIST, CERTIFIED REGISTERED

## 2024-10-30 PROCEDURE — 7100000000 HC PACU RECOVERY - FIRST 15 MIN: Performed by: ANESTHESIOLOGY

## 2024-10-30 PROCEDURE — 7100000001 HC PACU RECOVERY - ADDTL 15 MIN: Performed by: ANESTHESIOLOGY

## 2024-10-30 PROCEDURE — 90870 ELECTROCONVULSIVE THERAPY: CPT

## 2024-10-30 PROCEDURE — 2500000003 HC RX 250 WO HCPCS: Performed by: NURSE ANESTHETIST, CERTIFIED REGISTERED

## 2024-10-30 RX ORDER — ETOMIDATE 2 MG/ML
INJECTION INTRAVENOUS
Status: COMPLETED
Start: 2024-10-30 | End: 2024-10-30

## 2024-10-30 RX ORDER — SODIUM CHLORIDE 9 MG/ML
INJECTION, SOLUTION INTRAVENOUS PRN
Status: DISCONTINUED | OUTPATIENT
Start: 2024-10-30 | End: 2024-10-31 | Stop reason: HOSPADM

## 2024-10-30 RX ORDER — KETOROLAC TROMETHAMINE 30 MG/ML
INJECTION, SOLUTION INTRAMUSCULAR; INTRAVENOUS
Status: DISCONTINUED | OUTPATIENT
Start: 2024-10-30 | End: 2024-10-30 | Stop reason: SDUPTHER

## 2024-10-30 RX ORDER — SODIUM CHLORIDE 0.9 % (FLUSH) 0.9 %
5-40 SYRINGE (ML) INJECTION PRN
Status: DISCONTINUED | OUTPATIENT
Start: 2024-10-30 | End: 2024-10-31 | Stop reason: HOSPADM

## 2024-10-30 RX ORDER — LIDOCAINE HYDROCHLORIDE 10 MG/ML
1 INJECTION, SOLUTION EPIDURAL; INFILTRATION; INTRACAUDAL; PERINEURAL
Status: DISCONTINUED | OUTPATIENT
Start: 2024-10-30 | End: 2024-10-31 | Stop reason: HOSPADM

## 2024-10-30 RX ORDER — SODIUM CHLORIDE 0.9 % (FLUSH) 0.9 %
5-40 SYRINGE (ML) INJECTION EVERY 12 HOURS SCHEDULED
Status: DISCONTINUED | OUTPATIENT
Start: 2024-10-30 | End: 2024-10-31 | Stop reason: HOSPADM

## 2024-10-30 RX ORDER — SUCCINYLCHOLINE/SOD CL,ISO/PF 200MG/10ML
SYRINGE (ML) INTRAVENOUS
Status: COMPLETED
Start: 2024-10-30 | End: 2024-10-30

## 2024-10-30 RX ORDER — SUCCINYLCHOLINE/SOD CL,ISO/PF 200MG/10ML
SYRINGE (ML) INTRAVENOUS
Status: DISCONTINUED | OUTPATIENT
Start: 2024-10-30 | End: 2024-10-30 | Stop reason: SDUPTHER

## 2024-10-30 RX ORDER — KETOROLAC TROMETHAMINE 30 MG/ML
INJECTION, SOLUTION INTRAMUSCULAR; INTRAVENOUS
Status: COMPLETED
Start: 2024-10-30 | End: 2024-10-30

## 2024-10-30 RX ORDER — ETOMIDATE 2 MG/ML
INJECTION INTRAVENOUS
Status: DISCONTINUED | OUTPATIENT
Start: 2024-10-30 | End: 2024-10-30 | Stop reason: SDUPTHER

## 2024-10-30 RX ADMIN — Medication 10 ML: at 07:54

## 2024-10-30 RX ADMIN — KETOROLAC TROMETHAMINE 30 MG: 30 INJECTION, SOLUTION INTRAMUSCULAR at 07:54

## 2024-10-30 RX ADMIN — Medication 120 MG: at 07:54

## 2024-10-30 RX ADMIN — ETOMIDATE INJECTION 20 MG: 2 SOLUTION INTRAVENOUS at 07:54

## 2024-10-30 ASSESSMENT — PAIN - FUNCTIONAL ASSESSMENT
PAIN_FUNCTIONAL_ASSESSMENT: NONE - DENIES PAIN
PAIN_FUNCTIONAL_ASSESSMENT: 0-10

## 2024-10-30 NOTE — PROCEDURES
place. Stimulus leads then applied to stimulus electrode site bilaterally with the center of the lead placed approximately 1 inch superior to the midpoint of line between canthus and the tragus bilaterally. Static impedance was tested and within appropriate limits. MECTA settings were confirmed with nursing staff.   Staff was cleared from the patient and dose of ECT stimulus was delivered.  Motor seizure activity  was observed at duration noted and terminated.  EEG seizure activity was observed of duration noted that was confirmed via monitoring EEG and terminated with appropriate postictal suppression noted on EEG.  Static impedance and dynamic impedance were documented. Tourniquet on  lower extremity was released and recovery procedures initiated.      The patient was mask ventilated during the procedure with no significant drops in oxygenation saturation and tolerated the procedure well without any notable adverse effects.   Condition: The patient was in stable condition with stable vital signs and able to follow commands when moved to recovery.

## 2024-10-30 NOTE — DISCHARGE INSTRUCTIONS
ELECTROCONVULSIVE THERAPY DISCHARGE INSTRUCTIONS         1. Activity - Rest today. The anesthetic agents you have received can make you feel drowsy or tired.  A responsible person must drive you home and stay with you for 8 hours after discharge from the Hospital. Do not drive a motor vehicle or operate any machinery for 24 hours. .   *Do not make any complex decisions or execute any legal documents until 3 weeks AFTER your last treatment.  If you have any questions regarding this, speak with your psychiatrist first.*    2. Diet - Nothing to eat or drink after midnight the night of your ECT treatment. After ECT, start with clear liquids, if you can drink without coughing, you may proceed with gradually progressing to your usual diet.    No alcoholic beverages for 24 hours.    3. Medications - Take your daily medications as prescribed, after you return home from today's ECT. If you take a Beta Blocker or have been prescribed Imitrex, you may be instructed to take these medications the morning of ECT. If you are unsure please ask the physician.     Take with these medication the morning of ECT with one Tablespoon of water.   Tylenol 650 mg  All blood pressure medications  ***    4. You may experience the following after your treatment:   a. Poor memory   b. Poor balance   c. Headaches   d. Confusion   e. Muscle soreness   f. Nausea      5. Special Precautions - Contact you physician immediately if these occur:   a. Signs of infection: redness, swelling, heat, red streaks at the site of the IV   b. Excessive pain unrelieved by prescription pain medications   c. Nausea and vomiting that persists beyond the first day after your procedure    6. Next Procedure Date:   Your next ECT treatment is scheduled for 700 am on November 4th.  Please arrive to the hospital by 530 am that morning.    **You are also invited to join us at our monthly Electroconvulsive Therapy Peer Support Group.  This support group is held once a month,

## 2024-10-30 NOTE — ANESTHESIA PRE PROCEDURE
Department of Anesthesiology  Preprocedure Note       Name:  Alistair Hutchison   Age:  29 y.o.  :  1995                                          MRN:  [id number]         Date:  10/30/2024      Surgeon: * No surgeons listed *    Procedure: * No procedures listed *    Medications prior to admission:   Prior to Admission medications    Medication Sig Start Date End Date Taking? Authorizing Provider   hyoscyamine (LEVSIN/SL) 0.125 MG sublingual tablet Place 1 tablet under the tongue 3 times daily 10/2/24  Yes Corby Drake MD   ziprasidone (GEODON) 40 MG capsule Take 1 capsule by mouth 2 times daily (with meals)   Yes Provider, MD Shan   hydrOXYzine HCl (ATARAX) 50 MG tablet  10/9/24   ProviderShan MD   traZODone (DESYREL) 50 MG tablet Take 1 tablet by mouth nightly as needed for Sleep  Patient not taking: Reported on 10/23/2024 10/2/24   Corby Drake MD   paliperidone palmitate ER (INVEGA SUSTENNA) 234 MG/1.5ML FRANCHESKA IM injection Inject 234 mg into the muscle every 30 days 10/24/24   Corby Drake MD       Current medications:    Current Outpatient Medications   Medication Sig Dispense Refill   • hyoscyamine (LEVSIN/SL) 0.125 MG sublingual tablet Place 1 tablet under the tongue 3 times daily 90 tablet 3   • ziprasidone (GEODON) 40 MG capsule Take 1 capsule by mouth 2 times daily (with meals)     • hydrOXYzine HCl (ATARAX) 50 MG tablet  (Patient not taking: Reported on 10/23/2024)     • traZODone (DESYREL) 50 MG tablet Take 1 tablet by mouth nightly as needed for Sleep (Patient not taking: Reported on 10/23/2024) 30 tablet 0   • paliperidone palmitate ER (INVEGA SUSTENNA) 234 MG/1.5ML FRANCHESKA IM injection Inject 234 mg into the muscle every 30 days 1 each 0     Current Facility-Administered Medications   Medication Dose Route Frequency Provider Last Rate Last Admin   • lidocaine PF 1 % injection 1 mL  1 mL IntraDERmal Once PRN Kristel Alvarado MD       • sodium chloride flush 0.9 %

## 2024-10-30 NOTE — INTERVAL H&P NOTE
Update History & Physical    The patient's History and Physical of October 4, 2024 was reviewed with the patient and I examined the patient. Any changes are as documented below.      Alistair Hutchison is 29 y.o., male, here for ECT treatment.  Pt presents today with his grandmother, guardian, Jacqueline. Last ECT treatment was 10/23/24. Having ECT treatments once per week. Pt tolerated last treatment well and has voiced improvement. Jacqueline reports Pt is becoming more argumentative and mean as it gets closer to day of ECT. Sleep has been poor. Appetite has been good. Denies suicidal ideation. Has been having thoughts of harming others but no particular person. Denies hallucinations. Pt has been taking all medications as prescribed. Pt has no interval changes since last treatment visit.     Pt AAO x 3 in NAD. HRRR. No adventitious lung sounds. No respiratory distress. NPO p MN. Took no medications this am. Pt has had recent URI with mild cough, sneezing. Denies recent or current chest pain/pressure, palpitations, SOB, fever or chills. Review vitals per RN flowsheet.       Electronically signed by GIORGI Shah CNP on 10/30/2024 at 6:55 AM

## 2024-10-30 NOTE — ANESTHESIA POSTPROCEDURE EVALUATION
Department of Anesthesiology  Postprocedure Note    Patient: Alistair Hutchison  MRN: [id number]  YOB: 1995  Date of evaluation: 10/30/2024    Procedure Summary       Date: 10/30/24 Room / Location: [facility] PACU    Anesthesia Start: 0748 Anesthesia Stop: 0802    Procedure: ECT W/ ANESTHESIA Diagnosis: Disorganized schizophrenia    Scheduled Providers:  Responsible Provider: Zacarias Pandey MD    Anesthesia Type: general ASA Status: 3            Anesthesia Type: No value filed.    Amalia Phase I: Amalia Score: 10    Amalia Phase II:      Anesthesia Post Evaluation    Patient location during evaluation: PACU  Patient participation: complete - patient participated  Level of consciousness: awake and alert  Airway patency: patent  Nausea & Vomiting: no vomiting  Cardiovascular status: hemodynamically stable  Respiratory status: acceptable  Hydration status: euvolemic  Comments: POST- ANESTHESIA EVALUATION       Pt Name: Alistair Hutchison  MRN: [id number]  YOB: 1995  Date of evaluation: 10/30/2024  Time:  9:09 AM      /88   Pulse 97   Temp 98.5 °F (36.9 °C) (Infrared)   Resp 18   Ht 1.651 m (5' 5\")   Wt 113.4 kg (250 lb)   SpO2 95%   BMI 41.60 kg/m²      Consciousness Level  Awake  Cardiopulmonary Status  Stable  Pain Adequately Treated YES  Nausea / Vomiting  NO  Adequate Hydration  YES  Anesthesia Related Complications NONE      Electronically signed by Zacarias Pandey MD on 10/30/2024 at 9:09 AM         Pain management: satisfactory to patient    No notable events documented.

## 2024-11-01 ENCOUNTER — ANESTHESIA EVENT (OUTPATIENT)
Dept: POSTOP/PACU | Age: 29
End: 2024-11-01
Payer: MEDICARE

## 2024-11-01 DIAGNOSIS — F20.1 DISORGANIZED SCHIZOPHRENIA (HCC): ICD-10-CM

## 2024-11-01 DIAGNOSIS — F23 ACUTE PSYCHOSIS (HCC): Primary | ICD-10-CM

## 2024-11-01 NOTE — PROGRESS NOTES
Pre ECT Assessment Note  Psychiatry  10/30/2024      Alistair Hutchison  1995  [id number]      Subjective:     Patient is a 29 y.o.  male seen for an evaluation prior to today's electroconvulsive therapy treatment. Today is treatment number 13, utilizing bilateral.  This is course number 1.      Alistair is evaluated today bedside prior to his ECT treatment, his grandmother who is also his legal guardian is present.  She reports that he has had a decline in mood, she reports that he has verbalized wanting to harm others.  When asked about this he reports that he has had thoughts to physically harm his uncle.  He reports that these thoughts are new.  He denies any plans to harm himself.  We discussed the possibility of tapering frequency to once a week followed by twice a week followed by once a week and continuing that pattern for a while.  His grandmother is agreeable to returning on Monday for treatments on Monday and Friday next week.  We did discuss a safety plan if he does in fact worsen with thoughts to harm himself or others.  Patient and grandmother verbalized understanding and agreed to reach out.    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    Hyperthyroidism, subclinical 08/22/2024    Moderate mixed hyperlipidemia not requiring statin therapy 08/22/2024    Class 2 obesity without serious comorbidity with body mass index (BMI) of 38.0 to 38.9 in adult 08/22/2024    Hypovitaminosis D 10/18/2019    Rib pain on left side 10/18/2019     Past Medical History:   Diagnosis Date    Fracture     leg    Manic depression (HCC)     Oppositional defiant behavior     PONV (postoperative nausea and vomiting)     Schizophrenia ([facility])       Past Surgical History:   Procedure Laterality Date    EYE SURGERY      TYMPANOSTOMY TUBE PLACEMENT        Not in a hospital admission.  Allergies

## 2024-11-04 ENCOUNTER — HOSPITAL ENCOUNTER (OUTPATIENT)
Dept: POSTOP/PACU | Age: 29
Discharge: HOME | End: 2024-11-04
Payer: MEDICARE

## 2024-11-04 ENCOUNTER — ANESTHESIA (OUTPATIENT)
Dept: POSTOP/PACU | Age: 29
End: 2024-11-04
Payer: MEDICARE

## 2024-11-04 VITALS
HEIGHT: 65 IN | DIASTOLIC BLOOD PRESSURE: 80 MMHG | BODY MASS INDEX: 41.65 KG/M2 | HEART RATE: 77 BPM | SYSTOLIC BLOOD PRESSURE: 125 MMHG | OXYGEN SATURATION: 93 % | RESPIRATION RATE: 20 BRPM | TEMPERATURE: 97.7 F | WEIGHT: 250 LBS

## 2024-11-04 PROCEDURE — 6360000002 HC RX W HCPCS

## 2024-11-04 PROCEDURE — 3700000000 HC ANESTHESIA ATTENDED CARE: Performed by: ANESTHESIOLOGY

## 2024-11-04 PROCEDURE — 2580000003 HC RX 258: Performed by: ANESTHESIOLOGY

## 2024-11-04 PROCEDURE — 7100000000 HC PACU RECOVERY - FIRST 15 MIN: Performed by: ANESTHESIOLOGY

## 2024-11-04 PROCEDURE — 2500000003 HC RX 250 WO HCPCS: Performed by: ANESTHESIOLOGY

## 2024-11-04 PROCEDURE — 3700000001 HC ADD 15 MINUTES (ANESTHESIA): Performed by: ANESTHESIOLOGY

## 2024-11-04 PROCEDURE — 7100000001 HC PACU RECOVERY - ADDTL 15 MIN: Performed by: ANESTHESIOLOGY

## 2024-11-04 PROCEDURE — 90870 ELECTROCONVULSIVE THERAPY: CPT | Performed by: PSYCHIATRY & NEUROLOGY

## 2024-11-04 PROCEDURE — 2500000003 HC RX 250 WO HCPCS

## 2024-11-04 PROCEDURE — 90870 ELECTROCONVULSIVE THERAPY: CPT

## 2024-11-04 RX ORDER — KETOROLAC TROMETHAMINE 30 MG/ML
INJECTION, SOLUTION INTRAMUSCULAR; INTRAVENOUS
Status: DISCONTINUED | OUTPATIENT
Start: 2024-11-04 | End: 2024-11-04 | Stop reason: SDUPTHER

## 2024-11-04 RX ORDER — SODIUM CHLORIDE 0.9 % (FLUSH) 0.9 %
5-40 SYRINGE (ML) INJECTION PRN
Status: DISCONTINUED | OUTPATIENT
Start: 2024-11-04 | End: 2024-11-05 | Stop reason: HOSPADM

## 2024-11-04 RX ORDER — SODIUM CHLORIDE 9 MG/ML
INJECTION, SOLUTION INTRAVENOUS PRN
Status: DISCONTINUED | OUTPATIENT
Start: 2024-11-04 | End: 2024-11-05 | Stop reason: HOSPADM

## 2024-11-04 RX ORDER — LIDOCAINE HYDROCHLORIDE 10 MG/ML
1 INJECTION, SOLUTION EPIDURAL; INFILTRATION; INTRACAUDAL; PERINEURAL
Status: COMPLETED | OUTPATIENT
Start: 2024-11-04 | End: 2024-11-04

## 2024-11-04 RX ORDER — SUCCINYLCHOLINE/SOD CL,ISO/PF 200MG/10ML
SYRINGE (ML) INTRAVENOUS
Status: DISPENSED
Start: 2024-11-04 | End: 2024-11-04

## 2024-11-04 RX ORDER — SUCCINYLCHOLINE/SOD CL,ISO/PF 200MG/10ML
SYRINGE (ML) INTRAVENOUS
Status: DISCONTINUED | OUTPATIENT
Start: 2024-11-04 | End: 2024-11-04 | Stop reason: SDUPTHER

## 2024-11-04 RX ORDER — SODIUM CHLORIDE 0.9 % (FLUSH) 0.9 %
5-40 SYRINGE (ML) INJECTION EVERY 12 HOURS SCHEDULED
Status: DISCONTINUED | OUTPATIENT
Start: 2024-11-04 | End: 2024-11-05 | Stop reason: HOSPADM

## 2024-11-04 RX ORDER — KETOROLAC TROMETHAMINE 30 MG/ML
INJECTION, SOLUTION INTRAMUSCULAR; INTRAVENOUS
Status: DISPENSED
Start: 2024-11-04 | End: 2024-11-04

## 2024-11-04 RX ORDER — ETOMIDATE 2 MG/ML
INJECTION INTRAVENOUS
Status: DISPENSED
Start: 2024-11-04 | End: 2024-11-04

## 2024-11-04 RX ORDER — ETOMIDATE 2 MG/ML
INJECTION INTRAVENOUS
Status: DISCONTINUED | OUTPATIENT
Start: 2024-11-04 | End: 2024-11-04 | Stop reason: SDUPTHER

## 2024-11-04 RX ORDER — SODIUM CHLORIDE, SODIUM LACTATE, POTASSIUM CHLORIDE, CALCIUM CHLORIDE 600; 310; 30; 20 MG/100ML; MG/100ML; MG/100ML; MG/100ML
INJECTION, SOLUTION INTRAVENOUS CONTINUOUS
Status: DISCONTINUED | OUTPATIENT
Start: 2024-11-04 | End: 2024-11-05 | Stop reason: HOSPADM

## 2024-11-04 RX ADMIN — LIDOCAINE HYDROCHLORIDE 1 ML: 10 INJECTION, SOLUTION EPIDURAL; INFILTRATION; INTRACAUDAL; PERINEURAL at 06:50

## 2024-11-04 RX ADMIN — SODIUM CHLORIDE, PRESERVATIVE FREE 20 ML: 5 INJECTION INTRAVENOUS at 07:08

## 2024-11-04 RX ADMIN — ETOMIDATE INJECTION 20 MG: 2 SOLUTION INTRAVENOUS at 07:08

## 2024-11-04 RX ADMIN — SODIUM CHLORIDE, PRESERVATIVE FREE 10 ML: 5 INJECTION INTRAVENOUS at 06:50

## 2024-11-04 RX ADMIN — KETOROLAC TROMETHAMINE 30 MG: 30 INJECTION, SOLUTION INTRAMUSCULAR at 07:08

## 2024-11-04 RX ADMIN — Medication 120 MG: at 07:08

## 2024-11-04 ASSESSMENT — ENCOUNTER SYMPTOMS
EYES NEGATIVE: 1
ABDOMINAL PAIN: 0
COUGH: 1
VOMITING: 1
APNEA: 0
NAUSEA: 1
SHORTNESS OF BREATH: 0
SORE THROAT: 0
TROUBLE SWALLOWING: 0
BACK PAIN: 1

## 2024-11-04 ASSESSMENT — PAIN - FUNCTIONAL ASSESSMENT: PAIN_FUNCTIONAL_ASSESSMENT: 0-10

## 2024-11-04 NOTE — H&P
HISTORY and PHYSICAL  [facility]       NAME:  Alistair Hutchison  MRN: [id number]   YOB: 1995   Date: 11/4/2024   Age: 29 y.o.  Gender: male       COMPLAINT AND PRESENT HISTORY:     Alistair Hutchison is 29 y.o.,  male, presents for ECT WITH ANESTHESIA  Primary dx: Disorganized schizophrenia     HPI    Alistair Hutchison is 29 y.o.,  male, grandmother and grandfather present.  Pt here for ECT treatment.    Last ECT treatment was 10/30/2024. Pt tolerated last treatment well.  Reports short term memory loss.  Pt and grandparents state no recent improvement in symptoms  Pt has history of Disorganized schizophrenia .  Rating average mood 0/10 with 10 being the best mood.  Reports ECT treatments are  effective at improving overall mood.  Other symptoms include:  low energy.   Patient's sleep has been poor. Appetite has been fair.   Pt is not suicidal. Pt is homicidal. Grandparents state ongoing auditory/visual hallucinations.   Pt has been taking all medications as prescribed.   Denies recent fever/chills.  Denies recent chest pain or SOB    Review of additional significant medical hx:  (See chart for additional detail, including current medications /see ROS for current S/S):     NPO status: NPO  Medications taken TODAY (with sip of water): None  Anticoagulation status: None    Denies personal hx of blood clots.  Denies personal hx of MRSA infection.  PONV.  Denies any other other personal or family hx of previous complications w/anesthesia.    PAST MEDICAL HISTORY     Past Medical History:   Diagnosis Date    Fracture     leg    Manic depression (HCC)     Oppositional defiant behavior     PONV (postoperative nausea and vomiting)     Schizophrenia (HCC)        SURGICAL HISTORY       Past Surgical History:   Procedure Laterality Date    EYE SURGERY      TYMPANOSTOMY TUBE PLACEMENT         FAMILY HISTORY     No family history on file.    SOCIAL HISTORY       Social History

## 2024-11-04 NOTE — H&P (VIEW-ONLY)
Socioeconomic History    Marital status: Single   Tobacco Use    Smoking status: Never    Smokeless tobacco: Never   Vaping Use    Vaping status: Never Used   Substance and Sexual Activity    Alcohol use: No    Drug use: No    Sexual activity: Never     Social Determinants of Health     Financial Resource Strain: Low Risk  (8/22/2024)    Overall Financial Resource Strain (CARDIA)     Difficulty of Paying Living Expenses: Not hard at all   Food Insecurity: No Food Insecurity (9/14/2024)    Hunger Vital Sign     Worried About Running Out of Food in the Last Year: Never true     Ran Out of Food in the Last Year: Never true   Transportation Needs: No Transportation Needs (9/14/2024)    PRAPARE - Transportation     Lack of Transportation (Medical): No     Lack of Transportation (Non-Medical): No   Housing Stability: Low Risk  (9/14/2024)    Housing Stability Vital Sign     Unable to Pay for Housing in the Last Year: No     Number of Times Moved in the Last Year: 1     Homeless in the Last Year: No           REVIEW OF SYSTEMS      Allergies   Allergen Reactions    Chocolate Diarrhea       Current Outpatient Medications on File Prior to Encounter   Medication Sig Dispense Refill    hydrOXYzine HCl (ATARAX) 50 MG tablet  (Patient not taking: Reported on 10/23/2024)      traZODone (DESYREL) 50 MG tablet Take 1 tablet by mouth nightly as needed for Sleep (Patient not taking: Reported on 10/23/2024) 30 tablet 0    hyoscyamine (LEVSIN/SL) 0.125 MG sublingual tablet Place 1 tablet under the tongue 3 times daily 90 tablet 3    paliperidone palmitate ER (INVEGA SUSTENNA) 234 MG/1.5ML FRANCHESKA IM injection Inject 234 mg into the muscle every 30 days 1 each 0    ziprasidone (GEODON) 40 MG capsule Take 1 capsule by mouth 2 times daily (with meals)       No current facility-administered medications on file prior to encounter.       Review of Systems   Constitutional:  Positive for appetite change (decreased). Negative for chills,

## 2024-11-04 NOTE — ANESTHESIA PRE PROCEDURE
Department of Anesthesiology  Preprocedure Note       Name:  Alistair Hutchison   Age:  29 y.o.  :  1995                                          MRN:  [id number]         Date:  2024      Surgeon: Dr Drake    Procedure: ECT    Medications prior to admission:   Prior to Admission medications    Medication Sig Start Date End Date Taking? Authorizing Provider   hydrOXYzine HCl (ATARAX) 50 MG tablet  10/9/24  Yes Shan Curran MD   traZODone (DESYREL) 50 MG tablet Take 1 tablet by mouth nightly as needed for Sleep 10/2/24  Yes Corby Drake MD   hyoscyamine (LEVSIN/SL) 0.125 MG sublingual tablet Place 1 tablet under the tongue 3 times daily 10/2/24  Yes Corby Drake MD   paliperidone palmitate ER (INVEGA SUSTENNA) 234 MG/1.5ML FRANCHESKA IM injection Inject 234 mg into the muscle every 30 days 10/24/24  Yes Corby Drake MD   ziprasidone (GEODON) 40 MG capsule Take 1 capsule by mouth 2 times daily (with meals)   Yes ProviderShan MD       Current medications:    Current Outpatient Medications   Medication Sig Dispense Refill    hydrOXYzine HCl (ATARAX) 50 MG tablet       traZODone (DESYREL) 50 MG tablet Take 1 tablet by mouth nightly as needed for Sleep 30 tablet 0    hyoscyamine (LEVSIN/SL) 0.125 MG sublingual tablet Place 1 tablet under the tongue 3 times daily 90 tablet 3    paliperidone palmitate ER (INVEGA SUSTENNA) 234 MG/1.5ML FRANCHESKA IM injection Inject 234 mg into the muscle every 30 days 1 each 0    ziprasidone (GEODON) 40 MG capsule Take 1 capsule by mouth 2 times daily (with meals)       Current Facility-Administered Medications   Medication Dose Route Frequency Provider Last Rate Last Admin    lactated ringers infusion   IntraVENous Continuous Jewel Ji MD        sodium chloride flush 0.9 % injection 5-40 mL  5-40 mL IntraVENous 2 times per day Jewel Ji MD        sodium chloride flush 0.9 % injection 5-40 mL  5-40 mL IntraVENous PRN Jewel Ji,

## 2024-11-04 NOTE — H&P (VIEW-ONLY)
fever and unexpected weight change.   HENT: Negative.  Negative for dental problem, hearing loss, sore throat and trouble swallowing.    Eyes: Negative.  Negative for visual disturbance.   Respiratory:  Positive for cough. Negative for apnea and shortness of breath.    Cardiovascular:  Negative for chest pain, palpitations and leg swelling.   Gastrointestinal:  Positive for nausea and vomiting. Negative for abdominal pain.   Genitourinary:  Negative for dysuria and hematuria.   Musculoskeletal:  Positive for back pain. Negative for arthralgias and neck pain.   Skin: Negative.    Neurological:  Positive for headaches. Negative for dizziness, seizures, speech difficulty, weakness and numbness.   Hematological:  Bruises/bleeds easily.   Psychiatric/Behavioral: Negative.           GENERAL PHYSICAL EXAM     Vitals:  See nursing flowsheet for vital signs     GENERAL APPEARANCE:   Alistair Hutchison is 29 y.o.,  male, , nourished, conscious, alert.  Does not appear to be distress or pain at this time.                            Physical Exam  Constitutional:       General: He is not in acute distress.     Appearance: Normal appearance.   HENT:      Head: Normocephalic.      Right Ear: External ear normal.      Left Ear: External ear normal.      Nose: Nose normal.      Mouth/Throat:      Mouth: Mucous membranes are dry.      Pharynx: Oropharynx is clear.   Eyes:      General:         Right eye: No discharge.         Left eye: No discharge.   Cardiovascular:      Rate and Rhythm: Normal rate and regular rhythm.      Heart sounds: Normal heart sounds. No murmur heard.  Pulmonary:      Effort: Pulmonary effort is normal. No respiratory distress.      Breath sounds: Normal breath sounds. No wheezing, rhonchi or rales.   Abdominal:      General: Bowel sounds are normal. There is no distension.      Tenderness: There is no abdominal tenderness. There is no guarding.   Musculoskeletal:         General: No swelling.

## 2024-11-04 NOTE — ANESTHESIA POSTPROCEDURE EVALUATION
Department of Anesthesiology  Postprocedure Note    Patient: Alistair Hutchison  MRN: [id number]  YOB: 1995  Date of evaluation: 11/4/2024    Procedure Summary       Date: 11/04/24 Room / Location: [facility] PACU    Anesthesia Start: 0701 Anesthesia Stop: 0718    Procedure: ECT W/ ANESTHESIA Diagnosis: Disorganized schizophrenia    Scheduled Providers:  Responsible Provider: Barby Leonard MD    Anesthesia Type: general ASA Status: 3            Anesthesia Type: No value filed.    Amalia Phase I: Amalia Score: 10    Amalia Phase II:      Anesthesia Post Evaluation    Comments: POST- ANESTHESIA EVALUATION       Pt Name: Alistair Hutchison  MRN: [id number]  YOB: 1995  Date of evaluation: 11/4/2024  Time:  11:29 AM      /80   Pulse 77   Temp 97.7 °F (36.5 °C) (Infrared)   Resp 20   Ht 1.651 m (5' 5\")   Wt 113.4 kg (250 lb)   SpO2 93%   PF (!) 2 L/min   BMI 41.60 kg/m²      Consciousness Level  Awake  Cardiopulmonary Status  Stable  Pain Adequately Treated YES  Nausea / Vomiting  NO  Adequate Hydration  YES  Anesthesia Related Complications NONE      Electronically signed by Barby Leonard MD on 11/4/2024 at 11:29 AM           No notable events documented.
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
## 2024-11-04 NOTE — PROCEDURES
Bilateral ECT Procedure Report  Alistair Hutchison   11/4/2024  1995         Attending:Corby Drake MD  Preprocedure diagnosis: Disorganized schizophrenia (F20.1)  Postprocedure diagnosis: SAME AS PRE-PROCEDURE DIAGNOSIS  Treatment Number: This is treatment # 14   Patient Status: Outpatient   Type of ECT: Bilateral Brief Pulse  Medications  Preprocedure: Tylenol 650mg  Anesthetic: Methohexital 100 mg  Paralytic: Succinylcholine 120 mg  Post procedure: none needed   Cuff placement: Left Lower Extremity    MECTA Settings 1st Stimulus:     Pulse width: 1.0 ms   Frequency:  40 hz   Duration:   2.0 seconds   Static Impedance: 677 ohms             Dynamic Impedance: 239 ohms             Motor Seizure Duration: 42 seconds  EEG Seizure Duration: 49 seconds             The patient's ECT treatment was performed using MECTA machine  .  Timeout:  Time out was performed using two patient identifiers and confirmation of procedure.     Patient Preparation: Preprocedure documentation, labs, H&P were all verified and reviewed.  The patient was placed in supine position.  EEG leads were placed for monitoring of seizure.   Tenriism areas bilaterally cleaned and prepped.  Conductive gel  was applied over stimulus electrode site.   The patient was pre-oxygenated.       Procedure in detail: Medications were administered by anesthesia using intravenous access at the doses listed previously in this summary.  Anesthetic administered and once confirmation the patient is anesthetized, the patient's blood pressure cuff on lower extremity was inflated to 100mmHg in excess of patient's blood pressure.  At this time, the neuromuscular blockade was administered intravenously by anesthesia.  Upper extremity fasciculation were verified followed by lower extremity fasciculation.  Once fasciculations terminated, confirmation of affective neuromuscular blockade demonstrated by absence of withdrawal reflex.  Bite blocks were put in place.

## 2024-11-05 ENCOUNTER — ANESTHESIA EVENT (OUTPATIENT)
Dept: POSTOP/PACU | Age: 29
End: 2024-11-05
Payer: MEDICARE

## 2024-11-06 ENCOUNTER — ANESTHESIA (OUTPATIENT)
Dept: POSTOP/PACU | Age: 29
End: 2024-11-06
Payer: MEDICARE

## 2024-11-06 ENCOUNTER — HOSPITAL ENCOUNTER (OUTPATIENT)
Dept: POSTOP/PACU | Age: 29
Discharge: HOME | End: 2024-11-06
Payer: MEDICARE

## 2024-11-06 VITALS
RESPIRATION RATE: 16 BRPM | HEART RATE: 84 BPM | WEIGHT: 250 LBS | TEMPERATURE: 99 F | BODY MASS INDEX: 41.65 KG/M2 | DIASTOLIC BLOOD PRESSURE: 72 MMHG | HEIGHT: 65 IN | OXYGEN SATURATION: 95 % | SYSTOLIC BLOOD PRESSURE: 135 MMHG

## 2024-11-06 PROCEDURE — 7100000001 HC PACU RECOVERY - ADDTL 15 MIN: Performed by: ANESTHESIOLOGY

## 2024-11-06 PROCEDURE — 3700000000 HC ANESTHESIA ATTENDED CARE: Performed by: ANESTHESIOLOGY

## 2024-11-06 PROCEDURE — 90870 ELECTROCONVULSIVE THERAPY: CPT | Performed by: PSYCHIATRY & NEUROLOGY

## 2024-11-06 PROCEDURE — 7100000000 HC PACU RECOVERY - FIRST 15 MIN: Performed by: ANESTHESIOLOGY

## 2024-11-06 PROCEDURE — 2580000003 HC RX 258: Performed by: ANESTHESIOLOGY

## 2024-11-06 PROCEDURE — 90870 ELECTROCONVULSIVE THERAPY: CPT

## 2024-11-06 PROCEDURE — 2500000003 HC RX 250 WO HCPCS: Performed by: NURSE ANESTHETIST, CERTIFIED REGISTERED

## 2024-11-06 RX ORDER — LIDOCAINE HYDROCHLORIDE 10 MG/ML
1 INJECTION, SOLUTION EPIDURAL; INFILTRATION; INTRACAUDAL; PERINEURAL
Status: DISCONTINUED | OUTPATIENT
Start: 2024-11-06 | End: 2024-11-07 | Stop reason: HOSPADM

## 2024-11-06 RX ORDER — SODIUM CHLORIDE 9 MG/ML
INJECTION, SOLUTION INTRAVENOUS PRN
Status: DISCONTINUED | OUTPATIENT
Start: 2024-11-06 | End: 2024-11-07 | Stop reason: HOSPADM

## 2024-11-06 RX ORDER — SODIUM CHLORIDE 0.9 % (FLUSH) 0.9 %
5-40 SYRINGE (ML) INJECTION EVERY 12 HOURS SCHEDULED
Status: DISCONTINUED | OUTPATIENT
Start: 2024-11-06 | End: 2024-11-07 | Stop reason: HOSPADM

## 2024-11-06 RX ORDER — ETOMIDATE 2 MG/ML
INJECTION INTRAVENOUS
Status: DISCONTINUED | OUTPATIENT
Start: 2024-11-06 | End: 2024-11-06 | Stop reason: SDUPTHER

## 2024-11-06 RX ORDER — SUCCINYLCHOLINE/SOD CL,ISO/PF 200MG/10ML
SYRINGE (ML) INTRAVENOUS
Status: COMPLETED
Start: 2024-11-06 | End: 2024-11-06

## 2024-11-06 RX ORDER — SUCCINYLCHOLINE/SOD CL,ISO/PF 200MG/10ML
SYRINGE (ML) INTRAVENOUS
Status: DISCONTINUED | OUTPATIENT
Start: 2024-11-06 | End: 2024-11-06 | Stop reason: SDUPTHER

## 2024-11-06 RX ORDER — ETOMIDATE 2 MG/ML
INJECTION INTRAVENOUS
Status: COMPLETED
Start: 2024-11-06 | End: 2024-11-06

## 2024-11-06 RX ORDER — SODIUM CHLORIDE 9 MG/ML
INJECTION, SOLUTION INTRAVENOUS CONTINUOUS
Status: DISCONTINUED | OUTPATIENT
Start: 2024-11-06 | End: 2024-11-07 | Stop reason: HOSPADM

## 2024-11-06 RX ORDER — SODIUM CHLORIDE 0.9 % (FLUSH) 0.9 %
5-40 SYRINGE (ML) INJECTION PRN
Status: DISCONTINUED | OUTPATIENT
Start: 2024-11-06 | End: 2024-11-07 | Stop reason: HOSPADM

## 2024-11-06 RX ADMIN — ETOMIDATE INJECTION 20 MG: 2 SOLUTION INTRAVENOUS at 09:27

## 2024-11-06 RX ADMIN — SODIUM CHLORIDE: 9 INJECTION, SOLUTION INTRAVENOUS at 09:24

## 2024-11-06 RX ADMIN — Medication 120 MG: at 09:27

## 2024-11-06 ASSESSMENT — PAIN - FUNCTIONAL ASSESSMENT
PAIN_FUNCTIONAL_ASSESSMENT: 0-10
PAIN_FUNCTIONAL_ASSESSMENT: 0-10

## 2024-11-06 NOTE — PROCEDURES
Bilateral ECT Procedure Report  Alistair Hutchison   11/6/2024  1995         Attending:Corby Drake MD  Preprocedure diagnosis: Disorganized schizophrenia (F20.1)  Postprocedure diagnosis: SAME AS PRE-PROCEDURE DIAGNOSIS  Treatment Number: This is treatment # 15   Patient Status: Outpatient   Type of ECT: Bilateral Brief Pulse  Medications  Preprocedure: Tylenol 650mg  Anesthetic: Etomidate 20 mg  Paralytic: Succinylcholine 120 mg  Post procedure: none needed   Cuff placement: Left Lower Extremity    MECTA Settings 1st Stimulus:     Pulse width: 1.0 ms   Frequency:  40 hz   Duration:   2.0 seconds   Static Impedance: 602 ohms             Dynamic Impedance: 229 ohms             Motor Seizure Duration: 34 seconds  EEG Seizure Duration: 40 seconds             The patient's ECT treatment was performed using MECTA machine  .  Timeout:  Time out was performed using two patient identifiers and confirmation of procedure.     Patient Preparation: Preprocedure documentation, labs, H&P were all verified and reviewed.  The patient was placed in supine position.  EEG leads were placed for monitoring of seizure.   Muslim areas bilaterally cleaned and prepped.  Conductive gel  was applied over stimulus electrode site.   The patient was pre-oxygenated.       Procedure in detail: Medications were administered by anesthesia using intravenous access at the doses listed previously in this summary.  Anesthetic administered and once confirmation the patient is anesthetized, the patient's blood pressure cuff on lower extremity was inflated to 100mmHg in excess of patient's blood pressure.  At this time, the neuromuscular blockade was administered intravenously by anesthesia.  Upper extremity fasciculation were verified followed by lower extremity fasciculation.  Once fasciculations terminated, confirmation of affective neuromuscular blockade demonstrated by absence of withdrawal reflex.  Bite blocks were put in place.
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
## 2024-11-06 NOTE — ANESTHESIA PRE PROCEDURE
Department of Anesthesiology  Preprocedure Note       Name:  Alistair Hutchison   Age:  29 y.o.  :  1995                                          MRN:  [id number]         Date:  2024      Surgeon: Dr Drake    Procedure: ECT    Medications prior to admission:   Prior to Admission medications    Medication Sig Start Date End Date Taking? Authorizing Provider   hydrOXYzine HCl (ATARAX) 50 MG tablet  10/9/24  Yes Shan Curran MD   traZODone (DESYREL) 50 MG tablet Take 1 tablet by mouth nightly as needed for Sleep 10/2/24  Yes Corby Drake MD   hyoscyamine (LEVSIN/SL) 0.125 MG sublingual tablet Place 1 tablet under the tongue 3 times daily 10/2/24  Yes Corby Drake MD   ziprasidone (GEODON) 40 MG capsule Take 1 capsule by mouth 2 times daily (with meals)   Yes Shan Curran MD   paliperidone palmitate ER (INVEGA SUSTENNA) 234 MG/1.5ML FRANCHESKA IM injection Inject 234 mg into the muscle every 30 days 10/24/24   Corby Drake MD       Current medications:    Current Outpatient Medications   Medication Sig Dispense Refill    hydrOXYzine HCl (ATARAX) 50 MG tablet       traZODone (DESYREL) 50 MG tablet Take 1 tablet by mouth nightly as needed for Sleep 30 tablet 0    hyoscyamine (LEVSIN/SL) 0.125 MG sublingual tablet Place 1 tablet under the tongue 3 times daily 90 tablet 3    ziprasidone (GEODON) 40 MG capsule Take 1 capsule by mouth 2 times daily (with meals)      paliperidone palmitate ER (INVEGA SUSTENNA) 234 MG/1.5ML FRANCHESKA IM injection Inject 234 mg into the muscle every 30 days 1 each 0     Current Facility-Administered Medications   Medication Dose Route Frequency Provider Last Rate Last Admin    sodium chloride flush 0.9 % injection 5-40 mL  5-40 mL IntraVENous 2 times per day Zacarias Pandey MD        sodium chloride flush 0.9 % injection 5-40 mL  5-40 mL IntraVENous PRN Zacarias Pandey MD        0.9 % sodium chloride infusion   IntraVENous PRN Zacarias Pandey MD        0.9 %

## 2024-11-06 NOTE — ANESTHESIA POSTPROCEDURE EVALUATION
Department of Anesthesiology  Postprocedure Note    Patient: Alistair Hutchison  MRN: [id number]  YOB: 1995  Date of evaluation: 11/6/2024    Procedure Summary       Date: 11/06/24 Room / Location: [facility] PACU    Anesthesia Start: 0924 Anesthesia Stop: 0936    Procedure: ECT W/ ANESTHESIA Diagnosis: Disorganized schizophrenia    Scheduled Providers:  Responsible Provider: Barby Leonard MD    Anesthesia Type: general ASA Status: 3            Anesthesia Type: No value filed.    Amalia Phase I: Amalia Score: 6    Amalia Phase II:      Anesthesia Post Evaluation    Comments: POST- ANESTHESIA EVALUATION       Pt Name: Alistair Hutchison  MRN: [id number]  YOB: 1995  Date of evaluation: 11/6/2024  Time:  5:28 PM      /72   Pulse 84   Temp 99 °F (37.2 °C) (Infrared)   Resp 16   Ht 1.651 m (5' 5\")   Wt 113.4 kg (250 lb)   SpO2 95%   BMI 41.60 kg/m²      Consciousness Level  Awake  Cardiopulmonary Status  Stable  Pain Adequately Treated YES  Nausea / Vomiting  NO  Adequate Hydration  YES  Anesthesia Related Complications NONE      Electronically signed by Barby Leonard MD on 11/6/2024 at 5:28 PM           No notable events documented.

## 2024-11-07 ENCOUNTER — ANESTHESIA EVENT (OUTPATIENT)
Dept: POSTOP/PACU | Age: 29
End: 2024-11-07
Payer: MEDICARE
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
## 2024-11-08 ENCOUNTER — ANESTHESIA (OUTPATIENT)
Dept: POSTOP/PACU | Age: 29
End: 2024-11-08
Payer: MEDICARE

## 2024-11-08 ENCOUNTER — HOSPITAL ENCOUNTER (OUTPATIENT)
Dept: POSTOP/PACU | Age: 29
Discharge: HOME | End: 2024-11-08
Payer: MEDICARE

## 2024-11-08 VITALS
TEMPERATURE: 97.8 F | SYSTOLIC BLOOD PRESSURE: 134 MMHG | HEART RATE: 94 BPM | RESPIRATION RATE: 18 BRPM | OXYGEN SATURATION: 95 % | DIASTOLIC BLOOD PRESSURE: 77 MMHG

## 2024-11-08 DIAGNOSIS — F20.1 DISORGANIZED SCHIZOPHRENIA (HCC): ICD-10-CM

## 2024-11-08 DIAGNOSIS — F23 ACUTE PSYCHOSIS (HCC): Primary | ICD-10-CM

## 2024-11-08 PROCEDURE — 6360000002 HC RX W HCPCS: Performed by: NURSE ANESTHETIST, CERTIFIED REGISTERED

## 2024-11-08 PROCEDURE — 2500000003 HC RX 250 WO HCPCS: Performed by: ANESTHESIOLOGY

## 2024-11-08 PROCEDURE — 2580000003 HC RX 258: Performed by: ANESTHESIOLOGY

## 2024-11-08 PROCEDURE — 2500000003 HC RX 250 WO HCPCS: Performed by: NURSE ANESTHETIST, CERTIFIED REGISTERED

## 2024-11-08 PROCEDURE — 7100000000 HC PACU RECOVERY - FIRST 15 MIN: Performed by: ANESTHESIOLOGY

## 2024-11-08 PROCEDURE — 90870 ELECTROCONVULSIVE THERAPY: CPT | Performed by: PSYCHIATRY & NEUROLOGY

## 2024-11-08 PROCEDURE — 7100000001 HC PACU RECOVERY - ADDTL 15 MIN: Performed by: ANESTHESIOLOGY

## 2024-11-08 PROCEDURE — 90870 ELECTROCONVULSIVE THERAPY: CPT

## 2024-11-08 PROCEDURE — 3700000000 HC ANESTHESIA ATTENDED CARE: Performed by: ANESTHESIOLOGY

## 2024-11-08 RX ORDER — KETOROLAC TROMETHAMINE 30 MG/ML
INJECTION, SOLUTION INTRAMUSCULAR; INTRAVENOUS
Status: COMPLETED
Start: 2024-11-08 | End: 2024-11-08

## 2024-11-08 RX ORDER — ETOMIDATE 2 MG/ML
INJECTION INTRAVENOUS
Status: COMPLETED
Start: 2024-11-08 | End: 2024-11-08

## 2024-11-08 RX ORDER — SODIUM CHLORIDE 0.9 % (FLUSH) 0.9 %
5-40 SYRINGE (ML) INJECTION PRN
Status: DISCONTINUED | OUTPATIENT
Start: 2024-11-08 | End: 2024-11-09 | Stop reason: HOSPADM

## 2024-11-08 RX ORDER — SUCCINYLCHOLINE/SOD CL,ISO/PF 200MG/10ML
SYRINGE (ML) INTRAVENOUS
Status: COMPLETED
Start: 2024-11-08 | End: 2024-11-08

## 2024-11-08 RX ORDER — KETOROLAC TROMETHAMINE 30 MG/ML
INJECTION, SOLUTION INTRAMUSCULAR; INTRAVENOUS
Status: DISCONTINUED | OUTPATIENT
Start: 2024-11-08 | End: 2024-11-08 | Stop reason: SDUPTHER

## 2024-11-08 RX ORDER — ETOMIDATE 2 MG/ML
INJECTION INTRAVENOUS
Status: DISCONTINUED | OUTPATIENT
Start: 2024-11-08 | End: 2024-11-08 | Stop reason: SDUPTHER

## 2024-11-08 RX ORDER — SODIUM CHLORIDE 0.9 % (FLUSH) 0.9 %
5-40 SYRINGE (ML) INJECTION EVERY 12 HOURS SCHEDULED
Status: DISCONTINUED | OUTPATIENT
Start: 2024-11-08 | End: 2024-11-09 | Stop reason: HOSPADM

## 2024-11-08 RX ORDER — LIDOCAINE HYDROCHLORIDE 10 MG/ML
1 INJECTION, SOLUTION EPIDURAL; INFILTRATION; INTRACAUDAL; PERINEURAL
Status: COMPLETED | OUTPATIENT
Start: 2024-11-08 | End: 2024-11-08

## 2024-11-08 RX ORDER — SODIUM CHLORIDE 9 MG/ML
INJECTION, SOLUTION INTRAVENOUS CONTINUOUS
Status: DISCONTINUED | OUTPATIENT
Start: 2024-11-08 | End: 2024-11-09 | Stop reason: HOSPADM

## 2024-11-08 RX ORDER — SUCCINYLCHOLINE/SOD CL,ISO/PF 200MG/10ML
SYRINGE (ML) INTRAVENOUS
Status: DISCONTINUED | OUTPATIENT
Start: 2024-11-08 | End: 2024-11-08 | Stop reason: SDUPTHER

## 2024-11-08 RX ORDER — SODIUM CHLORIDE 9 MG/ML
INJECTION, SOLUTION INTRAVENOUS PRN
Status: DISCONTINUED | OUTPATIENT
Start: 2024-11-08 | End: 2024-11-09 | Stop reason: HOSPADM

## 2024-11-08 RX ADMIN — Medication 120 MG: at 08:03

## 2024-11-08 RX ADMIN — SODIUM CHLORIDE: 9 INJECTION, SOLUTION INTRAVENOUS at 07:00

## 2024-11-08 RX ADMIN — ETOMIDATE INJECTION 20 MG: 2 SOLUTION INTRAVENOUS at 08:03

## 2024-11-08 RX ADMIN — KETOROLAC TROMETHAMINE 30 MG: 30 INJECTION, SOLUTION INTRAMUSCULAR at 08:08

## 2024-11-08 RX ADMIN — LIDOCAINE HYDROCHLORIDE 1 ML: 10 INJECTION, SOLUTION EPIDURAL; INFILTRATION; INTRACAUDAL; PERINEURAL at 06:59

## 2024-11-08 RX ADMIN — SODIUM CHLORIDE: 9 INJECTION, SOLUTION INTRAVENOUS at 07:59

## 2024-11-08 ASSESSMENT — PAIN - FUNCTIONAL ASSESSMENT
PAIN_FUNCTIONAL_ASSESSMENT: NONE - DENIES PAIN
PAIN_FUNCTIONAL_ASSESSMENT: NONE - DENIES PAIN

## 2024-11-08 NOTE — DISCHARGE INSTRUCTIONS
ELECTROCONVULSIVE THERAPY DISCHARGE INSTRUCTIONS         1. Activity - Rest today. The anesthetic agents you have received can make you feel drowsy or tired.  A responsible person must drive you home and stay with you for 8 hours after discharge from the Hospital. Do not drive a motor vehicle or operate any machinery for 24 hours. .   *Do not make any complex decisions or execute any legal documents until 3 weeks AFTER your last treatment.  If you have any questions regarding this, speak with your psychiatrist first.*    2. Diet - Nothing to eat or drink after midnight the night of your ECT treatment. After ECT, start with clear liquids, if you can drink without coughing, you may proceed with gradually progressing to your usual diet.    No alcoholic beverages for 24 hours.    3. Medications - Take your daily medications as prescribed, after you return home from today's ECT. If you take a Beta Blocker or have been prescribed Imitrex, you may be instructed to take these medications the morning of ECT. If you are unsure please ask the physician.     Take with these medication the morning of ECT with one Tablespoon of water.   Tylenol 650 mg  All blood pressure medications  ***    4. You may experience the following after your treatment:   a. Poor memory   b. Poor balance   c. Headaches   d. Confusion   e. Muscle soreness   f. Nausea      5. Special Precautions - Contact you physician immediately if these occur:   a. Signs of infection: redness, swelling, heat, red streaks at the site of the IV   b. Excessive pain unrelieved by prescription pain medications   c. Nausea and vomiting that persists beyond the first day after your procedure    6. Next Procedure Date:   Your next ECT treatment is scheduled for 820 am on November 12th.  Please arrive to the hospital by 700 am that morning.    **You are also invited to join us at our monthly Electroconvulsive Therapy Peer Support Group.  This support group is held once a

## 2024-11-08 NOTE — INTERVAL H&P NOTE
Update History & Physical    The patient's History and Physical of November 4, 2024 was reviewed with the patient and I examined the patient. There was no change. The surgical site was confirmed by the patient and me.       Alistair Hutchison is 29 y.o.,  male, here for ECT treatment.  Grandmother/guardian present  Last ECT treatment was 11/6/2024. Pt tolerated last treatment well.   Pt has history of  Disorganized schizophrenia .  Rating average mood 10/10 with 10 being the best mood.  Reports ECT treatments are effective at improving overall mood.  Other symptoms include: hopelessness, helplessness, low energy.   Patient's sleep has been poor. Appetite has been good.   Pt is not suicidal. Pt is homicidal. Pt denies auditory/visual hallucinations.   Pt has been taking all medications as prescribed.     NPO since MN.  No am medications  PONV.  Denies any other complications with anesthesia    Cardiopulmonary assessment completed.  No change    Electronically signed by GIORGI Rust CNP on 11/8/2024 at 6:38 AM

## 2024-11-08 NOTE — ANESTHESIA POSTPROCEDURE EVALUATION
Department of Anesthesiology  Postprocedure Note    Patient: Alistair Hutchison  MRN: [id number]  YOB: 1995  Date of evaluation: 11/8/2024    Procedure Summary       Date: 11/08/24 Room / Location: [facility] PACU    Anesthesia Start: 0759 Anesthesia Stop: 0811    Procedure: ECT W/ ANESTHESIA Diagnosis: Disorganized schizophrenia    Scheduled Providers:  Responsible Provider: Barby Leonard MD    Anesthesia Type: general ASA Status: 3            Anesthesia Type: No value filed.    Amalia Phase I: Amalia Score: 10    Amalia Phase II:      Anesthesia Post Evaluation    Comments: POST- ANESTHESIA EVALUATION       Pt Name: Alistair Hutchison  MRN: [id number]  YOB: 1995  Date of evaluation: 11/8/2024  Time:  10:57 AM      /77   Pulse 94   Temp 97.8 °F (36.6 °C) (Infrared)   Resp 18   SpO2 95%      Consciousness Level  Awake  Cardiopulmonary Status  Stable  Pain Adequately Treated YES  Nausea / Vomiting  NO  Adequate Hydration  YES  Anesthesia Related Complications NONE      Electronically signed by Barby Leonard MD on 11/8/2024 at 10:57 AM           No notable events documented.

## 2024-11-08 NOTE — PROGRESS NOTES
history.       Objective:       Mental Status Evaluation:  Appearance:  Wearing gown, on stretcher, fairly groomed   Behavior:  Engages with interviewer, childlike   Speech:  Slow rate, loud volume and tone   Mood:  \"good\"   Affect:  Ieuthymic   Thought Process:  Linear and coherent   Thought Content:  Denies suicidal and homicidal ideation, improving thoughts to harm others   Sensorium:  No evident delusions   Cognition:  Oriented to person, place and general circumstance   Insight:  Fair   Judgment:  Poor     Assessment:     Diagnosis: Disorganized schizophrenia    Plan:     Continue bilateral ECT, plan for twice weekly treatments next week.  We will likely plan to follow up with a family meeting in outpatient clinic in the next few weeks  Taper treatment frequency as tolerated    Update consent and H&P every 30 days while undergoing ECT treatment, update labs every 6 months.   Patient verbalizes understanding of risks/benefits/alternatives to ECT.

## 2024-11-08 NOTE — ANESTHESIA PRE PROCEDURE
sodium chloride flush 0.9 % injection 5-40 mL  5-40 mL IntraVENous PRN Jewel Ji MD        0.9 % sodium chloride infusion   IntraVENous PRN Jewel Ji MD           Allergies:    Allergies   Allergen Reactions    Chocolate Diarrhea       Problem List:    Patient Active Problem List   Diagnosis Code    Hypovitaminosis D E55.9    Rib pain on left side R07.81    Acute psychosis (HCC) F23    Developmental delay R62.50    Hyperthyroidism, subclinical E05.90    Moderate mixed hyperlipidemia not requiring statin therapy E78.2    Class 2 obesity without serious comorbidity with body mass index (BMI) of 38.0 to 38.9 in adult E66.812, Z68.38    Disorganized schizophrenia (HCC) F20.1       Past Medical History:        Diagnosis Date    Fracture     leg    Manic depression ([facility])     Oppositional defiant behavior     PONV (postoperative nausea and vomiting)     Schizophrenia ([facility])        Past Surgical History:        Procedure Laterality Date    EYE SURGERY      TYMPANOSTOMY TUBE PLACEMENT         Social History:    Social History     Tobacco Use    Smoking status: Never    Smokeless tobacco: Never   Substance Use Topics    Alcohol use: No                                Counseling given: Not Answered      Vital Signs (Current):   Vitals:    11/08/24 0652   BP: 129/81   Pulse: 69   Resp: 16   Temp: 97.7 °F (36.5 °C)   TempSrc: Infrared   SpO2: 97%                                                BP Readings from Last 3 Encounters:   11/08/24 129/81   11/06/24 135/72   11/04/24 125/80       NPO Status: Time of last liquid consumption: 2300                        Time of last solid consumption: 1800                        Date of last liquid consumption: 11/07/24                        Date of last solid food consumption: 11/07/24    BMI:   Wt Readings from Last 3 Encounters:   11/06/24 113.4 kg (250 lb)   11/04/24 113.4 kg (250 lb)   10/30/24 113.4 kg (250 lb)     There is no height or weight on file to calculate

## 2024-11-11 ENCOUNTER — ANESTHESIA EVENT (OUTPATIENT)
Dept: POSTOP/PACU | Age: 29
End: 2024-11-11
Payer: COMMERCIAL

## 2024-11-12 ENCOUNTER — ANESTHESIA (OUTPATIENT)
Dept: POSTOP/PACU | Age: 29
End: 2024-11-12
Payer: COMMERCIAL

## 2024-11-12 ENCOUNTER — HOSPITAL ENCOUNTER (OUTPATIENT)
Dept: POSTOP/PACU | Age: 29
Discharge: HOME | End: 2024-11-12
Payer: COMMERCIAL

## 2024-11-12 VITALS
RESPIRATION RATE: 20 BRPM | HEART RATE: 101 BPM | BODY MASS INDEX: 41.65 KG/M2 | TEMPERATURE: 99.1 F | DIASTOLIC BLOOD PRESSURE: 69 MMHG | SYSTOLIC BLOOD PRESSURE: 135 MMHG | OXYGEN SATURATION: 96 % | HEIGHT: 65 IN | WEIGHT: 250 LBS

## 2024-11-12 PROCEDURE — 2500000003 HC RX 250 WO HCPCS

## 2024-11-12 PROCEDURE — 2580000003 HC RX 258: Performed by: ANESTHESIOLOGY

## 2024-11-12 PROCEDURE — 90870 ELECTROCONVULSIVE THERAPY: CPT | Performed by: PSYCHIATRY & NEUROLOGY

## 2024-11-12 PROCEDURE — 6360000002 HC RX W HCPCS

## 2024-11-12 PROCEDURE — 7100000001 HC PACU RECOVERY - ADDTL 15 MIN: Performed by: ANESTHESIOLOGY

## 2024-11-12 PROCEDURE — 3700000001 HC ADD 15 MINUTES (ANESTHESIA): Performed by: ANESTHESIOLOGY

## 2024-11-12 PROCEDURE — 7100000000 HC PACU RECOVERY - FIRST 15 MIN: Performed by: ANESTHESIOLOGY

## 2024-11-12 PROCEDURE — 3700000000 HC ANESTHESIA ATTENDED CARE: Performed by: ANESTHESIOLOGY

## 2024-11-12 PROCEDURE — 90870 ELECTROCONVULSIVE THERAPY: CPT | Performed by: ANESTHESIOLOGY

## 2024-11-12 RX ORDER — SODIUM CHLORIDE 0.9 % (FLUSH) 0.9 %
5-40 SYRINGE (ML) INJECTION PRN
Status: DISCONTINUED | OUTPATIENT
Start: 2024-11-12 | End: 2024-11-13 | Stop reason: HOSPADM

## 2024-11-12 RX ORDER — ETOMIDATE 2 MG/ML
INJECTION INTRAVENOUS
Status: COMPLETED
Start: 2024-11-12 | End: 2024-11-12

## 2024-11-12 RX ORDER — KETOROLAC TROMETHAMINE 30 MG/ML
INJECTION, SOLUTION INTRAMUSCULAR; INTRAVENOUS
Status: DISCONTINUED | OUTPATIENT
Start: 2024-11-12 | End: 2024-11-12 | Stop reason: SDUPTHER

## 2024-11-12 RX ORDER — SODIUM CHLORIDE 9 MG/ML
INJECTION, SOLUTION INTRAVENOUS CONTINUOUS
Status: DISCONTINUED | OUTPATIENT
Start: 2024-11-12 | End: 2024-11-13 | Stop reason: HOSPADM

## 2024-11-12 RX ORDER — LIDOCAINE HYDROCHLORIDE 10 MG/ML
1 INJECTION, SOLUTION EPIDURAL; INFILTRATION; INTRACAUDAL; PERINEURAL
Status: DISCONTINUED | OUTPATIENT
Start: 2024-11-12 | End: 2024-11-13 | Stop reason: HOSPADM

## 2024-11-12 RX ORDER — ETOMIDATE 2 MG/ML
INJECTION INTRAVENOUS
Status: DISCONTINUED | OUTPATIENT
Start: 2024-11-12 | End: 2024-11-12 | Stop reason: SDUPTHER

## 2024-11-12 RX ORDER — SODIUM CHLORIDE 9 MG/ML
INJECTION, SOLUTION INTRAVENOUS PRN
Status: DISCONTINUED | OUTPATIENT
Start: 2024-11-12 | End: 2024-11-13 | Stop reason: HOSPADM

## 2024-11-12 RX ORDER — SUCCINYLCHOLINE/SOD CL,ISO/PF 200MG/10ML
SYRINGE (ML) INTRAVENOUS
Status: COMPLETED
Start: 2024-11-12 | End: 2024-11-12

## 2024-11-12 RX ORDER — SODIUM CHLORIDE 0.9 % (FLUSH) 0.9 %
5-40 SYRINGE (ML) INJECTION EVERY 12 HOURS SCHEDULED
Status: DISCONTINUED | OUTPATIENT
Start: 2024-11-12 | End: 2024-11-13 | Stop reason: HOSPADM

## 2024-11-12 RX ORDER — KETOROLAC TROMETHAMINE 30 MG/ML
INJECTION, SOLUTION INTRAMUSCULAR; INTRAVENOUS
Status: COMPLETED
Start: 2024-11-12 | End: 2024-11-12

## 2024-11-12 RX ORDER — SUCCINYLCHOLINE/SOD CL,ISO/PF 200MG/10ML
SYRINGE (ML) INTRAVENOUS
Status: DISCONTINUED | OUTPATIENT
Start: 2024-11-12 | End: 2024-11-12 | Stop reason: SDUPTHER

## 2024-11-12 RX ADMIN — SODIUM CHLORIDE: 9 INJECTION, SOLUTION INTRAVENOUS at 07:56

## 2024-11-12 RX ADMIN — KETOROLAC TROMETHAMINE 30 MG: 30 INJECTION, SOLUTION INTRAMUSCULAR at 08:08

## 2024-11-12 RX ADMIN — Medication 120 MG: at 08:11

## 2024-11-12 RX ADMIN — ETOMIDATE INJECTION 20 MG: 2 SOLUTION INTRAVENOUS at 08:10

## 2024-11-12 ASSESSMENT — PAIN - FUNCTIONAL ASSESSMENT
PAIN_FUNCTIONAL_ASSESSMENT: NONE - DENIES PAIN
PAIN_FUNCTIONAL_ASSESSMENT: 0-10

## 2024-11-12 NOTE — PROCEDURES
Bilateral ECT Procedure Report  Alistair Hutchison   11/12/2024  1995         Attending:Corby Drake MD  Preprocedure diagnosis: Disorganized schizophrenia (F20.1)  Postprocedure diagnosis: SAME AS PRE-PROCEDURE DIAGNOSIS  Treatment Number: This is treatment # 17   Patient Status: Outpatient   Type of ECT: Bilateral Brief Pulse  Medications  Preprocedure: Tylenol 650mg  Anesthetic: Etomidate 20 mg  Paralytic: Succinylcholine 120 mg  Post procedure: none needed   Cuff placement: Left Lower Extremity    MECTA Settings 1st Stimulus:     Pulse width: 1.0 ms   Frequency:  40 hz   Duration:   2.0 seconds   Static Impedance: 722 ohms             Dynamic Impedance: 234 ohms             Motor Seizure Duration: 31 seconds  EEG Seizure Duration: 36 seconds             The patient's ECT treatment was performed using MECTA machine  .  Timeout:  Time out was performed using two patient identifiers and confirmation of procedure.     Patient Preparation: Preprocedure documentation, labs, H&P were all verified and reviewed.  The patient was placed in supine position.  EEG leads were placed for monitoring of seizure.   [place] areas bilaterally cleaned and prepped.  Conductive gel  was applied over stimulus electrode site.   The patient was pre-oxygenated.       Procedure in detail: Medications were administered by anesthesia using intravenous access at the doses listed previously in this summary.  Anesthetic administered and once confirmation the patient is anesthetized, the patient's blood pressure cuff on lower extremity was inflated to 100mmHg in excess of patient's blood pressure.  At this time, the neuromuscular blockade was administered intravenously by anesthesia.  Upper extremity fasciculation were verified followed by lower extremity fasciculation.  Once fasciculations terminated, confirmation of affective neuromuscular blockade demonstrated by absence of withdrawal reflex.  Bite blocks were put in place.

## 2024-11-12 NOTE — PROGRESS NOTES
Pre ECT Assessment Note  Psychiatry  11/12/24       Alistair Hutchison  1995  [id number]      Subjective:     Patient is a 29 y.o.  male seen for an evaluation prior to today's electroconvulsive therapy treatment. Today is treatment number 17, utilizing bilateral.  This is course number 1.      Alistair is present today with his grandmother.   Sean reports he is doing very well and denies any issues with sleep, appetite or mood swings.  He reports he has been finding adrian in his  video games and states he is going to see a movie with his .  Alistair's grandmother reports he has  been doing well and states he has not been having any \"violent tangents.\"  They are happy with how ECT is going.  Alistair denies any cognitive side effects however his grandmother reports they are noticeable but it does not seem to bother them.  He denies any suicidal ideation, intent or plan.      Patient Active Problem List    Diagnosis Date Noted    Developmental delay 03/21/2023    Acute psychosis (HCC) 03/20/2023    Disorganized schizophrenia ([facility]) 09/20/2024    Hyperthyroidism, subclinical 08/22/2024    Moderate mixed hyperlipidemia not requiring statin therapy 08/22/2024    Class 2 obesity without serious comorbidity with body mass index (BMI) of 38.0 to 38.9 in adult 08/22/2024    Hypovitaminosis D 10/18/2019    Rib pain on left side 10/18/2019     Past Medical History:   Diagnosis Date    Fracture     leg    Manic depression ([facility])     Oppositional defiant behavior     PONV (postoperative nausea and vomiting)     Schizophrenia ([facility])       Past Surgical History:   Procedure Laterality Date    EYE SURGERY      TYMPANOSTOMY TUBE PLACEMENT        Not in a hospital admission.  Allergies   Allergen Reactions    Chocolate Diarrhea      Social History     Tobacco Use    Smoking status: Never    Smokeless tobacco: Never   Substance Use Topics    Alcohol use: No      History reviewed. No pertinent family history.

## 2024-11-12 NOTE — DISCHARGE INSTRUCTIONS
ELECTROCONVULSIVE THERAPY DISCHARGE INSTRUCTIONS         1. Activity - Rest today. The anesthetic agents you have received can make you feel drowsy or tired.  A responsible person must drive you home and stay with you for 8 hours after discharge from the Hospital. Do not drive a motor vehicle or operate any machinery for 24 hours. .   *Do not make any complex decisions or execute any legal documents until 3 weeks AFTER your last treatment.  If you have any questions regarding this, speak with your psychiatrist first.*    2. Diet - Nothing to eat or drink after midnight the night of your ECT treatment. After ECT, start with clear liquids, if you can drink without coughing, you may proceed with gradually progressing to your usual diet.    No alcoholic beverages for 24 hours.    3. Medications - Take your daily medications as prescribed, after you return home from today's ECT. If you take a Beta Blocker or have been prescribed Imitrex, you may be instructed to take these medications the morning of ECT. If you are unsure please ask the physician.     Take with these medication the morning of ECT with one Tablespoon of water.   Tylenol 650 mg  All blood pressure medications  ***    4. You may experience the following after your treatment:   a. Poor memory   b. Poor balance   c. Headaches   d. Confusion   e. Muscle soreness   f. Nausea      5. Special Precautions - Contact you physician immediately if these occur:   a. Signs of infection: redness, swelling, heat, red streaks at the site of the IV   b. Excessive pain unrelieved by prescription pain medications   c. Nausea and vomiting that persists beyond the first day after your procedure    6. Next Procedure Date:   Your next ECT treatment is scheduled for 7:10 am on Wednesday, November 20, 2024.    Please arrive at 5:40 am.    **You are also invited to join us at our monthly Electroconvulsive Therapy Peer Support Group.  This support group is held once a month, on the

## 2024-11-12 NOTE — ANESTHESIA PRE PROCEDURE
hyperlipidemia not requiring statin therapy E78.2   • Class 2 obesity without serious comorbidity with body mass index (BMI) of 38.0 to 38.9 in adult E66.812, Z68.38   • Disorganized schizophrenia (HCC) F20.1       Past Medical History:        Diagnosis Date   • Fracture     leg   • Manic depression (HCC)    • Oppositional defiant behavior    • PONV (postoperative nausea and vomiting)    • Schizophrenia (HCC)        Past Surgical History:        Procedure Laterality Date   • EYE SURGERY     • TYMPANOSTOMY TUBE PLACEMENT         Social History:    Social History     Tobacco Use   • Smoking status: Never   • Smokeless tobacco: Never   Substance Use Topics   • Alcohol use: No                                Counseling given: Not Answered      Vital Signs (Current): There were no vitals filed for this visit.                                           BP Readings from Last 3 Encounters:   11/08/24 134/77   11/06/24 135/72   11/04/24 125/80       NPO Status:                                                                                 BMI:   Wt Readings from Last 3 Encounters:   11/06/24 113.4 kg (250 lb)   11/04/24 113.4 kg (250 lb)   10/30/24 113.4 kg (250 lb)     There is no height or weight on file to calculate BMI.    CBC:   Lab Results   Component Value Date/Time    WBC 11.6 09/20/2024 06:18 AM    RBC 4.86 09/20/2024 06:18 AM    HGB 14.2 09/20/2024 06:18 AM    HCT 41.9 09/20/2024 06:18 AM    MCV 86.3 09/20/2024 06:18 AM    RDW 13.6 09/20/2024 06:18 AM     09/20/2024 06:18 AM       CMP:   Lab Results   Component Value Date/Time     09/20/2024 06:18 AM    K 3.9 09/20/2024 06:18 AM     09/20/2024 06:18 AM    CO2 21 09/20/2024 06:18 AM    BUN 15 09/20/2024 06:18 AM    CREATININE 0.8 09/20/2024 06:18 AM    GFRAA >60 10/18/2019 10:05 AM    LABGLOM >90 09/20/2024 06:18 AM    LABGLOM >60 03/17/2023 08:51 AM    GLUCOSE 104 09/20/2024 06:18 AM    CALCIUM 9.4 09/20/2024 06:18 AM    BILITOT 0.3 09/20/2024

## 2024-11-12 NOTE — INTERVAL H&P NOTE
Update History & Physical    The patient's History and Physical of November 4, 2024 was reviewed with the patient and I examined the patient. Any changes are as documented below.    Alistair Hutchison is 29 y.o., male, here for ECT treatment. Presents today with alden Phillipsan. Last ECT treatment was 11/08/24. Pt tolerated last treatment well and has voiced improvement. Rating average mood 9/10 with 10 being the best mood. Sleep has been fair. Appetite are good. Denies suicidal or homicidal ideation. Pt reports being frustrated with grandparents at times. Reports getting angry and yelling. Denies hallucinations. Pt has been taking all medications as prescribed. Pt and guardian denies interval changes since last treatment visit.     Pt AAO x 3 in NAD. HRRR. No adventitious lung sounds. No respiratory distress. NPO p MN. Took no medications this am. Denies recent or current chest pain/pressure, palpitations, SOB, recent URI, fever or chills. Review vitals per RN flowsheet.       Electronically signed by GIORGI Shah CNP on 11/12/2024 at 7:26 AM

## 2024-11-12 NOTE — ANESTHESIA POSTPROCEDURE EVALUATION
Department of Anesthesiology  Postprocedure Note    Patient: Alistair Hutchison  MRN: [id number]  YOB: 1995  Date of evaluation: 11/12/2024    Procedure Summary       Date: 11/12/24 Room / Location: [facility] PACU    Anesthesia Start: 0806 Anesthesia Stop: 0821    Procedure: ECT W/ ANESTHESIA Diagnosis: Disorganized schizophrenia    Scheduled Providers:  Responsible Provider: Zacarias Pandey MD    Anesthesia Type: general ASA Status: 3            Anesthesia Type: No value filed.    Amalia Phase I: Amalia Score: 10    Amalia Phase II:      Anesthesia Post Evaluation    Patient location during evaluation: PACU  Patient participation: complete - patient participated  Level of consciousness: awake and alert  Airway patency: patent  Nausea & Vomiting: no vomiting  Cardiovascular status: hemodynamically stable  Respiratory status: acceptable  Hydration status: euvolemic  Comments: POST- ANESTHESIA EVALUATION       Pt Name: Alistair Hutchison  MRN: [id number]  YOB: 1995  Date of evaluation: 11/12/2024  Time:  11:11 AM      /69   Pulse (!) 101   Temp 99.1 °F (37.3 °C) (Infrared)   Resp 20   Ht 1.651 m (5' 5\")   Wt 113.4 kg (250 lb)   SpO2 96%   BMI 41.60 kg/m²      Consciousness Level  Awake  Cardiopulmonary Status  Stable  Pain Adequately Treated YES  Nausea / Vomiting  NO  Adequate Hydration  YES  Anesthesia Related Complications NONE      Electronically signed by Zacarias Pandey MD on 11/12/2024 at 11:11 AM         Pain management: satisfactory to patient    No notable events documented.

## 2024-11-13 DIAGNOSIS — F23 ACUTE PSYCHOSIS (HCC): Primary | ICD-10-CM

## 2024-11-13 DIAGNOSIS — F20.1 DISORGANIZED SCHIZOPHRENIA (HCC): ICD-10-CM

## 2024-11-19 ENCOUNTER — ANESTHESIA EVENT (OUTPATIENT)
Dept: POSTOP/PACU | Age: 29
End: 2024-11-19
Payer: MEDICARE

## 2024-11-20 ENCOUNTER — ANESTHESIA (OUTPATIENT)
Dept: POSTOP/PACU | Age: 29
End: 2024-11-20
Payer: MEDICARE

## 2024-11-20 ENCOUNTER — HOSPITAL ENCOUNTER (OUTPATIENT)
Dept: POSTOP/PACU | Age: 29
Discharge: HOME | End: 2024-11-20
Payer: MEDICARE

## 2024-11-20 VITALS
TEMPERATURE: 98.3 F | WEIGHT: 250 LBS | RESPIRATION RATE: 17 BRPM | OXYGEN SATURATION: 95 % | DIASTOLIC BLOOD PRESSURE: 93 MMHG | BODY MASS INDEX: 41.65 KG/M2 | HEIGHT: 65 IN | HEART RATE: 98 BPM | SYSTOLIC BLOOD PRESSURE: 140 MMHG

## 2024-11-20 PROCEDURE — 7100000001 HC PACU RECOVERY - ADDTL 15 MIN: Performed by: ANESTHESIOLOGY

## 2024-11-20 PROCEDURE — 90870 ELECTROCONVULSIVE THERAPY: CPT

## 2024-11-20 PROCEDURE — 6360000002 HC RX W HCPCS

## 2024-11-20 PROCEDURE — 3700000000 HC ANESTHESIA ATTENDED CARE: Performed by: ANESTHESIOLOGY

## 2024-11-20 PROCEDURE — 2580000003 HC RX 258: Performed by: ANESTHESIOLOGY

## 2024-11-20 PROCEDURE — 3700000001 HC ADD 15 MINUTES (ANESTHESIA): Performed by: ANESTHESIOLOGY

## 2024-11-20 PROCEDURE — 90870 ELECTROCONVULSIVE THERAPY: CPT | Performed by: PSYCHIATRY & NEUROLOGY

## 2024-11-20 PROCEDURE — 7100000000 HC PACU RECOVERY - FIRST 15 MIN: Performed by: ANESTHESIOLOGY

## 2024-11-20 PROCEDURE — 2500000003 HC RX 250 WO HCPCS

## 2024-11-20 RX ORDER — SODIUM CHLORIDE 9 MG/ML
INJECTION, SOLUTION INTRAVENOUS PRN
Status: DISCONTINUED | OUTPATIENT
Start: 2024-11-20 | End: 2024-11-21 | Stop reason: HOSPADM

## 2024-11-20 RX ORDER — SUCCINYLCHOLINE/SOD CL,ISO/PF 200MG/10ML
SYRINGE (ML) INTRAVENOUS
Status: DISCONTINUED | OUTPATIENT
Start: 2024-11-20 | End: 2024-11-20 | Stop reason: SDUPTHER

## 2024-11-20 RX ORDER — KETOROLAC TROMETHAMINE 30 MG/ML
INJECTION, SOLUTION INTRAMUSCULAR; INTRAVENOUS
Status: COMPLETED
Start: 2024-11-20 | End: 2024-11-20

## 2024-11-20 RX ORDER — SODIUM CHLORIDE 0.9 % (FLUSH) 0.9 %
5-40 SYRINGE (ML) INJECTION PRN
Status: DISCONTINUED | OUTPATIENT
Start: 2024-11-20 | End: 2024-11-21 | Stop reason: HOSPADM

## 2024-11-20 RX ORDER — METHOHEXITAL IN WATER/PF 100MG/10ML
SYRINGE (ML) INTRAVENOUS
Status: DISCONTINUED | OUTPATIENT
Start: 2024-11-20 | End: 2024-11-20 | Stop reason: SDUPTHER

## 2024-11-20 RX ORDER — METHOHEXITAL IN WATER/PF 100MG/10ML
SYRINGE (ML) INTRAVENOUS
Status: COMPLETED
Start: 2024-11-20 | End: 2024-11-20

## 2024-11-20 RX ORDER — KETOROLAC TROMETHAMINE 30 MG/ML
INJECTION, SOLUTION INTRAMUSCULAR; INTRAVENOUS
Status: DISCONTINUED | OUTPATIENT
Start: 2024-11-20 | End: 2024-11-20 | Stop reason: SDUPTHER

## 2024-11-20 RX ORDER — SODIUM CHLORIDE 9 MG/ML
INJECTION, SOLUTION INTRAVENOUS CONTINUOUS
Status: DISCONTINUED | OUTPATIENT
Start: 2024-11-20 | End: 2024-11-21 | Stop reason: HOSPADM

## 2024-11-20 RX ORDER — SODIUM CHLORIDE 0.9 % (FLUSH) 0.9 %
5-40 SYRINGE (ML) INJECTION EVERY 12 HOURS SCHEDULED
Status: DISCONTINUED | OUTPATIENT
Start: 2024-11-20 | End: 2024-11-21 | Stop reason: HOSPADM

## 2024-11-20 RX ORDER — SUCCINYLCHOLINE/SOD CL,ISO/PF 200MG/10ML
SYRINGE (ML) INTRAVENOUS
Status: COMPLETED
Start: 2024-11-20 | End: 2024-11-20

## 2024-11-20 RX ORDER — LIDOCAINE HYDROCHLORIDE 10 MG/ML
1 INJECTION, SOLUTION EPIDURAL; INFILTRATION; INTRACAUDAL; PERINEURAL
Status: DISCONTINUED | OUTPATIENT
Start: 2024-11-20 | End: 2024-11-21 | Stop reason: HOSPADM

## 2024-11-20 RX ADMIN — Medication 120 MG: at 07:04

## 2024-11-20 RX ADMIN — KETOROLAC TROMETHAMINE 30 MG: 30 INJECTION, SOLUTION INTRAMUSCULAR at 07:03

## 2024-11-20 RX ADMIN — SODIUM CHLORIDE, PRESERVATIVE FREE 10 ML: 5 INJECTION INTRAVENOUS at 06:43

## 2024-11-20 RX ADMIN — SODIUM CHLORIDE: 9 INJECTION, SOLUTION INTRAVENOUS at 07:00

## 2024-11-20 RX ADMIN — Medication 100 MG: at 07:04

## 2024-11-20 ASSESSMENT — PAIN - FUNCTIONAL ASSESSMENT: PAIN_FUNCTIONAL_ASSESSMENT: 0-10
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
## 2024-11-20 NOTE — ANESTHESIA POSTPROCEDURE EVALUATION
Department of Anesthesiology  Postprocedure Note    Patient: Alistair Hutchison  MRN: [id number]  YOB: 1995  Date of evaluation: 11/20/2024    Procedure Summary       Date: 11/20/24 Room / Location: [facility] PACU    Anesthesia Start: 0700 Anesthesia Stop: 0716    Procedure: ECT W/ ANESTHESIA Diagnosis: Paranoid schizophrenia    Scheduled Providers:  Responsible Provider: Barby Leonard MD    Anesthesia Type: General ASA Status: 3            Anesthesia Type: General    Amalia Phase I: Amalia Score: 10    Amalia Phase II:      Anesthesia Post Evaluation    Comments: POST- ANESTHESIA EVALUATION       Pt Name: Alistair Hutchison  MRN: [id number]  YOB: 1995  Date of evaluation: 11/20/2024  Time:  5:33 PM      BP (!) 140/93   Pulse 98   Temp 98.3 °F (36.8 °C)   Resp 17   Ht 1.651 m (5' 5\")   Wt 113.4 kg (250 lb)   SpO2 95%   BMI 41.60 kg/m²      Consciousness Level  Awake  Cardiopulmonary Status  Stable  Pain Adequately Treated YES  Nausea / Vomiting  NO  Adequate Hydration  YES  Anesthesia Related Complications NONE      Electronically signed by Barby Leonard MD on 11/20/2024 at 5:33 PM           No notable events documented.

## 2024-11-20 NOTE — ANESTHESIA PRE PROCEDURE
is 41.6 kg/m².    CBC:   Lab Results   Component Value Date/Time    WBC 11.6 09/20/2024 06:18 AM    RBC 4.86 09/20/2024 06:18 AM    HGB 14.2 09/20/2024 06:18 AM    HCT 41.9 09/20/2024 06:18 AM    MCV 86.3 09/20/2024 06:18 AM    RDW 13.6 09/20/2024 06:18 AM     09/20/2024 06:18 AM       CMP:   Lab Results   Component Value Date/Time     09/20/2024 06:18 AM    K 3.9 09/20/2024 06:18 AM     09/20/2024 06:18 AM    CO2 21 09/20/2024 06:18 AM    BUN 15 09/20/2024 06:18 AM    CREATININE 0.8 09/20/2024 06:18 AM    GFRAA >60 10/18/2019 10:05 AM    LABGLOM >90 09/20/2024 06:18 AM    LABGLOM >60 03/17/2023 08:51 AM    GLUCOSE 104 09/20/2024 06:18 AM    CALCIUM 9.4 09/20/2024 06:18 AM    BILITOT 0.3 09/20/2024 06:18 AM    ALKPHOS 84 09/20/2024 06:18 AM    AST 17 09/20/2024 06:18 AM    ALT 36 09/20/2024 06:18 AM       POC Tests:   No results for input(s): \"POCGLU\", \"POCNA\", \"POCK\", \"POCCL\", \"POCBUN\", \"POCHEMO\", \"POCHCT\" in the last 72 hours.      Coags: No results found for: \"PROTIME\", \"INR\", \"APTT\"    HCG (If Applicable): No results found for: \"PREGTESTUR\", \"PREGSERUM\", \"HCG\", \"HCGQUANT\"     ABGs: No results found for: \"PHART\", \"PO2ART\", \"NQB3ZLX\", \"OKX2VJU\", \"BEART\", \"J3YCVRHU\"     Type & Screen (If Applicable):  No results found for: \"ABORH\", \"LABANTI\"    Drug/Infectious Status (If Applicable):  Lab Results   Component Value Date/Time    HEPCAB NONREACTIVE 03/17/2023 08:51 AM       COVID-19 Screening (If Applicable): No results found for: \"COVID19\"        Anesthesia Evaluation  Patient summary reviewed and Nursing notes reviewed   no history of anesthetic complications:   Airway: Mallampati: III  TM distance: >3 FB   Neck ROM: full  Mouth opening: < 3 FB   Dental: normal exam         Pulmonary:Negative Pulmonary ROS and normal exam  breath sounds clear to auscultation                             Cardiovascular:Negative CV ROS            Rhythm: regular  Rate: normal                    Neuro/Psych:   (+)

## 2024-11-20 NOTE — DISCHARGE INSTRUCTIONS
ELECTROCONVULSIVE THERAPY DISCHARGE INSTRUCTIONS         1. Activity - Rest today. The anesthetic agents you have received can make you feel drowsy or tired.  A responsible person must drive you home and stay with you for 8 hours after discharge from the Hospital. Do not drive a motor vehicle or operate any machinery for 24 hours. .   *Do not make any complex decisions or execute any legal documents until 3 weeks AFTER your last treatment.  If you have any questions regarding this, speak with your psychiatrist first.*    2. Diet - Nothing to eat or drink after midnight the night of your ECT treatment. After ECT, start with clear liquids, if you can drink without coughing, you may proceed with gradually progressing to your usual diet.    No alcoholic beverages for 24 hours.    3. Medications - Take your daily medications as prescribed, after you return home from today's ECT. If you take a Beta Blocker or have been prescribed Imitrex, you may be instructed to take these medications the morning of ECT. If you are unsure please ask the physician.     Take with these medication the morning of ECT with one Tablespoon of water.   Tylenol 650 mg  All blood pressure medications  ***    4. You may experience the following after your treatment:   a. Poor memory   b. Poor balance   c. Headaches   d. Confusion   e. Muscle soreness   f. Nausea      5. Special Precautions - Contact you physician immediately if these occur:   a. Signs of infection: redness, swelling, heat, red streaks at the site of the IV   b. Excessive pain unrelieved by prescription pain medications   c. Nausea and vomiting that persists beyond the first day after your procedure    6. Next Procedure Date:   Your next ECT treatment is scheduled for *** on ***.  FOLLOW UP AT DR. BARNETT'S OFFICE  PM ON DECEMBER 11TH. [phone]    **You are also invited to join us at our monthly Electroconvulsive Therapy Peer Support Group.  This support group is held once

## 2024-11-21 NOTE — PROGRESS NOTES
Pre ECT Assessment Note  Psychiatry  11/20/2024       Alistair Hutchison  1995  [id number]      Subjective:     Patient is a 29 y.o.  male seen for an evaluation prior to today's electroconvulsive therapy treatment. Today is treatment number 18, utilizing bilateral.  This is course number 1.      Alistair is present today with his grandmother.   She reports that he continues to have episodes of anger and difficulty controlling behaviors between ECT treatments.  She does not necessarily feel that ECT alone in the long term is going to be an effective treatment.  She is interested in exploring additional medication options.  She has a follow-up appointment scheduled with William's outpatient psychiatrist this Friday.  We mutually agreed to schedule no additional ECT, she will follow up with his outpatient psychiatrist to talk about possible medication changes and we will arrange an outpatient ECT follow-up in approximately 3 weeks.    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    Hyperthyroidism, subclinical 08/22/2024    Moderate mixed hyperlipidemia not requiring statin therapy 08/22/2024    Class 2 obesity without serious comorbidity with body mass index (BMI) of 38.0 to 38.9 in adult 08/22/2024    Hypovitaminosis D 10/18/2019    Rib pain on left side 10/18/2019     Past Medical History:   Diagnosis Date    Fracture     leg    Manic depression ([facility])     Oppositional defiant behavior     PONV (postoperative nausea and vomiting)     Schizophrenia ([facility])       Past Surgical History:   Procedure Laterality Date    EYE SURGERY      TYMPANOSTOMY TUBE PLACEMENT        Not in a hospital admission.  Allergies   Allergen Reactions    Chocolate Diarrhea      Social History     Tobacco Use    Smoking status: Never    Smokeless tobacco: Never   Substance Use Topics    Alcohol use: No

## 2024-11-30 ENCOUNTER — HOSPITAL ENCOUNTER (OUTPATIENT)
Age: 29
Setting detail: SPECIMEN
Discharge: HOME | End: 2024-11-30
Payer: COMMERCIAL

## 2024-11-30 LAB
LITHIUM DATE LAST DOSE: NORMAL
LITHIUM DOSE AMOUNT: 2000
LITHIUM DOSE TIME: NORMAL
LITHIUM LEVEL: 0.1 MMOL/L

## 2024-11-30 PROCEDURE — 80178 ASSAY OF LITHIUM: CPT

## 2024-11-30 PROCEDURE — 36415 COLL VENOUS BLD VENIPUNCTURE: CPT

## 2024-12-11 ENCOUNTER — HOSPITAL ENCOUNTER (OUTPATIENT)
Dept: PSYCHIATRY | Age: 29
Setting detail: THERAPIES SERIES
Discharge: HOME | End: 2024-12-11

## 2024-12-11 ENCOUNTER — HOSPITAL ENCOUNTER (INPATIENT)
Age: 29
LOS: 12 days | Discharge: HOME | DRG: 750 | End: 2024-12-23
Attending: PSYCHIATRY & NEUROLOGY | Admitting: PSYCHIATRY & NEUROLOGY
Payer: COMMERCIAL

## 2024-12-11 DIAGNOSIS — Z51.81 MEDICATION MONITORING ENCOUNTER: Primary | ICD-10-CM

## 2024-12-11 PROBLEM — F20.1 SCHIZOPHRENIA, DISORGANIZED (HCC): Status: ACTIVE | Noted: 2024-12-11

## 2024-12-11 PROCEDURE — 6370000000 HC RX 637 (ALT 250 FOR IP): Performed by: PSYCHIATRY & NEUROLOGY

## 2024-12-11 PROCEDURE — 2040000000 HC PSYCH ICU R&B

## 2024-12-11 RX ORDER — IBUPROFEN 400 MG/1
400 TABLET, FILM COATED ORAL EVERY 6 HOURS PRN
Status: DISCONTINUED | OUTPATIENT
Start: 2024-12-11 | End: 2024-12-23 | Stop reason: HOSPADM

## 2024-12-11 RX ORDER — HALOPERIDOL 5 MG/ML
5 INJECTION INTRAMUSCULAR EVERY 6 HOURS PRN
Status: DISCONTINUED | OUTPATIENT
Start: 2024-12-11 | End: 2024-12-23 | Stop reason: HOSPADM

## 2024-12-11 RX ORDER — DIPHENHYDRAMINE HYDROCHLORIDE 50 MG/ML
50 INJECTION INTRAMUSCULAR; INTRAVENOUS EVERY 6 HOURS PRN
Status: DISCONTINUED | OUTPATIENT
Start: 2024-12-11 | End: 2024-12-23 | Stop reason: HOSPADM

## 2024-12-11 RX ORDER — MAGNESIUM HYDROXIDE/ALUMINUM HYDROXICE/SIMETHICONE 120; 1200; 1200 MG/30ML; MG/30ML; MG/30ML
30 SUSPENSION ORAL EVERY 6 HOURS PRN
Status: DISCONTINUED | OUTPATIENT
Start: 2024-12-11 | End: 2024-12-23 | Stop reason: HOSPADM

## 2024-12-11 RX ORDER — TRAZODONE HYDROCHLORIDE 50 MG/1
50 TABLET, FILM COATED ORAL NIGHTLY PRN
Status: DISCONTINUED | OUTPATIENT
Start: 2024-12-11 | End: 2024-12-23 | Stop reason: HOSPADM

## 2024-12-11 RX ORDER — HYDROXYZINE HYDROCHLORIDE 50 MG/1
50 TABLET, FILM COATED ORAL 3 TIMES DAILY PRN
Status: DISCONTINUED | OUTPATIENT
Start: 2024-12-11 | End: 2024-12-23 | Stop reason: HOSPADM

## 2024-12-11 RX ORDER — HALOPERIDOL 5 MG/1
5 TABLET ORAL EVERY 6 HOURS PRN
Status: DISCONTINUED | OUTPATIENT
Start: 2024-12-11 | End: 2024-12-23 | Stop reason: HOSPADM

## 2024-12-11 RX ORDER — POLYETHYLENE GLYCOL 3350 17 G/17G
17 POWDER, FOR SOLUTION ORAL DAILY PRN
Status: DISCONTINUED | OUTPATIENT
Start: 2024-12-11 | End: 2024-12-23 | Stop reason: HOSPADM

## 2024-12-11 RX ORDER — POLYETHYLENE GLYCOL 3350 17 G
2 POWDER IN PACKET (EA) ORAL
Status: DISCONTINUED | OUTPATIENT
Start: 2024-12-11 | End: 2024-12-23 | Stop reason: HOSPADM

## 2024-12-11 RX ORDER — ACETAMINOPHEN 325 MG/1
650 TABLET ORAL EVERY 6 HOURS PRN
Status: DISCONTINUED | OUTPATIENT
Start: 2024-12-11 | End: 2024-12-23 | Stop reason: HOSPADM

## 2024-12-11 RX ADMIN — TRAZODONE HYDROCHLORIDE 50 MG: 50 TABLET ORAL at 19:51

## 2024-12-11 RX ADMIN — HYDROXYZINE HYDROCHLORIDE 50 MG: 50 TABLET ORAL at 19:37

## 2024-12-11 ASSESSMENT — SLEEP AND FATIGUE QUESTIONNAIRES
AVERAGE NUMBER OF SLEEP HOURS: 6
SLEEP PATTERN: DISTURBED/INTERRUPTED SLEEP
DO YOU USE A SLEEP AID: NO
DO YOU HAVE DIFFICULTY SLEEPING: YES

## 2024-12-11 ASSESSMENT — PATIENT HEALTH QUESTIONNAIRE - PHQ9
SUM OF ALL RESPONSES TO PHQ QUESTIONS 1-9: 0
SUM OF ALL RESPONSES TO PHQ QUESTIONS 1-9: 0
1. LITTLE INTEREST OR PLEASURE IN DOING THINGS: NOT AT ALL
SUM OF ALL RESPONSES TO PHQ QUESTIONS 1-9: 0
2. FEELING DOWN, DEPRESSED OR HOPELESS: NOT AT ALL
SUM OF ALL RESPONSES TO PHQ QUESTIONS 1-9: 0
SUM OF ALL RESPONSES TO PHQ9 QUESTIONS 1 & 2: 0

## 2024-12-11 ASSESSMENT — LIFESTYLE VARIABLES
HOW OFTEN DO YOU HAVE A DRINK CONTAINING ALCOHOL: MONTHLY OR LESS
HOW MANY STANDARD DRINKS CONTAINING ALCOHOL DO YOU HAVE ON A TYPICAL DAY: 1 OR 2
HOW MANY STANDARD DRINKS CONTAINING ALCOHOL DO YOU HAVE ON A TYPICAL DAY: PATIENT DOES NOT DRINK
HOW OFTEN DO YOU HAVE A DRINK CONTAINING ALCOHOL: NEVER
HOW MANY STANDARD DRINKS CONTAINING ALCOHOL DO YOU HAVE ON A TYPICAL DAY: 1 OR 2
HOW OFTEN DO YOU HAVE A DRINK CONTAINING ALCOHOL: MONTHLY OR LESS

## 2024-12-11 NOTE — CARE COORDINATION
Psychosocial Assessment    Current Level of Psychosocial Functioning     Independent   Dependent  X  Minimal Assist     Comments:  Legal Guardian is grandmother    Psychosocial High Risk Factors (check all that apply)    Unable to obtain meds   Chronic illness/pain    Substance abuse   Lack of Family Support   Financial stress   Isolation   Inadequate Community Resources  Suicide attempt(s)  Not taking medications   Victim of crime   Developmental Delay X  Unable to manage personal needs    Age 65 or older   Homeless  No transportation   Readmission within 30 days  Unemployment  Traumatic Event    Family/Supports identified: Patient has supportive family.    Patient Strengths: Housing, SSI, and supportive family.    Patient Barriers: Non compliance with medications.    CMHC/MH history: Linked with [facility].    Plan of Care:  medication management, group/individual therapies, family meetings, psycho -education, treatment team meetings to assist with stabilization    Initial Discharge Plan:  Return home and continue outpatient     Clinical Summary:  Patient is a 29 year old male directly admitted to the [facility] from his appointment. Patient denies suicidal, homicidal ideations. Patient reports hallucinations due to staying up \"all the time\" playing his music, and video games. Patient reports his nieces and nephews are homicidal towards him because he is always on the game. Patient denies physical, emotional, verbal, and sexual abuse. Patient denies substance use. Patient grandmother signed the admission paperwork with the charge nurse. Social work will continue to work with patient throughout the admission.

## 2024-12-12 LAB
ALBUMIN SERPL-MCNC: 4.2 G/DL (ref 3.5–5.2)
ALP SERPL-CCNC: 82 U/L (ref 40–129)
ALT SERPL-CCNC: 22 U/L (ref 10–50)
ANION GAP SERPL CALCULATED.3IONS-SCNC: 10 MMOL/L (ref 9–16)
AST SERPL-CCNC: 18 U/L (ref 10–50)
BASOPHILS # BLD: 0.1 K/UL (ref 0–0.2)
BASOPHILS NFR BLD: 1 % (ref 0–2)
BILIRUB SERPL-MCNC: 0.4 MG/DL (ref 0–1.2)
BILIRUB UR QL STRIP: NEGATIVE
BUN SERPL-MCNC: 11 MG/DL (ref 6–20)
CALCIUM SERPL-MCNC: 9.4 MG/DL (ref 8.6–10.4)
CHLORIDE SERPL-SCNC: 102 MMOL/L (ref 98–107)
CLARITY UR: CLEAR
CO2 SERPL-SCNC: 24 MMOL/L (ref 20–31)
COLOR UR: YELLOW
COMMENT: NORMAL
CREAT SERPL-MCNC: 0.8 MG/DL (ref 0.7–1.2)
EOSINOPHIL # BLD: 0.3 K/UL (ref 0–0.4)
EOSINOPHILS RELATIVE PERCENT: 2 % (ref 0–4)
ERYTHROCYTE [DISTWIDTH] IN BLOOD BY AUTOMATED COUNT: 13.7 % (ref 11.5–14.9)
GFR, ESTIMATED: >90 ML/MIN/1.73M2
GLUCOSE SERPL-MCNC: 119 MG/DL (ref 74–99)
GLUCOSE UR STRIP-MCNC: NEGATIVE MG/DL
HCT VFR BLD AUTO: 41.8 % (ref 41–53)
HGB BLD-MCNC: 14 G/DL (ref 13.5–17.5)
HGB UR QL STRIP.AUTO: NEGATIVE
KETONES UR STRIP-MCNC: NEGATIVE MG/DL
LEUKOCYTE ESTERASE UR QL STRIP: NEGATIVE
LYMPHOCYTES NFR BLD: 2.2 K/UL (ref 1–4.8)
LYMPHOCYTES RELATIVE PERCENT: 19 % (ref 24–44)
MCH RBC QN AUTO: 28.3 PG (ref 26–34)
MCHC RBC AUTO-ENTMCNC: 33.4 G/DL (ref 31–37)
MCV RBC AUTO: 84.7 FL (ref 80–100)
MONOCYTES NFR BLD: 0.7 K/UL (ref 0.1–1.3)
MONOCYTES NFR BLD: 6 % (ref 1–7)
NEUTROPHILS NFR BLD: 72 % (ref 36–66)
NEUTS SEG NFR BLD: 8.5 K/UL (ref 1.3–9.1)
NITRITE UR QL STRIP: NEGATIVE
PH UR STRIP: 7 [PH] (ref 5–8)
PLATELET # BLD AUTO: 311 K/UL (ref 150–450)
PMV BLD AUTO: 7.2 FL (ref 6–12)
POTASSIUM SERPL-SCNC: 4.4 MMOL/L (ref 3.7–5.3)
PROT SERPL-MCNC: 6.9 G/DL (ref 6.6–8.7)
PROT UR STRIP-MCNC: NEGATIVE MG/DL
RBC # BLD AUTO: 4.93 M/UL (ref 4.5–5.9)
SODIUM SERPL-SCNC: 136 MMOL/L (ref 136–145)
SP GR UR STRIP: 1.02 (ref 1–1.03)
TSH SERPL DL<=0.05 MIU/L-ACNC: 2.55 UIU/ML (ref 0.27–4.2)
UROBILINOGEN UR STRIP-ACNC: NORMAL EU/DL (ref 0–1)
WBC OTHER # BLD: 11.8 K/UL (ref 3.5–11)

## 2024-12-12 PROCEDURE — 2040000000 HC PSYCH ICU R&B

## 2024-12-12 PROCEDURE — 6370000000 HC RX 637 (ALT 250 FOR IP): Performed by: PSYCHIATRY & NEUROLOGY

## 2024-12-12 PROCEDURE — 81003 URINALYSIS AUTO W/O SCOPE: CPT

## 2024-12-12 PROCEDURE — 85025 COMPLETE CBC W/AUTO DIFF WBC: CPT

## 2024-12-12 PROCEDURE — 84443 ASSAY THYROID STIM HORMONE: CPT

## 2024-12-12 PROCEDURE — 80053 COMPREHEN METABOLIC PANEL: CPT

## 2024-12-12 PROCEDURE — APPSS180 APP SPLIT SHARED TIME > 60 MINUTES: Performed by: NURSE PRACTITIONER

## 2024-12-12 PROCEDURE — 99222 1ST HOSP IP/OBS MODERATE 55: CPT | Performed by: PSYCHIATRY & NEUROLOGY

## 2024-12-12 PROCEDURE — 36415 COLL VENOUS BLD VENIPUNCTURE: CPT

## 2024-12-12 PROCEDURE — 99222 1ST HOSP IP/OBS MODERATE 55: CPT | Performed by: INTERNAL MEDICINE

## 2024-12-12 RX ORDER — CLOZAPINE 25 MG/1
25 TABLET ORAL 3 TIMES DAILY
Status: DISCONTINUED | OUTPATIENT
Start: 2024-12-12 | End: 2024-12-14

## 2024-12-12 RX ADMIN — HALOPERIDOL 5 MG: 5 TABLET ORAL at 21:03

## 2024-12-12 RX ADMIN — CLOZAPINE 25 MG: 25 TABLET ORAL at 21:03

## 2024-12-12 RX ADMIN — IBUPROFEN 400 MG: 400 TABLET, FILM COATED ORAL at 09:45

## 2024-12-12 RX ADMIN — ACETAMINOPHEN 650 MG: 325 TABLET ORAL at 10:05

## 2024-12-12 RX ADMIN — TRAZODONE HYDROCHLORIDE 50 MG: 50 TABLET ORAL at 21:03

## 2024-12-12 RX ADMIN — IBUPROFEN 400 MG: 400 TABLET, FILM COATED ORAL at 21:03

## 2024-12-12 RX ADMIN — HALOPERIDOL 5 MG: 5 TABLET ORAL at 11:12

## 2024-12-12 RX ADMIN — HYDROXYZINE HYDROCHLORIDE 50 MG: 50 TABLET ORAL at 21:03

## 2024-12-12 RX ADMIN — CLOZAPINE 25 MG: 25 TABLET ORAL at 15:53

## 2024-12-12 RX ADMIN — HYDROXYZINE HYDROCHLORIDE 50 MG: 50 TABLET ORAL at 10:05

## 2024-12-12 ASSESSMENT — PAIN SCALES - GENERAL
PAINLEVEL_OUTOF10: 3
PAINLEVEL_OUTOF10: 0
PAINLEVEL_OUTOF10: 7
PAINLEVEL_OUTOF10: 5
PAINLEVEL_OUTOF10: 8

## 2024-12-12 ASSESSMENT — PAIN DESCRIPTION - ORIENTATION: ORIENTATION: MID

## 2024-12-12 ASSESSMENT — PAIN - FUNCTIONAL ASSESSMENT: PAIN_FUNCTIONAL_ASSESSMENT: ACTIVITIES ARE NOT PREVENTED

## 2024-12-12 ASSESSMENT — PAIN DESCRIPTION - LOCATION
LOCATION: HEAD
LOCATION: HAND
LOCATION: HEAD

## 2024-12-12 ASSESSMENT — PAIN DESCRIPTION - DESCRIPTORS: DESCRIPTORS: ACHING

## 2024-12-12 NOTE — GROUP NOTE
Group Therapy Note    Date: 12/12/2024    Group Start Time: 1330  Group End Time: 1415  Group Topic: Music Therapy    [facility] Stef Rodriguez        Group Therapy Note    Attendees: 5/5     Patient's Goal:  Patients shared songs that they felt reflected \"something good about you.\" Patients shared after their song, and answered questions/expanded on ideas shared. Patients engaged in reflection on benefits of positive self-talk. Goals to increase self-esteem; Increase self-expression; Increase socialization; Demonstrate positive use of time;     Notes:  Patient attended and participated in group having positive interactions with peers and staff. Kianna was initially talkative but with support from peers and staff able to control and change behaviors. Kianna shared a song and talked about going to [employer] and that he liked to go different places with family. Asked patient if this means he has happy he has a community that loves him. Kianna agreed to this idea    Status After Intervention:  Improved    Participation Level: Active Listener and Interactive    Participation Quality: Appropriate, Attentive, and Sharing      Speech:  normal      Thought Process/Content: Logical    Affective Functioning: Congruent      Mood: euthymic      Level of consciousness:  Alert and Attentive      Response to Learning: Able to change behavior Progressing to goal      Endings: None Reported    Modes of Intervention: Support, Socialization, Exploration, Activity, Media, and Reality-testing      Discipline Responsible: Psychoeducational Specialist      Signature:  Stef Ferrer

## 2024-12-12 NOTE — GROUP NOTE
Group Therapy Note    Date: 12/12/2024    Group Start Time: 0830  Group End Time: 0900  Group Topic: Community Meeting    Edilia Meadows        Group Therapy Note    Attendees: 5/7       Patient's Goal:  \"Watch tv and play video games\"    Notes:  Goal setting    Status After Intervention:  Improved    Participation Level: Interactive    Participation Quality: Inappropriate and Intrusive      Speech:  pressured      Thought Process/Content: Flight of ideas      Affective Functioning: Blunted      Mood: anxious      Level of consciousness:  Alert      Response to Learning: Able to verbalize current knowledge/experience      Endings: None Reported    Modes of Intervention: Education, Support, Socialization, and Exploration      Discipline Responsible: Behavorial Health Tech      Signature:  Edilia Gimenez

## 2024-12-12 NOTE — GROUP NOTE
Group Therapy Note    Date: 12/12/2024    Group Start Time: 1000  Group End Time: 1030  Group Topic: Psychotherapy    ST BHGenesis Bautista MSW, LSW        Group Therapy Note    Attendees: 4/7       Patient's Goal:  Expression of feeling    Notes:  Therapeutic conversation cards provided and discussed. Patient is intermittently tearful and focused on his cousins. Hyper verbal.    Status After Intervention:  Unchanged    Participation Level: Monopolizing    Participation Quality: Intrusive      Speech:  normal      Thought Process/Content: Flight of ideas      Affective Functioning: Congruent      Mood: depressed, elevated, and irritable      Level of consciousness:  Alert and Oriented x4      Response to Learning: Progressing to goal      Endings: None Reported    Modes of Intervention: Support      Discipline Responsible: /Counselor      Signature:  LEISA Martinez LSW

## 2024-12-12 NOTE — H&P
[facility] Internal Medicine  Kamron Rosen MD; Shree Hu MD, Alex Huitron MD, Alysha Barragan MD, Shelby Malik MD; Adrian Dumont MD    [facility] Internal Medicine   IN-PATIENT SERVICE   [facility]     HISTORY AND PHYSICAL EXAMINATION            Date:   12/12/2024  Patient name:  Alistair Hutchison  Date of admission:  12/11/2024  3:17 PM  MRN:   [id number]  Account:  [id number]  YOB: 1995  PCP:    Ashwin Burrows MD  Room:   [address] [place] [address]  Code Status:    Full Code      Chief Complaint:     Suicidal /Ac Psychosis    History Obtained From:     Patient/EMR/bedside RN     History of Present Illness:     Patient29-year-old male with MRDD, mental illness is been admitted at [facility] for further management of acute psychosis  Patient cannot give any history,.  Comfortable,    Past Medical History:     Past Medical History:   Diagnosis Date    Fracture     leg    Manic depression (HCC)     Oppositional defiant behavior     PONV (postoperative nausea and vomiting)     Schizophrenia (HCC)         Past Surgical History:     Past Surgical History:   Procedure Laterality Date    EYE SURGERY      TYMPANOSTOMY TUBE PLACEMENT          Medications Prior to Admission:     Prior to Admission medications    Medication Sig Start Date End Date Taking? Authorizing Provider   hydrOXYzine HCl (ATARAX) 50 MG tablet  10/9/24   Shan Curran MD   traZODone (DESYREL) 50 MG tablet Take 1 tablet by mouth nightly as needed for Sleep 10/2/24   Corby Drake MD   hyoscyamine (LEVSIN/SL) 0.125 MG sublingual tablet Place 1 tablet under the tongue 3 times daily 10/2/24   Corby Drake MD   paliperidone palmitate ER (INVEGA SUSTENNA) 234 MG/1.5ML FRANCHESKA IM injection Inject 234 mg into the muscle every 30 days 10/24/24   Corby Drake MD   ziprasidone (GEODON) 40 MG capsule Take 1 capsule by mouth 2 times daily (with meals)    Shna Curran MD 
Presently on his way up to 75 mg 3 times a day.  She reports worsening of symptoms approximately 7 to 10 days post cessation of ECT.  After discussion of risk benefits and alternatives she is agreeable to right unilateral ECT.  Will resume Clazuril.  Patient on long-acting injectable of Invega already.  Most recently received a few days ago.  Electronically signed by HAROLDO BARNETT MD on 12/12/2024 at 1:46 PM

## 2024-12-12 NOTE — GROUP NOTE
Group Therapy Note    Date: 12/12/2024    Group Start Time: 1100  Group End Time: 1150  Group Topic: Recreational    [facility] XIOMYI Stef Wei        Group Therapy Note    Attendees: 6/7       Patient's Goal:  Patients each given a paper with a small doodle/shape on it. Asked to expand on it to a more detailed/ full picture. Patients worked on the same image at the same time, and had the opportunity to share what they made based on the same  image. Able to share preferred music during this time as well. Patient goals to increase self-expression; Increase sense of community; Increase socialization; Demonstrate positive use of time.    Notes:  Patinet difficult to have sit and engage during group. Up and down frequently. Pacing in shea and around group are. Talking consistently and difficult to redirect. Labile mood frequently switching between crying, laughing, and becoming angry while perseverating on familial issues.     Status After Intervention:  Unchanged    Participation Level: None    Participation Quality: Resistant      Speech:  Loud      Thought Process/Content: Perseverating      Affective Functioning: Congruent      Mood: dysphoric      Level of consciousness:  Alert and Preoccupied      Response to Learning: Resistant      Endings: None Reported    Modes of Intervention: Support, Socialization, Exploration, Activity, Media, and Reality-testing      Discipline Responsible: Psychoeducational Specialist      Signature:  Stef Ferrer

## 2024-12-12 NOTE — GROUP NOTE
Group Therapy Note    Date: 12/12/2024    Group Start Time: 1430  Group End Time: 1500  Group Topic: Group Documentation    Edilia Meadows        Group Therapy Note    Attendees: 5/5       Patient's Goal:  Attend and participate in wellness group    Notes:  Wellness/recovery questions    Status After Intervention:  Unchanged    Participation Level: Minimal    Participation Quality: Appropriate      Speech:  pressured      Thought Process/Content: Flight of ideas      Affective Functioning: Incongruent      Mood: anxious      Level of consciousness:  Alert      Response to Learning: Able to verbalize current knowledge/experience      Endings: None Reported    Modes of Intervention: Education, Support, and Socialization      Discipline Responsible: Behavorial Health Tech      Signature:  Edilia Gimenez

## 2024-12-13 ENCOUNTER — APPOINTMENT (OUTPATIENT)
Dept: POSTOP/PACU | Age: 29
DRG: 750 | End: 2024-12-13
Attending: PSYCHIATRY & NEUROLOGY
Payer: COMMERCIAL

## 2024-12-13 ENCOUNTER — ANESTHESIA (OUTPATIENT)
Dept: POSTOP/PACU | Age: 29
End: 2024-12-13
Payer: COMMERCIAL

## 2024-12-13 ENCOUNTER — ANESTHESIA EVENT (OUTPATIENT)
Dept: POSTOP/PACU | Age: 29
End: 2024-12-13
Payer: COMMERCIAL

## 2024-12-13 LAB — GLUCOSE BLD-MCNC: 86 MG/DL (ref 75–110)

## 2024-12-13 PROCEDURE — GZB0ZZZ ELECTROCONVULSIVE THERAPY, UNILATERAL-SINGLE SEIZURE: ICD-10-PCS | Performed by: PSYCHIATRY & NEUROLOGY

## 2024-12-13 PROCEDURE — 7100000001 HC PACU RECOVERY - ADDTL 15 MIN: Performed by: ANESTHESIOLOGY

## 2024-12-13 PROCEDURE — 3700000000 HC ANESTHESIA ATTENDED CARE: Performed by: ANESTHESIOLOGY

## 2024-12-13 PROCEDURE — 7100000000 HC PACU RECOVERY - FIRST 15 MIN: Performed by: ANESTHESIOLOGY

## 2024-12-13 PROCEDURE — 90870 ELECTROCONVULSIVE THERAPY: CPT | Performed by: PSYCHIATRY & NEUROLOGY

## 2024-12-13 PROCEDURE — 6370000000 HC RX 637 (ALT 250 FOR IP): Performed by: PSYCHIATRY & NEUROLOGY

## 2024-12-13 PROCEDURE — APPSS30 APP SPLIT SHARED TIME 16-30 MINUTES: Performed by: NURSE PRACTITIONER

## 2024-12-13 PROCEDURE — 82947 ASSAY GLUCOSE BLOOD QUANT: CPT

## 2024-12-13 PROCEDURE — 2500000003 HC RX 250 WO HCPCS: Performed by: NURSE ANESTHETIST, CERTIFIED REGISTERED

## 2024-12-13 PROCEDURE — 1240000000 HC EMOTIONAL WELLNESS R&B

## 2024-12-13 PROCEDURE — 99232 SBSQ HOSP IP/OBS MODERATE 35: CPT | Performed by: PSYCHIATRY & NEUROLOGY

## 2024-12-13 PROCEDURE — 6360000002 HC RX W HCPCS: Performed by: NURSE ANESTHETIST, CERTIFIED REGISTERED

## 2024-12-13 PROCEDURE — 2580000003 HC RX 258: Performed by: NURSE ANESTHETIST, CERTIFIED REGISTERED

## 2024-12-13 PROCEDURE — 90870 ELECTROCONVULSIVE THERAPY: CPT | Performed by: ANESTHESIOLOGY

## 2024-12-13 RX ORDER — METHOHEXITAL IN WATER/PF 100MG/10ML
SYRINGE (ML) INTRAVENOUS
Status: COMPLETED
Start: 2024-12-13 | End: 2024-12-13

## 2024-12-13 RX ORDER — KETOROLAC TROMETHAMINE 30 MG/ML
INJECTION, SOLUTION INTRAMUSCULAR; INTRAVENOUS
Status: DISCONTINUED | OUTPATIENT
Start: 2024-12-13 | End: 2024-12-13 | Stop reason: SDUPTHER

## 2024-12-13 RX ORDER — SODIUM CHLORIDE 9 MG/ML
INJECTION, SOLUTION INTRAVENOUS
Status: DISCONTINUED | OUTPATIENT
Start: 2024-12-13 | End: 2024-12-13 | Stop reason: SDUPTHER

## 2024-12-13 RX ORDER — KETOROLAC TROMETHAMINE 30 MG/ML
INJECTION, SOLUTION INTRAMUSCULAR; INTRAVENOUS
Status: COMPLETED
Start: 2024-12-13 | End: 2024-12-13

## 2024-12-13 RX ORDER — METHOHEXITAL IN WATER/PF 100MG/10ML
SYRINGE (ML) INTRAVENOUS
Status: DISCONTINUED | OUTPATIENT
Start: 2024-12-13 | End: 2024-12-13 | Stop reason: SDUPTHER

## 2024-12-13 RX ORDER — SUCCINYLCHOLINE/SOD CL,ISO/PF 200MG/10ML
SYRINGE (ML) INTRAVENOUS
Status: COMPLETED
Start: 2024-12-13 | End: 2024-12-13

## 2024-12-13 RX ORDER — SUCCINYLCHOLINE/SOD CL,ISO/PF 200MG/10ML
SYRINGE (ML) INTRAVENOUS
Status: DISCONTINUED | OUTPATIENT
Start: 2024-12-13 | End: 2024-12-13 | Stop reason: SDUPTHER

## 2024-12-13 RX ADMIN — HYDROXYZINE HYDROCHLORIDE 50 MG: 50 TABLET ORAL at 14:07

## 2024-12-13 RX ADMIN — ACETAMINOPHEN 650 MG: 325 TABLET ORAL at 11:41

## 2024-12-13 RX ADMIN — IBUPROFEN 400 MG: 400 TABLET, FILM COATED ORAL at 17:11

## 2024-12-13 RX ADMIN — ACETAMINOPHEN 650 MG: 325 TABLET ORAL at 19:31

## 2024-12-13 RX ADMIN — Medication 120 MG: at 07:54

## 2024-12-13 RX ADMIN — TRAZODONE HYDROCHLORIDE 50 MG: 50 TABLET ORAL at 19:29

## 2024-12-13 RX ADMIN — CLOZAPINE 25 MG: 25 TABLET ORAL at 19:29

## 2024-12-13 RX ADMIN — ACETAMINOPHEN 650 MG: 325 TABLET ORAL at 06:21

## 2024-12-13 RX ADMIN — CLOZAPINE 25 MG: 25 TABLET ORAL at 09:32

## 2024-12-13 RX ADMIN — HYDROXYZINE HYDROCHLORIDE 50 MG: 50 TABLET ORAL at 19:29

## 2024-12-13 RX ADMIN — CLOZAPINE 25 MG: 25 TABLET ORAL at 14:07

## 2024-12-13 RX ADMIN — IBUPROFEN 400 MG: 400 TABLET, FILM COATED ORAL at 09:31

## 2024-12-13 RX ADMIN — HYDROXYZINE HYDROCHLORIDE 50 MG: 50 TABLET ORAL at 11:41

## 2024-12-13 RX ADMIN — KETOROLAC TROMETHAMINE 30 MG: 30 INJECTION, SOLUTION INTRAMUSCULAR at 07:53

## 2024-12-13 RX ADMIN — SODIUM CHLORIDE: 9 INJECTION, SOLUTION INTRAVENOUS at 07:53

## 2024-12-13 RX ADMIN — Medication 100 MG: at 07:54

## 2024-12-13 RX ADMIN — HALOPERIDOL 5 MG: 5 TABLET ORAL at 19:29

## 2024-12-13 ASSESSMENT — PAIN - FUNCTIONAL ASSESSMENT
PAIN_FUNCTIONAL_ASSESSMENT: ACTIVITIES ARE NOT PREVENTED
PAIN_FUNCTIONAL_ASSESSMENT: CRITICAL CARE PAIN OBSERVATION TOOL (CPOT)

## 2024-12-13 ASSESSMENT — PAIN DESCRIPTION - LOCATION
LOCATION: HEAD

## 2024-12-13 ASSESSMENT — PAIN DESCRIPTION - DESCRIPTORS
DESCRIPTORS: ACHING

## 2024-12-13 ASSESSMENT — PAIN SCALES - GENERAL
PAINLEVEL_OUTOF10: 7
PAINLEVEL_OUTOF10: 7

## 2024-12-13 NOTE — ANESTHESIA PRE PROCEDURE
Department of Anesthesiology  Preprocedure Note       Name:  Alistair Hutchison   Age:  29 y.o.  :  1995                                          MRN:  [id number]         Date:  2024      Surgeon: Dr Drake    Procedure: ECT    Medications prior to admission:   Prior to Admission medications    Medication Sig Start Date End Date Taking? Authorizing Provider   hydrOXYzine HCl (ATARAX) 50 MG tablet  10/9/24   ProviderShan MD   traZODone (DESYREL) 50 MG tablet Take 1 tablet by mouth nightly as needed for Sleep 10/2/24   Corby Drake MD   hyoscyamine (LEVSIN/SL) 0.125 MG sublingual tablet Place 1 tablet under the tongue 3 times daily 10/2/24   Corby Drake MD   paliperidone palmitate ER (INVEGA SUSTENNA) 234 MG/1.5ML FRANCHESKA IM injection Inject 234 mg into the muscle every 30 days 10/24/24   Corby Drake MD   ziprasidone (GEODON) 40 MG capsule Take 1 capsule by mouth 2 times daily (with meals)    Provider, MD Shan       Current medications:    Current Facility-Administered Medications   Medication Dose Route Frequency Provider Last Rate Last Admin    cloZAPine (CLOZARIL) tablet 25 mg  25 mg Oral TID Corby Drake MD   25 mg at 24    acetaminophen (TYLENOL) tablet 650 mg  650 mg Oral Q6H PRN Corby Drake MD   650 mg at 24    ibuprofen (ADVIL;MOTRIN) tablet 400 mg  400 mg Oral Q6H PRN Corby Drake MD   400 mg at 24    hydrOXYzine HCl (ATARAX) tablet 50 mg  50 mg Oral TID PRN Corby Drake MD   50 mg at 24    traZODone (DESYREL) tablet 50 mg  50 mg Oral Nightly PRN Corby Drake MD   50 mg at 24    polyethylene glycol (GLYCOLAX) packet 17 g  17 g Oral Daily PRN Corby Drake MD        aluminum & magnesium hydroxide-simethicone (MAALOX) 200-200-20 MG/5ML suspension 30 mL  30 mL Oral Q6H PRN Corby Drake MD        haloperidol lactate (HALDOL) injection 5 mg  5 mg IntraMUSCular Q6H PRN Noelle  s/p seizure

## 2024-12-13 NOTE — GROUP NOTE
Group Therapy Note    Date: 12/13/2024    Group Start Time: 1330  Group End Time: 1430  Group Topic: Psychoeducation    [facility]    Ayse Fung CTRS    Group Therapy Note    Attendees: 5/7     Patient's Goal:  Patient will identify benefits of creative expression for coping and relaxation.    Notes:  Patient attended group but was unwilling to participate due to agitation and yelling.  Patient yelled at peers and writer throughout group, demanding to have the TV changed to his liking and telling others to \"shut the fuck up.\"  Patient needed frequent redirection.     Status After Intervention:  Decompensated    Participation Level: Minimal    Participation Quality: Inappropriate, Intrusive, and Resistant      Speech:  pressured and loud      Thought Process/Content: Flight of ideas      Affective Functioning: Blunted      Mood: dysphoric      Level of consciousness:  Alert, Preoccupied, and Inattentive      Response to Learning: Resistant      Endings: None Reported    Modes of Intervention: Education, Support, Socialization, and Exploration      Discipline Responsible: Psychoeducational Specialist      Signature:  NIKKI Mensah

## 2024-12-13 NOTE — ANESTHESIA POSTPROCEDURE EVALUATION
Department of Anesthesiology  Postprocedure Note    Patient: Alistair Hutchison  MRN: [id number]  YOB: 1995  Date of evaluation: 12/13/2024    Procedure Summary       Date: 12/13/24 Room / Location: [facility] PACU    Anesthesia Start: 0751 Anesthesia Stop: 0805    Procedure: ECT W/ ANESTHESIA Diagnosis: Disorganized schizophrenia    Scheduled Providers:  Responsible Provider: Kristel Alvarado MD    Anesthesia Type: General ASA Status: 3            Anesthesia Type: General    Amalia Phase I: Amalia Score: 5    Amalia Phase II:      Anesthesia Post Evaluation    Comments: POST- ANESTHESIA EVALUATION       Pt Name: Alistair Hutchison  MRN: [id number]  YOB: 1995  Date of evaluation: 12/13/2024  Time:  10:04 AM      BP (!) 129/90   Pulse (!) 115   Temp 97.2 °F (36.2 °C) (Temporal)   Resp 16   Ht 1.651 m (5' 5\")   Wt 113.4 kg (250 lb)   SpO2 97%   BMI 41.60 kg/m²      Consciousness Level  Awake  Cardiopulmonary Status  Stable  Pain Adequately Treated YES  Nausea / Vomiting  NO  Adequate Hydration  YES  Anesthesia Related Complications NONE      Electronically signed by Kristel Alvarado MD on 12/13/2024 at 10:04 AM      No notable events documented.

## 2024-12-13 NOTE — GROUP NOTE
Group Therapy Note    Date: 12/13/2024    Group Start Time: 1030  Group End Time: 1100  Group Topic: Activity    [facility]    Penny Brush CTRS        Group Therapy Note    Attendees: 6/6     Topic: : To increase socialization, practice leisure skills including music choices and   art, as a means to relate to peers also and practice communication skills.           Comments:   Patient did not participate in Activity Group, at 1100, despite staff encouragement.   Pt was seclusive to self but out in day room and hallway, pacing aloof of peers.     Q15 minute safety checks maintained for patient safety and will continue to encourage   patient to attend unit programming.       Discipline Responsible: Psychoeducational Specialist   Signature: NIKKI MCFARLAND

## 2024-12-13 NOTE — GROUP NOTE
Group Therapy Note    Date: 12/13/2024    Group Start Time: 1100  Group End Time: 1130  Group Topic: Psychoeducation    [facility]    Ayse Fung CTRS    Group Therapy Note    Attendees: 5/8     Patient's Goal:  Patient will identify benefits of leisure for coping and stress management    Notes:  Patient attended group and participated    Status After Intervention:  Improved    Participation Level: Interactive    Participation Quality: Appropriate and Intrusive      Speech:  slurred      Thought Process/Content: Flight of ideas      Affective Functioning: Constricted/Restricted      Mood: anxious      Level of consciousness:  Alert and Preoccupied      Response to Learning: Able to verbalize current knowledge/experience      Endings: None Reported    Modes of Intervention: Education, Support, Socialization, and Exploration      Discipline Responsible: Psychoeducational Specialist      Signature:  NIKKI Mensah

## 2024-12-14 PROCEDURE — APPSS30 APP SPLIT SHARED TIME 16-30 MINUTES: Performed by: NURSE PRACTITIONER

## 2024-12-14 PROCEDURE — 6360000002 HC RX W HCPCS: Performed by: PSYCHIATRY & NEUROLOGY

## 2024-12-14 PROCEDURE — 99232 SBSQ HOSP IP/OBS MODERATE 35: CPT | Performed by: PSYCHIATRY & NEUROLOGY

## 2024-12-14 PROCEDURE — 6370000000 HC RX 637 (ALT 250 FOR IP): Performed by: PSYCHIATRY & NEUROLOGY

## 2024-12-14 PROCEDURE — 1240000000 HC EMOTIONAL WELLNESS R&B

## 2024-12-14 RX ORDER — CLOZAPINE 25 MG/1
37.5 TABLET ORAL 3 TIMES DAILY
Status: DISCONTINUED | OUTPATIENT
Start: 2024-12-15 | End: 2024-12-16

## 2024-12-14 RX ADMIN — CLOZAPINE 25 MG: 25 TABLET ORAL at 14:35

## 2024-12-14 RX ADMIN — CLOZAPINE 25 MG: 25 TABLET ORAL at 08:04

## 2024-12-14 RX ADMIN — CLOZAPINE 25 MG: 25 TABLET ORAL at 20:30

## 2024-12-14 RX ADMIN — HALOPERIDOL LACTATE 5 MG: 5 INJECTION, SOLUTION INTRAMUSCULAR at 08:04

## 2024-12-14 RX ADMIN — DIPHENHYDRAMINE HYDROCHLORIDE 50 MG: 50 INJECTION INTRAMUSCULAR; INTRAVENOUS at 14:35

## 2024-12-14 RX ADMIN — TRAZODONE HYDROCHLORIDE 50 MG: 50 TABLET ORAL at 20:30

## 2024-12-14 RX ADMIN — HALOPERIDOL LACTATE 5 MG: 5 INJECTION, SOLUTION INTRAMUSCULAR at 14:35

## 2024-12-14 RX ADMIN — DIPHENHYDRAMINE HYDROCHLORIDE 50 MG: 50 INJECTION INTRAMUSCULAR; INTRAVENOUS at 08:05

## 2024-12-14 RX ADMIN — HYDROXYZINE HYDROCHLORIDE 50 MG: 50 TABLET ORAL at 05:40

## 2024-12-14 RX ADMIN — IBUPROFEN 400 MG: 400 TABLET, FILM COATED ORAL at 08:04

## 2024-12-14 ASSESSMENT — PAIN DESCRIPTION - LOCATION: LOCATION: GENERALIZED

## 2024-12-14 ASSESSMENT — PAIN SCALES - GENERAL
PAINLEVEL_OUTOF10: 4
PAINLEVEL_OUTOF10: 0

## 2024-12-14 NOTE — GROUP NOTE
Group Therapy Note    Date: 12/14/2024    Group Start Time: 1000  Group End Time: 1030  Group Topic: Psychoeducation    Genesis Castillo MSW, LSW        Group Therapy Note    Attendees: 3/6       Patient's Goal:  Expression of feeling    Notes:  Therapeutic conversation game provided and discussed.    Status After Intervention:  Unchanged    Participation Level: Monopolizing    Participation Quality: Intrusive      Speech:  slurred      Thought Process/Content: Perseverating      Affective Functioning: Congruent      Mood: irritable      Level of consciousness:  Preoccupied      Response to Learning: Resistant      Endings: None Reported    Modes of Intervention: Support      Discipline Responsible: /Counselor      Signature:  LEISA Martinez, RAY

## 2024-12-15 PROCEDURE — 2580000003 HC RX 258: Performed by: PSYCHIATRY & NEUROLOGY

## 2024-12-15 PROCEDURE — 1240000000 HC EMOTIONAL WELLNESS R&B

## 2024-12-15 PROCEDURE — APPSS30 APP SPLIT SHARED TIME 16-30 MINUTES: Performed by: NURSE PRACTITIONER

## 2024-12-15 PROCEDURE — 6370000000 HC RX 637 (ALT 250 FOR IP): Performed by: PSYCHIATRY & NEUROLOGY

## 2024-12-15 PROCEDURE — 6360000002 HC RX W HCPCS: Performed by: PSYCHIATRY & NEUROLOGY

## 2024-12-15 PROCEDURE — 99232 SBSQ HOSP IP/OBS MODERATE 35: CPT | Performed by: PSYCHIATRY & NEUROLOGY

## 2024-12-15 RX ADMIN — TRAZODONE HYDROCHLORIDE 50 MG: 50 TABLET ORAL at 20:09

## 2024-12-15 RX ADMIN — HYDROXYZINE HYDROCHLORIDE 50 MG: 50 TABLET ORAL at 20:09

## 2024-12-15 RX ADMIN — ZIPRASIDONE MESYLATE 20 MG: 20 INJECTION, POWDER, LYOPHILIZED, FOR SOLUTION INTRAMUSCULAR at 08:32

## 2024-12-15 RX ADMIN — CLOZAPINE 37.5 MG: 25 TABLET ORAL at 14:49

## 2024-12-15 RX ADMIN — DIPHENHYDRAMINE HYDROCHLORIDE 50 MG: 50 INJECTION INTRAMUSCULAR; INTRAVENOUS at 11:47

## 2024-12-15 RX ADMIN — HALOPERIDOL LACTATE 5 MG: 5 INJECTION, SOLUTION INTRAMUSCULAR at 11:47

## 2024-12-15 RX ADMIN — ACETAMINOPHEN 650 MG: 325 TABLET ORAL at 08:10

## 2024-12-15 RX ADMIN — CLOZAPINE 37.5 MG: 25 TABLET ORAL at 20:09

## 2024-12-15 RX ADMIN — CLOZAPINE 37.5 MG: 25 TABLET ORAL at 08:10

## 2024-12-15 ASSESSMENT — PAIN SCALES - GENERAL
PAINLEVEL_OUTOF10: 6
PAINLEVEL_OUTOF10: 0

## 2024-12-15 NOTE — GROUP NOTE
Group Therapy Note    Date: 12/15/2024    Group Start Time: 1100  Group End Time: 1155  Group Topic: Music Therapy    Stef Malik        Group Therapy Note    Attendees: 3/7       Patient's Goal:  Patient's shown examples of word-clouds, and given handouts with positive personal qualities on them. Instructed to write their name in the center of the paper, and make a word-cloud of positive qualities of themself on the page. Patients also able to share music that they felt reflected positive qualities of themselves. Patient goals to increase sense of community. Goals to increase self-esteem; Increase self-expression; Increase socialization; Demonstrate positive use of time;     Notes:  Kianna initially engaged appropriately to his functioning level in activity. Could not spell some words, but wrote his name and shared music and talked about good things about himself with some encouragement and specific questioning. Patient shared two songs. Becoming angry during peers first song. Difficult to calm and expressed if he did not like the song that was allowed, but that he cannot interrupt the peers song and sharing. Offered patient to go to room and come back after song and patient refused. Calmed between songs but when peer shared another song patient agitated and disruptive. Unable to be calmed and becoming angry and yelling at peer. Unable to be redirected. Refusing to go to room with instruction. Raised a fist to the writer before being able to be redirected to room. Repeatedly came out of room and yelling at peers and staff    Status After Intervention:  Decompensated    Participation Level: Interactive    Participation Quality: Resistant      Speech:  Loud      Thought Process/Content: Perseverating      Affective Functioning: Congruent      Mood: angry      Level of consciousness:  Alert      Response to Learning:

## 2024-12-16 ENCOUNTER — ANESTHESIA EVENT (OUTPATIENT)
Dept: POSTOP/PACU | Age: 29
End: 2024-12-16
Payer: COMMERCIAL

## 2024-12-16 ENCOUNTER — ANESTHESIA (OUTPATIENT)
Dept: POSTOP/PACU | Age: 29
End: 2024-12-16
Payer: COMMERCIAL

## 2024-12-16 ENCOUNTER — APPOINTMENT (OUTPATIENT)
Dept: POSTOP/PACU | Age: 29
DRG: 750 | End: 2024-12-16
Payer: COMMERCIAL

## 2024-12-16 LAB — GLUCOSE BLD-MCNC: 121 MG/DL (ref 75–110)

## 2024-12-16 PROCEDURE — 6360000002 HC RX W HCPCS: Performed by: NURSE ANESTHETIST, CERTIFIED REGISTERED

## 2024-12-16 PROCEDURE — 2500000003 HC RX 250 WO HCPCS: Performed by: NURSE ANESTHETIST, CERTIFIED REGISTERED

## 2024-12-16 PROCEDURE — 6360000002 HC RX W HCPCS: Performed by: ANESTHESIOLOGY

## 2024-12-16 PROCEDURE — 82947 ASSAY GLUCOSE BLOOD QUANT: CPT

## 2024-12-16 PROCEDURE — 3700000000 HC ANESTHESIA ATTENDED CARE

## 2024-12-16 PROCEDURE — 7100000001 HC PACU RECOVERY - ADDTL 15 MIN

## 2024-12-16 PROCEDURE — 90870 ELECTROCONVULSIVE THERAPY: CPT | Performed by: PSYCHIATRY & NEUROLOGY

## 2024-12-16 PROCEDURE — 1240000000 HC EMOTIONAL WELLNESS R&B

## 2024-12-16 PROCEDURE — 99232 SBSQ HOSP IP/OBS MODERATE 35: CPT | Performed by: PSYCHIATRY & NEUROLOGY

## 2024-12-16 PROCEDURE — 90870 ELECTROCONVULSIVE THERAPY: CPT

## 2024-12-16 PROCEDURE — 6370000000 HC RX 637 (ALT 250 FOR IP): Performed by: PSYCHIATRY & NEUROLOGY

## 2024-12-16 PROCEDURE — APPSS30 APP SPLIT SHARED TIME 16-30 MINUTES: Performed by: NURSE PRACTITIONER

## 2024-12-16 PROCEDURE — 2580000003 HC RX 258: Performed by: ANESTHESIOLOGY

## 2024-12-16 PROCEDURE — 7100000000 HC PACU RECOVERY - FIRST 15 MIN

## 2024-12-16 RX ORDER — SUCCINYLCHOLINE/SOD CL,ISO/PF 200MG/10ML
SYRINGE (ML) INTRAVENOUS
Status: COMPLETED
Start: 2024-12-16 | End: 2024-12-16

## 2024-12-16 RX ORDER — KETOROLAC TROMETHAMINE 30 MG/ML
INJECTION, SOLUTION INTRAMUSCULAR; INTRAVENOUS
Status: COMPLETED
Start: 2024-12-16 | End: 2024-12-16

## 2024-12-16 RX ORDER — LIDOCAINE HYDROCHLORIDE 10 MG/ML
1 INJECTION, SOLUTION EPIDURAL; INFILTRATION; INTRACAUDAL; PERINEURAL
Status: COMPLETED | OUTPATIENT
Start: 2024-12-16 | End: 2024-12-16

## 2024-12-16 RX ORDER — CLOZAPINE 25 MG/1
50 TABLET ORAL 3 TIMES DAILY
Status: DISCONTINUED | OUTPATIENT
Start: 2024-12-16 | End: 2024-12-18

## 2024-12-16 RX ORDER — METHOHEXITAL IN WATER/PF 100MG/10ML
SYRINGE (ML) INTRAVENOUS
Status: COMPLETED
Start: 2024-12-16 | End: 2024-12-16

## 2024-12-16 RX ORDER — KETOROLAC TROMETHAMINE 30 MG/ML
INJECTION, SOLUTION INTRAMUSCULAR; INTRAVENOUS
Status: DISCONTINUED | OUTPATIENT
Start: 2024-12-16 | End: 2024-12-16 | Stop reason: SDUPTHER

## 2024-12-16 RX ORDER — SUCCINYLCHOLINE/SOD CL,ISO/PF 200MG/10ML
SYRINGE (ML) INTRAVENOUS
Status: DISCONTINUED | OUTPATIENT
Start: 2024-12-16 | End: 2024-12-16 | Stop reason: SDUPTHER

## 2024-12-16 RX ORDER — SODIUM CHLORIDE 9 MG/ML
INJECTION, SOLUTION INTRAVENOUS CONTINUOUS
Status: DISCONTINUED | OUTPATIENT
Start: 2024-12-16 | End: 2024-12-23 | Stop reason: HOSPADM

## 2024-12-16 RX ORDER — METHOHEXITAL IN WATER/PF 100MG/10ML
SYRINGE (ML) INTRAVENOUS
Status: DISCONTINUED | OUTPATIENT
Start: 2024-12-16 | End: 2024-12-16 | Stop reason: SDUPTHER

## 2024-12-16 RX ADMIN — HYDROXYZINE HYDROCHLORIDE 50 MG: 50 TABLET ORAL at 20:32

## 2024-12-16 RX ADMIN — SODIUM CHLORIDE: 9 INJECTION, SOLUTION INTRAVENOUS at 07:00

## 2024-12-16 RX ADMIN — Medication 100 MG: at 07:02

## 2024-12-16 RX ADMIN — SODIUM CHLORIDE: 9 INJECTION, SOLUTION INTRAVENOUS at 06:31

## 2024-12-16 RX ADMIN — LIDOCAINE HYDROCHLORIDE 1 ML: 10 INJECTION, SOLUTION EPIDURAL; INFILTRATION; INTRACAUDAL; PERINEURAL at 06:30

## 2024-12-16 RX ADMIN — CLOZAPINE 50 MG: 25 TABLET ORAL at 20:32

## 2024-12-16 RX ADMIN — TRAZODONE HYDROCHLORIDE 50 MG: 50 TABLET ORAL at 20:32

## 2024-12-16 RX ADMIN — CLOZAPINE 37.5 MG: 25 TABLET ORAL at 15:48

## 2024-12-16 RX ADMIN — IBUPROFEN 400 MG: 400 TABLET, FILM COATED ORAL at 08:12

## 2024-12-16 RX ADMIN — CLOZAPINE 37.5 MG: 25 TABLET ORAL at 08:10

## 2024-12-16 RX ADMIN — ACETAMINOPHEN 650 MG: 325 TABLET ORAL at 11:57

## 2024-12-16 RX ADMIN — KETOROLAC TROMETHAMINE 30 MG: 30 INJECTION, SOLUTION INTRAMUSCULAR at 07:02

## 2024-12-16 RX ADMIN — ACETAMINOPHEN 650 MG: 325 TABLET ORAL at 05:34

## 2024-12-16 ASSESSMENT — PAIN - FUNCTIONAL ASSESSMENT: PAIN_FUNCTIONAL_ASSESSMENT: NONE - DENIES PAIN

## 2024-12-16 ASSESSMENT — PAIN SCALES - WONG BAKER: WONGBAKER_NUMERICALRESPONSE: NO HURT

## 2024-12-16 ASSESSMENT — PAIN SCALES - GENERAL
PAINLEVEL_OUTOF10: 8
PAINLEVEL_OUTOF10: 0

## 2024-12-16 NOTE — ANESTHESIA POSTPROCEDURE EVALUATION
Department of Anesthesiology  Postprocedure Note    Patient: Alistair Hutchison  MRN: [id number]  YOB: 1995  Date of evaluation: 12/16/2024    Procedure Summary       Date: 12/16/24 Room / Location: [facility] PACU    Anesthesia Start: 0700 Anesthesia Stop: 0713    Procedure: ECT W/ ANESTHESIA Diagnosis: Disorganized schizophrenia    Scheduled Providers:  Responsible Provider: Jewel Ji MD    Anesthesia Type: general ASA Status: 3            Anesthesia Type: No value filed.    Amalia Phase I: Amalia Score: 7    Amalia Phase II:      Anesthesia Post Evaluation    Comments: POST- ANESTHESIA EVALUATION       Pt Name: Alistair Hutchison  MRN: [id number]  YOB: 1995  Date of evaluation: 12/16/2024  Time:  1:30 PM      /78   Pulse (!) 112   Temp 97.7 °F (36.5 °C) (Oral)   Resp 14   Ht 1.651 m (5' 5\")   Wt 113.4 kg (250 lb)   SpO2 98%   BMI 41.60 kg/m²      Consciousness Level  Awake  Cardiopulmonary Status  Stable  Pain Adequately Treated YES  Nausea / Vomiting  NO  Adequate Hydration  YES  Anesthesia Related Complications NONE      Electronically signed by Jewel Ji MD on 12/16/2024 at 1:30 PM           No notable events documented.

## 2024-12-16 NOTE — GROUP NOTE
Group Therapy Note    Date: 12/16/2024    Group Start Time: 1330  Group End Time: 1430  Group Topic: Psychoeducation    [facility] Ayse Lopez CTRS    Group Therapy Note    Attendees: 3/7     Patient's Goal:  Patient will identify benefits of leisure and music for coping and stress management    Notes:  Patient attended group and participated.  Patient expressed improved mood and improvement of headache due to group activity.      Status After Intervention:  Improved    Participation Level: Active Listener and Interactive    Participation Quality: Appropriate and Sharing      Speech:  slurred      Thought Process/Content: Logical      Affective Functioning: Blunted      Mood: anxious      Level of consciousness:  Alert, Oriented x4, and Preoccupied      Response to Learning: Able to verbalize current knowledge/experience and Able to verbalize/acknowledge new learning      Endings: None Reported    Modes of Intervention: Education, Support, Socialization, and Exploration      Discipline Responsible: Psychoeducational Specialist      Signature:  NIKKI Mensah

## 2024-12-16 NOTE — GROUP NOTE
Psych-Ed/Relapse Prevention Group Note        Date: December 16, 2024 Start Time: 11am  End Time: 11:30am      Number of Participants in Group & Unit Census:  2/9    Topic: Leisure skills    Goal of Group:Patient will identify benefits of leisure for coping and relaxation      Comments:     Patient did not participate in Psych-Ed/Relapse Prevention group, despite staff encouragement and explanation of benefits.  Patient remain seclusive to self.  Q15 minute safety checks maintained for patient safety and will continue to encourage patient to attend unit programming.         Signature:  Ayse Fung, CTRS

## 2024-12-16 NOTE — GROUP NOTE
Group Therapy Note     Date: 12/16/2024     Group Start Time: 0930  Group End Time: 1000  Group Topic: Community Group     [facility]     Attendees: 4/7     Status After Intervention:  Unchanged      Participation Level: Interactive     Participation Quality: Appropriate and sharing        Speech:  Pressured, slurred        Thought Process/Content: Flight of ideas        Affective Functioning: Blunt, Constricted/Restricted        Mood: Anxious, sad, worried        Level of consciousness:  Alert, Oriented x4, and Attentive        Response to Learning: Able to verbalize current knowledge/experience, Able to verbalize/acknowledge new learning, and Progressing to goal        Endings: None Reported     Modes of Intervention: Education, Support, Socialization, and Exploration        Discipline Responsible: Licensed Practical Nurse

## 2024-12-16 NOTE — ANESTHESIA PRE PROCEDURE
03/17/2023 08:51 AM       COVID-19 Screening (If Applicable): No results found for: \"COVID19\"        Anesthesia Evaluation  Patient summary reviewed and Nursing notes reviewed   no history of anesthetic complications:   Airway: Mallampati: III  TM distance: >3 FB   Neck ROM: full  Mouth opening: < 3 FB   Dental: normal exam         Pulmonary:Negative Pulmonary ROS and normal exam  breath sounds clear to auscultation                             Cardiovascular:Negative CV ROS            Rhythm: regular  Rate: normal                    Neuro/Psych:   (+) psychiatric history:depression/anxiety             GI/Hepatic/Renal:   (+) morbid obesity          Endo/Other: Negative Endo/Other ROS                    Abdominal:             Vascular: negative vascular ROS.         Other Findings:           Anesthesia Plan      general     ASA 3     (TIVA  Consent signed by Grandma and Legal Gaurdian)  Induction: intravenous.    MIPS: Prophylactic antiemetics administered.  Anesthetic plan and risks discussed with patient.      Plan discussed with CRNA.                  Jewel Ji MD   12/16/2024

## 2024-12-17 PROCEDURE — APPSS30 APP SPLIT SHARED TIME 16-30 MINUTES: Performed by: NURSE PRACTITIONER

## 2024-12-17 PROCEDURE — 90833 PSYTX W PT W E/M 30 MIN: CPT | Performed by: PSYCHIATRY & NEUROLOGY

## 2024-12-17 PROCEDURE — 99232 SBSQ HOSP IP/OBS MODERATE 35: CPT | Performed by: PSYCHIATRY & NEUROLOGY

## 2024-12-17 PROCEDURE — 1240000000 HC EMOTIONAL WELLNESS R&B

## 2024-12-17 PROCEDURE — 6370000000 HC RX 637 (ALT 250 FOR IP): Performed by: PSYCHIATRY & NEUROLOGY

## 2024-12-17 RX ADMIN — CLOZAPINE 50 MG: 25 TABLET ORAL at 08:33

## 2024-12-17 RX ADMIN — HYDROXYZINE HYDROCHLORIDE 50 MG: 50 TABLET ORAL at 21:07

## 2024-12-17 RX ADMIN — CLOZAPINE 50 MG: 25 TABLET ORAL at 14:16

## 2024-12-17 RX ADMIN — CLOZAPINE 50 MG: 25 TABLET ORAL at 21:07

## 2024-12-17 RX ADMIN — TRAZODONE HYDROCHLORIDE 50 MG: 50 TABLET ORAL at 21:07

## 2024-12-17 ASSESSMENT — PAIN SCALES - GENERAL
PAINLEVEL_OUTOF10: 0
PAINLEVEL_OUTOF10: 0

## 2024-12-17 NOTE — GROUP NOTE
Group Therapy Note    Date: 12/16/2024    Group Start Time: 2000  Group End Time: 2030  Group Topic: Wrap-Up    [facility]    Sonam Krishnan LPN           Patient's Goal:  To be discharged before fidel and go home and watch youtube and play forte night. Patient also wants to listen to [employer] music and watch [employer]ascar.    Status After Intervention:  Improved    Participation Level: Active Listener and Interactive    Participation Quality: Intrusive      Speech:  pressured      Thought Process/Content: preoccupied       Affective Functioning: Flat      Mood: anxious      Level of consciousness:  Alert      Response to Learning: Able to verbalize current knowledge/experience      Endings: None Reported    Modes of Intervention: Education and Support      Discipline Responsible: Licensed Practical Nurse      Signature:  RIP Masters

## 2024-12-17 NOTE — GROUP NOTE
Group Therapy Note    Date: 12/17/2024    Group Start Time: 1330  Group End Time: 1430  Group Topic: Psychoeducation    [facility] Ayse Lopez CTRS    Group Therapy Note    Attendees: 4/9     Patient's Goal:  Patient will identify benefits of music and leisure for coping and stress management    Notes:  Patient attended group and participated.    Status After Intervention:  Improved    Participation Level: Interactive    Participation Quality: Appropriate and Sharing      Speech:  normal      Thought Process/Content: Logical      Affective Functioning: Constricted/Restricted      Mood: euthymic      Level of consciousness:  Alert and Attentive      Response to Learning: Able to verbalize current knowledge/experience, Able to verbalize/acknowledge new learning, Capable of insight, and Progressing to goal      Endings: None Reported    Modes of Intervention: Education, Support, Socialization, and Exploration      Discipline Responsible: Psychoeducational Specialist      Signature:  NIKKI Mensah

## 2024-12-17 NOTE — GROUP NOTE
Group Therapy Note    Date: 12/17/2024    Group Start Time: 1000  Group End Time: 1035  Group Topic: Psychoeducation    [facility] LSW        Group Therapy Note    Attendees: 4/9       Patient's Goal:  managing stress     Notes:      Status After Intervention:  Improved    Participation Level: Active Listener and Interactive    Participation Quality: Appropriate      Speech:  normal      Thought Process/Content: Logical      Affective Functioning: Congruent      Mood: euthymic      Level of consciousness:  Alert and Oriented x4      Response to Learning: Able to verbalize current knowledge/experience      Endings: None Reported    Modes of Intervention: Education and Support      Discipline Responsible: /Counselor      Signature:  RAY Wray

## 2024-12-17 NOTE — GROUP NOTE
Group Therapy Note    Date: 12/17/2024    Group Start Time: 1100  Group End Time: 1135  Group Topic: Psychoeducation    [facility]    Ayes Fung CTRS    Group Therapy Note    Attendees: 3/9     Patient's Goal:  Patient will demonstrate improved interpersonal skills    Notes:  Patient attended group and participated for a short time.  Patient became frustrated with peer yelling in day area throughout group and removed himself.  Patient was able to deescalate using coping skills and talk time.      Status After Intervention:  Improved    Participation Level: Active Listener and Interactive    Participation Quality: Appropriate and Sharing      Speech:  normal      Thought Process/Content: Logical      Affective Functioning: Constricted/Restricted      Mood: euthymic      Level of consciousness:  Alert      Response to Learning: Able to verbalize current knowledge/experience, Able to verbalize/acknowledge new learning, Able to change behavior, and Progressing to goal      Endings: None Reported    Modes of Intervention: Education, Support, Socialization, and Exploration      Discipline Responsible: Psychoeducational Specialist      Signature:  NIKKI Mensah

## 2024-12-18 ENCOUNTER — ANESTHESIA EVENT (OUTPATIENT)
Dept: POSTOP/PACU | Age: 29
End: 2024-12-18
Payer: COMMERCIAL

## 2024-12-18 ENCOUNTER — APPOINTMENT (OUTPATIENT)
Dept: POSTOP/PACU | Age: 29
DRG: 750 | End: 2024-12-18
Payer: COMMERCIAL

## 2024-12-18 ENCOUNTER — ANESTHESIA (OUTPATIENT)
Dept: POSTOP/PACU | Age: 29
End: 2024-12-18
Payer: COMMERCIAL

## 2024-12-18 DIAGNOSIS — F20.1 DISORGANIZED SCHIZOPHRENIA (HCC): Primary | ICD-10-CM

## 2024-12-18 LAB — GLUCOSE BLD-MCNC: 107 MG/DL (ref 75–110)

## 2024-12-18 PROCEDURE — 6370000000 HC RX 637 (ALT 250 FOR IP): Performed by: PSYCHIATRY & NEUROLOGY

## 2024-12-18 PROCEDURE — 90870 ELECTROCONVULSIVE THERAPY: CPT | Performed by: PSYCHIATRY & NEUROLOGY

## 2024-12-18 PROCEDURE — 90870 ELECTROCONVULSIVE THERAPY: CPT | Performed by: ANESTHESIOLOGY

## 2024-12-18 PROCEDURE — 99232 SBSQ HOSP IP/OBS MODERATE 35: CPT | Performed by: PSYCHIATRY & NEUROLOGY

## 2024-12-18 PROCEDURE — 7100000000 HC PACU RECOVERY - FIRST 15 MIN: Performed by: ANESTHESIOLOGY

## 2024-12-18 PROCEDURE — 3700000000 HC ANESTHESIA ATTENDED CARE: Performed by: ANESTHESIOLOGY

## 2024-12-18 PROCEDURE — 7100000001 HC PACU RECOVERY - ADDTL 15 MIN: Performed by: ANESTHESIOLOGY

## 2024-12-18 PROCEDURE — 6360000002 HC RX W HCPCS: Performed by: NURSE ANESTHETIST, CERTIFIED REGISTERED

## 2024-12-18 PROCEDURE — APPSS30 APP SPLIT SHARED TIME 16-30 MINUTES: Performed by: NURSE PRACTITIONER

## 2024-12-18 PROCEDURE — 1240000000 HC EMOTIONAL WELLNESS R&B

## 2024-12-18 PROCEDURE — 82947 ASSAY GLUCOSE BLOOD QUANT: CPT

## 2024-12-18 PROCEDURE — 2500000003 HC RX 250 WO HCPCS: Performed by: NURSE ANESTHETIST, CERTIFIED REGISTERED

## 2024-12-18 RX ORDER — CLOZAPINE 25 MG/1
75 TABLET ORAL 3 TIMES DAILY
Status: DISCONTINUED | OUTPATIENT
Start: 2024-12-19 | End: 2024-12-20

## 2024-12-18 RX ORDER — SUCCINYLCHOLINE/SOD CL,ISO/PF 200MG/10ML
SYRINGE (ML) INTRAVENOUS
Status: DISCONTINUED | OUTPATIENT
Start: 2024-12-18 | End: 2024-12-18 | Stop reason: SDUPTHER

## 2024-12-18 RX ORDER — METHOHEXITAL IN WATER/PF 100MG/10ML
SYRINGE (ML) INTRAVENOUS
Status: COMPLETED
Start: 2024-12-18 | End: 2024-12-18

## 2024-12-18 RX ORDER — KETOROLAC TROMETHAMINE 30 MG/ML
INJECTION, SOLUTION INTRAMUSCULAR; INTRAVENOUS
Status: DISCONTINUED | OUTPATIENT
Start: 2024-12-18 | End: 2024-12-18 | Stop reason: SDUPTHER

## 2024-12-18 RX ORDER — KETOROLAC TROMETHAMINE 30 MG/ML
INJECTION, SOLUTION INTRAMUSCULAR; INTRAVENOUS
Status: COMPLETED
Start: 2024-12-18 | End: 2024-12-18

## 2024-12-18 RX ORDER — SUCCINYLCHOLINE/SOD CL,ISO/PF 200MG/10ML
SYRINGE (ML) INTRAVENOUS
Status: COMPLETED
Start: 2024-12-18 | End: 2024-12-18

## 2024-12-18 RX ORDER — METHOHEXITAL IN WATER/PF 100MG/10ML
SYRINGE (ML) INTRAVENOUS
Status: DISCONTINUED | OUTPATIENT
Start: 2024-12-18 | End: 2024-12-18 | Stop reason: SDUPTHER

## 2024-12-18 RX ORDER — CLOZAPINE 25 MG/1
62.5 TABLET ORAL 3 TIMES DAILY
Status: DISCONTINUED | OUTPATIENT
Start: 2024-12-18 | End: 2024-12-18

## 2024-12-18 RX ADMIN — CLOZAPINE 62.5 MG: 25 TABLET ORAL at 20:00

## 2024-12-18 RX ADMIN — Medication 100 MG: at 08:25

## 2024-12-18 RX ADMIN — TRAZODONE HYDROCHLORIDE 50 MG: 50 TABLET ORAL at 20:00

## 2024-12-18 RX ADMIN — Medication 120 MG: at 08:25

## 2024-12-18 RX ADMIN — KETOROLAC TROMETHAMINE 30 MG: 30 INJECTION, SOLUTION INTRAMUSCULAR at 08:24

## 2024-12-18 RX ADMIN — HYDROXYZINE HYDROCHLORIDE 50 MG: 50 TABLET ORAL at 20:00

## 2024-12-18 RX ADMIN — ACETAMINOPHEN 650 MG: 325 TABLET ORAL at 06:20

## 2024-12-18 RX ADMIN — CLOZAPINE 62.5 MG: 25 TABLET ORAL at 09:38

## 2024-12-18 RX ADMIN — CLOZAPINE 62.5 MG: 25 TABLET ORAL at 15:15

## 2024-12-18 ASSESSMENT — PAIN SCALES - WONG BAKER: WONGBAKER_NUMERICALRESPONSE: NO HURT

## 2024-12-18 NOTE — ANESTHESIA POSTPROCEDURE EVALUATION
Department of Anesthesiology  Postprocedure Note    Patient: Alistair Hutchison  MRN: [id number]  YOB: 1995  Date of evaluation: 12/18/2024    Procedure Summary       Date: 12/18/24 Room / Location: [facility] PACU    Anesthesia Start: 0822 Anesthesia Stop: 0836    Procedure: ECT W/ ANESTHESIA Diagnosis: Disorganized schizophrenia (HCC)    Scheduled Providers:  Responsible Provider: Kristel Alvarado MD    Anesthesia Type: general, TIVA ASA Status: 3            Anesthesia Type: No value filed.    Amalia Phase I: Amalia Score: 9    Amalia Phase II:      Anesthesia Post Evaluation    Comments: POST- ANESTHESIA EVALUATION       Pt Name: Alistair Hutchison  MRN: [id number]  YOB: 1995  Date of evaluation: 12/18/2024  Time:  9:31 AM      /78   Pulse (!) 105   Temp 97.6 °F (36.4 °C) (Oral)   Resp 18   Ht 1.651 m (5' 5\")   Wt 113.4 kg (250 lb)   SpO2 98%   BMI 41.60 kg/m²      Consciousness Level  Awake  Cardiopulmonary Status  Stable  Pain Adequately Treated YES  Nausea / Vomiting  NO  Adequate Hydration  YES  Anesthesia Related Complications NONE      Electronically signed by Kristel Alvarado MD on 12/18/2024 at 9:31 AM      No notable events documented.

## 2024-12-18 NOTE — ANESTHESIA PRE PROCEDURE
Department of Anesthesiology  Preprocedure Note       Name:  Alistair Hutchison   Age:  29 y.o.  :  1995                                          MRN:  [id number]         Date:  2024      Surgeon: Dr Drake    Procedure: ECT    Medications prior to admission:   Prior to Admission medications    Medication Sig Start Date End Date Taking? Authorizing Provider   hydrOXYzine HCl (ATARAX) 50 MG tablet  10/9/24   ProviderShan MD   traZODone (DESYREL) 50 MG tablet Take 1 tablet by mouth nightly as needed for Sleep 10/2/24   Corby Drake MD   hyoscyamine (LEVSIN/SL) 0.125 MG sublingual tablet Place 1 tablet under the tongue 3 times daily 10/2/24   Corby Drake MD   paliperidone palmitate ER (INVEGA SUSTENNA) 234 MG/1.5ML FRANCHESKA IM injection Inject 234 mg into the muscle every 30 days 10/24/24   Corby Drake MD   ziprasidone (GEODON) 40 MG capsule Take 1 capsule by mouth 2 times daily (with meals)    Provider, MD Shan       Current medications:    Current Facility-Administered Medications   Medication Dose Route Frequency Provider Last Rate Last Admin   • cloZAPine (CLOZARIL) tablet 62.5 mg  62.5 mg Oral TID Corby Drake MD       • 0.9 % sodium chloride infusion   IntraVENous Continuous Jewel Ji MD 0 mL/hr at 24 0632 New Bag at 24 0700   • ziprasidone (GEODON) 20 mg in sterile water 1 mL injection  20 mg IntraMUSCular Q12H PRN Dalton Dumont MD   20 mg at 12/15/24 0832   • acetaminophen (TYLENOL) tablet 650 mg  650 mg Oral Q6H PRN Corby Drake MD   650 mg at 24 0620   • ibuprofen (ADVIL;MOTRIN) tablet 400 mg  400 mg Oral Q6H PRN Corby Drake MD   400 mg at 24 0812   • hydrOXYzine HCl (ATARAX) tablet 50 mg  50 mg Oral TID PRN Corby Drake MD   50 mg at 24 2107   • traZODone (DESYREL) tablet 50 mg  50 mg Oral Nightly PRN Corby Drake MD   50 mg at 24   • polyethylene glycol (GLYCOLAX) packet 17 g  17 g Oral

## 2024-12-18 NOTE — GROUP NOTE
Psych-Ed/Relapse Prevention Group Note        Date: December 18, 2024 Start Time: 11am  End Time: 11:30am      Number of Participants in Group & Unit Census:  4/10    Topic: Patient Advisory    Goal of Group:Patient will identify benefits and constructive criticism of unit and hospitalization      Comments:     Patient attended and participated in Psych-Ed/Relapse Prevention/Patient Advisory group.       Signature:  ELOINA MensahS

## 2024-12-18 NOTE — GROUP NOTE
Group Therapy Note    Date: 12/18/2024    Group Start Time: 1000  Group End Time: 1030  Group Topic: Psychoeducation    [facility] LSW        Group Therapy Note    Attendees: 4/9       Patient's Goal:  identifying feelings     Notes:      Status After Intervention:  Improved    Participation Level: Minimal    Participation Quality: Attentive      Speech:  normal      Thought Process/Content: Perseverating      Affective Functioning: Blunted      Mood: anxious      Level of consciousness:  Alert and Oriented x4      Response to Learning: Progressing to goal      Endings: None Reported    Modes of Intervention: Education, Support, and Activity      Discipline Responsible: /Counselor      Signature:  RAY Wray

## 2024-12-18 NOTE — GROUP NOTE
Group Therapy Note    Date: 12/18/2024    Group Start Time: 1330  Group End Time: 1400  Group Topic: Psychoeducation    [facility]    Ayse Fung CTRS    Group Therapy Note    Attendees: 2/10     Patient's Goal:  Patient will demonstrate improved interpersonal communication    Notes:  patient attended group with encouragement.    Status After Intervention:  Improved    Participation Level: Interactive and Minimal    Participation Quality: Appropriate and Resistant      Speech:  normal      Thought Process/Content: Logical      Affective Functioning: Constricted/Restricted      Mood: euthymic      Level of consciousness:  Alert and Preoccupied      Response to Learning: Able to verbalize current knowledge/experience      Endings: None Reported    Modes of Intervention: Education, Support, Socialization, and Exploration      Discipline Responsible: Psychoeducational Specialist      Signature:  NIKKI Mensah

## 2024-12-19 DIAGNOSIS — F20.1 DISORGANIZED SCHIZOPHRENIA (HCC): Primary | ICD-10-CM

## 2024-12-19 LAB
BASOPHILS # BLD: 0 K/UL (ref 0–0.2)
BASOPHILS NFR BLD: 0 % (ref 0–2)
EOSINOPHIL # BLD: 0.54 K/UL (ref 0–0.4)
EOSINOPHILS RELATIVE PERCENT: 4 % (ref 0–4)
ERYTHROCYTE [DISTWIDTH] IN BLOOD BY AUTOMATED COUNT: 13.9 % (ref 11.5–14.9)
HCT VFR BLD AUTO: 41.4 % (ref 41–53)
HGB BLD-MCNC: 13.6 G/DL (ref 13.5–17.5)
LYMPHOCYTES NFR BLD: 1.77 K/UL (ref 1–4.8)
LYMPHOCYTES RELATIVE PERCENT: 13 % (ref 24–44)
MCH RBC QN AUTO: 28.3 PG (ref 26–34)
MCHC RBC AUTO-ENTMCNC: 32.9 G/DL (ref 31–37)
MCV RBC AUTO: 86.1 FL (ref 80–100)
MONOCYTES NFR BLD: 0.95 K/UL (ref 0.1–1.3)
MONOCYTES NFR BLD: 7 % (ref 1–7)
MORPHOLOGY: NORMAL
NEUTROPHILS NFR BLD: 76 % (ref 36–66)
NEUTS SEG NFR BLD: 10.34 K/UL (ref 1.3–9.1)
PLATELET # BLD AUTO: 289 K/UL (ref 150–450)
PMV BLD AUTO: 7.2 FL (ref 6–12)
RBC # BLD AUTO: 4.81 M/UL (ref 4.5–5.9)
WBC OTHER # BLD: 13.6 K/UL (ref 3.5–11)

## 2024-12-19 PROCEDURE — 1240000000 HC EMOTIONAL WELLNESS R&B

## 2024-12-19 PROCEDURE — APPSS30 APP SPLIT SHARED TIME 16-30 MINUTES: Performed by: NURSE PRACTITIONER

## 2024-12-19 PROCEDURE — 36415 COLL VENOUS BLD VENIPUNCTURE: CPT

## 2024-12-19 PROCEDURE — 6370000000 HC RX 637 (ALT 250 FOR IP): Performed by: PSYCHIATRY & NEUROLOGY

## 2024-12-19 PROCEDURE — 85025 COMPLETE CBC W/AUTO DIFF WBC: CPT

## 2024-12-19 PROCEDURE — 99232 SBSQ HOSP IP/OBS MODERATE 35: CPT | Performed by: PSYCHIATRY & NEUROLOGY

## 2024-12-19 RX ADMIN — HYDROXYZINE HYDROCHLORIDE 50 MG: 50 TABLET ORAL at 20:04

## 2024-12-19 RX ADMIN — ACETAMINOPHEN 650 MG: 325 TABLET ORAL at 08:24

## 2024-12-19 RX ADMIN — CLOZAPINE 75 MG: 25 TABLET ORAL at 20:04

## 2024-12-19 RX ADMIN — CLOZAPINE 75 MG: 25 TABLET ORAL at 08:24

## 2024-12-19 RX ADMIN — TRAZODONE HYDROCHLORIDE 50 MG: 50 TABLET ORAL at 20:04

## 2024-12-19 RX ADMIN — CLOZAPINE 75 MG: 25 TABLET ORAL at 14:27

## 2024-12-19 RX ADMIN — HYDROXYZINE HYDROCHLORIDE 50 MG: 50 TABLET ORAL at 08:24

## 2024-12-19 ASSESSMENT — PAIN SCALES - GENERAL
PAINLEVEL_OUTOF10: 4
PAINLEVEL_OUTOF10: 4

## 2024-12-19 NOTE — GROUP NOTE
Group Therapy Note    Date: 12/19/2024    Group Start Time: 1100  Group End Time: 1135  Group Topic: Cognitive Skills    Suzanne hSarma CTRS        Group Therapy Note    Attendees: 6/10       Patient's Goal:  pt will demonstrate improved coping skills and improved concentration     Notes:   pt was pleasant and participated well    Status After Intervention:  Improved    Participation Level: Active Listener and Interactive    Participation Quality: Appropriate      Speech: childlike      Thought Process/Content: slow to process      Affective Functioning: Congruent      Mood: euthymic      Level of consciousness:  Alert      Response to Learning: Able to verbalize current knowledge/experience, Capable of insight, and Progressing to goal      Endings: None Reported    Modes of Intervention: Support, Socialization, and Activity      Discipline Responsible: Psychoeducational Specialist      Signature:  NIKKI RAND

## 2024-12-19 NOTE — GROUP NOTE
Group Therapy Note    Date: 12/19/2024    Group Start Time: 1330  Group End Time: 1400  Group Topic: Psychoeducation    [facility] Suzanne Tolentino CTRS        Group Therapy Note    Attendees: 4/10       Patient's Goal:  pt will demonstrate improved coping skills and improved interpersonal relationships     Notes:   pt was pleasant and participated well    Status After Intervention:  Improved    Participation Level: Active Listener and Interactive    Participation Quality: Appropriate, Sharing, and       Speech:  hyperverbal      Thought Process/Content: disorganized      Affective Functioning: Congruent      Mood: euthymic      Level of consciousness:  Alert      Response to Learning: Able to verbalize current knowledge/experience, Capable of insight, and Progressing to goal      Endings: None Reported    Modes of Intervention: Support, Socialization, and Activity      Discipline Responsible: Psychoeducational Specialist      Signature:  NIKKI RAND

## 2024-12-19 NOTE — GROUP NOTE
Group Therapy Note    Date: 12/19/2024    Group Start Time: 1000  Group End Time: 1040  Group Topic: Psychoeducation    [facility] LSW        Group Therapy Note    Attendees: 5/10       Patient's Goal:  expressing feelings through creative process     Notes:      Status After Intervention:  Improved    Participation Level: Interactive    Participation Quality: Appropriate and Attentive      Speech:  normal      Thought Process/Content: Flight of ideas      Affective Functioning: Exaggerated      Mood: euthymic      Level of consciousness:  Alert      Response to Learning: Progressing to goal      Endings: None Reported    Modes of Intervention: Education and Support      Discipline Responsible: /Counselor      Signature:  RAY Wray

## 2024-12-19 NOTE — GROUP NOTE
Group Therapy Note    Date: 12/19/2024    Group Start Time: 1430  Group End Time: 1500  Group Topic: Education Group - Inpatient    [facility] Elizabeth Nina LPN        Group Therapy Note    Attendees: 6/10         Notes:  Medication Education     Status After Intervention:  Improved    Participation Level: Active Listener and Interactive    Participation Quality: Appropriate and Attentive      Speech:  normal      Thought Process/Content: Logical      Affective Functioning: Congruent      Mood: anxious      Level of consciousness:  Alert and Oriented x4      Response to Learning: Able to retain information and Progressing to goal      Endings: None Reported    Modes of Intervention: Education and Support      Discipline Responsible: Licensed Practical Nurse      Signature:  Elizabeth Yip LPN

## 2024-12-20 ENCOUNTER — ANESTHESIA (OUTPATIENT)
Dept: POSTOP/PACU | Age: 29
End: 2024-12-20
Payer: COMMERCIAL

## 2024-12-20 ENCOUNTER — APPOINTMENT (OUTPATIENT)
Dept: POSTOP/PACU | Age: 29
DRG: 750 | End: 2024-12-20
Payer: COMMERCIAL

## 2024-12-20 ENCOUNTER — ANESTHESIA EVENT (OUTPATIENT)
Dept: POSTOP/PACU | Age: 29
End: 2024-12-20
Payer: COMMERCIAL

## 2024-12-20 LAB — GLUCOSE BLD-MCNC: 101 MG/DL (ref 75–110)

## 2024-12-20 PROCEDURE — 90870 ELECTROCONVULSIVE THERAPY: CPT | Performed by: ANESTHESIOLOGY

## 2024-12-20 PROCEDURE — 2580000003 HC RX 258: Performed by: ANESTHESIOLOGY

## 2024-12-20 PROCEDURE — 6360000002 HC RX W HCPCS: Performed by: NURSE ANESTHETIST, CERTIFIED REGISTERED

## 2024-12-20 PROCEDURE — 2500000003 HC RX 250 WO HCPCS: Performed by: NURSE ANESTHETIST, CERTIFIED REGISTERED

## 2024-12-20 PROCEDURE — 7100000001 HC PACU RECOVERY - ADDTL 15 MIN: Performed by: ANESTHESIOLOGY

## 2024-12-20 PROCEDURE — 90870 ELECTROCONVULSIVE THERAPY: CPT | Performed by: PSYCHIATRY & NEUROLOGY

## 2024-12-20 PROCEDURE — 7100000000 HC PACU RECOVERY - FIRST 15 MIN: Performed by: ANESTHESIOLOGY

## 2024-12-20 PROCEDURE — 6370000000 HC RX 637 (ALT 250 FOR IP): Performed by: PSYCHIATRY & NEUROLOGY

## 2024-12-20 PROCEDURE — 82947 ASSAY GLUCOSE BLOOD QUANT: CPT

## 2024-12-20 PROCEDURE — 99232 SBSQ HOSP IP/OBS MODERATE 35: CPT | Performed by: PSYCHIATRY & NEUROLOGY

## 2024-12-20 PROCEDURE — 3700000000 HC ANESTHESIA ATTENDED CARE: Performed by: ANESTHESIOLOGY

## 2024-12-20 PROCEDURE — 1240000000 HC EMOTIONAL WELLNESS R&B

## 2024-12-20 PROCEDURE — 3700000001 HC ADD 15 MINUTES (ANESTHESIA): Performed by: ANESTHESIOLOGY

## 2024-12-20 PROCEDURE — APPSS30 APP SPLIT SHARED TIME 16-30 MINUTES

## 2024-12-20 RX ORDER — SUCCINYLCHOLINE/SOD CL,ISO/PF 200MG/10ML
SYRINGE (ML) INTRAVENOUS
Status: COMPLETED
Start: 2024-12-20 | End: 2024-12-20

## 2024-12-20 RX ORDER — KETOROLAC TROMETHAMINE 30 MG/ML
INJECTION, SOLUTION INTRAMUSCULAR; INTRAVENOUS
Status: DISCONTINUED | OUTPATIENT
Start: 2024-12-20 | End: 2024-12-20 | Stop reason: SDUPTHER

## 2024-12-20 RX ORDER — SUCCINYLCHOLINE/SOD CL,ISO/PF 200MG/10ML
SYRINGE (ML) INTRAVENOUS
Status: DISCONTINUED | OUTPATIENT
Start: 2024-12-20 | End: 2024-12-20 | Stop reason: SDUPTHER

## 2024-12-20 RX ORDER — METHOHEXITAL IN WATER/PF 100MG/10ML
SYRINGE (ML) INTRAVENOUS
Status: COMPLETED
Start: 2024-12-20 | End: 2024-12-20

## 2024-12-20 RX ORDER — KETOROLAC TROMETHAMINE 30 MG/ML
INJECTION, SOLUTION INTRAMUSCULAR; INTRAVENOUS
Status: COMPLETED
Start: 2024-12-20 | End: 2024-12-20

## 2024-12-20 RX ORDER — CLOZAPINE 25 MG/1
87.5 TABLET ORAL 3 TIMES DAILY
Status: DISCONTINUED | OUTPATIENT
Start: 2024-12-20 | End: 2024-12-22

## 2024-12-20 RX ORDER — METHOHEXITAL IN WATER/PF 100MG/10ML
SYRINGE (ML) INTRAVENOUS
Status: DISCONTINUED | OUTPATIENT
Start: 2024-12-20 | End: 2024-12-20 | Stop reason: SDUPTHER

## 2024-12-20 RX ADMIN — CLOZAPINE 87.5 MG: 25 TABLET ORAL at 20:18

## 2024-12-20 RX ADMIN — SODIUM CHLORIDE: 9 INJECTION, SOLUTION INTRAVENOUS at 07:49

## 2024-12-20 RX ADMIN — Medication 100 MG: at 08:12

## 2024-12-20 RX ADMIN — Medication 120 MG: at 08:12

## 2024-12-20 RX ADMIN — ACETAMINOPHEN 650 MG: 325 TABLET ORAL at 06:23

## 2024-12-20 RX ADMIN — CLOZAPINE 75 MG: 25 TABLET ORAL at 09:30

## 2024-12-20 RX ADMIN — HYDROXYZINE HYDROCHLORIDE 50 MG: 50 TABLET ORAL at 20:20

## 2024-12-20 RX ADMIN — KETOROLAC TROMETHAMINE 30 MG: 30 INJECTION, SOLUTION INTRAMUSCULAR at 08:10

## 2024-12-20 RX ADMIN — CLOZAPINE 75 MG: 25 TABLET ORAL at 15:55

## 2024-12-20 RX ADMIN — TRAZODONE HYDROCHLORIDE 50 MG: 50 TABLET ORAL at 20:20

## 2024-12-20 ASSESSMENT — PAIN SCALES - GENERAL: PAINLEVEL_OUTOF10: 0

## 2024-12-20 ASSESSMENT — PAIN - FUNCTIONAL ASSESSMENT: PAIN_FUNCTIONAL_ASSESSMENT: 0-10

## 2024-12-20 NOTE — GROUP NOTE
Group Therapy Note    Date: 12/20/2024    Group Start Time: 0900  Group End Time: 0915  Group Topic: Community Meeting    Amado De La Fuente, DOREEN        Group Therapy Note    Attendees: 4/11       Patient's Goal:  discharge     Notes:  goals group     Status After Intervention:  Unchanged    Participation Level: Active Listener and Interactive    Participation Quality: Appropriate, Attentive, and Sharing      Speech:  normal      Thought Process/Content: Logical      Affective Functioning: Congruent      Mood: anxious      Level of consciousness:  Alert, Oriented x4, and Preoccupied      Response to Learning: Progressing to goal      Endings: None Reported    Modes of Intervention: Education and Support      Discipline Responsible: Registered Nurse      Signature:  Amado Lubin RN     No

## 2024-12-20 NOTE — GROUP NOTE
Group Therapy Note    Date: 12/20/2024    Group Start Time: 1000  Group End Time: 1030  Group Topic: Psychotherapy    [facility]        Group Therapy Note    Attendees: 4/11       Patient's Goal:  expression of feeling    Notes:  Therapeutic conversation cards provided and discussed.    Status After Intervention:  Improved    Participation Level: Interactive    Participation Quality: Sharing      Speech:  normal      Thought Process/Content: Logical      Affective Functioning: Congruent      Mood: euthymic      Level of consciousness:  Alert and Oriented x4      Response to Learning: Progressing to goal      Endings: None Reported    Modes of Intervention: Education and Support      Discipline Responsible: /Counselor      Signature:  LEISA Martinez, RAY

## 2024-12-20 NOTE — ANESTHESIA POSTPROCEDURE EVALUATION
Department of Anesthesiology  Postprocedure Note    Patient: Alistair Hutchison  MRN: [id number]  YOB: 1995  Date of evaluation: 12/20/2024    Procedure Summary       Date: 12/20/24 Room / Location: [facility] PACU    Anesthesia Start: 0808 Anesthesia Stop: 0824    Procedure: ECT W/ ANESTHESIA Diagnosis: Paranoid schizophrenia    Scheduled Providers:  Responsible Provider: Kristel Alvarado MD    Anesthesia Type: General ASA Status: 3            Anesthesia Type: General    Amalia Phase I: Amalia Score: 10    Amalia Phase II:      Anesthesia Post Evaluation    Comments: POST- ANESTHESIA EVALUATION       Pt Name: Alistair Hutchison  MRN: [id number]  YOB: 1995  Date of evaluation: 12/20/2024  Time:  10:12 AM      BP (!) 151/96   Pulse (!) 116   Temp 97.1 °F (36.2 °C) (Temporal)   Resp 14   Ht 1.651 m (5' 5\")   Wt 113.4 kg (250 lb)   SpO2 96%   BMI 41.60 kg/m²      Consciousness Level  Awake  Cardiopulmonary Status  Stable  Pain Adequately Treated YES  Nausea / Vomiting  NO  Adequate Hydration  YES  Anesthesia Related Complications NONE      Electronically signed by Kristel Alvarado MD on 12/20/2024 at 10:12 AM      No notable events documented.

## 2024-12-20 NOTE — GROUP NOTE
Group Therapy Note    Date: 12/20/2024    Group Start Time: 1435  Group End Time: 1525  Group Topic: Music Therapy    Stef Malik        Group Therapy Note    Attendees: 6/12       Patient's Goal:  Patients shared music, analyzing lyrics for themes, and shared advice based on themes within music. Patients then discussed cognitive distortion \"All or nothing thinking\" and recognizing negative thoughts patterns. Encouraging patients to reflect on advice they know and skills they having following recognition of negative thoughts. Goals to engage in peer support; Increase sense of community; Increase self-esteem; Increase self-expression.    Notes:  Patient attended and participated in group having positive interactions with peers and staff throughout. Patinet was pleasant and engaging, and supportive towards peers sharing and music. When asked about advice patient talked about if he had a girlfriend he would that song to her to make her feel better. Talked about spending time with other loved ones like his family and things they like to do together and being excited for when he goes home, but that he knows he can come to the hospital \"If I'm in trouble.\"     Status After Intervention:  Improved    Participation Level: Active Listener and Interactive    Participation Quality: Appropriate, Attentive, and Sharing      Speech:  normal      Thought Process/Content: Logical  Linear      Affective Functioning: Congruent      Mood: euthymic      Level of consciousness:  Alert and Attentive      Response to Learning: Able to verbalize current knowledge/experience, Able to verbalize/acknowledge new learning, Capable of insight, and Progressing to goal      Endings: None Reported    Modes of Intervention: Support, Socialization, Exploration, Activity, Media, and Reality-testing      Discipline Responsible: Psychoeducational

## 2024-12-20 NOTE — ANESTHESIA PRE PROCEDURE
\"PO2ART\", \"XOR9BIZ\", \"SOZ8ZLA\", \"BEART\", \"H6ASWTDJ\"     Type & Screen (If Applicable):  No results found for: \"ABORH\", \"LABANTI\"    Drug/Infectious Status (If Applicable):  Lab Results   Component Value Date/Time    HEPCAB NONREACTIVE 03/17/2023 08:51 AM       COVID-19 Screening (If Applicable): No results found for: \"COVID19\"        Anesthesia Evaluation  Patient summary reviewed and Nursing notes reviewed   no history of anesthetic complications:   Airway: Mallampati: III  TM distance: >3 FB   Neck ROM: full  Mouth opening: < 3 FB   Dental: normal exam         Pulmonary:Negative Pulmonary ROS and normal exam  breath sounds clear to auscultation                             Cardiovascular:Negative CV ROS            Rhythm: regular  Rate: normal                    Neuro/Psych:   (+) psychiatric history:depression/anxiety             GI/Hepatic/Renal:   (+) morbid obesity     (-) GERD       Endo/Other: Negative Endo/Other ROS                    Abdominal:             Vascular: negative vascular ROS.         Other Findings:           Anesthesia Plan      general and TIVA     ASA 3     (TIVA  Consent signed by Grandma and Legal Gaurdian)  Induction: intravenous.    MIPS: Prophylactic antiemetics administered.  Anesthetic plan and risks discussed with patient.      Plan discussed with CRNA.                  Kristel Alvarado MD   12/20/2024

## 2024-12-20 NOTE — GROUP NOTE
Group Therapy Note    Date: 12/20/2024    Group Start Time: 1100  Group End Time: 1130  Group Topic: Education Group - Inpatient    [facility] Amado Green RN        Group Therapy Note    Attendees: 5/10       Patient's Goal:  education     Notes:  coping skills     Status After Intervention:  Unchanged    Participation Level: Active Listener, Interactive, and Monopolizing    Participation Quality: Appropriate, Sharing, Supportive, and Intrusive      Speech:  normal      Thought Process/Content: Logical  Linear      Affective Functioning: Congruent      Mood: euphoric      Level of consciousness:  Alert, Oriented x4, Attentive, and Preoccupied      Response to Learning: Progressing to goal      Endings: None Reported    Modes of Intervention: Education, Support, and Socialization      Discipline Responsible: Registered Nurse      Signature:  Amado Lubin RN

## 2024-12-21 PROCEDURE — 6370000000 HC RX 637 (ALT 250 FOR IP): Performed by: PSYCHIATRY & NEUROLOGY

## 2024-12-21 PROCEDURE — 99232 SBSQ HOSP IP/OBS MODERATE 35: CPT | Performed by: NURSE PRACTITIONER

## 2024-12-21 PROCEDURE — 1240000000 HC EMOTIONAL WELLNESS R&B

## 2024-12-21 RX ADMIN — TRAZODONE HYDROCHLORIDE 50 MG: 50 TABLET ORAL at 20:40

## 2024-12-21 RX ADMIN — HYDROXYZINE HYDROCHLORIDE 50 MG: 50 TABLET ORAL at 20:39

## 2024-12-21 RX ADMIN — CLOZAPINE 87.5 MG: 25 TABLET ORAL at 20:40

## 2024-12-21 RX ADMIN — CLOZAPINE 87.5 MG: 25 TABLET ORAL at 15:31

## 2024-12-21 RX ADMIN — CLOZAPINE 87.5 MG: 25 TABLET ORAL at 08:47

## 2024-12-22 PROCEDURE — 1240000000 HC EMOTIONAL WELLNESS R&B

## 2024-12-22 PROCEDURE — 99232 SBSQ HOSP IP/OBS MODERATE 35: CPT | Performed by: NURSE PRACTITIONER

## 2024-12-22 PROCEDURE — 6370000000 HC RX 637 (ALT 250 FOR IP): Performed by: NURSE PRACTITIONER

## 2024-12-22 PROCEDURE — 6370000000 HC RX 637 (ALT 250 FOR IP): Performed by: PSYCHIATRY & NEUROLOGY

## 2024-12-22 RX ORDER — CLOZAPINE 100 MG/1
100 TABLET ORAL 3 TIMES DAILY
Status: DISCONTINUED | OUTPATIENT
Start: 2024-12-22 | End: 2024-12-23 | Stop reason: HOSPADM

## 2024-12-22 RX ADMIN — CLOZAPINE 100 MG: 100 TABLET ORAL at 08:49

## 2024-12-22 RX ADMIN — TRAZODONE HYDROCHLORIDE 50 MG: 50 TABLET ORAL at 20:03

## 2024-12-22 RX ADMIN — CLOZAPINE 100 MG: 100 TABLET ORAL at 14:09

## 2024-12-22 RX ADMIN — CLOZAPINE 100 MG: 100 TABLET ORAL at 20:03

## 2024-12-22 RX ADMIN — HYDROXYZINE HYDROCHLORIDE 50 MG: 50 TABLET ORAL at 20:03

## 2024-12-22 ASSESSMENT — PAIN SCALES - GENERAL: PAINLEVEL_OUTOF10: 0

## 2024-12-22 NOTE — GROUP NOTE
Group Therapy Note    Date: 12/22/2024    Group Start Time: 1030  Group End Time: 1100  Group Topic: Psychoeducation    [facility]    Group Therapy Note    Attendees: 7/11     Patient's Goal:  Patient will identify benefits of leisure for coping and stress management    Notes:  Patient attended group and participated    Status After Intervention:  Improved    Participation Level: Active Listener and Interactive    Participation Quality: Appropriate, Attentive, Sharing, and Supportive      Speech:  normal      Thought Process/Content: Logical  Linear      Affective Functioning: Constricted/Restricted      Mood: euthymic      Level of consciousness:  Alert, Oriented x4, and Attentive      Response to Learning: Able to verbalize current knowledge/experience, Able to verbalize/acknowledge new learning, Able to retain information, Able to change behavior, and Progressing to goal      Endings: None Reported    Modes of Intervention: Education, Support, Socialization, and Exploration      Discipline Responsible: Psychoeducational Specialist      Signature:  NIKKI Mensah

## 2024-12-23 ENCOUNTER — ANESTHESIA (OUTPATIENT)
Dept: POSTOP/PACU | Age: 29
DRG: 750 | End: 2024-12-23
Payer: COMMERCIAL

## 2024-12-23 ENCOUNTER — ANESTHESIA EVENT (OUTPATIENT)
Dept: POSTOP/PACU | Age: 29
DRG: 750 | End: 2024-12-23
Payer: COMMERCIAL

## 2024-12-23 ENCOUNTER — APPOINTMENT (OUTPATIENT)
Dept: POSTOP/PACU | Age: 29
DRG: 750 | End: 2024-12-23
Payer: COMMERCIAL

## 2024-12-23 VITALS
BODY MASS INDEX: 41.65 KG/M2 | TEMPERATURE: 98 F | WEIGHT: 250 LBS | HEART RATE: 123 BPM | SYSTOLIC BLOOD PRESSURE: 123 MMHG | HEIGHT: 65 IN | OXYGEN SATURATION: 98 % | DIASTOLIC BLOOD PRESSURE: 93 MMHG | RESPIRATION RATE: 14 BRPM

## 2024-12-23 LAB — GLUCOSE BLD-MCNC: 90 MG/DL (ref 75–110)

## 2024-12-23 PROCEDURE — 6370000000 HC RX 637 (ALT 250 FOR IP): Performed by: NURSE PRACTITIONER

## 2024-12-23 PROCEDURE — 90870 ELECTROCONVULSIVE THERAPY: CPT | Performed by: PSYCHIATRY & NEUROLOGY

## 2024-12-23 PROCEDURE — 7100000000 HC PACU RECOVERY - FIRST 15 MIN: Performed by: ANESTHESIOLOGY

## 2024-12-23 PROCEDURE — 82947 ASSAY GLUCOSE BLOOD QUANT: CPT

## 2024-12-23 PROCEDURE — 90870 ELECTROCONVULSIVE THERAPY: CPT | Performed by: ANESTHESIOLOGY

## 2024-12-23 PROCEDURE — 99239 HOSP IP/OBS DSCHRG MGMT >30: CPT | Performed by: PSYCHIATRY & NEUROLOGY

## 2024-12-23 PROCEDURE — 7100000001 HC PACU RECOVERY - ADDTL 15 MIN: Performed by: ANESTHESIOLOGY

## 2024-12-23 PROCEDURE — 3700000000 HC ANESTHESIA ATTENDED CARE: Performed by: ANESTHESIOLOGY

## 2024-12-23 PROCEDURE — 6370000000 HC RX 637 (ALT 250 FOR IP): Performed by: PSYCHIATRY & NEUROLOGY

## 2024-12-23 PROCEDURE — 6360000002 HC RX W HCPCS: Performed by: NURSE ANESTHETIST, CERTIFIED REGISTERED

## 2024-12-23 PROCEDURE — 2500000003 HC RX 250 WO HCPCS: Performed by: NURSE ANESTHETIST, CERTIFIED REGISTERED

## 2024-12-23 RX ORDER — CLOZAPINE 100 MG/1
100 TABLET ORAL 3 TIMES DAILY
Qty: 90 TABLET | Refills: 0 | Status: SHIPPED | OUTPATIENT
Start: 2024-12-23

## 2024-12-23 RX ORDER — SUCCINYLCHOLINE/SOD CL,ISO/PF 200MG/10ML
SYRINGE (ML) INTRAVENOUS
Status: COMPLETED
Start: 2024-12-23 | End: 2024-12-23

## 2024-12-23 RX ORDER — SUCCINYLCHOLINE/SOD CL,ISO/PF 200MG/10ML
SYRINGE (ML) INTRAVENOUS
Status: DISCONTINUED | OUTPATIENT
Start: 2024-12-23 | End: 2024-12-23 | Stop reason: SDUPTHER

## 2024-12-23 RX ORDER — KETOROLAC TROMETHAMINE 30 MG/ML
INJECTION, SOLUTION INTRAMUSCULAR; INTRAVENOUS
Status: COMPLETED
Start: 2024-12-23 | End: 2024-12-23

## 2024-12-23 RX ORDER — METHOHEXITAL IN WATER/PF 100MG/10ML
SYRINGE (ML) INTRAVENOUS
Status: DISCONTINUED | OUTPATIENT
Start: 2024-12-23 | End: 2024-12-23 | Stop reason: SDUPTHER

## 2024-12-23 RX ORDER — METHOHEXITAL IN WATER/PF 100MG/10ML
SYRINGE (ML) INTRAVENOUS
Status: COMPLETED
Start: 2024-12-23 | End: 2024-12-23

## 2024-12-23 RX ORDER — HYDROXYZINE HYDROCHLORIDE 25 MG/1
25 TABLET, FILM COATED ORAL 3 TIMES DAILY PRN
Qty: 30 TABLET | Refills: 0 | Status: SHIPPED | OUTPATIENT
Start: 2024-12-23 | End: 2025-01-02

## 2024-12-23 RX ORDER — KETOROLAC TROMETHAMINE 30 MG/ML
INJECTION, SOLUTION INTRAMUSCULAR; INTRAVENOUS
Status: DISCONTINUED | OUTPATIENT
Start: 2024-12-23 | End: 2024-12-23 | Stop reason: SDUPTHER

## 2024-12-23 RX ADMIN — CLOZAPINE 100 MG: 100 TABLET ORAL at 09:50

## 2024-12-23 RX ADMIN — ACETAMINOPHEN 650 MG: 325 TABLET ORAL at 06:08

## 2024-12-23 RX ADMIN — Medication 100 MG: at 08:31

## 2024-12-23 RX ADMIN — CLOZAPINE 100 MG: 100 TABLET ORAL at 15:46

## 2024-12-23 RX ADMIN — Medication 120 MG: at 08:31

## 2024-12-23 RX ADMIN — KETOROLAC TROMETHAMINE 30 MG: 30 INJECTION, SOLUTION INTRAMUSCULAR at 08:35

## 2024-12-23 ASSESSMENT — PAIN SCALES - GENERAL
PAINLEVEL_OUTOF10: 0

## 2024-12-23 ASSESSMENT — PAIN - FUNCTIONAL ASSESSMENT: PAIN_FUNCTIONAL_ASSESSMENT: 0-10

## 2024-12-23 NOTE — BH NOTE
Arrival from ECT to [facility] Unit:    Patient arrived to the unit Time: 0800 and Via Wheelchair. Patient is oriented to person, place, time/date, and situation. Patient is Calm and cooperative with assessments.    Physical and Mental assessment, Vitals x 2, and fall risk assessment completed and documented.     Post-op ECT checklist completed and placed in patients chart.    
[facility]:    Patient arrived to the unit Time: 09:12 and Via Wheelchair. Patient is person, place, time/date, and situation. Patient is  calm and cooperative, endorsing mild headache.     Physical and Mental assessment, Vitals x 2, and fall risk assessment completed and documented.     Post-op ECT checklist completed and placed in patients chart.    
Arrival from ECT to [facility] Unit:    Patient arrived to the unit Time: 0925 and Via Wheelchair. Patient is person, place, time/date, situation, and year. Patient is  Calm, cooperative.    Physical and Mental assessment, Vitals x 2, and fall risk assessment completed and documented.     Post-op ECT checklist completed and placed in patients chart.    
Arrival from ECT to [facility] Unit:    Patient arrived to the unit Time: 0915 and Via Wheelchair. Patient is oriented to person, place, time/date, and situation. Patient is Calm and cooperative with assessments.    Physical and Mental assessment, Vitals x 2, and fall risk assessment completed and documented.     Post-op ECT checklist completed and placed in patients chart.    
Arrival from ECT to [facility] Unit:    Patient arrived to the unit Time: 0902 and Via Wheelchair. Patient is oriented to person, place, time/date, and situation. Patient is Calm and cooperative with assessments.    Physical and Mental assessment, Vitals x 2, and fall risk assessment completed and documented.     Post-op ECT checklist completed and placed in patients chart.    
BEHAVIORAL SERVICES: One - Hour In- Person Review  For Management of Violent or Self - Destructive Behavior    Seclusion/Restraint:  seclusion started at 1148 with no holds    Reason for Intervention: threatening staff, yelling, slamming doors, cussing    Response to Intervention:  Banging on walls, yelling, cussing at staff    Medical Record reviewed and discussed precipitating events/behaviors with RN initiating Intervention:  Yes    Patient Medical Status:  Vital Signs: Unable to obtain  Respiratory Status: appears to be within normal limits  Circulatory Status: No issues noted  Skin Integrity: No areas of concern noted    Orientation: oriented to person, place, time/date, and situation    Mood/Affect: angry, anxious, irritable, and labile    Speech: Loud, Rigid, and Pressured    Thought Content: delusions    Thought Processes: Flight of Ideas and Circumstantial    Rationale for continued use of intervention: to maintain safety for staff, peers and patient    Rationale for discontinuing intervention: Patient is able to:able to verbalize an understanding of need to maintain control and safety for everyone on unit    One Hour Review Evaluation Physician Notification:  Dr. Dumont   
[facility] [place]  Discharge Note    Pt discharged with followings belongings:   Dental Appliances: None  Vision - Corrective Lenses: None  Hearing Aid: None  Jewelry: None  Body Piercings Removed: N/A  Clothing:  (hospital gown only)  Other Valuables:  (none)   Valuables sent home with or returned to patient. Patient's guardian educated on aftercare instructions:   Information faxed to follow up  by staff  at 1700 .Patient's guardian verbalize understanding of AVS:  yes.    Status EXAM upon discharge:  Mental Status and Behavioral Exam  Normal: No (anxious)  Level of Assistance: Independent/Self  Facial Expression: Brightened  Affect: Appropriate  Level of Consciousness: Alert  Frequency of Checks: 4 times per hour, close  Mood:Normal: Yes  Mood: Anxious  Motor Activity:Normal: Yes  Motor Activity: Decreased  Eye Contact: Good  Observed Behavior: Cooperative, Friendly  Sexual Misconduct History: Current - no  Preception: [place] to person, [place] to time, [place] to place, [place] to situation  Attention:Normal: Yes  Attention: Distractible, Unable to concentrate  Thought Processes: Unremarkable  Thought Content:Normal: Yes  Thought Content: Preoccupations  Depression Symptoms: No problems reported or observed.  Anxiety Symptoms: Generalized  Erica Symptoms: No problems reported or observed.  Hallucinations: None  Delusions: No  Delusions: Obsessions  Memory:Normal: Yes  Memory: Confabulation, Poor recent  Insight and Judgment: No  Insight and Judgment: Poor judgment, Poor insight    Tobacco Screening:  Practical Counseling, on admission, matti X, if applicable and completed (first 3 are required if patient doesn't refuse):            ( ) Recognizing danger situations (included triggers and roadblocks)                    ( ) Coping skills (new ways to manage stress,relaxation techniques, changing routine, distraction)                                                           ( ) Basic information about 
Departure from I Unit to ECT:    Pre-op ECT Checklist completed and placed by front of patients chart. Patient departed unit Time: 0551 and Via Wheelchair, accompanied by i staff. Patient is alert and orientated at this time, as well as calm, cooperative.  
Departure from I Unit to ECT:    Pre-op ECT Checklist completed and placed by front of patients chart. Patient departed unit Time: 0655 and Via Wheelchair, accompanied by LPN. Patient is alert and orientated at this time, as well as calm and cooperative.    
Departure from I Unit to ECT:    Pre-op ECT Checklist completed and placed by front of patients chart. Patient departed unit Time: 0721 and Via Wheelchair, accompanied by staff. Patient is alert and orientated at this time, as well as  Calm, cooperative.      
Departure from I Unit to ECT:    Pre-op ECT Checklist completed and placed by front of patients chart. Patient departed unit Time: 550 and Via Wheelchair, accompanied by staff. Patient is alert and orientated at this time, as well as  calm and cooperative     
Discontinuation of Seclusion     Patient sitting on mattress calm and cooperative able to maintain behavioral control. Seclusion event discontinued at this time with 77 mins spent in seclusion   
Emergency Medication Follow-Up Note:    PRN medication of Benadryl 50mg and Haldol 5mg was effective as evidence by patient is able to maintain behavioral control. Patient denies medication side effects. Will continue to monitor and provide support as needed.   
Emergency Medication Follow-Up Note:    PRN medication of Geodon 20 mg IM was effective as evidence by Patient is pleasant, cooperative social with peers able to control behaviors.  Patient denies medication side effects. Will continue to monitor and provide support as needed.   
Emergency Medication Follow-Up Note:    PRN medication of Haldol 5mg, benadryl 50mg was effective as evidence by Patient is redirectable and able to sit and remain calm.  Patient denies medication side effects. Will continue to monitor and provide support as needed.   
Emergency PRN Medication Administration Note:      Patient is Agitated and Disruptive as evidence by Patient continues to yell and point at peers, tearful and non redirectable . Staff attempted to find and relieve the distress by Talking to patient, Refocusing on new activity, Offering suggestions, and Administer PRN medications Patient is currently  yelling and pacing shea entering and exiting door of room slamming doors, yelling at staff. Medication Administered as prescribed: haldol 5 mg IM, benadryl 50mg IM.  Patient Tolerated medication administration.   Will continue to monitor, offer support, and reassess.  
Emergency PRN Medication Administration Note:      Patient is Agitated and Disruptive as evidence by Patient yelling and pacing this unit. Staff attempted to find and relieve the distress by Talking to patient, Refocusing on new activity, Offering suggestions, Checking for undiagnosed pain, and Administer PRN medications Patient is currently  continuing to  pacing and yell pointing at peers in milieu, staff un able to redirect to room with multiple attempts . Medication Administered as prescribed: Haldol 10mg IM, Benadryl 50mg IM. Patient Tolerated medication administration.   Will continue to monitor, offer support, and reassess.  
Emergency PRN Medication Administration Note:      Patient is Agitated and Disruptive as evidence by Patient yelling at peers and staff. Demanding to discharge. Staff attempted to find and relieve the distress by Talking to patient, Refocusing on new activity, Offering suggestions, and Checking for undiagnosed pain Patient is currently  continuing to yell at peers and is non redirectable with talk therapy and routine medication. Medication Administered as prescribed: Geodon 20mg IM. Patient Tolerated medication administration.   Will continue to monitor, offer support, and reassess.  
Emergency PRN Medication Administration Note:      Patient is Agitated, Disruptive, and Dangerous as evidence by Patient yelling and non redirectable disruptive to group peers raising fist to recreational therapist and staff . Staff attempted to find and relieve the distress by Talking to patient, Refocusing on new activity, and Offering suggestions Patient is currently  slamming bedroom door yelling screaming and raising fist at staff. Staff attempted to allow patient to decompensate in room. Patient continued to escalate in milieu. Medication Administered as prescribed: Benadryl 50mg IM Haldol 5mg IM . Patient Tolerated medication administration.   Will continue to monitor, offer support, and reassess.  
Emergency PRN Medication Administration Note:    Patient is Agitated and Disruptive as evidence by restlessness, yelling at peers, rapid speech, and crying. Staff attempted to find and relieve the distress by Talking to patient, Refocusing on new activity, Checking for undiagnosed pain, and Administer PRN medications Patient is currently continuing to escalate, and is accepting of PRN's.  Medication Administered as prescribed: PO Haldol 5mg. Patient Tolerated medication administration.   Will continue to monitor, offer support, and reassess.   
Guardian returns call and is updated of recent events  
Initiation of Seclusion    Patient become agitated during group with recreational therapist and peers. Staff attempt to remove patient from group and to assist patient with decompensation in room. Patient behaviors escalated  to yelling screaming and raising a closed dist at staff when staff intervene. Patient continued to escalate in milieu. Medication Administered as prescribed: Benadryl 50mg IM Haldol 5mg IM . Provider notified.No physical hold required, patient advocate present.  Seclusion initiated @ 1148. Guardian notified to return call to staff for update.  
Morning Orientation  Patient accepting of morning orientation and information given.   
No discharge OQ completed, due to no admission OQ   
PRN effective.  
Patient is a non smoker, smoking cessation is not required at this date and time.  
Patient is agitated and disruptive as evidence by pacing through day room, yelling at staff and slamming fists on counter demanding staff to change the channel as other peers are watching a movie. Staff attempted to find and relieve the distress by talking with patient, refocus on a new activity and taking a walk with patient. Patient was unable to be redirected, yelled \"I hate you all\". PRN PO Haldol 5 mg was administered as prescribed at this time. Patient accepting of medication and tolerated well. Writer will continue to monitor and reassess.    
Patient is non-smoker no smoking cessation needed at this time.  
Patient is stating that Fredy barrera is stating behind me and that he can hear is cousins and nephews yelling at him from home. EMILEE [place] given. Patent throwing his bible around.   
Patient refused influenza vaccine at this date and time.  
Post Restraint and Seclusion Patient Debriefing    Did debriefing occur? yes, Patient verbalizes ability to maintain behavioral control     If No, why not?  [] Patient refused  [] Patient is unavailable for debriefing due to:  [] Patient debriefing not completed due to clinical contraindication of:    What events led to the seclusion/restraint incident?  Patient unable to maintain behavioral control  escalating in hostile and aggressive behaviors  Did being restrained or in seclusion help you regain control of your behavior? yes, Patient was able to safely decompensated       Did you feel safe while you were in restraint or seclusion:      [] Very Safe  [x] Safe  [] Somewhat Safe  [] Not Safe     Did you have the chance to gain control of your behavior before you were secluded or restrained? yes, patient was offered severarl chances to regain control in room and unable at the time     If Yes, how:    [x] Offered Medication  [x] Talked with  [] Given Time Out      [x] Offered alternatives to restraint/seclusion     During the restraint or seclusion process were you offered medicine to help you gain control? yes      Were your physical and emotional needs met, and your privacy rights addressed while you were in restraints/seclusion? yes      How can we assist you in remaining restraint or seclusion free in the future?      Is there anything else you would like to share regarding this restraint/seclusion episode?    Patient consented to family or significant other to participate in debriefing no    Patient's guardian participated in debriefing (when applicable) no    Patient's guardian unable to participate in debriefing (when applicable) no    Staff:  Were modifications to the treatment plan completed? no      
RN has reviewed all LPN documentation.     
RN has reviewed all LPN documentation.     
Safety check completed. No contraband found.  
Spoke with Guardian to inform her of discharge. She states she will pick him up at 1600.   
Writer witnessed admission documents being signed by legal guardian.  
     ( ) Recognizing danger situations (included triggers and roadblocks)                    ( ) Coping skills (new ways to manage stress,relaxation techniques, changing routine, distraction)                                                           ( ) Basic information about quitting (benefits of quitting, techniques in how to quit, available resources  ( ) [facility]                                                                                                                   (x) Patient refused counseling  ( ) Patient has not smoked in the last 30 days    Metabolic Screening:    Lab Results   Component Value Date    LABA1C 5.0 03/17/2023       Lab Results   Component Value Date    CHOL 172 09/15/2024    CHOL 162 03/17/2023    CHOL 176 10/18/2019     Lab Results   Component Value Date    TRIG 93 09/15/2024    TRIG 72 03/17/2023    TRIG 113 10/18/2019     Lab Results   Component Value Date    HDL 32 (L) 09/15/2024    HDL 37 (L) 03/17/2023    HDL 38 (L) 10/18/2019     No components found for: \"LDLCAL\"  No components found for: \"LABVLDL\"      Body mass index is 41.6 kg/m².    BP Readings from Last 2 Encounters:   12/11/24 (!) 136/90   11/20/24 (!) 140/93       Recliner Assessment:  Patient is able to demonstrate the ability to move from a reclining position to an upright position within the recliner.    Pt admitted with followings belongings:  Dental Appliances: None  Vision - Corrective Lenses: None  Hearing Aid: None  Jewelry: None  Body Piercings Removed: N/A  Clothing: Shirt, Socks, Undergarments, Pants, Footwear  The following personal items were collected during admission. Items secured in locker/safe. Items will be returned to patient at discharge.     Ronn Gloria RN        
    ( ) Recognizing danger situations (included triggers and roadblocks)                    ( ) Coping skills (new ways to manage stress,relaxation techniques, changing routine, distraction)                                                           ( ) Basic information about quitting (benefits of quitting, techniques in how to quit, available resources  ( ) [facility]                                                                                                                     ( x) Patient refused counseling  ( ) Patient has not smoked in the last 30 days    Metabolic Screening:    Lab Results   Component Value Date    LABA1C 5.0 03/17/2023       Lab Results   Component Value Date    CHOL 172 09/15/2024    CHOL 162 03/17/2023    CHOL 176 10/18/2019     Lab Results   Component Value Date    TRIG 93 09/15/2024    TRIG 72 03/17/2023    TRIG 113 10/18/2019     Lab Results   Component Value Date    HDL 32 (L) 09/15/2024    HDL 37 (L) 03/17/2023    HDL 38 (L) 10/18/2019     No components found for: \"LDLCAL\"  No components found for: \"LABVLDL\"        Body mass index is 41.6 kg/m².    BP Readings from Last 2 Encounters:   12/11/24 (!) 136/90   11/20/24 (!) 140/93           Pt admitted with followings belongings:  Dental Appliances: None  Vision - Corrective Lenses: None  Hearing Aid: None  Jewelry: None  Body Piercings Removed: N/A  Clothing: Shirt, Socks, Undergarments, Pants, Footwear      Brandi Torre RN

## 2024-12-23 NOTE — GROUP NOTE
Group Therapy Note    Date: 12/23/2024    Group Start Time: 0900  Group End Time: 0915  Group Topic: Community Meeting    Amado De La Fuente, DOREEN        Group Therapy Note    Attendees: 3/9       Patient's Goal:  discharge     Notes:  coping skills     Status After Intervention:  Unchanged    Participation Level: Active Listener and Interactive    Participation Quality: Appropriate, Attentive, and Sharing      Speech:  normal      Thought Process/Content: Logical  Linear      Affective Functioning: Congruent      Mood: anxious      Level of consciousness:  Alert, Oriented x4, and Preoccupied      Response to Learning: Progressing to goal      Endings: None Reported    Modes of Intervention: Education      Discipline Responsible: Registered Nurse      Signature:  Amado Lubin RN

## 2024-12-23 NOTE — PROGRESS NOTES
Behavioral Services                                              Medicare Re-Certification    I certify that the inpatient psychiatric hospital services furnished since the previous certification/re-certification were, and continue to be, medically necessary for;    [x] (1) Treatment which could reasonably be expected to improve the patient's condition,    [x] (2) Or for diagnostic study.    Estimated length of stay/service 3 to 5 days    Plan for post-hospital care home with outpatient community mental health follow-up as well as outpatient ECT    This patient continues to need, on a daily basis, active treatment furnished directly by or requiring the supervision of inpatient psychiatric personnel.    Electronically signed by HAROLDO BARNETT MD on 12/18/2024 at 6:38 AM   
      Behavioral Services  Medicare Certification Upon Admission    I certify that this patient's inpatient psychiatric hospital admission is medically necessary for:    [x] (1) Treatment which could reasonably be expected to improve this patient's condition,       [x] (2) Or for diagnostic study;     AND     [x](2) The inpatient psychiatric services are provided while the individual is under the care of a physician and are included in the individualized plan of care.    Estimated length of stay/service 4-7 days    Plan for post-hospital care home with outpatient [facility] f/u    Electronically signed by HAROLDO BARNETT MD on 12/12/2024 at 1:43 PM      
     Nutrition Note    Pt was mistakenly referred to RD due to history of nutrition support. Spoke to RN. Pt has no history of nutrition support. Will see as LOS.     Electronically signed by Rebecca Hickey RD, LD on 12/12/24 at 9:47 AM EST    Contact: [phone]    
   12/18/24 1837   Encounter Summary   Encounter Overview/Reason Behavioral Health   Service Provided For Patient   Last Encounter  12/18/24   Behavioral Health    Type  [facility]   Assessment/Intervention/Outcome   Assessment Anxious;Compromised coping;Impaired resilience;Impaired social interaction   Intervention Active listening;Confronted/Challenged;Explored/Affirmed feelings, thoughts, concerns;Explored Coping Skills/Resources;Prayer (assurance of)/[place];Sustaining Presence/Ministry of presence   Outcome Engaged in conversation;Expressed feelings, needs, and concerns;Receptive       
  Daily Progress Note  12/13/2024    Patient Name: Alistair Hutchison    CHIEF COMPLAINT: Acute psychosis         SUBJECTIVE:    Patient was seen for follow-up assessment today.  He has been compliant with scheduled Clozaril.  He last required emergency oral Haldol last night for agitation.  Patient received first treatment of ECT for this admission this morning.  Nursing staff report patient has been somewhat anxious and restless and focused on discharge.  He has endorsed ongoing auditory and visual hallucinations of family including his grandpa.  Patient was observed in the day area watching TV and was agreeable to conversation.  He walked with writer in the hallway.  Patient stated he is much happier on stepdown unit as there was too much \"arguing\" on PICU.  Patient then stated because of the transfer he is \"in a much better mood\".  Patient is denying suicidal ideation.  He is denying auditory and visual hallucinations at time of assessment.  He is denying any lingering side effects from ECT treatment this morning.  He made irrelevant and unrelated comments throughout discussion including \"I am allergic to mushrooms\".  Thought process continues to be disorganized.  He is reporting intermittent thoughts of wanting to harm others but feels safe at this facility.  At this time patient has yet to demonstrate stability and is at increased risk of harm to self and others and warrants hospitalization for safety and stabilization.    Appetite:  [x] Adequate/Unchanged  [] Increased  [] Decreased      Sleep:       [x] Adequate/Unchanged  [] Fair  [] Poor      Group Attendance on Unit:   [x] Yes   [] Selectively    [] No    Compliant with scheduled medications: [x] Yes  [] No    Received emergency medications in past 24 hrs: [x] Yes   [] No    Medication Side Effects: Denies         Mental Status Exam  Level of consciousness: Alert and awake   Appearance: Appropriate attire for setting, standing, with poor grooming and 
  Daily Progress Note  12/16/2024    Patient Name: Alistair Hutchison    CHIEF COMPLAINT: Acute psychosis         SUBJECTIVE:    Patient was seen for follow-up assessment today.  Patient found in day room and accepted the need for privacy, moved to private room, door left open.  Underwent ECT this morning, denies headache or other adverse effects. States he is \"really trying\" hard to not hurt anyone and has been doing \"much better.\"  Endorses improvement in homicidal ideation. Denies suicidal ideation at this time. He denies paranoia however presents as guarded/suspicious. Expresses desire to be home by Sara and excitement to play Xbox again. Continues to endorse annoyance toward his young nieces and nephews at home who frequently take his Xbox and who are loud.  Endorses sleeping well and good appetite.  He has been compliant with scheduled Clozaril with no signs of adverse effects.  Received IM Benadryl and Haldol for agitation yesterday at 1147, no need for emergency medication yet today. Patient symptoms remain unstable and warrants further hospitalization for safety and stabilization.      Appetite:  [x] Adequate/Unchanged  [] Increased  [] Decreased      Sleep:       [x] Adequate/Unchanged  [] Fair  [] Poor      Group Attendance on Unit:   [x] Yes   [] Selectively    [] No    Compliant with scheduled medications: [x] Yes  [] No    Received emergency medications in past 24 hrs: [x] Yes   [] No    Medication Side Effects: Denies         Mental Status Exam  Level of consciousness: Alert and awake   Appearance: Appropriate attire for setting, seated on bed with poor grooming and hygiene   Behavior/Motor: Approachable, engages with interviewer, no psychomotor abnormalities  Attitude toward examiner: Cooperative, attentive, fair eye contact  Speech: spontaneous, normal volume, rapid, slurred, dysarthric  Mood: anxious  Affect: Mood congruent  Thought processes: Rapid, perseverative on discharge  Thought 
  Daily Progress Note  12/17/2024    Patient Name: Alistair Hutchison    CHIEF COMPLAINT: Acute psychosis         SUBJECTIVE:    Patient was seen for follow-up assessment today.  He has been compliant with scheduled Clozaril.  He last required emergency medications for agitation on 12/15/2024.  Nursing staff report patient has been very focused on discharge today and restless.  Patient was approached in the day area.  He stood up to engage in conversation with writer and became restless and agitated.  Patient is very focused on wanting to go home and states he does not need ECT or an increase in medication.  He is very eager to play his video games.  He has no insight into his mental illness or need for treatment.  Patient continued to be argumentative and assessment had to be discontinued because of this.  At this time patient has yet to demonstrate stability and warrants further hospitalization for safety and stabilization.    Appetite:  [x] Adequate/Unchanged  [] Increased  [] Decreased      Sleep:       [x] Adequate/Unchanged  [] Fair  [] Poor      Group Attendance on Unit:   [x] Yes   [x] Selectively    [] No    Compliant with scheduled medications: [x] Yes  [] No    Received emergency medications in past 24 hrs: [x] Yes   [] No    Medication Side Effects: Denies         Mental Status Exam  Level of consciousness: Alert and awake   Appearance: Appropriate attire for setting, standing, with poor grooming and hygiene   Behavior/Motor: Approachable, engages with interviewer, restless, hyperactive  Attitude toward examiner: Somewhat cooperative, attentive, fair eye contact  Speech: Increased rate and volume,slurred, dysarthric, irritable tone  Mood: anxious, irritable  Affect: Mood congruent  Thought processes: Rapid, perseverative on discharge  Thought content:  Reports improvement in homicidal ideation  Suicidal Ideation: Denies suicidal ideations, contracts for safety on the unit.   Delusions: Presents with 
  Daily Progress Note  12/19/2024    Patient Name: Alistair Hutchison    CHIEF COMPLAINT: Acute psychosis         SUBJECTIVE:    Patient was seen for follow-up assessment today.  He has been compliant with scheduled Clozaril.  He last required emergency medications for agitation on 12/15/2024.  Nursing staff reports patient remains discharge focused.  He is active in the day area and attends groups.  Patient was approached in the day area.  He was agreeable to conversation and declined need for privacy.  Thoughts were not as rapid and speech not as pressured.  He was able to maintain appropriate dialogue with writer.  He did not become distressed  today during assessment, which is an improvement for him.  He was focused on when attending who would be coming to speak with him.  He reports that his mood is \"good so far\" today.  He expressed having fun in the recent group because they played a game called [employer].  He is denying any side effects to the titration of Clozaril.  He is denying suicidal and homicidal ideation.  He is denying any current homicidal ideation and continues to state that that only happens when he is at home.  Patient to continue to receive inpatient ECT for now.    Appetite:  [x] Adequate/Unchanged  [] Increased  [] Decreased      Sleep:       [x] Adequate/Unchanged  [] Fair  [] Poor      Group Attendance on Unit:   [x] Yes   [x] Selectively    [] No    Compliant with scheduled medications: [x] Yes  [] No    Received emergency medications in past 24 hrs: [x] Yes   [] No    Medication Side Effects: Denies         Mental Status Exam  Level of consciousness: Alert and awake   Appearance: Appropriate attire for setting, standing, with poor grooming and hygiene   Behavior/Motor: Approachable, engages with interviewer, restless, hyperactive  Attitude toward examiner: cooperative, attentive, fair eye contact  Speech: Increased rate and volume, slurred, dysarthric, irritable tone  Mood: Pleasant, 
  Daily Progress Note  12/21/2024    Patient Name: Alistair Hutchison    CHIEF COMPLAINT:  Acute Psychosis          SUBJECTIVE:      Patient is seen today for a follow up assessment.  He is found in the day room, accepted the need for privacy, moved to his room, door was left open.  He remains discharge focused.  Denies suicidal or homicidal ideation.  Denies auditory, visual hallucinations or other perceptual disturbances.  No current evidence of delusions.  Remains compliant with taking scheduled psychotropic medication and denies adverse effects.  Per unit nursing staff patient maintains behave control with no need for emergency medication for the past 24 hours.    Appetite:  [x] Normal/Adequate/Unchanged  [] Increased  [] Decreased      Sleep:       [x] Normal/Adequate/Unchanged  [] Fair  [] Poor      Group Attendance on Unit:   [x] Yes  [] Selectively    [] No    Medication Side Effects:  Patient denies any medication side effects at the time of assessment.         Mental Status Exam  Level of consciousness: Alert and awake.   Appearance: Appropriate attire for setting, seated on bed, with fair grooming and hygiene.   Behavior/Motor: Approachable, childlike  Attitude toward examiner: Cooperative, attentive, fair eye contact.  Speech: Normal rate, normal volume, childlike tone.  Mood:  Patient reports \"I'm ready to go home\".   Affect: Elevated  Thought processes: Perseverative, linear  Thought content: Denies homicidal ideation.  Suicidal Ideation: Denies suicidal ideations  Delusions: No evidence of delusions. Denies paranoia.  Perceptual Disturbance: Patient does not appear to be responding to internal stimuli. Denies auditory hallucinations. Denies visual hallucinations.   Cognition: Oriented to self, location, time, and situation.  Memory: Intact.  Insight & Judgement: Poor.     Data   height is 1.651 m (5' 5\") and weight is 113.4 kg (250 lb). His temporal temperature is 98.2 °F (36.8 °C). His blood 
CLINICAL PHARMACY NOTE: MEDS TO BEDS    Total # of Prescriptions Filled: 2   The following medications were delivered to the patient:  Clozapine 100MG Tablets  Hydroxyzine HCL 25MG Tablets   Additional Documentation:  Delivered to [facility] Unit [facility] and signed for by Amado at 3:35PM 12/23/34  
Pre ECT Assessment Note  Psychiatry  12/23/2024      Alistair Hutchison  1995  [id number]      Subjective:     Patient is a 29 y.o.  male seen for an evaluation prior to today's electroconvulsive therapy treatment. Today is treatment number 5, utilizing right unilateral.  This is course number 2.  The patient has previously completed a course of bilateral ECT for a total of 18 treatments that ended on 11/20/2024.    Alistair remains inpatient, he has a one-to-one sitter, he reports improvement in his mood.  He reports that he has been making friends on the inpatient unit.  He reports that he is excited about returning home today.  The plan is for him to return this Friday for outpatient ECT.  He was able to verbalize coping skills to utilize at home to avoid irritability, agitation and aggression towards family.  He reports that he is feeling a lot less irritable since resuming ECT, he denies any significant memory problems.  He denies any suicidal ideation.  He will return on Friday, we will reevaluate how he is doing in the home in his normal surroundings to determine the frequency of treatment after Friday.    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    Hyperthyroidism, subclinical 08/22/2024    Moderate mixed hyperlipidemia not requiring statin therapy 08/22/2024    Class 2 obesity without serious comorbidity with body mass index (BMI) of 38.0 to 38.9 in adult 08/22/2024    Hypovitaminosis D 10/18/2019    Rib pain on left side 10/18/2019     Past Medical History:   Diagnosis Date    Fracture     leg    Manic depression (HCC)     Oppositional defiant behavior     PONV (postoperative nausea and vomiting)     Schizophrenia ([facility])       Past Surgical History:   Procedure Laterality Date    EYE SURGERY      TYMPANOSTOMY TUBE 
RT ASSESSMENT TREATMENT GOALS    [x]Pt Goal:  Pt will identify 1-2 positive coping skills by time of discharge.    []Pt Goal:  Pt will identify 1-2 positive aspects of self by time of discharge.    [x]Pt Goal:  Pt will remain on task/topic for 15-30 minutes during group by time of discharge.    []Pt Goal:  Pt will identify 1-2 aspects of relapse prevention plan by time of discharge.    []Pt Goal:  Pt will join in conversation with peers 1-2 times per group by time of discharge.    []Pt Goal:  Pt will identify 1-2 new leisure interests by time of discharge.    []Pt Goal:  Pt will not voice any delusional content by time of discharge.    
Unable to complete leisure assessment on this date. Will be complete by psycho- on 12/12/2024  
   Alkaline Phosphatase 12/12/2024 82  40 - 129 U/L Final    ALT 12/12/2024 22  10 - 50 U/L Final    AST 12/12/2024 18  10 - 50 U/L Final    WBC 12/12/2024 11.8 (H)  3.5 - 11.0 k/uL Final    RBC 12/12/2024 4.93  4.5 - 5.9 m/uL Final    Hemoglobin 12/12/2024 14.0  13.5 - 17.5 g/dL Final    Hematocrit 12/12/2024 41.8  41 - 53 % Final    MCV 12/12/2024 84.7  80 - 100 fL Final    MCH 12/12/2024 28.3  26 - 34 pg Final    MCHC 12/12/2024 33.4  31 - 37 g/dL Final    RDW 12/12/2024 13.7  11.5 - 14.9 % Final    Platelets 12/12/2024 311  150 - 450 k/uL Final    MPV 12/12/2024 7.2  6.0 - 12.0 fL Final    Neutrophils % 12/12/2024 72 (H)  36 - 66 % Final    Lymphocytes % 12/12/2024 19 (L)  24 - 44 % Final    Monocytes % 12/12/2024 6  1 - 7 % Final    Eosinophils % 12/12/2024 2  0 - 4 % Final    Basophils % 12/12/2024 1  0 - 2 % Final    Neutrophils Absolute 12/12/2024 8.50  1.3 - 9.1 k/uL Final    Lymphocytes Absolute 12/12/2024 2.20  1.0 - 4.8 k/uL Final    Monocytes Absolute 12/12/2024 0.70  0.1 - 1.3 k/uL Final    Eosinophils Absolute 12/12/2024 0.30  0.0 - 0.4 k/uL Final    Basophils Absolute 12/12/2024 0.10  0.0 - 0.2 k/uL Final    Color, UA 12/12/2024 Yellow  Yellow Final    Turbidity UA 12/12/2024 Clear  Clear Final    Glucose, Ur 12/12/2024 NEGATIVE  NEGATIVE mg/dL Final    Bilirubin, Urine 12/12/2024 NEGATIVE  NEGATIVE Final    Ketones, Urine 12/12/2024 NEGATIVE  NEGATIVE mg/dL Final    Specific Gravity, UA 12/12/2024 1.016  1.000 - 1.030 Final    Urine Hgb 12/12/2024 NEGATIVE  NEGATIVE Final    pH, Urine 12/12/2024 7.0  5.0 - 8.0 Final    Protein, UA 12/12/2024 NEGATIVE  NEGATIVE mg/dL Final    Urobilinogen, Urine 12/12/2024 Normal  0.0 - 1.0 EU/dL Final    Nitrite, Urine 12/12/2024 NEGATIVE  NEGATIVE Final    Leukocyte Esterase, Urine 12/12/2024 NEGATIVE  NEGATIVE Final    Comment 12/12/2024 Microscopic exam not performed based on chemical results unless requested in original order.   Final    TSH 12/12/2024 2.55  
MCV 12/12/2024 84.7  80 - 100 fL Final    MCH 12/12/2024 28.3  26 - 34 pg Final    MCHC 12/12/2024 33.4  31 - 37 g/dL Final    RDW 12/12/2024 13.7  11.5 - 14.9 % Final    Platelets 12/12/2024 311  150 - 450 k/uL Final    MPV 12/12/2024 7.2  6.0 - 12.0 fL Final    Neutrophils % 12/12/2024 72 (H)  36 - 66 % Final    Lymphocytes % 12/12/2024 19 (L)  24 - 44 % Final    Monocytes % 12/12/2024 6  1 - 7 % Final    Eosinophils % 12/12/2024 2  0 - 4 % Final    Basophils % 12/12/2024 1  0 - 2 % Final    Neutrophils Absolute 12/12/2024 8.50  1.3 - 9.1 k/uL Final    Lymphocytes Absolute 12/12/2024 2.20  1.0 - 4.8 k/uL Final    Monocytes Absolute 12/12/2024 0.70  0.1 - 1.3 k/uL Final    Eosinophils Absolute 12/12/2024 0.30  0.0 - 0.4 k/uL Final    Basophils Absolute 12/12/2024 0.10  0.0 - 0.2 k/uL Final    Color, UA 12/12/2024 Yellow  Yellow Final    Turbidity UA 12/12/2024 Clear  Clear Final    Glucose, Ur 12/12/2024 NEGATIVE  NEGATIVE mg/dL Final    Bilirubin, Urine 12/12/2024 NEGATIVE  NEGATIVE Final    Ketones, Urine 12/12/2024 NEGATIVE  NEGATIVE mg/dL Final    Specific Gravity, UA 12/12/2024 1.016  1.000 - 1.030 Final    Urine Hgb 12/12/2024 NEGATIVE  NEGATIVE Final    pH, Urine 12/12/2024 7.0  5.0 - 8.0 Final    Protein, UA 12/12/2024 NEGATIVE  NEGATIVE mg/dL Final    Urobilinogen, Urine 12/12/2024 Normal  0.0 - 1.0 EU/dL Final    Nitrite, Urine 12/12/2024 NEGATIVE  NEGATIVE Final    Leukocyte Esterase, Urine 12/12/2024 NEGATIVE  NEGATIVE Final    Comment 12/12/2024 Microscopic exam not performed based on chemical results unless requested in original order.   Final    TSH 12/12/2024 2.55  0.27 - 4.20 uIU/mL Final    POC Glucose 12/13/2024 86  75 - 110 mg/dL Final    POC Glucose 12/16/2024 121 (H)  75 - 110 mg/dL Final    POC Glucose 12/18/2024 107  75 - 110 mg/dL Final    WBC 12/19/2024 13.6 (H)  3.5 - 11.0 k/uL Final    RBC 12/19/2024 4.81  4.5 - 5.9 m/uL Final    Hemoglobin 12/19/2024 13.6  13.5 - 17.5 g/dL Final    
0.27 - 4.20 uIU/mL Final    POC Glucose 12/13/2024 86  75 - 110 mg/dL Final    POC Glucose 12/16/2024 121 (H)  75 - 110 mg/dL Final    POC Glucose 12/18/2024 107  75 - 110 mg/dL Final    WBC 12/19/2024 13.6 (H)  3.5 - 11.0 k/uL Final    RBC 12/19/2024 4.81  4.5 - 5.9 m/uL Final    Hemoglobin 12/19/2024 13.6  13.5 - 17.5 g/dL Final    Hematocrit 12/19/2024 41.4  41 - 53 % Final    MCV 12/19/2024 86.1  80 - 100 fL Final    MCH 12/19/2024 28.3  26 - 34 pg Final    MCHC 12/19/2024 32.9  31 - 37 g/dL Final    RDW 12/19/2024 13.9  11.5 - 14.9 % Final    Platelets 12/19/2024 289  150 - 450 k/uL Final    MPV 12/19/2024 7.2  6.0 - 12.0 fL Final    Neutrophils % 12/19/2024 76 (H)  36 - 66 % Final    Lymphocytes % 12/19/2024 13 (L)  24 - 44 % Final    Monocytes % 12/19/2024 7  1 - 7 % Final    Eosinophils % 12/19/2024 4  0 - 4 % Final    Basophils % 12/19/2024 0  0 - 2 % Final    Neutrophils Absolute 12/19/2024 10.34 (H)  1.3 - 9.1 k/uL Final    Lymphocytes Absolute 12/19/2024 1.77  1.0 - 4.8 k/uL Final    Monocytes Absolute 12/19/2024 0.95  0.1 - 1.3 k/uL Final    Eosinophils Absolute 12/19/2024 0.54 (H)  0.0 - 0.4 k/uL Final    Basophils Absolute 12/19/2024 0.00  0.0 - 0.2 k/uL Final    Morphology 12/19/2024 Normal   Final    POC Glucose 12/20/2024 101  75 - 110 mg/dL Final         Reviewed patient's current plan of care and vital signs with nursing staff.    Labs reviewed: [x] Yes      Medications  Current Facility-Administered Medications: cloZAPine (CLOZARIL) tablet 75 mg, 75 mg, Oral, TID  0.9 % sodium chloride infusion, , IntraVENous, Continuous  ziprasidone (GEODON) 20 mg in sterile water 1 mL injection, 20 mg, IntraMUSCular, Q12H PRN  acetaminophen (TYLENOL) tablet 650 mg, 650 mg, Oral, Q6H PRN  ibuprofen (ADVIL;MOTRIN) tablet 400 mg, 400 mg, Oral, Q6H PRN  hydrOXYzine HCl (ATARAX) tablet 50 mg, 50 mg, Oral, TID PRN  traZODone (DESYREL) tablet 50 mg, 50 mg, Oral, Nightly PRN  polyethylene glycol (GLYCOLAX) packet 17 
original order.   Final    TSH 12/12/2024 2.55  0.27 - 4.20 uIU/mL Final    POC Glucose 12/13/2024 86  75 - 110 mg/dL Final    POC Glucose 12/16/2024 121 (H)  75 - 110 mg/dL Final    POC Glucose 12/18/2024 107  75 - 110 mg/dL Final         Reviewed patient's current plan of care and vital signs with nursing staff.    Labs reviewed: [x] Yes    Vitals reviewed: [x] Yes      Medications  Current Facility-Administered Medications: cloZAPine (CLOZARIL) tablet 62.5 mg, 62.5 mg, Oral, TID  0.9 % sodium chloride infusion, , IntraVENous, Continuous  ziprasidone (GEODON) 20 mg in sterile water 1 mL injection, 20 mg, IntraMUSCular, Q12H PRN  acetaminophen (TYLENOL) tablet 650 mg, 650 mg, Oral, Q6H PRN  ibuprofen (ADVIL;MOTRIN) tablet 400 mg, 400 mg, Oral, Q6H PRN  hydrOXYzine HCl (ATARAX) tablet 50 mg, 50 mg, Oral, TID PRN  traZODone (DESYREL) tablet 50 mg, 50 mg, Oral, Nightly PRN  polyethylene glycol (GLYCOLAX) packet 17 g, 17 g, Oral, Daily PRN  aluminum & magnesium hydroxide-simethicone (MAALOX) 200-200-20 MG/5ML suspension 30 mL, 30 mL, Oral, Q6H PRN  haloperidol lactate (HALDOL) injection 5 mg, 5 mg, IntraMUSCular, Q6H PRN **AND** diphenhydrAMINE (BENADRYL) injection 50 mg, 50 mg, IntraMUSCular, Q6H PRN  haloperidol (HALDOL) tablet 5 mg, 5 mg, Oral, Q6H PRN  nicotine polacrilex (COMMIT) lozenge 2 mg, 2 mg, Oral, Q2H PRN    ASSESSMENT  Disorganized schizophrenia (HCC)         PATIENT HANDOFF  Patient symptoms are: Modest signs of improvement but unstable for discharge  Monitor need and frequency of PRN medications.  Encourage participation in groups and milieu.  Medication changes and discharge planning per attending  Follow-up daily while inpatient.     Patient continues to be monitored in the inpatient psychiatric facility at [facility] for safety and stabilization. Patient continues to need, on a daily basis, active treatment furnished directly by or requiring the supervision of inpatient psychiatric 
safety and stabilization. Patient continues to need, on a daily basis, active treatment furnished directly by or requiring the supervision of inpatient psychiatric personnel.    Electronically signed by GIORGI Tavarez CNP on 12/15/2024 at 2:51 PM    **This report has been created using voice recognition software. It may contain minor errors which are inherent in voice recognition technology.**  I independently saw and evaluated the patient.  I reviewed the  documentation above.  Any additional comments or changes to the    documentation are stated below otherwise agree with assessment.      The patient has been more agitated today.  He is loud and hyperverbal.  He is also angry and more irritable.  The patient has received injectable medications this morning.  Geodon was ordered when the first trial of injectable medication was not very effective.  No changes were made to his regular medications today    PLAN  Medications as noted above  Attempt to develop insight  Psycho-education conducted.  Supportive Therapy conducted.  Follow-up daily while on inpatient unit    Electronically signed by SHAHID CAZARES MD on 12/15/24 at 6:23 PM EST    
seen face-to-face.  He claims to be feeling better but has required 2 sets of as needed medications today.  His Clozapine has been increased to 37.5 mg 3 times daily    PLAN  Medications as noted above  Attempt to develop insight  Psycho-education conducted.  Supportive Therapy conducted.  Follow-up daily while on inpatient unit    Electronically signed by SHAHID CAZARES MD on 12/14/24 at 9:42 PM EST

## 2024-12-23 NOTE — PLAN OF CARE
Problem: Erica  Goal: Will exhibit normal sleep and speech and no impulsivity  Description: INTERVENTIONS:  1. Administer medication as ordered  2. Set limits on impulsive behavior  3. Make attempts to decrease external stimuli as possible  Outcome: Progressing     Problem: Psychosis  Goal: Will report no hallucinations or delusions  Description: INTERVENTIONS:  1. Administer medication as  ordered  2. Assist with reality testing to support increasing orientation  3. Assess if patient's hallucinations or delusions are encouraging self harm or harm to others and intervene as appropriate  12/23/2024 0948 by Amado Lubin RN  Outcome: Progressing     Problem: Behavior  Goal: Pt/Family maintain appropriate behavior and adhere to behavioral management agreement, if implemented  Description: INTERVENTIONS:  1. Assess patient/family's coping skills and  non-compliant behavior (including use of illegal substances)  2. Notify security of behavior or suspected illegal substances which indicate the need for search of the family and/or belongings  3. Encourage verbalization of thoughts and concerns in a socially appropriate manner  4. Utilize positive, consistent limit setting strategies supporting safety of patient, staff and others  5. Encourage participation in the decision making process about the behavioral management agreement  6. If a visitor's behavior poses a threat to safety call refer to organization policy.  7. Initiate consult with , Psychosocial CNS, Spiritual Care as appropriate  12/23/2024 0948 by Amado Lubin, RN  Outcome: Progressing     Problem: Pain  Goal: Verbalizes/displays adequate comfort level or baseline comfort level  12/23/2024 0948 by Amado Lubin, RN  Outcome: Progressing     Problem: Safety - Adult  Goal: Free from fall injury  Outcome: Progressing   Patient currently denies thoughts of self harm or suicidal ideation. Denies any hallucinations or delusions. Patient has slept 
  Problem: Behavior  Goal: Pt/Family maintain appropriate behavior and adhere to behavioral management agreement, if implemented  Description: INTERVENTIONS:  1. Assess patient/family's coping skills and  non-compliant behavior (including use of illegal substances)  2. Notify security of behavior or suspected illegal substances which indicate the need for search of the family and/or belongings  3. Encourage verbalization of thoughts and concerns in a socially appropriate manner  4. Utilize positive, consistent limit setting strategies supporting safety of patient, staff and others  5. Encourage participation in the decision making process about the behavioral management agreement  6. If a visitor's behavior poses a threat to safety call refer to organization policy.  7. Initiate consult with , Psychosocial CNS, Spiritual Care as appropriate  12/21/2024 2131 by Juanito He RN  Outcome: Progressing    Pt denies thoughts of harm to self or others, safety checks maintained Q15 minutes. Pt is anxious and still disappointed about not being discharged. Remains in behavioral control. Took medications. Denies hallucinations - does not respond to internal stimuli and no evident delusions. Mostly isolative to room this evening. No complaints about sleep or appetite. Hygiene encouraged.      
  Problem: Behavior  Goal: Pt/Family maintain appropriate behavior and adhere to behavioral management agreement, if implemented  Description: INTERVENTIONS:  1. Assess patient/family's coping skills and  non-compliant behavior (including use of illegal substances)  2. Notify security of behavior or suspected illegal substances which indicate the need for search of the family and/or belongings  3. Encourage verbalization of thoughts and concerns in a socially appropriate manner  4. Utilize positive, consistent limit setting strategies supporting safety of patient, staff and others  5. Encourage participation in the decision making process about the behavioral management agreement  6. If a visitor's behavior poses a threat to safety call refer to organization policy.  7. Initiate consult with , Psychosocial CNS, Spiritual Care as appropriate  Outcome: Progressing    Pt denies thoughts of harm to self or others, safety checks maintained Q15 minutes. Pt is anxious and some underlying irritability related to wanting to be discharged. Remains in behavioral control. Took medications. Denies hallucinations - does not respond to internal stimuli and no evident delusions. Pt is out and social with peers. No complaints about sleep or appetite. Hygiene encouraged.      
  Problem: Erica  Goal: Will exhibit normal sleep and speech and no impulsivity  Description: INTERVENTIONS:  1. Administer medication as ordered  2. Set limits on impulsive behavior  3. Make attempts to decrease external stimuli as possible  12/16/2024 0052 by Sonam Krishnan LPN  Outcome: Not Progressing  Note: Patient is hyper verbal and intrusive on peers and dayroom at times. Patient needs redirection and can get frustrated and irritable when he does not get his way. Patient denies suicidal/homicidal ideations this shift. Patient educated to come to staff if needed this shift. Patient monitored every 15 minutes with environmental safety checks.      Problem: Psychosis  Goal: Will report no hallucinations or delusions  Description: INTERVENTIONS:  1. Administer medication as  ordered  2. Assist with reality testing to support increasing orientation  3. Assess if patient's hallucinations or delusions are encouraging self harm or harm to others and intervene as appropriate  12/16/2024 0052 by Sonam Krishnan LPN  Outcome: Progressing     Problem: Behavior  Goal: Pt/Family maintain appropriate behavior and adhere to behavioral management agreement, if implemented  Description: INTERVENTIONS:  1. Assess patient/family's coping skills and  non-compliant behavior (including use of illegal substances)  2. Notify security of behavior or suspected illegal substances which indicate the need for search of the family and/or belongings  3. Encourage verbalization of thoughts and concerns in a socially appropriate manner  4. Utilize positive, consistent limit setting strategies supporting safety of patient, staff and others  5. Encourage participation in the decision making process about the behavioral management agreement  6. If a visitor's behavior poses a threat to safety call refer to organization policy.  7. Initiate consult with , Psychosocial CNS, Spiritual Care as appropriate  12/16/2024 0052 by Sonam Krishnan 
  Problem: Erica  Goal: Will exhibit normal sleep and speech and no impulsivity  Description: INTERVENTIONS:  1. Administer medication as ordered  2. Set limits on impulsive behavior  3. Make attempts to decrease external stimuli as possible  12/16/2024 2121 by Sonam Krishnan LPN  Outcome: Progressing  Note: Patient was out in dayroom, social with peers and staff this shift. Patient cooperative with assessments and scheduled medications this shift. Patient denies suicidal/homicidal ideations and hallucinations this shift. Patient educated and agrees to come to staff if needed this shift. Patient monitored every 15 minutes with environmental safety checks.      Problem: Psychosis  Goal: Will report no hallucinations or delusions  Description: INTERVENTIONS:  1. Administer medication as  ordered  2. Assist with reality testing to support increasing orientation  3. Assess if patient's hallucinations or delusions are encouraging self harm or harm to others and intervene as appropriate  12/16/2024 2121 by Sonam Krishnan LPN  Outcome: Progressing  Note: Patient denies hallucinations this shift.      Problem: Behavior  Goal: Pt/Family maintain appropriate behavior and adhere to behavioral management agreement, if implemented  Description: INTERVENTIONS:  1. Assess patient/family's coping skills and  non-compliant behavior (including use of illegal substances)  2. Notify security of behavior or suspected illegal substances which indicate the need for search of the family and/or belongings  3. Encourage verbalization of thoughts and concerns in a socially appropriate manner  4. Utilize positive, consistent limit setting strategies supporting safety of patient, staff and others  5. Encourage participation in the decision making process about the behavioral management agreement  6. If a visitor's behavior poses a threat to safety call refer to organization policy.  7. Initiate consult with , Psychosocial CNS, 
  Problem: Erica  Goal: Will exhibit normal sleep and speech and no impulsivity  Description: INTERVENTIONS:  1. Administer medication as ordered  2. Set limits on impulsive behavior  3. Make attempts to decrease external stimuli as possible  12/17/2024 2356 by Sonam Krishnan LPN  Outcome: Progressing  Note: Patient was out in dayroom, social with peers and staff this shift. Patient cooperative with assessments and scheduled medications this shift. Patient denies suicidal/homicidal ideations and hallucinations this shift. Patient educated and agrees to come to staff if needed this shift. Patient monitored every 15 minutes with environmental safety checks.     Problem: Psychosis  Goal: Will report no hallucinations or delusions  Description: INTERVENTIONS:  1. Administer medication as  ordered  2. Assist with reality testing to support increasing orientation  3. Assess if patient's hallucinations or delusions are encouraging self harm or harm to others and intervene as appropriate  12/17/2024 2356 by Sonam Krishnan LPN  Outcome: Progressing  Note: Patient denies hallucinations this shift.      Problem: Behavior  Goal: Pt/Family maintain appropriate behavior and adhere to behavioral management agreement, if implemented  Description: INTERVENTIONS:  1. Assess patient/family's coping skills and  non-compliant behavior (including use of illegal substances)  2. Notify security of behavior or suspected illegal substances which indicate the need for search of the family and/or belongings  3. Encourage verbalization of thoughts and concerns in a socially appropriate manner  4. Utilize positive, consistent limit setting strategies supporting safety of patient, staff and others  5. Encourage participation in the decision making process about the behavioral management agreement  6. If a visitor's behavior poses a threat to safety call refer to organization policy.  7. Initiate consult with , Psychosocial CNS, Spiritual 
  Problem: Erica  Goal: Will exhibit normal sleep and speech and no impulsivity  Description: INTERVENTIONS:  1. Administer medication as ordered  2. Set limits on impulsive behavior  3. Make attempts to decrease external stimuli as possible  12/17/2024 2356 by Sonam Krishnan LPN  Outcome: Progressing  Note: Patient was out in dayroom, social with peers and staff this shift. Patient cooperative with assessments and scheduled medications this shift. Patient denies suicidal/homicidal ideations and hallucinations this shift. Patient educated and agrees to come to staff if needed this shift. Patient monitored every 15 minutes with environmental safety checks.     Problem: Psychosis  Goal: Will report no hallucinations or delusions  Description: INTERVENTIONS:  1. Administer medication as  ordered  2. Assist with reality testing to support increasing orientation  3. Assess if patient's hallucinations or delusions are encouraging self harm or harm to others and intervene as appropriate  12/18/2024 1050 by Do Muniz RN  Outcome: Progressing  Note: Pt denies hallucinations and delusions.      Problem: Behavior  Goal: Pt/Family maintain appropriate behavior and adhere to behavioral management agreement, if implemented  Description: INTERVENTIONS:  1. Assess patient/family's coping skills and  non-compliant behavior (including use of illegal substances)  2. Notify security of behavior or suspected illegal substances which indicate the need for search of the family and/or belongings  3. Encourage verbalization of thoughts and concerns in a socially appropriate manner  4. Utilize positive, consistent limit setting strategies supporting safety of patient, staff and others  5. Encourage participation in the decision making process about the behavioral management agreement  6. If a visitor's behavior poses a threat to safety call refer to organization policy.  7. Initiate consult with , Psychosocial CNS, 
  Problem: Erica  Goal: Will exhibit normal sleep and speech and no impulsivity  Description: INTERVENTIONS:  1. Administer medication as ordered  2. Set limits on impulsive behavior  3. Make attempts to decrease external stimuli as possible  12/19/2024 1203 by Elizabeth Yip LPN  Outcome: Progressing  Note: Patient present with no signs and symptoms of erica, Patient is able to control outburst, aggressive behaviors and pacing.      Problem: Psychosis  Goal: Will report no hallucinations or delusions  Description: INTERVENTIONS:  1. Administer medication as  ordered  2. Assist with reality testing to support increasing orientation  3. Assess if patient's hallucinations or delusions are encouraging self harm or harm to others and intervene as appropriate  12/19/2024 1203 by Elizabeth Yip LPN  Outcome: Progressing  Note: Patient is alert and oriented, no longer speaking to \"nieces and nephews\" in room. Does continue to speak aloud and and respond to self. 15 min safety checks continue      Problem: Behavior  Goal: Pt/Family maintain appropriate behavior and adhere to behavioral management agreement, if implemented  Description: INTERVENTIONS:  1. Assess patient/family's coping skills and  non-compliant behavior (including use of illegal substances)  2. Notify security of behavior or suspected illegal substances which indicate the need for search of the family and/or belongings  3. Encourage verbalization of thoughts and concerns in a socially appropriate manner  4. Utilize positive, consistent limit setting strategies supporting safety of patient, staff and others  5. Encourage participation in the decision making process about the behavioral management agreement  6. If a visitor's behavior poses a threat to safety call refer to organization policy.  7. Initiate consult with , Psychosocial CNS, Spiritual Care as appropriate  12/19/2024 1203 by Elizabeth Yip LPN  Outcome: Progressing  Note: 
  Problem: Erica  Goal: Will exhibit normal sleep and speech and no impulsivity  Description: INTERVENTIONS:  1. Administer medication as ordered  2. Set limits on impulsive behavior  3. Make attempts to decrease external stimuli as possible  12/21/2024 0915 by Aimee Terrell, RN  Outcome: Progressing     Problem: Psychosis  Goal: Will report no hallucinations or delusions  Description: INTERVENTIONS:  1. Administer medication as  ordered  2. Assist with reality testing to support increasing orientation  3. Assess if patient's hallucinations or delusions are encouraging self harm or harm to others and intervene as appropriate  12/21/2024 0915 by Aimee Terrell, RN  Outcome: Progressing     Problem: Pain  Goal: Verbalizes/displays adequate comfort level or baseline comfort level  12/21/2024 0915 by Aimee Terrell, RN  Outcome: Progressing     Patient denies suicidal thoughts, thoughts to self harm, and has remained free from self injury thus far.   Patient has been observed as selectively social with peers in the milieu.  Patient reports anxiety but states that it is due to still being here and feeling anxious about potential discharge Monday after ECT. Patient hygiene care encouraged.  Patient denies all perceptual disturbances thus far.  Patient encouraged to seek staff for any needs or concerns that may arise.  Safe environment maintained.  Safety checks in place q15 min per protocol and at irregular intervals.    
  Problem: Erica  Goal: Will exhibit normal sleep and speech and no impulsivity  Description: INTERVENTIONS:  1. Administer medication as ordered  2. Set limits on impulsive behavior  3. Make attempts to decrease external stimuli as possible  12/22/2024 1003 by Trish Baez LPN  Outcome: Progressing   Patient denies both suicidal and homicidal ideations. Patient denies both auditory and visual disturbances. Patient reports anxiety about wanting to to discharged. Patient denies depression. Patient is independent with ADLs and is encouraged to practice good hygiene. Patient is compliant with scheduled medications and remains in behavioral control. Patient is polite, reports eating and sleeping adequately with safety checks Q15 minutes and at irregular intervals.   Problem: Psychosis  Goal: Will report no hallucinations or delusions  Description: INTERVENTIONS:  1. Administer medication as  ordered  2. Assist with reality testing to support increasing orientation  3. Assess if patient's hallucinations or delusions are encouraging self harm or harm to others and intervene as appropriate  12/22/2024 1003 by Trish Baez LPN  Outcome: Progressing   Patient denies both suicidal and homicidal ideations. Patient denies both auditory and visual disturbances. Patient reports anxiety about wanting to to discharged. Patient denies depression. Patient is independent with ADLs and is encouraged to practice good hygiene. Patient is compliant with scheduled medications and remains in behavioral control. Patient is polite, reports eating and sleeping adequately with safety checks Q15 minutes and at irregular intervals.   Problem: Behavior  Goal: Pt/Family maintain appropriate behavior and adhere to behavioral management agreement, if implemented  Description: INTERVENTIONS:  1. Assess patient/family's coping skills and  non-compliant behavior (including use of illegal substances)  2. Notify security of behavior or suspected 
  Problem: Erica  Goal: Will exhibit normal sleep and speech and no impulsivity  Description: INTERVENTIONS:  1. Administer medication as ordered  2. Set limits on impulsive behavior  3. Make attempts to decrease external stimuli as possible  Outcome: Not Progressing  Note: Patient is loud and intrusive impulsive and labile, un able to redirect this shift without PRN medication interventions     Problem: Psychosis  Goal: Will report no hallucinations or delusions  Description: INTERVENTIONS:  1. Administer medication as  ordered  2. Assist with reality testing to support increasing orientation  3. Assess if patient's hallucinations or delusions are encouraging self harm or harm to others and intervene as appropriate  Outcome: Not Progressing  Note: Patient continues to have delusions of nieces and nephews trying to take over the TV in his room. Patient is irritable and hostile when when staff is unable to provide patients requests.      Problem: Behavior  Goal: Pt/Family maintain appropriate behavior and adhere to behavioral management agreement, if implemented  Description: INTERVENTIONS:  1. Assess patient/family's coping skills and  non-compliant behavior (including use of illegal substances)  2. Notify security of behavior or suspected illegal substances which indicate the need for search of the family and/or belongings  3. Encourage verbalization of thoughts and concerns in a socially appropriate manner  4. Utilize positive, consistent limit setting strategies supporting safety of patient, staff and others  5. Encourage participation in the decision making process about the behavioral management agreement  6. If a visitor's behavior poses a threat to safety call refer to organization policy.  7. Initiate consult with , Psychosocial CNS, Spiritual Care as appropriate  Outcome: Not Progressing  Note: Patient is irritable and non redirectable requiring PRN medications after several attempts to 
  Problem: Erica  Goal: Will exhibit normal sleep and speech and no impulsivity  Description: INTERVENTIONS:  1. Administer medication as ordered  2. Set limits on impulsive behavior  3. Make attempts to decrease external stimuli as possible  Outcome: Progressing     Problem: Psychosis  Goal: Will report no hallucinations or delusions  Description: INTERVENTIONS:  1. Administer medication as  ordered  2. Assist with reality testing to support increasing orientation  3. Assess if patient's hallucinations or delusions are encouraging self harm or harm to others and intervene as appropriate  Outcome: Progressing     Problem: Behavior  Goal: Pt/Family maintain appropriate behavior and adhere to behavioral management agreement, if implemented  Description: INTERVENTIONS:  1. Assess patient/family's coping skills and  non-compliant behavior (including use of illegal substances)  2. Notify security of behavior or suspected illegal substances which indicate the need for search of the family and/or belongings  3. Encourage verbalization of thoughts and concerns in a socially appropriate manner  4. Utilize positive, consistent limit setting strategies supporting safety of patient, staff and others  5. Encourage participation in the decision making process about the behavioral management agreement  6. If a visitor's behavior poses a threat to safety call refer to organization policy.  7. Initiate consult with , Psychosocial CNS, Spiritual Care as appropriate  Outcome: Progressing     Patient denied thoughts of SI/HI/self-harm and agreed to seek out staff should thoughts of SI/HI/self-harm arise. Safe environment maintained. Q15 minute checks for safety continued per unit policy.  Patient has been medication compliant and accepting of 1:1 talk time with staff. Patient demonstrates lack of personal boundaries, but is redirectable.   
  Problem: Erica  Goal: Will exhibit normal sleep and speech and no impulsivity  Description: INTERVENTIONS:  1. Administer medication as ordered  2. Set limits on impulsive behavior  3. Make attempts to decrease external stimuli as possible  Outcome: Progressing     Problem: Psychosis  Goal: Will report no hallucinations or delusions  Description: INTERVENTIONS:  1. Administer medication as  ordered  2. Assist with reality testing to support increasing orientation  3. Assess if patient's hallucinations or delusions are encouraging self harm or harm to others and intervene as appropriate  Outcome: Progressing   Patient has been pleasant and cooperative, denies intent to harm self. Support and encouragement provided.  
  Problem: Erica  Goal: Will exhibit normal sleep and speech and no impulsivity  Description: INTERVENTIONS:  1. Administer medication as ordered  2. Set limits on impulsive behavior  3. Make attempts to decrease external stimuli as possible  Outcome: Progressing  Note: Patient was out in dayroom, social with peers and staff this shift. Patient cooperative with assessments and scheduled medications this shift. Patient denies suicidal/homicidal ideations and hallucinations this shift.Patient is focused on discharge and being home for [place]. Patient educated and agrees to come to staff if needed this shift. Patient monitored every 15 minutes with environmental safety checks.      Problem: Psychosis  Goal: Will report no hallucinations or delusions  Description: INTERVENTIONS:  1. Administer medication as  ordered  2. Assist with reality testing to support increasing orientation  3. Assess if patient's hallucinations or delusions are encouraging self harm or harm to others and intervene as appropriate  12/18/2024 2229 by Sonam Krishnan LPN  Outcome: Progressing  Note: Patient denies hallucinations this shift.     Problem: Behavior  Goal: Pt/Family maintain appropriate behavior and adhere to behavioral management agreement, if implemented  Description: INTERVENTIONS:  1. Assess patient/family's coping skills and  non-compliant behavior (including use of illegal substances)  2. Notify security of behavior or suspected illegal substances which indicate the need for search of the family and/or belongings  3. Encourage verbalization of thoughts and concerns in a socially appropriate manner  4. Utilize positive, consistent limit setting strategies supporting safety of patient, staff and others  5. Encourage participation in the decision making process about the behavioral management agreement  6. If a visitor's behavior poses a threat to safety call refer to organization policy.  7. Initiate consult with , 
  Problem: Erica  Goal: Will exhibit normal sleep and speech and no impulsivity  Description: INTERVENTIONS:  1. Administer medication as ordered  2. Set limits on impulsive behavior  3. Make attempts to decrease external stimuli as possible  Outcome: Progressing  Note: Pt denies having suicidal or homicidal ideations. Pt denies having depression but reports having anxiety due to wanting to be discharged. Pt has been compliant with medical treatment and cooperative with staff.      Problem: Psychosis  Goal: Will report no hallucinations or delusions  Description: INTERVENTIONS:  1. Administer medication as  ordered  2. Assist with reality testing to support increasing orientation  3. Assess if patient's hallucinations or delusions are encouraging self harm or harm to others and intervene as appropriate  Outcome: Progressing     
  Problem: Psychosis  Goal: Will report no hallucinations or delusions  Description: INTERVENTIONS:  1. Administer medication as  ordered  2. Assist with reality testing to support increasing orientation  3. Assess if patient's hallucinations or delusions are encouraging self harm or harm to others and intervene as appropriate  12/12/2024 2009 by Jeff Woodruff RN  Outcome: Progressing  Note: Patient denies A/VH at this time.      Problem: Behavior  Goal: Pt/Family maintain appropriate behavior and adhere to behavioral management agreement, if implemented  Description: INTERVENTIONS:  1. Assess patient/family's coping skills and  non-compliant behavior (including use of illegal substances)  2. Notify security of behavior or suspected illegal substances which indicate the need for search of the family and/or belongings  3. Encourage verbalization of thoughts and concerns in a socially appropriate manner  4. Utilize positive, consistent limit setting strategies supporting safety of patient, staff and others  5. Encourage participation in the decision making process about the behavioral management agreement  6. If a visitor's behavior poses a threat to safety call refer to organization policy.  7. Initiate consult with , Psychosocial CNS, Spiritual Care as appropriate  12/12/2024 2009 by Jeff Woodruff RN  Outcome: Progressing  Flowsheets (Taken 12/12/2024 2009)  Patient/family maintains appropriate behavior and adheres to behavioral management agreement, if implemented:   Assess patient/family’s coping skills and  non-compliant behavior (including use of illegal substances)   Encourage verbalization of thoughts and concerns in a socially appropriate manner  Note: Patient needs consistent redirection needed. Patient keeps requesting you tube tv and to be discharged so he can go home and play video games. He has approached the nurses stations multiple times requesting both. Anytime staff walks by him he 
  Problem: Psychosis  Goal: Will report no hallucinations or delusions  Description: INTERVENTIONS:  1. Administer medication as  ordered  2. Assist with reality testing to support increasing orientation  3. Assess if patient's hallucinations or delusions are encouraging self harm or harm to others and intervene as appropriate  12/13/2024 2312 by Conrad Kilgore LPN  Outcome: Progressing  Patient reports visual hallucinations \"at night time and the morning time my grandfather visits me\". Patient reports auditory hallucinations \"my cousins, nieces and grandfather keeps yelling at me in my room\"  Patient reports thoughts of wanting to harm cousins, nieces and nephews.  Patient denies suicidal ideations. Writer asked patient how he is eating patient said \"I'm trying too\" Writer asked patient how he is sleeping patient replied \"not sleeping good guys\". Patient had a moment of agitation and showed disruptive behavior PRN haldol 5mg was given as prescribed. Patient hyper verbal, anxious, and restless. Patient was compliant with assessments and medication administration. Writer will continue to monitor every 15 minutes with environmental safety checks.     Problem: Behavior  Goal: Pt/Family maintain appropriate behavior and adhere to behavioral management agreement, if implemented  Description: INTERVENTIONS:  1. Assess patient/family's coping skills and  non-compliant behavior (including use of illegal substances)  2. Notify security of behavior or suspected illegal substances which indicate the need for search of the family and/or belongings  3. Encourage verbalization of thoughts and concerns in a socially appropriate manner  4. Utilize positive, consistent limit setting strategies supporting safety of patient, staff and others  5. Encourage participation in the decision making process about the behavioral management agreement  6. If a visitor's behavior poses a threat to safety call refer to organization policy.  7. 
  Problem: Psychosis  Goal: Will report no hallucinations or delusions  Description: INTERVENTIONS:  1. Administer medication as  ordered  2. Assist with reality testing to support increasing orientation  3. Assess if patient's hallucinations or delusions are encouraging self harm or harm to others and intervene as appropriate  12/14/2024 2128 by Conrad Kilgore LPN  Outcome: Progressing  Patient reports auditory hallucinations patient said \"Im trying to sleep good but I keep waking up too early because I hear my grandma and grandpa arguing\"  Patient denies visual hallucinations this shift, patient said \"I'm not seeing things I just hear things\". Patient denies suicidal ideations but when asked about homicidal ideations patient said \" I'm not but they keep trying to make me\". Writer will continue to monitor patient every 15 minutes with environmental safety checks.    Problem: Behavior  Goal: Pt/Family maintain appropriate behavior and adhere to behavioral management agreement, if implemented  Description: INTERVENTIONS:  1. Assess patient/family's coping skills and  non-compliant behavior (including use of illegal substances)  2. Notify security of behavior or suspected illegal substances which indicate the need for search of the family and/or belongings  3. Encourage verbalization of thoughts and concerns in a socially appropriate manner  4. Utilize positive, consistent limit setting strategies supporting safety of patient, staff and others  5. Encourage participation in the decision making process about the behavioral management agreement  6. If a visitor's behavior poses a threat to safety call refer to organization policy.  7. Initiate consult with , Psychosocial CNS, Spiritual Care as appropriate  12/14/2024 2128 by Conrad Kilgore LPN  Outcome: Progressing  Patient compliant with assessments and medication administration. Patient denies being depressed but said \"sometimes I have anxiety 
  Problem: Psychosis  Goal: Will report no hallucinations or delusions  Description: INTERVENTIONS:  1. Administer medication as  ordered  2. Assist with reality testing to support increasing orientation  3. Assess if patient's hallucinations or delusions are encouraging self harm or harm to others and intervene as appropriate  Outcome: Progressing     Problem: Behavior  Goal: Pt/Family maintain appropriate behavior and adhere to behavioral management agreement, if implemented  Description: INTERVENTIONS:  1. Assess patient/family's coping skills and  non-compliant behavior (including use of illegal substances)  2. Notify security of behavior or suspected illegal substances which indicate the need for search of the family and/or belongings  3. Encourage verbalization of thoughts and concerns in a socially appropriate manner  4. Utilize positive, consistent limit setting strategies supporting safety of patient, staff and others  5. Encourage participation in the decision making process about the behavioral management agreement  6. If a visitor's behavior poses a threat to safety call refer to organization policy.  7. Initiate consult with , Psychosocial CNS, Spiritual Care as appropriate  Outcome: Progressing   Patient stated mood is \"good\". Patient denies auditory and visual hallucinations. Patient denies homicidal and suicidal ideations. Patient said sleeping and eating \"good\". Patient said \"I'm just ready to go home\". Patient denies feeling depressed but anxious about getting ECT tomorrow and being discharged home. Patient pleasant and cooperative with assessments and medication administration. Patient out in day room social with peers. Will continue to monitor every 15 minutes with environmental safety checks.   Problem: Pain  Goal: Verbalizes/displays adequate comfort level or baseline comfort level  Outcome: Progressing   Patient denies being in pain no signs of discomfort noticed.   
  Problem: Psychosis  Goal: Will report no hallucinations or delusions  Description: INTERVENTIONS:  1. Administer medication as  ordered  2. Assist with reality testing to support increasing orientation  3. Assess if patient's hallucinations or delusions are encouraging self harm or harm to others and intervene as appropriate  Outcome: Progressing  Note: Patient is able to recognize behaviors and is alert and oriented x4     Problem: Pain  Goal: Verbalizes/displays adequate comfort level or baseline comfort level  Outcome: Progressing  Note: Patient is accepting of PRN pain medications      Problem: Erica  Goal: Will exhibit normal sleep and speech and no impulsivity  Description: INTERVENTIONS:  1. Administer medication as ordered  2. Set limits on impulsive behavior  3. Make attempts to decrease external stimuli as possible  Outcome: Not Progressing  Note: Patient continues with hypervebal speech and pacing. Patient denies thoughts of SI or HI. 15 min safety checks continue.      Problem: Behavior  Goal: Pt/Family maintain appropriate behavior and adhere to behavioral management agreement, if implemented  Description: INTERVENTIONS:  1. Assess patient/family's coping skills and  non-compliant behavior (including use of illegal substances)  2. Notify security of behavior or suspected illegal substances which indicate the need for search of the family and/or belongings  3. Encourage verbalization of thoughts and concerns in a socially appropriate manner  4. Utilize positive, consistent limit setting strategies supporting safety of patient, staff and others  5. Encourage participation in the decision making process about the behavioral management agreement  6. If a visitor's behavior poses a threat to safety call refer to organization policy.  7. Initiate consult with , Psychosocial CNS, Spiritual Care as appropriate  Outcome: Not Progressing  Note: Patient is unable to maintain behavioral control 
  Problem: Psychosis  Goal: Will report no hallucinations or delusions  Description: INTERVENTIONS:  1. Administer medication as  ordered  2. Assist with reality testing to support increasing orientation  3. Assess if patient's hallucinations or delusions are encouraging self harm or harm to others and intervene as appropriate  Outcome: Progressing  Note: Patient is reporting that his grandparents are yelling at him. During 1 on 1 patient denies AH/VH.      Problem: Behavior  Goal: Pt/Family maintain appropriate behavior and adhere to behavioral management agreement, if implemented  Description: INTERVENTIONS:  1. Assess patient/family's coping skills and  non-compliant behavior (including use of illegal substances)  2. Notify security of behavior or suspected illegal substances which indicate the need for search of the family and/or belongings  3. Encourage verbalization of thoughts and concerns in a socially appropriate manner  4. Utilize positive, consistent limit setting strategies supporting safety of patient, staff and others  5. Encourage participation in the decision making process about the behavioral management agreement  6. If a visitor's behavior poses a threat to safety call refer to organization policy.  7. Initiate consult with , Psychosocial CNS, Spiritual Care as appropriate  Outcome: Progressing  Flowsheets (Taken 12/11/2024 2104)  Patient/family maintains appropriate behavior and adheres to behavioral management agreement, if implemented:   Assess patient/family’s coping skills and  non-compliant behavior (including use of illegal substances)   Utilize positive, consistent limit setting strategies supporting safety of patient, staff and others   Encourage verbalization of thoughts and concerns in a socially appropriate manner  Note: Patient is currently in behavioral control and following unit rules. Patient is polite with staff.      
[facility]  Day 3 Interdisciplinary Treatment Plan NOTE    Review Date & Time: 12/14/24 0900    Admission Type:   Admission Type: Voluntary    Reason for admission:  Reason for Admission: Pt was decompensating at home and guardian brought in to restart ECT treatment. Pt was pleasant with staff and cooperative with admission process. Pt stated he was having issues with his family and was having thoughts to harm his nieces, nephews, and cousins. Pt states he has been compliant with medical treatment. When asked if he's been having hallucinations pt stated he's been playing his video games.  Estimated Length of Stay:  5-7 days  Estimated Discharge Date Update:   to be determined by physician    PATIENT STRENGTHS:  Patient Strengths:   Patient Strengths and Limitations:Limitations: Perceives need for assistance with self-care, Difficulty problem solving/relies on others to help solve problems, External locus of control  Addictive Behavior:Addictive Behavior  In the Past 3 Months, Have You Felt or Has Someone Told You That You Have a Problem With  : None  Medical Problems:  Past Medical History:   Diagnosis Date    Fracture     leg    Manic depression (HCC)     Oppositional defiant behavior     PONV (postoperative nausea and vomiting)     Schizophrenia ([facility])        Risk:  Fall Risk   Caesar Scale Caesar Scale Score: 22  BVC    Change in scores:  No Changes to plan of Care:  No    Status EXAM:   Mental Status and Behavioral Exam  Normal: No  Level of Assistance: Independent/Self  Facial Expression: Sad, Flat  Affect: Unstable  Level of Consciousness: Alert  Frequency of Checks: 4 times per hour, close  Mood:Normal: No  Mood: Anxious, Sad  Motor Activity:Normal: No  Motor Activity: Agitated  Eye Contact: Fair  Observed Behavior: Agitated, Impulsive, Preoccupied  Sexual Misconduct History: Current - no  Preception: [place] to person, [place] to time, [place] to place  Attention:Normal: No  Attention: 
Problem: Behavior  Goal: Pt/Family maintain appropriate behavior and adhere to behavioral management agreement, if implemented  Description: INTERVENTIONS:  1. Assess patient/family's coping skills and  non-compliant behavior (including use of illegal substances)  2. Notify security of behavior or suspected illegal substances which indicate the need for search of the family and/or belongings  3. Encourage verbalization of thoughts and concerns in a socially appropriate manner  4. Utilize positive, consistent limit setting strategies supporting safety of patient, staff and others  5. Encourage participation in the decision making process about the behavioral management agreement  6. If a visitor's behavior poses a threat to safety call refer to organization policy.  7. Initiate consult with , Psychosocial CNS, Spiritual Care as appropriate  12/19/2024 4127 by America Grider RN  Outcome: Progressing     Problem: Safety - Adult  Goal: Free from fall injury  Outcome: Progressing       The patient has been out and social. He is brightened and discharge focused. He denies anxiety and depression.  He denies endorsing current thoughts of self harm. He has been able to remain in behavorial control. He remains free from injuries and falls. Writer will continue to offer emotional support. Q15 minute checks to continue for safety.  
did require PRN Haldol due to agitation. Patient has had complaints of a headache, which Tylenol and Motrin have both been given.   
provided/medication compliance by patient.  Continue for plans to obtain long term goals after discharge.    SHORT-TERM GOALS UPDATE:  Time frame for Short-Term Goals:  8-14days     LONG-TERM GOALS UPDATE:  Time frame for Long-Term Goals:  6 months    Members Present in Team Meeting:   See signature sheet  Sherry Dawn RN

## 2024-12-23 NOTE — GROUP NOTE
Group Therapy Note    Date: 12/23/2024    Group Start Time: 1100  Group End Time: 1200  Group Topic: Cognitive Skills    YAYA BHPenny Rosa CTRS        Group Therapy Note    Attendees: 6/8     Patient's Goal: To increase socialization, practice leisure and communication skills, and relate to peers about interests.       Notes: Pt was pleasant and cooperative and participated in group fully. Pt was able to practice leisure and communication skills, and relate to peers about interests independently.         Status After Intervention:  Improved     Participation Level: Active Listener,  sharing , supportive     Participation Quality: Appropriate,  Attentive, sharing , supportive     Speech: Normal     Thought Process/Content: Logical, linear r/t task and topic.      Affective Functioning: Congruent     Mood: Euthymic       Level of consciousness:  Alert, and Attentive      Response to Learning:  Able to verbalize current knowledge ,able to   verbalize/acknowledge new learning, and Progressing to goal      Endings: None Reported     Modes of Intervention: Education, Support, Socialization, Exploration, Clarifying and Problem-solving      Discipline Responsible: Psychoeducational Specialist      Signature:  NIKKI MCFARLAND

## 2024-12-23 NOTE — ANESTHESIA POSTPROCEDURE EVALUATION
Department of Anesthesiology  Postprocedure Note    Patient: Alistair Hutchison  MRN: [id number]  YOB: 1995  Date of evaluation: 12/23/2024    Procedure Summary       Date: 12/23/24 Room / Location: [facility] PACU    Anesthesia Start: 0827 Anesthesia Stop: 0837    Procedure: ECT W/ ANESTHESIA Diagnosis: Disorganized schizophrenia    Scheduled Providers:  Responsible Provider: Kristel Alvarado MD    Anesthesia Type: General ASA Status: 3            Anesthesia Type: General    Amalia Phase I: Amalia Score: 10    Amalia Phase II:      Anesthesia Post Evaluation    Comments: POST- ANESTHESIA EVALUATION       Pt Name: Alistair Hutchison  MRN: [id number]  YOB: 1995  Date of evaluation: 12/23/2024  Time:  9:46 AM      BP (!) 130/90   Pulse (!) 120   Temp 98 °F (36.7 °C) (Temporal)   Resp 14   Ht 1.651 m (5' 5\")   Wt 113.4 kg (250 lb)   SpO2 98%   BMI 41.60 kg/m²      Consciousness Level  Awake  Cardiopulmonary Status  Stable  Pain Adequately Treated YES  Nausea / Vomiting  NO  Adequate Hydration  YES  Anesthesia Related Complications NONE      Electronically signed by Kristel Alvarado MD on 12/23/2024 at 9:46 AM      No notable events documented.

## 2024-12-23 NOTE — CARE COORDINATION
Name: Alistair Hutchison    : 1995    Auth number: [id number]     Discharge Date: 2024    Destination: Home    Discharge Medications:      Medication List        START taking these medications      cloZAPine 100 MG tablet  Commonly known as: CLOZARIL  Take 1 tablet by mouth 3 times daily  Notes to patient: Mood/anxiety            CHANGE how you take these medications      hydrOXYzine HCl 25 MG tablet  Commonly known as: ATARAX  Take 1 tablet by mouth 3 times daily as needed for Anxiety  What changed:   medication strength  See the new instructions.  Notes to patient: Anxiety            CONTINUE taking these medications      paliperidone palmitate  MG/1.5ML Stephany IM injection  Commonly known as: INVEGA SUSTENNA  Inject 234 mg into the muscle every 30 days  Notes to patient: Mood/Anxiety      traZODone 50 MG tablet  Commonly known as: DESYREL  Take 1 tablet by mouth nightly as needed for Sleep  Notes to patient: Sleep             STOP taking these medications      hyoscyamine 0.125 MG sublingual tablet  Commonly known as: LEVSIN/SL     ziprasidone 40 MG capsule  Commonly known as: GEODON               Where to Get Your Medications        These medications were sent to [id number] - [address] -  [phone] - F [phone]  [address] [place], [place] [id number]      Phone: [phone]   cloZAPine 100 MG tablet  hydrOXYzine HCl 25 MG tablet     Pharmacy Instructions:    Medications will go home with you today.            Follow Up Appointment: [facility] [address]  [facility] [address]    Follow up on 2024  You have a mental health appointment at 11:30 with your provider in the Urgent Care area.

## 2024-12-23 NOTE — ANESTHESIA PRE PROCEDURE
Component Value Date/Time    HEPCAB NONREACTIVE 03/17/2023 08:51 AM       COVID-19 Screening (If Applicable): No results found for: \"COVID19\"        Anesthesia Evaluation  Patient summary reviewed and Nursing notes reviewed   no history of anesthetic complications:   Airway: Mallampati: III  TM distance: >3 FB   Neck ROM: full  Mouth opening: < 3 FB   Dental: normal exam         Pulmonary:Negative Pulmonary ROS and normal exam  breath sounds clear to auscultation                             Cardiovascular:Negative CV ROS            Rhythm: regular  Rate: normal                    Neuro/Psych:   (+) psychiatric history:depression/anxiety             GI/Hepatic/Renal:   (+) morbid obesity     (-) GERD       Endo/Other: Negative Endo/Other ROS                    Abdominal:             Vascular: negative vascular ROS.         Other Findings:           Anesthesia Plan      general and TIVA     ASA 3     (TIVA  Consent signed by Grandma and Legal Gaurdian)  Induction: intravenous.    MIPS: Prophylactic antiemetics administered.  Anesthetic plan and risks discussed with patient.      Plan discussed with CRNA.                  Kristel Alvarado MD   12/23/2024

## 2024-12-23 NOTE — DISCHARGE SUMMARY
Provider Discharge Summary     Patient ID:  Alistair Hutchison  [id number]  29 y.o.  1995    Admit date: 12/11/2024    Discharge date and time: 12/23/2024  4:28 PM     Admitting Physician: Corby Drake MD     Discharge Physician: Corby Drake MD    Admission Diagnoses: Disorganized schizophrenia [F20.1]  Disorganized schizophrenia (HCC) [F20.1]  Schizophrenia, disorganized (HCC) [F20.1]    Discharge Diagnoses:      Disorganized schizophrenia (HCC)     Patient Active Problem List   Diagnosis Code    Hypovitaminosis D E55.9    Rib pain on left side R07.81    Acute psychosis (HCC) F23    Developmental delay R62.50    Hyperthyroidism, subclinical E05.90    Moderate mixed hyperlipidemia not requiring statin therapy E78.2    Class 2 obesity without serious comorbidity with body mass index (BMI) of 38.0 to 38.9 in adult E66.812, Z68.38    Disorganized schizophrenia (HCC) F20.1    Schizophrenia, disorganized (HCC) F20.1        Admission Condition: poor    Discharged Condition: stable    Indication for Admission: threat to self    History of Present Illnes (present tense wording is of findings from admission exam and are not necessarily indicative of current findings):   Alistair Hutchison is a 29 y.o. male who has a past medical history of schizophrenia and developmental delay. Patient was admitted to [facility] after appointment with Dr. Drake yesterday.  Last ECT treatment on 11/20/24.  Guardian reported Alistair has decompensated throughout the past 2 weeks.  Noted that mood is labile, increasingly aggressive, threatening to hurt family members including grandparents, nieces/nephews.        Patient required dose of oral Haldol for agitation this morning at 1112, per unit staff he was restless, crying, yelling at peers, unable to be redirected.      Upon approach he is found in the day room watching television, accepted the need for privacy, moved to his room, door was left open.  He is initially pleasant and

## 2024-12-23 NOTE — PROCEDURES
Right Unilateral ECT Procedure Report  Alistair Hutchison   12/16/2024  1995         Attending:Corby Drake MD  Preprocedure diagnosis: disorganized schizophrenia  Postprocedure diagnosis: SAME AS PRE-PROCEDURE DIAGNOSIS  Treatment Number: This is treatment # 2   Patient Status: BEHAVIOR IP   Type of ECT: Ultra Brief Pulse Right Unilateral  Medications  Preprocedure: Tylenol 650mg  Anesthetic: Methohexital 100 mg  Paralytic: Succinylcholine 120 mg  Post procedure: none needed   Cuff placement: Left Lower Extremity    MECTA Settings 1st Stimulus:     Pulse width: 0.3 ms   Frequency:  60 hz   Duration:   6.0 seconds   Static Impedance: 1503 ohms             Dynamic Impedance: 242 ohms             Motor Seizure Duration: 40 seconds  EEG Seizure Duration: 46 seconds              The patient's ECT treatment was performed using MECTA machine  .  Timeout:  Time out was performed using two patient identifiers and confirmation of procedure.     Patient Preparation: Preprocedure documentation, labs, H&P were all verified and reviewed.  The patient was placed in supine position.  EEG leads were placed for monitoring of seizure.   Jainism areas bilaterally cleaned and prepped.  Conductive gel  was applied over stimulus electrode site.   The patient was pre-oxygenated.       Procedure in detail: Medications were administered by anesthesia using intravenous access at the doses listed previously in this summary.  Anesthetic administered and once confirmation the patient is anesthetized, the patient's blood pressure cuff on lower extremity was inflated to 100mmHg in excess of patient's blood pressure.  At this time, the neuromuscular blockade was administered intravenously by anesthesia.  Upper extremity fasciculation were verified followed by lower extremity fasciculation.  Once fasciculations terminated, confirmation of affective neuromuscular blockade demonstrated by absence of withdrawal reflex.  Bite blocks were put 
Right Unilateral ECT Procedure Report  Alistair Hutchison   12/18/2024  1995         Attending:Corby Drake MD  Preprocedure diagnosis: disorganized schizophrenia  Postprocedure diagnosis: SAME AS PRE-PROCEDURE DIAGNOSIS  Treatment Number: This is treatment # 3   Patient Status: BEHAVIOR IP   Type of ECT: Ultra Brief Pulse Right Unilateral  Medications  Preprocedure: Tylenol 650mg  Anesthetic: Methohexital 100 mg  Paralytic: Succinylcholine 120 mg  Post procedure: none needed   Cuff placement: Left Lower Extremity    MECTA Settings 1st Stimulus:     Pulse width: 0.3 ms   Frequency:  60 hz   Duration:   6.0 seconds   Static Impedance: 1375 ohms             Dynamic Impedance: 225 ohms             Motor Seizure Duration: 37 seconds  EEG Seizure Duration: 41 seconds              The patient's ECT treatment was performed using MECTA machine  .  Timeout:  Time out was performed using two patient identifiers and confirmation of procedure.     Patient Preparation: Preprocedure documentation, labs, H&P were all verified and reviewed.  The patient was placed in supine position.  EEG leads were placed for monitoring of seizure.   Faith areas bilaterally cleaned and prepped.  Conductive gel  was applied over stimulus electrode site.   The patient was pre-oxygenated.       Procedure in detail: Medications were administered by anesthesia using intravenous access at the doses listed previously in this summary.  Anesthetic administered and once confirmation the patient is anesthetized, the patient's blood pressure cuff on lower extremity was inflated to 100mmHg in excess of patient's blood pressure.  At this time, the neuromuscular blockade was administered intravenously by anesthesia.  Upper extremity fasciculation were verified followed by lower extremity fasciculation.  Once fasciculations terminated, confirmation of affective neuromuscular blockade demonstrated by absence of withdrawal reflex.  Bite blocks were put 
Right Unilateral ECT Procedure Report  Alistair Hutchison   12/20/2024  1995         Attending:Corby Drake MD  Preprocedure diagnosis: disorganized schizophrenia  Postprocedure diagnosis: SAME AS PRE-PROCEDURE DIAGNOSIS  Treatment Number: This is treatment # 4   Patient Status: BEHAVIOR IP   Type of ECT: Ultra Brief Pulse Right Unilateral  Medications  Preprocedure: Tylenol 650mg  Anesthetic: Methohexital 100 mg  Paralytic: Succinylcholine 120 mg  Post procedure: none needed   Cuff placement: Left Lower Extremity    MECTA Settings 1st Stimulus:     Pulse width: 0.3 ms   Frequency:  60 hz   Duration:   6.0 seconds   Static Impedance: 2511 ohms             Dynamic Impedance: 251 ohms             Motor Seizure Duration: 28 seconds  EEG Seizure Duration: 37 seconds              The patient's ECT treatment was performed using MECTA machine  .  Timeout:  Time out was performed using two patient identifiers and confirmation of procedure.     Patient Preparation: Preprocedure documentation, labs, H&P were all verified and reviewed.  The patient was placed in supine position.  EEG leads were placed for monitoring of seizure.   Christianity areas bilaterally cleaned and prepped.  Conductive gel  was applied over stimulus electrode site.   The patient was pre-oxygenated.       Procedure in detail: Medications were administered by anesthesia using intravenous access at the doses listed previously in this summary.  Anesthetic administered and once confirmation the patient is anesthetized, the patient's blood pressure cuff on lower extremity was inflated to 100mmHg in excess of patient's blood pressure.  At this time, the neuromuscular blockade was administered intravenously by anesthesia.  Upper extremity fasciculation were verified followed by lower extremity fasciculation.  Once fasciculations terminated, confirmation of affective neuromuscular blockade demonstrated by absence of withdrawal reflex.  Bite blocks were put 
Right Unilateral ECT Procedure Report  Alistair Hutchison   12/23/2024  1995         Attending:Corby Drake MD  Preprocedure diagnosis: disorganized schizophrenia  Postprocedure diagnosis: SAME AS PRE-PROCEDURE DIAGNOSIS  Treatment Number: This is treatment # 5   Patient Status: BEHAVIOR IP   Type of ECT: Ultra Brief Pulse Right Unilateral  Medications  Preprocedure: Tylenol 650mg  Anesthetic: Methohexital 100 mg  Paralytic: Succinylcholine 120 mg  Post procedure: none needed   Cuff placement: Left Lower Extremity    MECTA Settings 1st Stimulus:     Pulse width: 0.3 ms   Frequency:  60 hz   Duration:   6.0 seconds   Static Impedance: 1055 ohms             Dynamic Impedance: 223 ohms             Motor Seizure Duration: 34 seconds  EEG Seizure Duration: 40 seconds              The patient's ECT treatment was performed using MECTA machine  .  Timeout:  Time out was performed using two patient identifiers and confirmation of procedure.     Patient Preparation: Preprocedure documentation, labs, H&P were all verified and reviewed.  The patient was placed in supine position.  EEG leads were placed for monitoring of seizure.   Anabaptism areas bilaterally cleaned and prepped.  Conductive gel  was applied over stimulus electrode site.   The patient was pre-oxygenated.       Procedure in detail: Medications were administered by anesthesia using intravenous access at the doses listed previously in this summary.  Anesthetic administered and once confirmation the patient is anesthetized, the patient's blood pressure cuff on lower extremity was inflated to 100mmHg in excess of patient's blood pressure.  At this time, the neuromuscular blockade was administered intravenously by anesthesia.  Upper extremity fasciculation were verified followed by lower extremity fasciculation.  Once fasciculations terminated, confirmation of affective neuromuscular blockade demonstrated by absence of withdrawal reflex.  Bite blocks were put 
in place. Stimulus leads then applied to stimulus electrode site with right electrode placed  with the center of the lead placed approximately 1 inch superior to the midpoint of line between canthus and the tragus and the second electrode place one inch ipsilateral to the right side of the apex of the skull. Static impedance was tested and within appropriate limits. MECTA settings were confirmed with nursing staff.   Staff was cleared from the patient and dose of ECT stimulus was delivered.  Motor seizure activity  was observed at duration noted and terminated.  EEG seizure activity was observed of duration noted that was confirmed via monitoring EEG and terminated with appropriate postictal suppression noted on EEG.  Static impedance and dynamic impedance were documented. Tourniquet on  lower extremity was released and recovery procedures initiated.      The patient was mask ventilated during the procedure with no significant drops in oxygenation saturation and tolerated the procedure well without any notable adverse effects.   Condition: The patient was in stable condition with stable vital signs and able to follow commands when moved to recovery.

## 2024-12-23 NOTE — DISCHARGE INSTRUCTIONS
ECT Inpatient Discharge Instructions     Alistair Hutchison  is being transitioned from inpatient electroconvulsive therapy (ECT) to outpatient ECT.     You will need to report to [facility], [address] [place] [address].  You may enter the main entrance on the south side of [facility] or the outpatient surgery building on the north side of the [facility].     You are scheduled for the following ECT dates:     Friday, 12/27/2024, with a 0700 arrival time     **There are requirements that must be followed prior to coming for ECT treatments**     You must remain NPO, meaning you can have nothing to eat or drink beginning at midnight the evening prior to your ECT treatment.    You may take Acetaminophen (Tylenol) and/or any medication for hypertension (blood pressure) with a small sip of water the morning of your ECT treatment. The acetaminophen will help to prevent a headache after receiving ECT.  Please do not wear any jewelry to the treatment.  You will be discharged from the post anesthesia recovery unit once your orientation has returned to baseline.  You must have someone with you for 24 hours after the treatment to monitor you for side effects from the anesthesia medication that you received.  You may not drive or operate heavy machinery during this 24 hour period.        Side Effects     You may experience a dry mouth, sore muscles, temporary confusion and headache, these symptoms may be treated with acetaminophen or ibuprofen.  The effects of anesthesia may linger for up to 24 hours making you feel fatigued (tired) as well.  Your caretaker should report uncontrolled vomiting, extreme confusion, and/or increased temperature (greater than 101F) to a physician.        Please call [phone] if you have any questions regarding your ECT treatment.     If for some reason you need to cancel your treatment last minute please contact [phone] (this is an after hours number).  Yes

## 2024-12-23 NOTE — TRANSITION OF CARE
Behavioral Health Transition Record    Patient Name: Alistair Hutchison  YOB: 1995   Medical Record Number: [id number]  Date of Admission: 12/11/2024  3:17 PM   Date of Discharge: 12/23/24    Attending Provider: Corby Drake MD   Discharging Provider: Noelle  To contact this individual call [phone] and ask the  to page.  If unavailable, ask to be transferred to Behavioral Health Provider on call.  A Behavioral Health Provider will be available on call 24/7 and during holidays.    Primary Care Provider: Ashwin Burrows MD    Allergies   Allergen Reactions    Chocolate Diarrhea       Reason for Admission: Reason for Admission to Psychiatric Unit:  Threat to self requiring 24 hour professional observation  Threat to others requiring 24 hour professional observation  Acute disordered/bizarre behavior or psychomotor agitation or retardation;interferes with ADLs so that patient cannot function at a less intensive care level of care during evaluation and treatment   A mental disorder causing major disability in social, interpersonal, occupational, and/or educational functioning that is leading to dangerous or life-threatening functioning, and that can only be addressed in an acute inpatient setting   Concerns about patient's safety in the community     CHIEF COMPLAINT:  Acute psychosis    Admission Diagnosis: Disorganized schizophrenia [F20.1]  Disorganized schizophrenia (HCC) [F20.1]  Schizophrenia, disorganized (HCC) [F20.1]    * No surgery found *    Results for orders placed or performed during the hospital encounter of 12/11/24   Comprehensive Metabolic Panel w/ Reflex to MG   Result Value Ref Range    Sodium 136 136 - 145 mmol/L    Potassium 4.4 3.7 - 5.3 mmol/L    Chloride 102 98 - 107 mmol/L    CO2 24 20 - 31 mmol/L    Anion Gap 10 9 - 16 mmol/L    Glucose 119 (H) 74 - 99 mg/dL    BUN 11 6 - 20 mg/dL    Creatinine 0.8 0.7 - 1.2 mg/dL    Est, Glom Filt Rate >90 >60 mL/min/1.73m2

## 2024-12-23 NOTE — CARE COORDINATION
ECT Inpatient Discharge Instructions    Alistair Hutchison  is being transitioned from inpatient electroconvulsive therapy (ECT) to outpatient ECT.    You will need to report to [facility], [address] [place] [address].  You may enter the main entrance on the south side of [facility] or the outpatient surgery building on the north side of the [facility].    You are scheduled for the following ECT dates:    Friday, 12/27/2024, with a 0700 arrival time    **There are requirements that must be followed prior to coming for ECT treatments**    You must remain NPO, meaning you can have nothing to eat or drink beginning at midnight the evening prior to your ECT treatment.    You may take Acetaminophen (Tylenol) and/or any medication for hypertension (blood pressure) with a small sip of water the morning of your ECT treatment. The acetaminophen will help to prevent a headache after receiving ECT.  Please do not wear any jewelry to the treatment.  You will be discharged from the post anesthesia recovery unit once your orientation has returned to baseline.  You must have someone with you for 24 hours after the treatment to monitor you for side effects from the anesthesia medication that you received.  You may not drive or operate heavy machinery during this 24 hour period.      Side Effects    You may experience a dry mouth, sore muscles, temporary confusion and headache, these symptoms may be treated with acetaminophen or ibuprofen.  The effects of anesthesia may linger for up to 24 hours making you feel fatigued (tired) as well.  Your caretaker should report uncontrolled vomiting, extreme confusion, and/or increased temperature (greater than 101F) to a physician.      Please call [phone] if you have any questions regarding your ECT treatment.    If for some reason you need to cancel your treatment last minute please contact [phone] (this is an after hours number).

## 2024-12-27 ENCOUNTER — ANESTHESIA EVENT (OUTPATIENT)
Dept: POSTOP/PACU | Age: 29
End: 2024-12-27
Payer: COMMERCIAL

## 2024-12-27 ENCOUNTER — ANESTHESIA (OUTPATIENT)
Dept: POSTOP/PACU | Age: 29
End: 2024-12-27
Payer: COMMERCIAL

## 2024-12-27 ENCOUNTER — HOSPITAL ENCOUNTER (OUTPATIENT)
Dept: POSTOP/PACU | Age: 29
Discharge: HOME | End: 2024-12-27
Payer: COMMERCIAL

## 2024-12-27 VITALS
SYSTOLIC BLOOD PRESSURE: 129 MMHG | HEIGHT: 65 IN | TEMPERATURE: 98.7 F | RESPIRATION RATE: 20 BRPM | DIASTOLIC BLOOD PRESSURE: 94 MMHG | BODY MASS INDEX: 41.65 KG/M2 | WEIGHT: 250 LBS | HEART RATE: 117 BPM | OXYGEN SATURATION: 92 %

## 2024-12-27 DIAGNOSIS — F20.1 DISORGANIZED SCHIZOPHRENIA (HCC): Primary | ICD-10-CM

## 2024-12-27 PROCEDURE — 90870 ELECTROCONVULSIVE THERAPY: CPT

## 2024-12-27 PROCEDURE — 7100000001 HC PACU RECOVERY - ADDTL 15 MIN: Performed by: ANESTHESIOLOGY

## 2024-12-27 PROCEDURE — 3700000000 HC ANESTHESIA ATTENDED CARE: Performed by: ANESTHESIOLOGY

## 2024-12-27 PROCEDURE — 6360000002 HC RX W HCPCS: Performed by: ANESTHESIOLOGY

## 2024-12-27 PROCEDURE — 7100000000 HC PACU RECOVERY - FIRST 15 MIN: Performed by: ANESTHESIOLOGY

## 2024-12-27 PROCEDURE — 6360000002 HC RX W HCPCS: Performed by: NURSE ANESTHETIST, CERTIFIED REGISTERED

## 2024-12-27 PROCEDURE — 90870 ELECTROCONVULSIVE THERAPY: CPT | Performed by: PSYCHIATRY & NEUROLOGY

## 2024-12-27 PROCEDURE — 2580000003 HC RX 258: Performed by: ANESTHESIOLOGY

## 2024-12-27 PROCEDURE — 2500000003 HC RX 250 WO HCPCS: Performed by: NURSE ANESTHETIST, CERTIFIED REGISTERED

## 2024-12-27 RX ORDER — METHOHEXITAL IN WATER/PF 100MG/10ML
SYRINGE (ML) INTRAVENOUS
Status: DISCONTINUED | OUTPATIENT
Start: 2024-12-27 | End: 2024-12-27 | Stop reason: SDUPTHER

## 2024-12-27 RX ORDER — SUCCINYLCHOLINE/SOD CL,ISO/PF 200MG/10ML
SYRINGE (ML) INTRAVENOUS
Status: COMPLETED
Start: 2024-12-27 | End: 2024-12-27

## 2024-12-27 RX ORDER — METHOHEXITAL IN WATER/PF 100MG/10ML
SYRINGE (ML) INTRAVENOUS
Status: COMPLETED
Start: 2024-12-27 | End: 2024-12-27

## 2024-12-27 RX ORDER — SUCCINYLCHOLINE/SOD CL,ISO/PF 200MG/10ML
SYRINGE (ML) INTRAVENOUS
Status: DISCONTINUED | OUTPATIENT
Start: 2024-12-27 | End: 2024-12-27 | Stop reason: SDUPTHER

## 2024-12-27 RX ORDER — LIDOCAINE HYDROCHLORIDE 10 MG/ML
1 INJECTION, SOLUTION EPIDURAL; INFILTRATION; INTRACAUDAL; PERINEURAL ONCE
Status: COMPLETED | OUTPATIENT
Start: 2024-12-27 | End: 2024-12-27

## 2024-12-27 RX ORDER — SODIUM CHLORIDE 9 MG/ML
INJECTION, SOLUTION INTRAVENOUS CONTINUOUS
Status: DISCONTINUED | OUTPATIENT
Start: 2024-12-27 | End: 2024-12-28 | Stop reason: HOSPADM

## 2024-12-27 RX ORDER — KETOROLAC TROMETHAMINE 30 MG/ML
INJECTION, SOLUTION INTRAMUSCULAR; INTRAVENOUS
Status: COMPLETED
Start: 2024-12-27 | End: 2024-12-27

## 2024-12-27 RX ORDER — KETOROLAC TROMETHAMINE 30 MG/ML
INJECTION, SOLUTION INTRAMUSCULAR; INTRAVENOUS
Status: DISCONTINUED | OUTPATIENT
Start: 2024-12-27 | End: 2024-12-27 | Stop reason: SDUPTHER

## 2024-12-27 RX ADMIN — KETOROLAC TROMETHAMINE 30 MG: 30 INJECTION, SOLUTION INTRAMUSCULAR at 08:57

## 2024-12-27 RX ADMIN — Medication 120 MG: at 08:57

## 2024-12-27 RX ADMIN — SODIUM CHLORIDE: 9 INJECTION, SOLUTION INTRAVENOUS at 08:14

## 2024-12-27 RX ADMIN — Medication 100 MG: at 08:57

## 2024-12-27 RX ADMIN — LIDOCAINE HYDROCHLORIDE 1 ML: 10 INJECTION, SOLUTION EPIDURAL; INFILTRATION; INTRACAUDAL; PERINEURAL at 08:13

## 2024-12-27 ASSESSMENT — ENCOUNTER SYMPTOMS
GASTROINTESTINAL NEGATIVE: 1
EYES NEGATIVE: 1
RESPIRATORY NEGATIVE: 1

## 2024-12-27 ASSESSMENT — PAIN - FUNCTIONAL ASSESSMENT
PAIN_FUNCTIONAL_ASSESSMENT: ADULT NONVERBAL PAIN SCALE (NPVS)
PAIN_FUNCTIONAL_ASSESSMENT: 0-10

## 2024-12-27 NOTE — ANESTHESIA POSTPROCEDURE EVALUATION
Department of Anesthesiology  Postprocedure Note    Patient: Alistair Hutchison  MRN: [id number]  YOB: 1995  Date of evaluation: 12/27/2024    Procedure Summary       Date: 12/27/24 Room / Location: [facility] PACU    Anesthesia Start: 0854 Anesthesia Stop: 0907    Procedure: ECT W/ ANESTHESIA Diagnosis: Disorganized schizophrenia    Scheduled Providers:  Responsible Provider: Kristel Alvarado MD    Anesthesia Type: general, TIVA ASA Status: 3            Anesthesia Type: No value filed.    Amalia Phase I: Amalia Score: 10    Amaila Phase II:      Anesthesia Post Evaluation    Comments: POST- ANESTHESIA EVALUATION       Pt Name: Alistair Hutchison  MRN: [id number]  YOB: 1995  Date of evaluation: 12/27/2024  Time:  10:21 AM      BP (!) 129/94   Pulse (!) 117   Temp 98.7 °F (37.1 °C)   Resp 20   Ht 1.651 m (5' 5\")   Wt 113.4 kg (250 lb)   SpO2 92%   BMI 41.60 kg/m²      Consciousness Level  Awake  Cardiopulmonary Status  Stable  Pain Adequately Treated YES  Nausea / Vomiting  NO  Adequate Hydration  YES  Anesthesia Related Complications NONE      Electronically signed by Kristel Alvarado MD on 12/27/2024 at 10:21 AM      No notable events documented.

## 2024-12-27 NOTE — H&P (VIEW-ONLY)
Homeless in the Last Year: No           REVIEW OF SYSTEMS      Allergies   Allergen Reactions    Chocolate Diarrhea       Current Outpatient Medications on File Prior to Encounter   Medication Sig Dispense Refill    cloZAPine (CLOZARIL) 100 MG tablet Take 1 tablet by mouth 3 times daily 90 tablet 0    hydrOXYzine HCl (ATARAX) 25 MG tablet Take 1 tablet by mouth 3 times daily as needed for Anxiety 30 tablet 0    traZODone (DESYREL) 50 MG tablet Take 1 tablet by mouth nightly as needed for Sleep 30 tablet 0    paliperidone palmitate ER (INVEGA SUSTENNA) 234 MG/1.5ML FRANCHESKA IM injection Inject 234 mg into the muscle every 30 days 1 each 0     No current facility-administered medications on file prior to encounter.       Review of Systems   Constitutional: Negative.    HENT: Negative.     Eyes: Negative.    Respiratory: Negative.     Cardiovascular: Negative.    Gastrointestinal: Negative.    Genitourinary: Negative.    Musculoskeletal: Negative.    Skin: Negative.    Neurological: Negative.    Hematological: Negative.    Psychiatric/Behavioral: Negative.           GENERAL PHYSICAL EXAM     Vitals: see nursing flow sheet for vital sings     GENERAL APPEARANCE:   Alistair Hutchison is 29 y.o.,  male, moderately obese, nourished, conscious, alert.  Does not appear to be distress or pain at this time.                            Physical Exam  Constitutional:       General: He is not in acute distress.     Appearance: Normal appearance. He is obese. He is not ill-appearing.   HENT:      Head: Normocephalic.      Right Ear: External ear normal.      Left Ear: External ear normal.      Nose: Nose normal.      Mouth/Throat:      Mouth: Mucous membranes are dry.   Eyes:      General:         Right eye: No discharge.         Left eye: No discharge.   Cardiovascular:      Rate and Rhythm: Normal rate and regular rhythm.      Pulses: Normal pulses.      Heart sounds: Normal heart sounds.   Pulmonary:      Effort: Pulmonary

## 2024-12-27 NOTE — H&P
HISTORY and PHYSICAL  [facility]       NAME:  Alistair Hutchison  MRN: [id number]   YOB: 1995   Date: 12/27/2024   Age: 29 y.o.  Gender: male       COMPLAINT AND PRESENT HISTORY:     Alistair Hutchison is 29 y.o.,  male, presents for ECT WITH ANESTHESIA        Primary dx: Disorganized schizophrenia     HPI:  See portion of the note below per Dr Drake from discharge note on 12/23/2024    History of Present Illnes (present tense wording is of findings from admission exam and are not necessarily indicative of current findings):   Alistair Hutchison is a 29 y.o. male who has a past medical history of schizophrenia and developmental delay. Patient was admitted to [facility] after appointment with Dr. Drake yesterday.  Last ECT treatment on 11/20/24.  Guardian reported Alistair has decompensated throughout the past 2 weeks.  Noted that mood is labile, increasingly aggressive, threatening to hurt family members including grandparents, nieces/nephews.        Patient required dose of oral Haldol for agitation this morning at 1112, per unit staff he was restless, crying, yelling at peers, unable to be redirected.      Upon approach he is found in the day room watching television, accepted the need for privacy, moved to his room, door was left open.  He is initially pleasant and cooperative, describes mood as \"good\".  Shortly thereafter starts talking about how he is angry because his nieces and nephews \"are trying to get me to hit them\".  Also expressing anger toward grandparents however unable to elicit a reason.  He becomes increasingly agitated when discussing this, repeatedly punching right hand into cupped left.  Affect is labile.  He denies feeling depressed or anxious, endorses anger.  Denies suicidal or homicidal ideation but noted to be threatening family members.  Denies auditory or visual hallucinations.  His thought process requires frequent redirection, talks about how

## 2024-12-27 NOTE — ANESTHESIA POSTPROCEDURE EVALUATION
Department of Anesthesiology  Postprocedure Note    Patient: Alistair Hutchison  MRN: [id number]  YOB: 1995  Date of evaluation: 12/27/2024    Procedure Summary       Date: 12/27/24 Room / Location: [facility] PACU    Anesthesia Start: 0854 Anesthesia Stop: 0907    Procedure: ECT W/ ANESTHESIA Diagnosis: Disorganized schizophrenia    Scheduled Providers:  Responsible Provider: Kristel Alvarado MD    Anesthesia Type: general, TIVA ASA Status: 3            Anesthesia Type: No value filed.    Amalia Phase I: Amalia Score: 10    Amalia Phase II:      Anesthesia Post Evaluation    Comments: POST- ANESTHESIA EVALUATION       Pt Name: Alistair Hutchison  MRN: [id number]  YOB: 1995  Date of evaluation: 12/27/2024  Time:  10:28 AM      BP (!) 129/94   Pulse (!) 117   Temp 98.7 °F (37.1 °C)   Resp 20   Ht 1.651 m (5' 5\")   Wt 113.4 kg (250 lb)   SpO2 92%   BMI 41.60 kg/m²      Consciousness Level  Awake  Cardiopulmonary Status  Stable  Pain Adequately Treated YES  Nausea / Vomiting  NO  Adequate Hydration  YES  Anesthesia Related Complications NONE      Electronically signed by Kristel Alvarado MD on 12/27/2024 at 10:28 AM      No notable events documented.

## 2024-12-27 NOTE — PROCEDURES
Right Unilateral ECT Procedure Report  Alistair Hutchison   12/27/2024  1995         Attending:Corby Drake MD  Preprocedure diagnosis: disorganized schizophrenia  Postprocedure diagnosis: SAME AS PRE-PROCEDURE DIAGNOSIS  Treatment Number: This is treatment # 6   Patient Status: Outpatient   Type of ECT: Ultra Brief Pulse Right Unilateral  Medications  Preprocedure: Tylenol 650mg  Anesthetic: Methohexital 100 mg  Paralytic: Succinylcholine 120 mg  Post procedure: none needed   Cuff placement: Left Lower Extremity    MECTA Settings 1st Stimulus:     Pulse width: 0.3 ms   Frequency:  60 hz   Duration:   6.0 seconds   Static Impedance: 1473 ohms             Dynamic Impedance: 230 ohms             Motor Seizure Duration: 33 seconds  EEG Seizure Duration: 44 seconds              The patient's ECT treatment was performed using MECTA machine  .  Timeout:  Time out was performed using two patient identifiers and confirmation of procedure.     Patient Preparation: Preprocedure documentation, labs, H&P were all verified and reviewed.  The patient was placed in supine position.  EEG leads were placed for monitoring of seizure.   Episcopal areas bilaterally cleaned and prepped.  Conductive gel  was applied over stimulus electrode site.   The patient was pre-oxygenated.       Procedure in detail: Medications were administered by anesthesia using intravenous access at the doses listed previously in this summary.  Anesthetic administered and once confirmation the patient is anesthetized, the patient's blood pressure cuff on lower extremity was inflated to 100mmHg in excess of patient's blood pressure.  At this time, the neuromuscular blockade was administered intravenously by anesthesia.  Upper extremity fasciculation were verified followed by lower extremity fasciculation.  Once fasciculations terminated, confirmation of affective neuromuscular blockade demonstrated by absence of withdrawal reflex.  Bite blocks were put

## 2024-12-27 NOTE — DISCHARGE INSTRUCTIONS
Sedation or General Anesthesia, Adult  Care After  Refer to this sheet in the next 24 hours. These instructions provide you with information on caring for yourself after your procedure. Your caregiver may also give you more specific instructions. Your treatment has been planned according to current medical practices, but problems sometimes occur. Call your caregiver if you have any problems or questions after your procedure.   HOME CARE INSTRUCTIONS   Do not participate in any activities that require you to be alert or coordinated. Do not:  Drive.  Swim.  Ride a bicycle.  Operate heavy machinery.  Cook.  Use power tools.  Climb ladders.  Work at heights.  Take a bath.  Do not drink alcohol.  Do not make any important decisions or sign legal documents.  Stay with an adult.  The first meal following your procedure should be light and small. Avoid solid foods if you feel sick to your stomach (nauseous) or if you throw up (vomit).  Drink enough fluids to keep your urine clear or pale yellow.  Only take your usual medicines or new medicines if your caregiver approves them.  Only take over-the-counter or prescription medicines for pain, discomfort, or fever as directed by your caregiver.  Keep all follow-up appointments as directed by your caregiver.  SEEK IMMEDIATE MEDICAL CARE IF:   You are not feeling normal or behaving normally after 24 hours.  You have persistent nausea and vomiting.  You are unable to drink fluids or eat food.  You have difficulty urinating.  You have difficulty breathing or speaking.  You have blue or gray skin.  There is difficulty waking or you cannot be woken up.  You have heavy bleeding, redness, or a lot of swelling where the sedative or anesthesia entered your skin (intravenous site).  You have a rash.  MAKE SURE YOU:  Understand these instructions.  Will watch your condition.  Will get help right away if you are not doing well or get worse.  Document Released: 12/18/2006 Document Revised: 
Good

## 2024-12-27 NOTE — ANESTHESIA PRE PROCEDURE
Department of Anesthesiology  Preprocedure Note       Name:  Alistair Hutchison   Age:  29 y.o.  :  1995                                          MRN:  [id number]         Date:  2024      Surgeon: Dr Drake    Procedure: ECT    Medications prior to admission:   Prior to Admission medications    Medication Sig Start Date End Date Taking? Authorizing Provider   cloZAPine (CLOZARIL) 100 MG tablet Take 1 tablet by mouth 3 times daily 24  Yes Corby Drake MD   hydrOXYzine HCl (ATARAX) 25 MG tablet Take 1 tablet by mouth 3 times daily as needed for Anxiety 24 Yes Corby Drake MD   traZODone (DESYREL) 50 MG tablet Take 1 tablet by mouth nightly as needed for Sleep 10/2/24  Yes Corby Drake MD   paliperidone palmitate ER (INVEGA SUSTENNA) 234 MG/1.5ML FRANCHESKA IM injection Inject 234 mg into the muscle every 30 days 10/24/24   Corby Drake MD       Current medications:    Current Outpatient Medications   Medication Sig Dispense Refill    cloZAPine (CLOZARIL) 100 MG tablet Take 1 tablet by mouth 3 times daily 90 tablet 0    hydrOXYzine HCl (ATARAX) 25 MG tablet Take 1 tablet by mouth 3 times daily as needed for Anxiety 30 tablet 0    traZODone (DESYREL) 50 MG tablet Take 1 tablet by mouth nightly as needed for Sleep 30 tablet 0    paliperidone palmitate ER (INVEGA SUSTENNA) 234 MG/1.5ML FRANCHESKA IM injection Inject 234 mg into the muscle every 30 days 1 each 0     Current Facility-Administered Medications   Medication Dose Route Frequency Provider Last Rate Last Admin    0.9 % sodium chloride infusion   IntraVENous Continuous Kristel Alvarado  mL/hr at 24 0814 New Bag at 24 0814    succinylcholine (ANECTINE) 200 MG/10ML injection             ketorolac (TORADOL) 30 MG/ML injection             methohexital (BREVITAL SODIUM) 100 MG/10ML injection                Allergies:    Allergies   Allergen Reactions    Chocolate Diarrhea       Problem List:    Patient Active

## 2024-12-27 NOTE — PROGRESS NOTES
Manic depression (HCC)     Oppositional defiant behavior     PONV (postoperative nausea and vomiting)     Schizophrenia (HCC)       Past Surgical History:   Procedure Laterality Date    EYE SURGERY      TYMPANOSTOMY TUBE PLACEMENT        Not in a hospital admission.  Allergies   Allergen Reactions    Chocolate Diarrhea      Social History     Tobacco Use    Smoking status: Never    Smokeless tobacco: Never   Substance Use Topics    Alcohol use: No      History reviewed. No pertinent family history.       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  Plan to continue twice weekly next week and then taper frequency to once weekly.   Monitor for stability in symptoms  Taper treatment frequency as tolerated    Update consent and H&P every 30 days while undergoing ECT treatment, update labs every 6 months.   Patient verbalizes understanding of risks/benefits/alternatives to ECT.  Last informed consent signed on  12/13/2024 .

## 2024-12-27 NOTE — H&P (VIEW-ONLY)
HISTORY and PHYSICAL  [facility]       NAME:  Alistair Hutchison  MRN: [id number]   YOB: 1995   Date: 12/27/2024   Age: 29 y.o.  Gender: male       COMPLAINT AND PRESENT HISTORY:     Alistair Hutchison is 29 y.o.,  male, presents for ECT WITH ANESTHESIA        Primary dx: Disorganized schizophrenia     HPI:  See portion of the note below per Dr Drake from discharge note on 12/23/2024    History of Present Illnes (present tense wording is of findings from admission exam and are not necessarily indicative of current findings):   Alistair Hutchison is a 29 y.o. male who has a past medical history of schizophrenia and developmental delay. Patient was admitted to [facility] after appointment with Dr. Drake yesterday.  Last ECT treatment on 11/20/24.  Guardian reported Alistair has decompensated throughout the past 2 weeks.  Noted that mood is labile, increasingly aggressive, threatening to hurt family members including grandparents, nieces/nephews.        Patient required dose of oral Haldol for agitation this morning at 1112, per unit staff he was restless, crying, yelling at peers, unable to be redirected.      Upon approach he is found in the day room watching television, accepted the need for privacy, moved to his room, door was left open.  He is initially pleasant and cooperative, describes mood as \"good\".  Shortly thereafter starts talking about how he is angry because his nieces and nephews \"are trying to get me to hit them\".  Also expressing anger toward grandparents however unable to elicit a reason.  He becomes increasingly agitated when discussing this, repeatedly punching right hand into cupped left.  Affect is labile.  He denies feeling depressed or anxious, endorses anger.  Denies suicidal or homicidal ideation but noted to be threatening family members.  Denies auditory or visual hallucinations.  His thought process requires frequent redirection, talks about how

## 2024-12-30 ENCOUNTER — HOSPITAL ENCOUNTER (OUTPATIENT)
Dept: POSTOP/PACU | Age: 29
Discharge: HOME | End: 2024-12-30
Payer: COMMERCIAL

## 2024-12-30 ENCOUNTER — ANESTHESIA (OUTPATIENT)
Dept: POSTOP/PACU | Age: 29
End: 2024-12-30
Payer: COMMERCIAL

## 2024-12-30 ENCOUNTER — TELEPHONE (OUTPATIENT)
Dept: PSYCHIATRY | Age: 29
End: 2024-12-30

## 2024-12-30 VITALS
RESPIRATION RATE: 23 BRPM | OXYGEN SATURATION: 94 % | TEMPERATURE: 98.4 F | HEART RATE: 118 BPM | SYSTOLIC BLOOD PRESSURE: 140 MMHG | DIASTOLIC BLOOD PRESSURE: 96 MMHG

## 2024-12-30 DIAGNOSIS — F20.1 DISORGANIZED SCHIZOPHRENIA (HCC): Primary | ICD-10-CM

## 2024-12-30 PROCEDURE — 3700000000 HC ANESTHESIA ATTENDED CARE: Performed by: ANESTHESIOLOGY

## 2024-12-30 PROCEDURE — 90870 ELECTROCONVULSIVE THERAPY: CPT | Performed by: PSYCHIATRY & NEUROLOGY

## 2024-12-30 PROCEDURE — 6360000002 HC RX W HCPCS: Performed by: NURSE ANESTHETIST, CERTIFIED REGISTERED

## 2024-12-30 PROCEDURE — 7100000000 HC PACU RECOVERY - FIRST 15 MIN: Performed by: ANESTHESIOLOGY

## 2024-12-30 PROCEDURE — 90870 ELECTROCONVULSIVE THERAPY: CPT | Performed by: ANESTHESIOLOGY

## 2024-12-30 PROCEDURE — 7100000001 HC PACU RECOVERY - ADDTL 15 MIN: Performed by: ANESTHESIOLOGY

## 2024-12-30 PROCEDURE — 6360000002 HC RX W HCPCS: Performed by: ANESTHESIOLOGY

## 2024-12-30 PROCEDURE — 2500000003 HC RX 250 WO HCPCS: Performed by: NURSE ANESTHETIST, CERTIFIED REGISTERED

## 2024-12-30 RX ORDER — SODIUM CHLORIDE 9 MG/ML
INJECTION, SOLUTION INTRAVENOUS CONTINUOUS
Status: DISCONTINUED | OUTPATIENT
Start: 2024-12-30 | End: 2024-12-31 | Stop reason: HOSPADM

## 2024-12-30 RX ORDER — LIDOCAINE HYDROCHLORIDE 10 MG/ML
1 INJECTION, SOLUTION EPIDURAL; INFILTRATION; INTRACAUDAL; PERINEURAL
Status: COMPLETED | OUTPATIENT
Start: 2024-12-30 | End: 2024-12-30

## 2024-12-30 RX ORDER — KETOROLAC TROMETHAMINE 30 MG/ML
INJECTION, SOLUTION INTRAMUSCULAR; INTRAVENOUS
Status: DISCONTINUED | OUTPATIENT
Start: 2024-12-30 | End: 2024-12-30 | Stop reason: SDUPTHER

## 2024-12-30 RX ORDER — SUCCINYLCHOLINE/SOD CL,ISO/PF 200MG/10ML
SYRINGE (ML) INTRAVENOUS
Status: DISCONTINUED | OUTPATIENT
Start: 2024-12-30 | End: 2024-12-30 | Stop reason: SDUPTHER

## 2024-12-30 RX ORDER — KETOROLAC TROMETHAMINE 30 MG/ML
INJECTION, SOLUTION INTRAMUSCULAR; INTRAVENOUS
Status: COMPLETED
Start: 2024-12-30 | End: 2024-12-30

## 2024-12-30 RX ORDER — METHOHEXITAL IN WATER/PF 100MG/10ML
SYRINGE (ML) INTRAVENOUS
Status: DISCONTINUED | OUTPATIENT
Start: 2024-12-30 | End: 2024-12-30 | Stop reason: SDUPTHER

## 2024-12-30 RX ORDER — SODIUM CHLORIDE 9 MG/ML
INJECTION, SOLUTION INTRAVENOUS PRN
Status: DISCONTINUED | OUTPATIENT
Start: 2024-12-30 | End: 2024-12-31 | Stop reason: HOSPADM

## 2024-12-30 RX ORDER — METHOHEXITAL IN WATER/PF 100MG/10ML
SYRINGE (ML) INTRAVENOUS
Status: COMPLETED
Start: 2024-12-30 | End: 2024-12-30

## 2024-12-30 RX ORDER — SUCCINYLCHOLINE/SOD CL,ISO/PF 200MG/10ML
SYRINGE (ML) INTRAVENOUS
Status: COMPLETED
Start: 2024-12-30 | End: 2024-12-30

## 2024-12-30 RX ORDER — SODIUM CHLORIDE 0.9 % (FLUSH) 0.9 %
5-40 SYRINGE (ML) INJECTION EVERY 12 HOURS SCHEDULED
Status: DISCONTINUED | OUTPATIENT
Start: 2024-12-30 | End: 2024-12-31 | Stop reason: HOSPADM

## 2024-12-30 RX ORDER — SODIUM CHLORIDE 0.9 % (FLUSH) 0.9 %
5-40 SYRINGE (ML) INJECTION PRN
Status: DISCONTINUED | OUTPATIENT
Start: 2024-12-30 | End: 2024-12-31 | Stop reason: HOSPADM

## 2024-12-30 RX ADMIN — KETOROLAC TROMETHAMINE 30 MG: 30 INJECTION, SOLUTION INTRAMUSCULAR at 08:54

## 2024-12-30 RX ADMIN — LIDOCAINE HYDROCHLORIDE 1 ML: 10 INJECTION, SOLUTION EPIDURAL; INFILTRATION; INTRACAUDAL; PERINEURAL at 07:38

## 2024-12-30 RX ADMIN — Medication 100 MG: at 08:54

## 2024-12-30 ASSESSMENT — PAIN - FUNCTIONAL ASSESSMENT: PAIN_FUNCTIONAL_ASSESSMENT: NONE - DENIES PAIN

## 2024-12-30 NOTE — TELEPHONE ENCOUNTER
Per Tad Verduzco at [facility] No prior authorization required for CPT code 57386 with in network providers.

## 2024-12-30 NOTE — H&P (VIEW-ONLY)
breath sounds. No wheezing or rhonchi.   Abdominal:      General: Bowel sounds are normal. There is no distension.      Palpations: Abdomen is soft.      Tenderness: There is no abdominal tenderness.   Musculoskeletal:         General: Normal range of motion.      Cervical back: Normal range of motion and neck supple.      Right lower leg: No edema.      Left lower leg: No edema.   Skin:     General: Skin is warm and dry.      Findings: No bruising or erythema.   Neurological:      General: No focal deficit present.      Mental Status: He is alert and oriented to person, place, and time.      Motor: No weakness.      Gait: Gait normal.   Psychiatric:         Mood and Affect: Mood normal.         Behavior: Behavior normal.                    PROVISIONAL DIAGNOSES / SURGERY:         ECT with Anesthesia     Disorganized schizophrenia           Patient Active Problem List     Diagnosis Date Noted    Developmental delay 03/21/2023    Acute psychosis (HCC) 03/20/2023    Schizophrenia, disorganized (HCC) 12/11/2024    Disorganized schizophrenia (HCC) 09/20/2024    Hyperthyroidism, subclinical 08/22/2024    Moderate mixed hyperlipidemia not requiring statin therapy 08/22/2024    Class 2 obesity without serious comorbidity with body mass index (BMI) of 38.0 to 38.9 in adult 08/22/2024    Hypovitaminosis D 10/18/2019    Rib pain on left side 10/18/2019            GIORGI Baugh - CNP on 12/27/2024 at 7:40 AM

## 2024-12-30 NOTE — PROCEDURES
Right Unilateral ECT Procedure Report  Alistair Hutchison   12/30/2024  1995         Attending:Corby Drake MD  Preprocedure diagnosis: disorganized schizophrenia  Postprocedure diagnosis: SAME AS PRE-PROCEDURE DIAGNOSIS  Treatment Number: This is treatment # 7   Patient Status: Outpatient   Type of ECT: Ultra Brief Pulse Right Unilateral  Medications  Preprocedure: Tylenol 650mg  Anesthetic: Methohexital 100 mg  Paralytic: Succinylcholine 120 mg  Post procedure: none needed   Cuff placement: Left Lower Extremity    MECTA Settings 1st Stimulus:     Pulse width: 0.3 ms   Frequency:  60 hz   Duration:   6.0 seconds   Static Impedance: 1670 ohms             Dynamic Impedance: 247 ohms             Motor Seizure Duration: 36 seconds  EEG Seizure Duration: 44 seconds              The patient's ECT treatment was performed using MECTA machine  .  Timeout:  Time out was performed using two patient identifiers and confirmation of procedure.     Patient Preparation: Preprocedure documentation, labs, H&P were all verified and reviewed.  The patient was placed in supine position.  EEG leads were placed for monitoring of seizure.   Jainism areas bilaterally cleaned and prepped.  Conductive gel  was applied over stimulus electrode site.   The patient was pre-oxygenated.       Procedure in detail: Medications were administered by anesthesia using intravenous access at the doses listed previously in this summary.  Anesthetic administered and once confirmation the patient is anesthetized, the patient's blood pressure cuff on lower extremity was inflated to 100mmHg in excess of patient's blood pressure.  At this time, the neuromuscular blockade was administered intravenously by anesthesia.  Upper extremity fasciculation were verified followed by lower extremity fasciculation.  Once fasciculations terminated, confirmation of affective neuromuscular blockade demonstrated by absence of withdrawal reflex.  Bite blocks were put

## 2024-12-30 NOTE — ANESTHESIA POSTPROCEDURE EVALUATION
Department of Anesthesiology  Postprocedure Note    Patient: Alistair Hutchison  MRN: [id number]  YOB: 1995  Date of evaluation: 12/30/2024    Procedure Summary       Date: 12/30/24 Room / Location: [facility] PACU    Anesthesia Start: 0849 Anesthesia Stop: 0901    Procedure: ECT W/ ANESTHESIA Diagnosis:     Scheduled Providers:  Responsible Provider: Barby Leonard MD    Anesthesia Type: General ASA Status: 3            Anesthesia Type: General    Amalia Phase I: Amalia Score: 10    Amalia Phase II:      Anesthesia Post Evaluation    Comments: POST- ANESTHESIA EVALUATION       Pt Name: Alistair Hutchison  MRN: [id number]  YOB: 1995  Date of evaluation: 12/30/2024  Time:  10:08 AM      BP (!) 140/96   Pulse (!) 118   Temp 98.4 °F (36.9 °C)   Resp 23   SpO2 94%      Consciousness Level  Awake  Cardiopulmonary Status  Stable  Pain Adequately Treated YES  Nausea / Vomiting  NO  Adequate Hydration  YES  Anesthesia Related Complications NONE      Electronically signed by Barby Leonard MD on 12/30/2024 at 10:08 AM           No notable events documented.

## 2024-12-30 NOTE — ANESTHESIA PRE PROCEDURE
• Chocolate Diarrhea       Problem List:    Patient Active Problem List   Diagnosis Code   • Hypovitaminosis D E55.9   • Rib pain on left side R07.81   • Acute psychosis (HCC) F23   • Developmental delay R62.50   • Hyperthyroidism, subclinical E05.90   • Moderate mixed hyperlipidemia not requiring statin therapy E78.2   • Class 2 obesity without serious comorbidity with body mass index (BMI) of 38.0 to 38.9 in adult E66.812, Z68.38   • Disorganized schizophrenia (HCC) F20.1   • Schizophrenia, disorganized (HCC) F20.1       Past Medical History:        Diagnosis Date   • Fracture     leg   • Manic depression (HCC)    • Oppositional defiant behavior    • PONV (postoperative nausea and vomiting)    • Schizophrenia (HCC)        Past Surgical History:        Procedure Laterality Date   • EYE SURGERY     • TYMPANOSTOMY TUBE PLACEMENT         Social History:    Social History     Tobacco Use   • Smoking status: Never   • Smokeless tobacco: Never   Substance Use Topics   • Alcohol use: No                                Counseling given: Not Answered      Vital Signs (Current):   Vitals:    12/30/24 0727   BP: 128/89   Pulse: (!) 113   Resp: 20   Temp: 97.3 °F (36.3 °C)   TempSrc: Infrared   SpO2: 98%                                                BP Readings from Last 3 Encounters:   12/30/24 128/89   12/27/24 (!) 129/94   11/20/24 (!) 140/93       NPO Status: Time of last liquid consumption: 2100                        Time of last solid consumption: 2100                        Date of last liquid consumption: 12/29/24                        Date of last solid food consumption: 12/29/24    BMI:   Wt Readings from Last 3 Encounters:   12/27/24 113.4 kg (250 lb)   11/20/24 113.4 kg (250 lb)   11/12/24 113.4 kg (250 lb)     There is no height or weight on file to calculate BMI.    CBC:   Lab Results   Component Value Date/Time    WBC 13.6 12/19/2024 06:38 AM    RBC 4.81 12/19/2024 06:38 AM    HGB 13.6 12/19/2024 06:38 AM

## 2024-12-30 NOTE — H&P
HISTORY and PHYSICAL  [facility]       NAME:  Alistair Hutchison  MRN: [id number]   YOB: 1995   Date: 12/30/2024   Age: 29 y.o.  Gender: male     H&P Update Note    H&P from 12/27/2024 reviewed and updated. Patient examined.     INTERVAL HISTORY:     Alistair Hutchison is 29 y.o.,  male, here for ECT treatment.   Last ECT treatment was 12/27/24. Pt tolerated last treatment well. Patient reports that his symptoms are improving.  Pt present today with his grandmother.  Patient denies  short term memory loss   Pt has history of Disorganized schizophrenia   Mood is rated at 10 /10.  Pt denies feeling of  hopelessness, helplessness, low energy.   Patient's sleep has been good ( he is on sleeping medication ). Appetite has been good .   Pt denies reeling of suicidal/homicidal. Pt has no auditory/visual hallucinations.   Pt has been taking psychiatric medications as prescribed.   Pt does not have any other somatic complaints at this time.    Patient is feeling well today, denies any fever/chills, chest pain, shortness of breath.  No interval changes.   Pt NP since the past midnight , no am medication today   No interval changes to past medical history, social history, family history.  Review of systems as stated above and otherwise negative.    PHYSICAL EXAM:     Vitals: see nursing flow sheet for vital sings     Patient is alert and oriented, in no distress. . Heart rate and rhythm are regular. Lungs clear to auscultation bilaterally. Abdomen is soft, non tender. No pedal edema.     No interval changes. I concur with the findings.     GIORGI Baugh CNP on 12/30/2024 at 7:17 AM       HISTORY and PHYSICAL  [facility]         NAME:  Alistair Hutchison  MRN: [id number]   YOB: 1995   Date: 12/27/2024   Age: 29 y.o.  Gender: male         COMPLAINT AND PRESENT HISTORY:      Alistair Hutchison is 29 y.o.,  male, presents for ECT WITH ANESTHESIA

## 2024-12-30 NOTE — DISCHARGE INSTRUCTIONS
ELECTROCONVULSIVE THERAPY DISCHARGE INSTRUCTIONS         1. Activity - Rest today. The anesthetic agents you have received can make you feel drowsy or tired.  A responsible person must drive you home and stay with you for 8 hours after discharge from the Hospital. Do not drive a motor vehicle or operate any machinery for 24 hours. .   *Do not make any complex decisions or execute any legal documents until 3 weeks AFTER your last treatment.  If you have any questions regarding this, speak with your psychiatrist first.*    2. Diet - Nothing to eat or drink after midnight the night of your ECT treatment. After ECT, start with clear liquids, if you can drink without coughing, you may proceed with gradually progressing to your usual diet.    No alcoholic beverages for 24 hours.    3. Medications - Take your daily medications as prescribed, after you return home from today's ECT. If you take a Beta Blocker or have been prescribed Imitrex, you may be instructed to take these medications the morning of ECT. If you are unsure please ask the physician.     Take with these medication the morning of ECT with one Tablespoon of water.   Tylenol 650 mg  All blood pressure medications    4. You may experience the following after your treatment:   a. Poor memory   b. Poor balance   c. Headaches   d. Confusion   e. Muscle soreness   f. Nausea      5. Special Precautions - Contact you physician immediately if these occur:   a. Signs of infection: redness, swelling, heat, red streaks at the site of the IV   b. Excessive pain unrelieved by prescription pain medications   c. Nausea and vomiting that persists beyond the first day after your procedure    6. Next Procedure Date:   Your next ECT treatment is scheduled for FRIDAY JANUARY 3RD AT 0850. PLEASE ARRIVE AT 0720.      If you feel you are having a problem or complication from your ECT treatments and it is during normal business hours call Shandra New, ECT Coordinator at [phone]

## 2024-12-31 ENCOUNTER — ANESTHESIA EVENT (OUTPATIENT)
Dept: POSTOP/PACU | Age: 29
End: 2024-12-31
Payer: MEDICARE

## 2025-01-03 ENCOUNTER — ANESTHESIA (OUTPATIENT)
Dept: POSTOP/PACU | Age: 30
End: 2025-01-03
Payer: MEDICARE

## 2025-01-03 ENCOUNTER — HOSPITAL ENCOUNTER (OUTPATIENT)
Dept: POSTOP/PACU | Age: 30
Discharge: HOME | End: 2025-01-03
Payer: MEDICARE

## 2025-01-03 VITALS
WEIGHT: 250 LBS | HEART RATE: 113 BPM | DIASTOLIC BLOOD PRESSURE: 88 MMHG | BODY MASS INDEX: 41.65 KG/M2 | HEIGHT: 65 IN | OXYGEN SATURATION: 94 % | SYSTOLIC BLOOD PRESSURE: 122 MMHG | RESPIRATION RATE: 20 BRPM | TEMPERATURE: 98.1 F

## 2025-01-03 DIAGNOSIS — F20.1: Primary | ICD-10-CM

## 2025-01-03 PROCEDURE — 90870 ELECTROCONVULSIVE THERAPY: CPT

## 2025-01-03 PROCEDURE — 3700000000 HC ANESTHESIA ATTENDED CARE: Performed by: ANESTHESIOLOGY

## 2025-01-03 PROCEDURE — 2580000003 HC RX 258: Performed by: ANESTHESIOLOGY

## 2025-01-03 PROCEDURE — 6360000002 HC RX W HCPCS: Performed by: NURSE ANESTHETIST, CERTIFIED REGISTERED

## 2025-01-03 PROCEDURE — 90870 ELECTROCONVULSIVE THERAPY: CPT | Performed by: PSYCHIATRY & NEUROLOGY

## 2025-01-03 PROCEDURE — 6360000002 HC RX W HCPCS: Performed by: ANESTHESIOLOGY

## 2025-01-03 PROCEDURE — 7100000000 HC PACU RECOVERY - FIRST 15 MIN: Performed by: ANESTHESIOLOGY

## 2025-01-03 PROCEDURE — 7100000001 HC PACU RECOVERY - ADDTL 15 MIN: Performed by: ANESTHESIOLOGY

## 2025-01-03 PROCEDURE — 2500000003 HC RX 250 WO HCPCS: Performed by: NURSE ANESTHETIST, CERTIFIED REGISTERED

## 2025-01-03 RX ORDER — KETOROLAC TROMETHAMINE 30 MG/ML
INJECTION, SOLUTION INTRAMUSCULAR; INTRAVENOUS
Status: COMPLETED
Start: 2025-01-03 | End: 2025-01-03

## 2025-01-03 RX ORDER — KETOROLAC TROMETHAMINE 30 MG/ML
INJECTION, SOLUTION INTRAMUSCULAR; INTRAVENOUS
Status: DISCONTINUED | OUTPATIENT
Start: 2025-01-03 | End: 2025-01-03 | Stop reason: SDUPTHER

## 2025-01-03 RX ORDER — SODIUM CHLORIDE 0.9 % (FLUSH) 0.9 %
5-40 SYRINGE (ML) INJECTION EVERY 12 HOURS SCHEDULED
Status: DISCONTINUED | OUTPATIENT
Start: 2025-01-03 | End: 2025-01-04 | Stop reason: HOSPADM

## 2025-01-03 RX ORDER — SODIUM CHLORIDE 9 MG/ML
INJECTION, SOLUTION INTRAVENOUS PRN
Status: DISCONTINUED | OUTPATIENT
Start: 2025-01-03 | End: 2025-01-04 | Stop reason: HOSPADM

## 2025-01-03 RX ORDER — HYOSCYAMINE SULFATE 0.125 MG
0.12 TABLET ORAL EVERY 4 HOURS PRN
COMMUNITY

## 2025-01-03 RX ORDER — METHOHEXITAL IN WATER/PF 100MG/10ML
SYRINGE (ML) INTRAVENOUS
Status: DISCONTINUED | OUTPATIENT
Start: 2025-01-03 | End: 2025-01-03 | Stop reason: SDUPTHER

## 2025-01-03 RX ORDER — SUCCINYLCHOLINE/SOD CL,ISO/PF 200MG/10ML
SYRINGE (ML) INTRAVENOUS
Status: COMPLETED
Start: 2025-01-03 | End: 2025-01-03

## 2025-01-03 RX ORDER — METHOHEXITAL IN WATER/PF 100MG/10ML
SYRINGE (ML) INTRAVENOUS
Status: COMPLETED
Start: 2025-01-03 | End: 2025-01-03

## 2025-01-03 RX ORDER — PROPRANOLOL HYDROCHLORIDE 10 MG/1
10 TABLET ORAL DAILY
COMMUNITY

## 2025-01-03 RX ORDER — SUCCINYLCHOLINE/SOD CL,ISO/PF 200MG/10ML
SYRINGE (ML) INTRAVENOUS
Status: DISCONTINUED | OUTPATIENT
Start: 2025-01-03 | End: 2025-01-03 | Stop reason: SDUPTHER

## 2025-01-03 RX ORDER — SODIUM CHLORIDE 0.9 % (FLUSH) 0.9 %
5-40 SYRINGE (ML) INJECTION PRN
Status: DISCONTINUED | OUTPATIENT
Start: 2025-01-03 | End: 2025-01-04 | Stop reason: HOSPADM

## 2025-01-03 RX ORDER — LIDOCAINE HYDROCHLORIDE 10 MG/ML
1 INJECTION, SOLUTION EPIDURAL; INFILTRATION; INTRACAUDAL; PERINEURAL
Status: COMPLETED | OUTPATIENT
Start: 2025-01-03 | End: 2025-01-03

## 2025-01-03 RX ORDER — SODIUM CHLORIDE 9 MG/ML
INJECTION, SOLUTION INTRAVENOUS CONTINUOUS
Status: DISCONTINUED | OUTPATIENT
Start: 2025-01-03 | End: 2025-01-04 | Stop reason: HOSPADM

## 2025-01-03 RX ADMIN — Medication 120 MG: at 08:36

## 2025-01-03 RX ADMIN — LIDOCAINE HYDROCHLORIDE 1 ML: 10 INJECTION, SOLUTION EPIDURAL; INFILTRATION; INTRACAUDAL; PERINEURAL at 08:16

## 2025-01-03 RX ADMIN — SODIUM CHLORIDE: 9 INJECTION, SOLUTION INTRAVENOUS at 08:15

## 2025-01-03 RX ADMIN — KETOROLAC TROMETHAMINE 30 MG: 30 INJECTION, SOLUTION INTRAMUSCULAR at 08:29

## 2025-01-03 RX ADMIN — Medication 100 MG: at 08:36

## 2025-01-03 ASSESSMENT — PAIN - FUNCTIONAL ASSESSMENT
PAIN_FUNCTIONAL_ASSESSMENT: NONE - DENIES PAIN
PAIN_FUNCTIONAL_ASSESSMENT: 0-10

## 2025-01-03 NOTE — PROCEDURES
Right Unilateral ECT Procedure Report  Alistair Hutchison   1/3/2025  1995         Attending:Corby Drake MD  Preprocedure diagnosis: disorganized schizophrenia  Postprocedure diagnosis: SAME AS PRE-PROCEDURE DIAGNOSIS  Treatment Number: This is treatment # 8   Patient Status: Outpatient   Type of ECT: Ultra Brief Pulse Right Unilateral  Medications  Preprocedure: Tylenol 650mg  Anesthetic: Methohexital 100 mg  Paralytic: Succinylcholine 120 mg  Post procedure: none needed   Cuff placement: Left Lower Extremity    MECTA Settings 1st Stimulus:     Pulse width: 0.3 ms   Frequency:  60 hz   Duration:   6.0 seconds   Static Impedance: 1835 ohms             Dynamic Impedance: 238 ohms             Motor Seizure Duration: 29 seconds  EEG Seizure Duration: 41 seconds              The patient's ECT treatment was performed using MECTA machine  .  Timeout:  Time out was performed using two patient identifiers and confirmation of procedure.     Patient Preparation: Preprocedure documentation, labs, H&P were all verified and reviewed.  The patient was placed in supine position.  EEG leads were placed for monitoring of seizure.   Mandaen areas bilaterally cleaned and prepped.  Conductive gel  was applied over stimulus electrode site.   The patient was pre-oxygenated.       Procedure in detail: Medications were administered by anesthesia using intravenous access at the doses listed previously in this summary.  Anesthetic administered and once confirmation the patient is anesthetized, the patient's blood pressure cuff on lower extremity was inflated to 100mmHg in excess of patient's blood pressure.  At this time, the neuromuscular blockade was administered intravenously by anesthesia.  Upper extremity fasciculation were verified followed by lower extremity fasciculation.  Once fasciculations terminated, confirmation of affective neuromuscular blockade demonstrated by absence of withdrawal reflex.  Bite blocks were put in

## 2025-01-03 NOTE — INTERVAL H&P NOTE
Update History & Physical    The patient's History and Physical of December 27, 2024 was reviewed with the patient and I examined the patient.     Alistair Hutchison is 29 y.o.,  male, here for ECT treatment.    Pt present today with his grandmother.   Last ECT treatment was 12/30//24. Pt tolerated last treatment well. Patient reports that his symptoms are improving.  Patient denies  short term memory loss   Pt has history of Disorganized schizophrenia, he is coming twice a week.      States mood has been good. Pt denies any depression.    Patient's sleep has been good ( he is on sleeping medication ). Appetite has been good .   Pt denies feelings of suicidal/homicidal. Pt has no auditory/visual hallucinations.   Pt has been taking psychiatric medications as prescribed.   Pt does not have any other somatic complaints at this time.    Patient is feeling well today, denies any fever/chills, chest pain, shortness of breath.  No interval changes.   Pt NPO since the past midnight , no am medication today   No interval changes to past medical history, social history, family history.  Review of systems as stated above and otherwise negative.      Electronically signed by GIORGI BREWER CNP on 1/3/2025 at 7:45 AM

## 2025-01-03 NOTE — ANESTHESIA PRE PROCEDURE
Department of Anesthesiology  Preprocedure Note       Name:  Alistair Hutchison   Age:  29 y.o.  :  1995                                          MRN:  [id number]         Date:  1/3/2025      Surgeon: * No surgeons listed *    Procedure: * No procedures listed *    Medications prior to admission:   Prior to Admission medications    Medication Sig Start Date End Date Taking? Authorizing Provider   cloZAPine (CLOZARIL) 100 MG tablet Take 1 tablet by mouth 3 times daily 24   Corby Drake MD   traZODone (DESYREL) 50 MG tablet Take 1 tablet by mouth nightly as needed for Sleep 10/2/24   Corby Drake MD   paliperidone palmitate ER (INVEGA SUSTENNA) 234 MG/1.5ML FRANCHESKA IM injection Inject 234 mg into the muscle every 30 days 10/24/24   Corby Drake MD       Current medications:    Current Outpatient Medications   Medication Sig Dispense Refill   • cloZAPine (CLOZARIL) 100 MG tablet Take 1 tablet by mouth 3 times daily 90 tablet 0   • traZODone (DESYREL) 50 MG tablet Take 1 tablet by mouth nightly as needed for Sleep 30 tablet 0   • paliperidone palmitate ER (INVEGA SUSTENNA) 234 MG/1.5ML FRANCHESKA IM injection Inject 234 mg into the muscle every 30 days 1 each 0     Current Facility-Administered Medications   Medication Dose Route Frequency Provider Last Rate Last Admin   • lidocaine PF 1 % injection 1 mL  1 mL IntraDERmal Once PRN Kristel Alvarado MD       • 0.9 % sodium chloride infusion   IntraVENous Continuous Kristel Alvarado MD       • sodium chloride flush 0.9 % injection 5-40 mL  5-40 mL IntraVENous 2 times per day Kristel Alvarado MD       • sodium chloride flush 0.9 % injection 5-40 mL  5-40 mL IntraVENous PRN Kristel Alvarado MD       • 0.9 % sodium chloride infusion   IntraVENous PRN Kristel Alvarado MD           Allergies:    Allergies   Allergen Reactions   • Chocolate Diarrhea       Problem List:    Patient Active Problem List   Diagnosis Code   • Hypovitaminosis D E55.9   • Rib pain on left

## 2025-01-03 NOTE — ANESTHESIA POSTPROCEDURE EVALUATION
Department of Anesthesiology  Postprocedure Note    Patient: Alistair Hutchison  MRN: [id number]  YOB: 1995  Date of evaluation: 1/3/2025    Procedure Summary       Date: 01/03/25 Room / Location: [facility] PACU    Anesthesia Start: 0829 Anesthesia Stop: 0842    Procedure: ECT W/ ANESTHESIA Diagnosis: Paranoid schizophrenia    Scheduled Providers:  Responsible Provider: Zacarias Pandey MD    Anesthesia Type: general ASA Status: 3            Anesthesia Type: No value filed.    Amalia Phase I: Amalia Score: 10    Amalia Phase II:      Anesthesia Post Evaluation    Patient location during evaluation: PACU  Patient participation: complete - patient participated  Level of consciousness: awake and alert  Airway patency: patent  Nausea & Vomiting: no vomiting  Cardiovascular status: hemodynamically stable  Respiratory status: acceptable  Hydration status: euvolemic  Comments: POST- ANESTHESIA EVALUATION       Pt Name: Alistair Hutchison  MRN: [id number]  YOB: 1995  Date of evaluation: 1/3/2025  Time:  10:55 AM      /88   Pulse (!) 113   Temp 98.1 °F (36.7 °C)   Resp 20   Ht 1.651 m (5' 5\")   Wt 113.4 kg (250 lb)   SpO2 94%   BMI 41.60 kg/m²      Consciousness Level  Awake  Cardiopulmonary Status  Stable  Pain Adequately Treated YES  Nausea / Vomiting  NO  Adequate Hydration  YES  Anesthesia Related Complications NONE      Electronically signed by Zacarias Pandey MD on 1/3/2025 at 10:55 AM         Pain management: satisfactory to patient    No notable events documented.

## 2025-01-03 NOTE — DISCHARGE INSTRUCTIONS
ELECTROCONVULSIVE THERAPY DISCHARGE INSTRUCTIONS         1. Activity - Rest today. The anesthetic agents you have received can make you feel drowsy or tired.  A responsible person must drive you home and stay with you for 8 hours after discharge from the Hospital. Do not drive a motor vehicle or operate any machinery for 24 hours. .   *Do not make any complex decisions or execute any legal documents until 3 weeks AFTER your last treatment.  If you have any questions regarding this, speak with your psychiatrist first.*    2. Diet - Nothing to eat or drink after midnight the night of your ECT treatment. After ECT, start with clear liquids, if you can drink without coughing, you may proceed with gradually progressing to your usual diet.    No alcoholic beverages for 24 hours.    3. Medications - Take your daily medications as prescribed, after you return home from today's ECT. If you take a Beta Blocker or have been prescribed Imitrex, you may be instructed to take these medications the morning of ECT. If you are unsure please ask the physician.     Take with these medication the morning of ECT with one Tablespoon of water.   Tylenol 650 mg  All blood pressure medications  ***    4. You may experience the following after your treatment:   a. Poor memory   b. Poor balance   c. Headaches   d. Confusion   e. Muscle soreness   f. Nausea      5. Special Precautions - Contact you physician immediately if these occur:   a. Signs of infection: redness, swelling, heat, red streaks at the site of the IV   b. Excessive pain unrelieved by prescription pain medications   c. Nausea and vomiting that persists beyond the first day after your procedure    6. Next Procedure Date:   Your next ECT treatment is scheduled for 0730 on 1/10.  Arrive at 0600    **You are also invited to join us at our monthly Electroconvulsive Therapy Peer Support Group.  This support group is held once a month, on the last Monday of the month with holiday

## 2025-01-09 ENCOUNTER — ANESTHESIA EVENT (OUTPATIENT)
Dept: POSTOP/PACU | Age: 30
End: 2025-01-09
Payer: COMMERCIAL

## 2025-01-10 ENCOUNTER — HOSPITAL ENCOUNTER (OUTPATIENT)
Dept: POSTOP/PACU | Age: 30
Discharge: HOME | End: 2025-01-10
Payer: COMMERCIAL

## 2025-01-10 ENCOUNTER — ANESTHESIA (OUTPATIENT)
Dept: POSTOP/PACU | Age: 30
End: 2025-01-10
Payer: COMMERCIAL

## 2025-01-10 VITALS
DIASTOLIC BLOOD PRESSURE: 84 MMHG | BODY MASS INDEX: 41.65 KG/M2 | OXYGEN SATURATION: 99 % | RESPIRATION RATE: 19 BRPM | HEART RATE: 101 BPM | WEIGHT: 250 LBS | HEIGHT: 65 IN | SYSTOLIC BLOOD PRESSURE: 116 MMHG | TEMPERATURE: 97.9 F

## 2025-01-10 DIAGNOSIS — F20.1 DISORGANIZED SCHIZOPHRENIA (HCC): Primary | ICD-10-CM

## 2025-01-10 PROCEDURE — 3700000000 HC ANESTHESIA ATTENDED CARE: Performed by: ANESTHESIOLOGY

## 2025-01-10 PROCEDURE — 2580000003 HC RX 258: Performed by: ANESTHESIOLOGY

## 2025-01-10 PROCEDURE — 2500000003 HC RX 250 WO HCPCS: Performed by: NURSE ANESTHETIST, CERTIFIED REGISTERED

## 2025-01-10 PROCEDURE — 7100000001 HC PACU RECOVERY - ADDTL 15 MIN: Performed by: ANESTHESIOLOGY

## 2025-01-10 PROCEDURE — 7100000000 HC PACU RECOVERY - FIRST 15 MIN: Performed by: ANESTHESIOLOGY

## 2025-01-10 PROCEDURE — 6360000002 HC RX W HCPCS: Performed by: ANESTHESIOLOGY

## 2025-01-10 PROCEDURE — 90870 ELECTROCONVULSIVE THERAPY: CPT | Performed by: PSYCHIATRY & NEUROLOGY

## 2025-01-10 PROCEDURE — 90870 ELECTROCONVULSIVE THERAPY: CPT

## 2025-01-10 RX ORDER — METHOHEXITAL IN WATER/PF 100MG/10ML
SYRINGE (ML) INTRAVENOUS
Status: COMPLETED
Start: 2025-01-10 | End: 2025-01-10

## 2025-01-10 RX ORDER — SODIUM CHLORIDE 9 MG/ML
INJECTION, SOLUTION INTRAVENOUS PRN
Status: DISCONTINUED | OUTPATIENT
Start: 2025-01-10 | End: 2025-01-11 | Stop reason: HOSPADM

## 2025-01-10 RX ORDER — SODIUM CHLORIDE 0.9 % (FLUSH) 0.9 %
5-40 SYRINGE (ML) INJECTION PRN
Status: DISCONTINUED | OUTPATIENT
Start: 2025-01-10 | End: 2025-01-11 | Stop reason: HOSPADM

## 2025-01-10 RX ORDER — SUCCINYLCHOLINE/SOD CL,ISO/PF 200MG/10ML
SYRINGE (ML) INTRAVENOUS
Status: DISCONTINUED | OUTPATIENT
Start: 2025-01-10 | End: 2025-01-10 | Stop reason: SDUPTHER

## 2025-01-10 RX ORDER — SUCCINYLCHOLINE/SOD CL,ISO/PF 200MG/10ML
SYRINGE (ML) INTRAVENOUS
Status: COMPLETED
Start: 2025-01-10 | End: 2025-01-10

## 2025-01-10 RX ORDER — METHOHEXITAL IN WATER/PF 100MG/10ML
SYRINGE (ML) INTRAVENOUS
Status: DISCONTINUED | OUTPATIENT
Start: 2025-01-10 | End: 2025-01-10 | Stop reason: SDUPTHER

## 2025-01-10 RX ORDER — LIDOCAINE HYDROCHLORIDE 10 MG/ML
1 INJECTION, SOLUTION EPIDURAL; INFILTRATION; INTRACAUDAL; PERINEURAL
Status: COMPLETED | OUTPATIENT
Start: 2025-01-10 | End: 2025-01-10

## 2025-01-10 RX ORDER — SODIUM CHLORIDE 9 MG/ML
INJECTION, SOLUTION INTRAVENOUS CONTINUOUS
Status: DISCONTINUED | OUTPATIENT
Start: 2025-01-10 | End: 2025-01-11 | Stop reason: HOSPADM

## 2025-01-10 RX ORDER — SODIUM CHLORIDE 0.9 % (FLUSH) 0.9 %
5-40 SYRINGE (ML) INJECTION EVERY 12 HOURS SCHEDULED
Status: DISCONTINUED | OUTPATIENT
Start: 2025-01-10 | End: 2025-01-11 | Stop reason: HOSPADM

## 2025-01-10 RX ADMIN — Medication 100 MG: at 07:37

## 2025-01-10 RX ADMIN — SODIUM CHLORIDE: 9 INJECTION, SOLUTION INTRAVENOUS at 07:05

## 2025-01-10 RX ADMIN — Medication 120 MG: at 07:37

## 2025-01-10 RX ADMIN — LIDOCAINE HYDROCHLORIDE 1 ML: 10 INJECTION, SOLUTION EPIDURAL; INFILTRATION; INTRACAUDAL; PERINEURAL at 07:05

## 2025-01-10 ASSESSMENT — PAIN - FUNCTIONAL ASSESSMENT
PAIN_FUNCTIONAL_ASSESSMENT: 0-10
PAIN_FUNCTIONAL_ASSESSMENT: 0-10

## 2025-01-10 ASSESSMENT — PAIN SCALES - GENERAL: PAINLEVEL_OUTOF10: 0

## 2025-01-10 NOTE — H&P (VIEW-ONLY)
HISTORY and PHYSICAL  [facility]       NAME:  Alistair Hutchison  MRN: [id number]   YOB: 1995   Date: 10/4/2024   Age: 29 y.o.  Gender: male       COMPLAINT AND PRESENT HISTORY:     Alistair Hutchison is 29 y.o.,  male, here for ECT treatment.  Patient is being treated for  Acute psychosis      Patient was in patient in [facility] from 9/13/24-10/2/2024    Notes per Dr. Drake on 9/13/24  History of Present Illnes (present tense wording is of findings from admission exam and are not necessarily indicative of current findings):   Alistair Hutchison is a 29 y.o. male who has a past medical history of schizophrenia, bipolar disorder, and developmental delay. Patient pink slipped from psychiatrist's office at [facility] to [facility] for mental health crisis.  His grandparents reported increased irritability, anger, and violence towards household members over the past 10 days.  Patient was somehow transferred to [facility] ED via EMS.  Patient was medically cleared and transferred to [facility] for psychiatric admission.  UDS negative, ethanol level less than 10.    UPDATE  Patient's grandmother is present.  Pt reports that he lives with his grandparents.   Patient has had x 6 ECT treatment in patient.     Last ECT treatment was 10/2/2024 .    Pt tolerated last treatment well.  Pt reports ECT treatments have been effective at the current frequency is at 3 times a week, and will be possibly going to 2 times a week , next week.     Patient states that his mood has been good happy and sad.   Pt admits to  no depression    Patient states that his  sleep has been good. .   Appetite has been good . Pt denies any thoughts of suicidal.     Pt also denies any thoughts of homicidal. Grandmother states that pt still responds to voices /auditory/visual hallucinations.   Pt reports being compliant with taking all prescribed medications. As given by his grandparents.      Pt AAO x 3 in NAD. HRRR.  Pt calling with c/o vaginal bleeding and she said that she has been bleeding heavy for the last 9 days. Pt said that she just did 2 iron infusions and thought it had gotten better but that she started with heavy bleeding again and triaged and care advice to go to the ED. Pt admits to severe abdominal pain. Pt to call back once back home or any other questions or concerns or worsening                  Reason for Disposition   SEVERE abdominal pain (e.g., excruciating)    Additional Information   Negative: SEVERE vaginal bleeding (e.g., continuous red blood from vagina, or large blood clots) and very weak (can't stand)   Negative: Passed out (e.g., fainted, lost consciousness, blacked out and was not responding)   Negative: Difficult to awaken or acting confused (e.g., disoriented, slurred speech)   Negative: Shock suspected (e.g., cold/pale/clammy skin, too weak to stand, low BP, rapid pulse)   Negative: Sounds like a life-threatening emergency to the triager    Protocols used: Vaginal Bleeding - [id number]

## 2025-01-10 NOTE — PROCEDURES
Right Unilateral ECT Procedure Report  Alistair Hutchison   1/10/2025  1995         Attending:Corby Drake MD  Preprocedure diagnosis: disorganized schizophrenia  Postprocedure diagnosis: SAME AS PRE-PROCEDURE DIAGNOSIS  Treatment Number: This is treatment # 9   Patient Status: Outpatient   Type of ECT: Ultra Brief Pulse Right Unilateral  Medications  Preprocedure: Tylenol 650mg  Anesthetic: Methohexital 100 mg  Paralytic: Succinylcholine 120 mg  Post procedure: none needed   Cuff placement: Left Lower Extremity    MECTA Settings 1st Stimulus:     Pulse width: 0.3 ms   Frequency:  60 hz   Duration:   6.0 seconds   Static Impedance: 1605 ohms             Dynamic Impedance: 233 ohms             Motor Seizure Duration: 30 seconds  EEG Seizure Duration: 45 seconds              The patient's ECT treatment was performed using MECTA machine  .  Timeout:  Time out was performed using two patient identifiers and confirmation of procedure.     Patient Preparation: Preprocedure documentation, labs, H&P were all verified and reviewed.  The patient was placed in supine position.  EEG leads were placed for monitoring of seizure.   [place] areas bilaterally cleaned and prepped.  Conductive gel  was applied over stimulus electrode site.   The patient was pre-oxygenated.       Procedure in detail: Medications were administered by anesthesia using intravenous access at the doses listed previously in this summary.  Anesthetic administered and once confirmation the patient is anesthetized, the patient's blood pressure cuff on lower extremity was inflated to 100mmHg in excess of patient's blood pressure.  At this time, the neuromuscular blockade was administered intravenously by anesthesia.  Upper extremity fasciculation were verified followed by lower extremity fasciculation.  Once fasciculations terminated, confirmation of affective neuromuscular blockade demonstrated by absence of withdrawal reflex.  Bite blocks were put in

## 2025-01-10 NOTE — DISCHARGE INSTRUCTIONS
Therapy Peer Support Group.  This support group is held once a month, on the last Monday of the month with holiday exceptions.  Attendance may be in person or virtually.  The support group is located in person at [facility], [address] [place] [address], [place] [address], in the basement Board Room.  It is available virtually, however you must pre-arrange access by emailing [email or web address].  The support group starts at 4p and ends at 5p.  Our support group offers a safe place where all aspects of ECT treatment can be discussed openly.  Patients and family/friends are welcome to attend.**      If you feel you are having a problem or complication from your ECT treatments and it is during normal business hours call [phone]. If it is after normal business hours please call [facility] at [phone] to speak with a nurse, or go to your nearest emergency room.     If you need to schedule, reschedule or cancel an appointment, please call the [facility] at [phone]. You may also use this number for any questions regarding the ECT Support Group.    If you have an after hours or weekend cancellation please call [phone], they will forward message to appropriate party.    You will need to arrange transportation to and from the hospital for all outpatient ECT treatments.    Bring a current list of your medications, including dosages with you to every ECT treatment and arrive 1 hour and 20 minutes before your scheduled ECT time.

## 2025-01-10 NOTE — ANESTHESIA PRE PROCEDURE
chloride flush 0.9 % injection 5-40 mL  5-40 mL IntraVENous PRN Barby Leonard MD       • 0.9 % sodium chloride infusion   IntraVENous PRN Barby Leonard MD       • 0.9 % sodium chloride infusion   IntraVENous Continuous Barby Leonard MD           Allergies:    Allergies   Allergen Reactions   • Chocolate Diarrhea       Problem List:    Patient Active Problem List   Diagnosis Code   • Hypovitaminosis D E55.9   • Rib pain on left side R07.81   • Acute psychosis (HCC) F23   • Developmental delay R62.50   • Hyperthyroidism, subclinical E05.90   • Moderate mixed hyperlipidemia not requiring statin therapy E78.2   • Class 2 obesity without serious comorbidity with body mass index (BMI) of 38.0 to 38.9 in adult E66.812, Z68.38   • Disorganized schizophrenia (HCC) F20.1   • Schizophrenia, disorganized (HCC) F20.1       Past Medical History:        Diagnosis Date   • Fracture     leg   • Manic depression (HCC)    • Oppositional defiant behavior    • PONV (postoperative nausea and vomiting)    • Schizophrenia (HCC)        Past Surgical History:        Procedure Laterality Date   • EYE SURGERY     • TYMPANOSTOMY TUBE PLACEMENT         Social History:    Social History     Tobacco Use   • Smoking status: Never   • Smokeless tobacco: Never   Substance Use Topics   • Alcohol use: No                                Counseling given: Not Answered      Vital Signs (Current):   Vitals:    01/10/25 0641   BP: 125/82   Pulse: (!) 106   Resp: 18   Temp: 98.4 °F (36.9 °C)   TempSrc: Infrared   SpO2: 99%   Weight: 113.4 kg (250 lb)   Height: 1.651 m (5' 5\")                                              BP Readings from Last 3 Encounters:   01/10/25 125/82   01/03/25 122/88   12/30/24 (!) 140/96       NPO Status: Time of last liquid consumption: 2100                        Time of last solid consumption: 2100                        Date of last liquid consumption: 01/09/25                        Date of last solid food consumption:

## 2025-01-10 NOTE — ANESTHESIA POSTPROCEDURE EVALUATION
Department of Anesthesiology  Postprocedure Note    Patient: Alistair Hutchison  MRN: [id number]  YOB: 1995  Date of evaluation: 1/10/2025    Procedure Summary       Date: 01/10/25 Room / Location: [facility] PACU    Anesthesia Start: 0734 Anesthesia Stop: 0744    Procedure: ECT W/ ANESTHESIA Diagnosis: Disorganized schizophrenia    Scheduled Providers:  Responsible Provider: Zacarias Pandey MD    Anesthesia Type: General ASA Status: 3            Anesthesia Type: General    Amalia Phase I: Amalia Score: 10    Amalia Phase II:      Anesthesia Post Evaluation    Patient location during evaluation: PACU  Patient participation: complete - patient participated  Level of consciousness: awake and alert  Airway patency: patent  Nausea & Vomiting: no vomiting  Cardiovascular status: hemodynamically stable  Respiratory status: acceptable  Hydration status: euvolemic  Comments: POST- ANESTHESIA EVALUATION       Pt Name: Alistair Hutchison  MRN: [id number]  YOB: 1995  Date of evaluation: 1/10/2025  Time:  9:22 AM      /84   Pulse (!) 101   Temp 97.9 °F (36.6 °C) (Infrared)   Resp 19   Ht 1.651 m (5' 5\")   Wt 113.4 kg (250 lb)   SpO2 99%   BMI 41.60 kg/m²      Consciousness Level  Awake  Cardiopulmonary Status  Stable  Pain Adequately Treated YES  Nausea / Vomiting  NO  Adequate Hydration  YES  Anesthesia Related Complications NONE      Electronically signed by Zacarias Pandey MD on 1/10/2025 at 9:22 AM         Pain management: satisfactory to patient    No notable events documented.

## 2025-01-10 NOTE — INTERVAL H&P NOTE
Update History & Physical    The patient's History and Physical of December 27, 2024 was reviewed with the patient and I examined the patient. There was no change. The surgical site was confirmed by the patient and me.     UPDATE  Alistair Hutchison is 29 y.o.,  male, here for ECT treatment.    Last ECT treatment was 1/3/2025. Pt tolerated last treatment well.   Pt has history of Disorganized schizophrenia .  Rating average mood 10/10 with 10 being the best mood.  Reports ECT treatments are effective at improving overall mood.  Other symptoms include: hopelessness, helplessness, low energy.   Patient's sleep has been good. Appetite has been good.   Pt is not suicidal. Pt is not homicidal. Pt has auditory/visual hallucinations.   Pt has been taking all medications as prescribed.   NPO since MN  No medications taken this am  Denies personal h/o complications with general anesthesia  Cardiopulmonary assessment completed.  No change      Electronically signed by GIORGI Rust CNP on 1/10/2025 at 6:09 AM

## 2025-01-16 ENCOUNTER — ANESTHESIA EVENT (OUTPATIENT)
Dept: POSTOP/PACU | Age: 30
End: 2025-01-16
Payer: COMMERCIAL

## 2025-01-17 ENCOUNTER — HOSPITAL ENCOUNTER (OUTPATIENT)
Dept: POSTOP/PACU | Age: 30
Discharge: HOME | End: 2025-01-17
Payer: COMMERCIAL

## 2025-01-17 ENCOUNTER — ANESTHESIA (OUTPATIENT)
Dept: POSTOP/PACU | Age: 30
End: 2025-01-17
Payer: COMMERCIAL

## 2025-01-17 VITALS
RESPIRATION RATE: 15 BRPM | SYSTOLIC BLOOD PRESSURE: 133 MMHG | BODY MASS INDEX: 41.65 KG/M2 | HEIGHT: 65 IN | DIASTOLIC BLOOD PRESSURE: 85 MMHG | TEMPERATURE: 98.2 F | HEART RATE: 101 BPM | WEIGHT: 250 LBS | OXYGEN SATURATION: 99 %

## 2025-01-17 DIAGNOSIS — F20.1 DISORGANIZED SCHIZOPHRENIA (HCC): Primary | ICD-10-CM

## 2025-01-17 PROCEDURE — 2580000003 HC RX 258: Performed by: ANESTHESIOLOGY

## 2025-01-17 PROCEDURE — 3700000001 HC ADD 15 MINUTES (ANESTHESIA): Performed by: ANESTHESIOLOGY

## 2025-01-17 PROCEDURE — 7100000000 HC PACU RECOVERY - FIRST 15 MIN: Performed by: ANESTHESIOLOGY

## 2025-01-17 PROCEDURE — 7100000001 HC PACU RECOVERY - ADDTL 15 MIN: Performed by: ANESTHESIOLOGY

## 2025-01-17 PROCEDURE — 3700000000 HC ANESTHESIA ATTENDED CARE: Performed by: ANESTHESIOLOGY

## 2025-01-17 PROCEDURE — 2500000003 HC RX 250 WO HCPCS: Performed by: NURSE ANESTHETIST, CERTIFIED REGISTERED

## 2025-01-17 PROCEDURE — 90870 ELECTROCONVULSIVE THERAPY: CPT

## 2025-01-17 PROCEDURE — 6360000002 HC RX W HCPCS: Performed by: NURSE ANESTHETIST, CERTIFIED REGISTERED

## 2025-01-17 RX ORDER — METHOHEXITAL IN WATER/PF 100MG/10ML
SYRINGE (ML) INTRAVENOUS
Status: COMPLETED
Start: 2025-01-17 | End: 2025-01-17

## 2025-01-17 RX ORDER — SODIUM CHLORIDE 0.9 % (FLUSH) 0.9 %
5-40 SYRINGE (ML) INJECTION PRN
Status: DISCONTINUED | OUTPATIENT
Start: 2025-01-17 | End: 2025-01-18 | Stop reason: HOSPADM

## 2025-01-17 RX ORDER — SODIUM CHLORIDE 9 MG/ML
INJECTION, SOLUTION INTRAVENOUS PRN
Status: DISCONTINUED | OUTPATIENT
Start: 2025-01-17 | End: 2025-01-18 | Stop reason: HOSPADM

## 2025-01-17 RX ORDER — SUCCINYLCHOLINE/SOD CL,ISO/PF 200MG/10ML
SYRINGE (ML) INTRAVENOUS
Status: COMPLETED
Start: 2025-01-17 | End: 2025-01-17

## 2025-01-17 RX ORDER — KETOROLAC TROMETHAMINE 30 MG/ML
INJECTION, SOLUTION INTRAMUSCULAR; INTRAVENOUS
Status: COMPLETED
Start: 2025-01-17 | End: 2025-01-17

## 2025-01-17 RX ORDER — METHOHEXITAL IN WATER/PF 100MG/10ML
SYRINGE (ML) INTRAVENOUS
Status: DISCONTINUED | OUTPATIENT
Start: 2025-01-17 | End: 2025-01-17 | Stop reason: SDUPTHER

## 2025-01-17 RX ORDER — SODIUM CHLORIDE 9 MG/ML
INJECTION, SOLUTION INTRAVENOUS CONTINUOUS
Status: DISCONTINUED | OUTPATIENT
Start: 2025-01-17 | End: 2025-01-18 | Stop reason: HOSPADM

## 2025-01-17 RX ORDER — SODIUM CHLORIDE 0.9 % (FLUSH) 0.9 %
5-40 SYRINGE (ML) INJECTION EVERY 12 HOURS SCHEDULED
Status: DISCONTINUED | OUTPATIENT
Start: 2025-01-17 | End: 2025-01-18 | Stop reason: HOSPADM

## 2025-01-17 RX ORDER — KETOROLAC TROMETHAMINE 30 MG/ML
INJECTION, SOLUTION INTRAMUSCULAR; INTRAVENOUS
Status: DISCONTINUED | OUTPATIENT
Start: 2025-01-17 | End: 2025-01-17 | Stop reason: SDUPTHER

## 2025-01-17 RX ORDER — SUCCINYLCHOLINE/SOD CL,ISO/PF 200MG/10ML
SYRINGE (ML) INTRAVENOUS
Status: DISCONTINUED | OUTPATIENT
Start: 2025-01-17 | End: 2025-01-17 | Stop reason: SDUPTHER

## 2025-01-17 RX ORDER — LIDOCAINE HYDROCHLORIDE 10 MG/ML
1 INJECTION, SOLUTION EPIDURAL; INFILTRATION; INTRACAUDAL; PERINEURAL
Status: DISCONTINUED | OUTPATIENT
Start: 2025-01-17 | End: 2025-01-18 | Stop reason: HOSPADM

## 2025-01-17 RX ADMIN — Medication 100 MG: at 08:00

## 2025-01-17 RX ADMIN — KETOROLAC TROMETHAMINE 30 MG: 30 INJECTION, SOLUTION INTRAMUSCULAR at 07:59

## 2025-01-17 RX ADMIN — SODIUM CHLORIDE: 9 INJECTION, SOLUTION INTRAVENOUS at 07:44

## 2025-01-17 RX ADMIN — Medication 120 MG: at 08:01

## 2025-01-17 ASSESSMENT — PAIN - FUNCTIONAL ASSESSMENT
PAIN_FUNCTIONAL_ASSESSMENT: 0-10
PAIN_FUNCTIONAL_ASSESSMENT: ADULT NONVERBAL PAIN SCALE (NPVS)

## 2025-01-17 NOTE — ANESTHESIA PRE PROCEDURE
Department of Anesthesiology  Preprocedure Note       Name:  Alistair Hutchison   Age:  29 y.o.  :  1995                                          MRN:  [id number]         Date:  2025      Surgeon: * No surgeons listed *    Procedure: * No procedures listed *    Medications prior to admission:   Prior to Admission medications    Medication Sig Start Date End Date Taking? Authorizing Provider   hyoscyamine (ANASPAZ;LEVSIN) 0.125 MG tablet Take 1 tablet by mouth every 4 hours as needed for Cramping    Shan Curran MD   propranolol (INDERAL) 10 MG tablet Take 1 tablet by mouth daily    Shan Curran MD   cloZAPine (CLOZARIL) 100 MG tablet Take 1 tablet by mouth 3 times daily 24   Corby Drake MD   traZODone (DESYREL) 50 MG tablet Take 1 tablet by mouth nightly as needed for Sleep 10/2/24   Corby Drake MD   paliperidone palmitate ER (INVEGA SUSTENNA) 234 MG/1.5ML FRANCHESKA IM injection Inject 234 mg into the muscle every 30 days 10/24/24   Corby Drake MD       Current medications:    Current Outpatient Medications   Medication Sig Dispense Refill   • hyoscyamine (ANASPAZ;LEVSIN) 0.125 MG tablet Take 1 tablet by mouth every 4 hours as needed for Cramping     • propranolol (INDERAL) 10 MG tablet Take 1 tablet by mouth daily     • cloZAPine (CLOZARIL) 100 MG tablet Take 1 tablet by mouth 3 times daily 90 tablet 0   • traZODone (DESYREL) 50 MG tablet Take 1 tablet by mouth nightly as needed for Sleep 30 tablet 0   • paliperidone palmitate ER (INVEGA SUSTENNA) 234 MG/1.5ML FRANCHESKA IM injection Inject 234 mg into the muscle every 30 days 1 each 0     Current Facility-Administered Medications   Medication Dose Route Frequency Provider Last Rate Last Admin   • lidocaine PF 1 % injection 1 mL  1 mL IntraDERmal Once PRN Jewel Ji MD       • 0.9 % sodium chloride infusion   IntraVENous Continuous Jewel Ji MD       • sodium chloride flush 0.9 % injection 5-40 mL  5-40

## 2025-01-17 NOTE — INTERVAL H&P NOTE
Update History & Physical    The patient's History and Physical of December 27, 2024 was reviewed with the patient and I examined the patient. There was no change. The surgical site was confirmed by the patient and me.     UPDATE 1/17/2025  Alistair Hutchison is 29 y.o.,  male, here for ECT treatment.     Last ECT treatment was 1/10/2025.  Pt tolerated last treatment well. Patient reports that his mood has improved but grandmother/patient reports behavior has not improved.  Patient has complaints of some short term memory loss    Pt has had multiple failed treatment modalities. Pt has history of Disorganized schizophrenia.   Mood is rated currently at 10/10.    Patient's sleep has been good. Appetite has been good.   Pt is not suicidal. Pt is not homicidal. Pt denies auditory/visual hallucinations.   Pt has had episodes of anger including punching the wall and jumping on the bed.  Noted redness and scratches to left hand/knuckles.  Pt has been taking psychiatric medications as prescribed.   Pt does not have any other somatic complaints at this time.   NPO since MN.  Denies medications this am  Denies h/o complications with general anesthesia.  Reports headaches after treatments.  See nursing flowsheet for vital signs  Cardiopulmonary assessment completed.  No change         Electronically signed by GIORGI Rust CNP on 1/17/2025 at 6:55 AM

## 2025-01-17 NOTE — ANESTHESIA POSTPROCEDURE EVALUATION
Department of Anesthesiology  Postprocedure Note    Patient: Alistair Hutchison  MRN: [id number]  YOB: 1995  Date of evaluation: 1/17/2025    Procedure Summary       Date: 01/17/25 Room / Location: [facility] PACU    Anesthesia Start: 0755 Anesthesia Stop: 0810    Procedure: ECT W/ ANESTHESIA Diagnosis: Disorganized schizophrenia    Scheduled Providers:  Responsible Provider: Zacarias Pandey MD    Anesthesia Type: General ASA Status: 3            Anesthesia Type: General    Amalia Phase I: Amalia Score: 10    Amalia Phase II:      Anesthesia Post Evaluation    Patient location during evaluation: PACU  Patient participation: complete - patient participated  Level of consciousness: awake and alert  Airway patency: patent  Nausea & Vomiting: no vomiting  Cardiovascular status: hemodynamically stable  Respiratory status: acceptable  Hydration status: euvolemic  Comments: POST- ANESTHESIA EVALUATION       Pt Name: Alistair Hutchison  MRN: [id number]  YOB: 1995  Date of evaluation: 1/17/2025  Time:  9:36 AM      /85   Pulse (!) 101   Temp 98.2 °F (36.8 °C)   Resp 15   Ht 1.651 m (5' 5\")   Wt 113.4 kg (250 lb)   SpO2 99%   BMI 41.60 kg/m²      Consciousness Level  Awake  Cardiopulmonary Status  Stable  Pain Adequately Treated YES  Nausea / Vomiting  NO  Adequate Hydration  YES  Anesthesia Related Complications NONE      Electronically signed by Zacarias Pandey MD on 1/17/2025 at 9:36 AM         Pain management: satisfactory to patient    No notable events documented.

## 2025-01-17 NOTE — PROCEDURES
in place. Stimulus leads then applied to stimulus electrode site with right electrode placed  with the center of the lead placed approximately 1 inch superior to the midpoint of line between canthus and the tragus and the second electrode place one inch ipsilateral to the right side of the apex of the skull. Static impedance was tested and within appropriate limits. MECTA settings were confirmed with nursing staff.   Staff was cleared from the patient and dose of ECT stimulus was delivered.  Motor seizure activity  was observed at duration noted and terminated.  EEG seizure activity was observed of duration noted that was confirmed via monitoring EEG and terminated with appropriate postictal suppression noted on EEG.  Static impedance and dynamic impedance were documented. Tourniquet on  lower extremity was released and recovery procedures initiated.      The patient was mask ventilated during the procedure with no significant drops in oxygenation saturation and tolerated the procedure well without any notable adverse effects.   Condition: The patient was in stable condition with stable vital signs and able to follow commands when moved to recovery.

## 2025-01-23 ENCOUNTER — ANESTHESIA EVENT (OUTPATIENT)
Dept: POSTOP/PACU | Age: 30
End: 2025-01-23
Payer: COMMERCIAL

## 2025-01-24 ENCOUNTER — HOSPITAL ENCOUNTER (OUTPATIENT)
Dept: POSTOP/PACU | Age: 30
Discharge: HOME | End: 2025-01-24
Payer: COMMERCIAL

## 2025-01-24 ENCOUNTER — ANESTHESIA (OUTPATIENT)
Dept: POSTOP/PACU | Age: 30
End: 2025-01-24
Payer: COMMERCIAL

## 2025-01-24 VITALS
HEIGHT: 65 IN | BODY MASS INDEX: 41.65 KG/M2 | RESPIRATION RATE: 14 BRPM | WEIGHT: 250 LBS | DIASTOLIC BLOOD PRESSURE: 96 MMHG | HEART RATE: 98 BPM | SYSTOLIC BLOOD PRESSURE: 126 MMHG | OXYGEN SATURATION: 97 % | TEMPERATURE: 97.8 F

## 2025-01-24 DIAGNOSIS — F20.1 SCHIZOPHRENIA, DISORGANIZED (HCC): Primary | ICD-10-CM

## 2025-01-24 PROCEDURE — 6360000002 HC RX W HCPCS: Performed by: NURSE ANESTHETIST, CERTIFIED REGISTERED

## 2025-01-24 PROCEDURE — 3700000000 HC ANESTHESIA ATTENDED CARE: Performed by: ANESTHESIOLOGY

## 2025-01-24 PROCEDURE — 7100000001 HC PACU RECOVERY - ADDTL 15 MIN: Performed by: ANESTHESIOLOGY

## 2025-01-24 PROCEDURE — 2580000003 HC RX 258: Performed by: ANESTHESIOLOGY

## 2025-01-24 PROCEDURE — 90870 ELECTROCONVULSIVE THERAPY: CPT | Performed by: ANESTHESIOLOGY

## 2025-01-24 PROCEDURE — 7100000000 HC PACU RECOVERY - FIRST 15 MIN: Performed by: ANESTHESIOLOGY

## 2025-01-24 PROCEDURE — 2500000003 HC RX 250 WO HCPCS: Performed by: NURSE ANESTHETIST, CERTIFIED REGISTERED

## 2025-01-24 RX ORDER — SODIUM CHLORIDE 9 MG/ML
INJECTION, SOLUTION INTRAVENOUS PRN
Status: DISCONTINUED | OUTPATIENT
Start: 2025-01-24 | End: 2025-01-25 | Stop reason: HOSPADM

## 2025-01-24 RX ORDER — KETOROLAC TROMETHAMINE 30 MG/ML
INJECTION, SOLUTION INTRAMUSCULAR; INTRAVENOUS
Status: COMPLETED
Start: 2025-01-24 | End: 2025-01-24

## 2025-01-24 RX ORDER — KETOROLAC TROMETHAMINE 30 MG/ML
INJECTION, SOLUTION INTRAMUSCULAR; INTRAVENOUS
Status: DISCONTINUED | OUTPATIENT
Start: 2025-01-24 | End: 2025-01-24 | Stop reason: SDUPTHER

## 2025-01-24 RX ORDER — SODIUM CHLORIDE 0.9 % (FLUSH) 0.9 %
5-40 SYRINGE (ML) INJECTION EVERY 12 HOURS SCHEDULED
Status: DISCONTINUED | OUTPATIENT
Start: 2025-01-24 | End: 2025-01-25 | Stop reason: HOSPADM

## 2025-01-24 RX ORDER — SUCCINYLCHOLINE/SOD CL,ISO/PF 200MG/10ML
SYRINGE (ML) INTRAVENOUS
Status: COMPLETED
Start: 2025-01-24 | End: 2025-01-24

## 2025-01-24 RX ORDER — METHOHEXITAL IN WATER/PF 100MG/10ML
SYRINGE (ML) INTRAVENOUS
Status: COMPLETED
Start: 2025-01-24 | End: 2025-01-24

## 2025-01-24 RX ORDER — SUCCINYLCHOLINE/SOD CL,ISO/PF 200MG/10ML
SYRINGE (ML) INTRAVENOUS
Status: DISCONTINUED | OUTPATIENT
Start: 2025-01-24 | End: 2025-01-24 | Stop reason: SDUPTHER

## 2025-01-24 RX ORDER — SODIUM CHLORIDE 9 MG/ML
INJECTION, SOLUTION INTRAVENOUS CONTINUOUS
Status: DISCONTINUED | OUTPATIENT
Start: 2025-01-24 | End: 2025-01-25 | Stop reason: HOSPADM

## 2025-01-24 RX ORDER — METHOHEXITAL IN WATER/PF 100MG/10ML
SYRINGE (ML) INTRAVENOUS
Status: DISCONTINUED | OUTPATIENT
Start: 2025-01-24 | End: 2025-01-24 | Stop reason: SDUPTHER

## 2025-01-24 RX ORDER — SODIUM CHLORIDE 0.9 % (FLUSH) 0.9 %
5-40 SYRINGE (ML) INJECTION PRN
Status: DISCONTINUED | OUTPATIENT
Start: 2025-01-24 | End: 2025-01-25 | Stop reason: HOSPADM

## 2025-01-24 RX ORDER — LIDOCAINE HYDROCHLORIDE 10 MG/ML
1 INJECTION, SOLUTION EPIDURAL; INFILTRATION; INTRACAUDAL; PERINEURAL
Status: DISCONTINUED | OUTPATIENT
Start: 2025-01-24 | End: 2025-01-25 | Stop reason: HOSPADM

## 2025-01-24 RX ADMIN — KETOROLAC TROMETHAMINE 30 MG: 30 INJECTION, SOLUTION INTRAMUSCULAR at 07:30

## 2025-01-24 RX ADMIN — Medication 100 MG: at 07:31

## 2025-01-24 RX ADMIN — Medication 120 MG: at 07:32

## 2025-01-24 RX ADMIN — SODIUM CHLORIDE: 9 INJECTION, SOLUTION INTRAVENOUS at 07:24

## 2025-01-24 ASSESSMENT — PAIN - FUNCTIONAL ASSESSMENT: PAIN_FUNCTIONAL_ASSESSMENT: 0-10

## 2025-01-24 NOTE — PROCEDURES
Right Unilateral ECT Procedure Report  Alistair Hutchison   1/24/2025  1995         Attending:Corby Drake MD  Preprocedure diagnosis: disorganized schizophrenia  Postprocedure diagnosis: SAME AS PRE-PROCEDURE DIAGNOSIS  Treatment Number: This is treatment # 11   Patient Status: Outpatient   Type of ECT: Ultra Brief Pulse Right Unilateral  Medications  Preprocedure: Tylenol 650mg  Anesthetic: Methohexital 100 mg  Paralytic: Succinylcholine 120 mg  Post procedure: none needed   Cuff placement: Left Lower Extremity    MECTA Settings 1st Stimulus:     Pulse width: 0.3 ms   Frequency:  60 hz   Duration:   6.0 seconds   Static Impedance: 2591 ohms             Dynamic Impedance: 242 ohms             Motor Seizure Duration: 37 seconds  EEG Seizure Duration: 39 seconds              The patient's ECT treatment was performed using MECTA machine  .  Timeout:  Time out was performed using two patient identifiers and confirmation of procedure.     Patient Preparation: Preprocedure documentation, labs, H&P were all verified and reviewed.  The patient was placed in supine position.  EEG leads were placed for monitoring of seizure.   [place] areas bilaterally cleaned and prepped.  Conductive gel  was applied over stimulus electrode site.   The patient was pre-oxygenated.       Procedure in detail: Medications were administered by anesthesia using intravenous access at the doses listed previously in this summary.  Anesthetic administered and once confirmation the patient is anesthetized, the patient's blood pressure cuff on lower extremity was inflated to 100mmHg in excess of patient's blood pressure.  At this time, the neuromuscular blockade was administered intravenously by anesthesia.  Upper extremity fasciculation were verified followed by lower extremity fasciculation.  Once fasciculations terminated, confirmation of affective neuromuscular blockade demonstrated by absence of withdrawal reflex.  Bite blocks were put

## 2025-01-24 NOTE — ANESTHESIA PRE PROCEDURE
Department of Anesthesiology  Preprocedure Note       Name:  Alistair Hutchison   Age:  29 y.o.  :  1995                                          MRN:  [id number]         Date:  2025      Surgeon: Dr Drake    Procedure: ECT    Medications prior to admission:   Prior to Admission medications    Medication Sig Start Date End Date Taking? Authorizing Provider   propranolol (INDERAL) 10 MG tablet Take 1 tablet by mouth daily   Yes Shan Curran MD   cloZAPine (CLOZARIL) 100 MG tablet Take 1 tablet by mouth 3 times daily 24  Yes Corby Drake MD   traZODone (DESYREL) 50 MG tablet Take 1 tablet by mouth nightly as needed for Sleep 10/2/24  Yes Corby Drake MD   hyoscyamine (ANASPAZ;LEVSIN) 0.125 MG tablet Take 1 tablet by mouth every 4 hours as needed for Cramping    Shan Curran MD   paliperidone palmitate ER (INVEGA SUSTENNA) 234 MG/1.5ML FRANCHESKA IM injection Inject 234 mg into the muscle every 30 days 10/24/24   Corby Drake MD       Current medications:    Current Outpatient Medications   Medication Sig Dispense Refill    propranolol (INDERAL) 10 MG tablet Take 1 tablet by mouth daily      cloZAPine (CLOZARIL) 100 MG tablet Take 1 tablet by mouth 3 times daily 90 tablet 0    traZODone (DESYREL) 50 MG tablet Take 1 tablet by mouth nightly as needed for Sleep 30 tablet 0    hyoscyamine (ANASPAZ;LEVSIN) 0.125 MG tablet Take 1 tablet by mouth every 4 hours as needed for Cramping      paliperidone palmitate ER (INVEGA SUSTENNA) 234 MG/1.5ML FRANCHESKA IM injection Inject 234 mg into the muscle every 30 days 1 each 0     Current Facility-Administered Medications   Medication Dose Route Frequency Provider Last Rate Last Admin    sodium chloride flush 0.9 % injection 5-40 mL  5-40 mL IntraVENous 2 times per day Zacarias Pandey MD        sodium chloride flush 0.9 % injection 5-40 mL  5-40 mL IntraVENous PRN Zacarias Pandey MD        0.9 % sodium chloride infusion   IntraVENous PRN Dannie

## 2025-01-24 NOTE — PROGRESS NOTES
PLACEMENT        Not in a hospital admission.  Allergies   Allergen Reactions    Chocolate Diarrhea      Social History     Tobacco Use    Smoking status: Never    Smokeless tobacco: Never   Substance Use Topics    Alcohol use: No      History reviewed. No pertinent family history.       Objective:       Mental Status Evaluation:  Appearance:  Wearing gown, on stretcher, well groomed   Behavior:  Engages with interviewer, smiles, childlike   Speech:  Normal rate, volume and tone   Mood:  \"Good\"   Affect:  Congruent, euthymic   Thought Process:  Linear and organized   Thought Content:  Denies suicidal ideation   Sensorium:  Does not appear to attend to internal stimuli   Cognition:  Oriented to person, place and general circumstance   Insight:  fair   Judgment:  fair     Assessment:     Diagnosis: Disorganized schizophrenia    Plan:     Continue right unilateral ECT, once weekly  We will consider tapering frequency of treatments once he is engaging in psychotherapy  Monitor for stability in symptoms  Taper treatment frequency as tolerated    Update consent and H&P every 30 days while undergoing ECT treatment, update labs every 6 months.   Patient verbalizes understanding of risks/benefits/alternatives to ECT.  Last informed consent signed on  1/17/2025 .

## 2025-01-24 NOTE — ANESTHESIA POSTPROCEDURE EVALUATION
Department of Anesthesiology  Postprocedure Note    Patient: Alistair Hutchison  MRN: [id number]  YOB: 1995  Date of evaluation: 1/24/2025    Procedure Summary       Date: 01/24/25 Room / Location: [facility] PACU    Anesthesia Start: 0728 Anesthesia Stop: 0742    Procedure: ECT W/ ANESTHESIA Diagnosis: Disorganized schizophrenia    Scheduled Providers:  Responsible Provider: Kristel Alvarado MD    Anesthesia Type: General ASA Status: 3            Anesthesia Type: General    Amalia Phase I: Amalia Score: 8    Amalia Phase II:      Anesthesia Post Evaluation    Comments: POST- ANESTHESIA EVALUATION       Pt Name: Alistair Hutchison  MRN: [id number]  YOB: 1995  Date of evaluation: 1/24/2025  Time:  9:56 AM      BP (!) 126/96   Pulse 98   Temp 97.8 °F (36.6 °C)   Resp 14   Ht 1.651 m (5' 5\")   Wt 113.4 kg (250 lb)   SpO2 97%   BMI 41.60 kg/m²      Consciousness Level  Awake  Cardiopulmonary Status  Stable  Pain Adequately Treated YES  Nausea / Vomiting  NO  Adequate Hydration  YES  Anesthesia Related Complications NONE      Electronically signed by Kristel Alvarado MD on 1/24/2025 at 9:56 AM          No notable events documented.

## 2025-01-24 NOTE — H&P
Last Year: No               REVIEW OF SYSTEMS       Allergies        Allergies   Allergen Reactions    Chocolate Diarrhea            Medications Ordered Prior to Encounter          Current Outpatient Medications on File Prior to Encounter   Medication Sig Dispense Refill    cloZAPine (CLOZARIL) 100 MG tablet Take 1 tablet by mouth 3 times daily 90 tablet 0    hydrOXYzine HCl (ATARAX) 25 MG tablet Take 1 tablet by mouth 3 times daily as needed for Anxiety 30 tablet 0    traZODone (DESYREL) 50 MG tablet Take 1 tablet by mouth nightly as needed for Sleep 30 tablet 0    paliperidone palmitate ER (INVEGA SUSTENNA) 234 MG/1.5ML FRANCHESKA IM injection Inject 234 mg into the muscle every 30 days 1 each 0      No current facility-administered medications on file prior to encounter.            Review of Systems   Constitutional: Negative.    HENT: Negative.     Eyes: Negative.    Respiratory: Negative.     Cardiovascular: Negative.    Gastrointestinal: Negative.    Genitourinary: Negative.    Musculoskeletal: Negative.    Skin: Negative.    Neurological: Negative.    Hematological: Negative.    Psychiatric/Behavioral: Negative.           GENERAL PHYSICAL EXAM      Vitals: see nursing flow sheet for vital sings      GENERAL APPEARANCE:   Alistair Hutchison is 29 y.o.,  male, moderately obese, nourished, conscious, alert.  Does not appear to be distress or pain at this time.                            Physical Exam  Constitutional:       General: He is not in acute distress.     Appearance: Normal appearance. He is obese. He is not ill-appearing.   HENT:      Head: Normocephalic.      Right Ear: External ear normal.      Left Ear: External ear normal.      Nose: Nose normal.      Mouth/Throat:      Mouth: Mucous membranes are dry.   Eyes:      General:         Right eye: No discharge.         Left eye: No discharge.   Cardiovascular:      Rate and Rhythm: Normal rate and regular rhythm.      Pulses: Normal pulses.      Heart

## 2025-01-28 DIAGNOSIS — F23 ACUTE PSYCHOSIS (HCC): Primary | ICD-10-CM

## 2025-01-28 DIAGNOSIS — F20.1 DISORGANIZED SCHIZOPHRENIA (HCC): ICD-10-CM

## 2025-01-30 ENCOUNTER — ANESTHESIA EVENT (OUTPATIENT)
Dept: POSTOP/PACU | Age: 30
End: 2025-01-30
Payer: COMMERCIAL

## 2025-01-31 ENCOUNTER — ANESTHESIA (OUTPATIENT)
Dept: POSTOP/PACU | Age: 30
End: 2025-01-31
Payer: COMMERCIAL

## 2025-01-31 ENCOUNTER — HOSPITAL ENCOUNTER (OUTPATIENT)
Dept: POSTOP/PACU | Age: 30
Discharge: HOME | End: 2025-01-31
Payer: COMMERCIAL

## 2025-01-31 VITALS
RESPIRATION RATE: 22 BRPM | OXYGEN SATURATION: 98 % | SYSTOLIC BLOOD PRESSURE: 133 MMHG | DIASTOLIC BLOOD PRESSURE: 101 MMHG | TEMPERATURE: 98.3 F | WEIGHT: 250 LBS | HEIGHT: 65 IN | BODY MASS INDEX: 41.65 KG/M2 | HEART RATE: 104 BPM

## 2025-01-31 PROCEDURE — 2500000003 HC RX 250 WO HCPCS: Performed by: NURSE ANESTHETIST, CERTIFIED REGISTERED

## 2025-01-31 PROCEDURE — 6360000002 HC RX W HCPCS: Performed by: NURSE ANESTHETIST, CERTIFIED REGISTERED

## 2025-01-31 PROCEDURE — 90870 ELECTROCONVULSIVE THERAPY: CPT | Performed by: PSYCHIATRY & NEUROLOGY

## 2025-01-31 PROCEDURE — 7100000000 HC PACU RECOVERY - FIRST 15 MIN: Performed by: ANESTHESIOLOGY

## 2025-01-31 PROCEDURE — 7100000001 HC PACU RECOVERY - ADDTL 15 MIN: Performed by: ANESTHESIOLOGY

## 2025-01-31 PROCEDURE — 2580000003 HC RX 258: Performed by: ANESTHESIOLOGY

## 2025-01-31 PROCEDURE — 3700000001 HC ADD 15 MINUTES (ANESTHESIA): Performed by: ANESTHESIOLOGY

## 2025-01-31 PROCEDURE — 3700000000 HC ANESTHESIA ATTENDED CARE: Performed by: ANESTHESIOLOGY

## 2025-01-31 PROCEDURE — 6360000002 HC RX W HCPCS: Performed by: ANESTHESIOLOGY

## 2025-01-31 RX ORDER — KETOROLAC TROMETHAMINE 30 MG/ML
INJECTION, SOLUTION INTRAMUSCULAR; INTRAVENOUS
Status: DISCONTINUED | OUTPATIENT
Start: 2025-01-31 | End: 2025-01-31 | Stop reason: SDUPTHER

## 2025-01-31 RX ORDER — METHOHEXITAL IN WATER/PF 100MG/10ML
SYRINGE (ML) INTRAVENOUS
Status: COMPLETED
Start: 2025-01-31 | End: 2025-01-31

## 2025-01-31 RX ORDER — SODIUM CHLORIDE 9 MG/ML
INJECTION, SOLUTION INTRAVENOUS CONTINUOUS
Status: DISCONTINUED | OUTPATIENT
Start: 2025-01-31 | End: 2025-02-01 | Stop reason: HOSPADM

## 2025-01-31 RX ORDER — SUCCINYLCHOLINE/SOD CL,ISO/PF 200MG/10ML
SYRINGE (ML) INTRAVENOUS
Status: DISCONTINUED | OUTPATIENT
Start: 2025-01-31 | End: 2025-01-31 | Stop reason: SDUPTHER

## 2025-01-31 RX ORDER — SODIUM CHLORIDE 0.9 % (FLUSH) 0.9 %
5-40 SYRINGE (ML) INJECTION EVERY 12 HOURS SCHEDULED
Status: DISCONTINUED | OUTPATIENT
Start: 2025-01-31 | End: 2025-02-01 | Stop reason: HOSPADM

## 2025-01-31 RX ORDER — KETOROLAC TROMETHAMINE 30 MG/ML
INJECTION, SOLUTION INTRAMUSCULAR; INTRAVENOUS
Status: COMPLETED
Start: 2025-01-31 | End: 2025-01-31

## 2025-01-31 RX ORDER — SODIUM CHLORIDE 0.9 % (FLUSH) 0.9 %
5-40 SYRINGE (ML) INJECTION PRN
Status: DISCONTINUED | OUTPATIENT
Start: 2025-01-31 | End: 2025-02-01 | Stop reason: HOSPADM

## 2025-01-31 RX ORDER — METHOHEXITAL IN WATER/PF 100MG/10ML
SYRINGE (ML) INTRAVENOUS
Status: DISCONTINUED | OUTPATIENT
Start: 2025-01-31 | End: 2025-01-31 | Stop reason: SDUPTHER

## 2025-01-31 RX ORDER — SODIUM CHLORIDE 9 MG/ML
INJECTION, SOLUTION INTRAVENOUS PRN
Status: DISCONTINUED | OUTPATIENT
Start: 2025-01-31 | End: 2025-02-01 | Stop reason: HOSPADM

## 2025-01-31 RX ORDER — LIDOCAINE HYDROCHLORIDE 10 MG/ML
1 INJECTION, SOLUTION EPIDURAL; INFILTRATION; INTRACAUDAL; PERINEURAL
Status: COMPLETED | OUTPATIENT
Start: 2025-01-31 | End: 2025-01-31

## 2025-01-31 RX ORDER — SUCCINYLCHOLINE/SOD CL,ISO/PF 200MG/10ML
SYRINGE (ML) INTRAVENOUS
Status: COMPLETED
Start: 2025-01-31 | End: 2025-01-31

## 2025-01-31 RX ADMIN — SODIUM CHLORIDE: 9 INJECTION, SOLUTION INTRAVENOUS at 07:09

## 2025-01-31 RX ADMIN — LIDOCAINE HYDROCHLORIDE 1 ML: 10 INJECTION, SOLUTION EPIDURAL; INFILTRATION; INTRACAUDAL; PERINEURAL at 07:09

## 2025-01-31 RX ADMIN — Medication 120 MG: at 07:45

## 2025-01-31 RX ADMIN — KETOROLAC TROMETHAMINE 30 MG: 30 INJECTION, SOLUTION INTRAMUSCULAR at 07:40

## 2025-01-31 RX ADMIN — Medication 100 MG: at 07:45

## 2025-01-31 ASSESSMENT — ENCOUNTER SYMPTOMS
RESPIRATORY NEGATIVE: 1
GASTROINTESTINAL NEGATIVE: 1
EYES NEGATIVE: 1

## 2025-01-31 ASSESSMENT — PAIN - FUNCTIONAL ASSESSMENT
PAIN_FUNCTIONAL_ASSESSMENT: 0-10
PAIN_FUNCTIONAL_ASSESSMENT: NONE - DENIES PAIN

## 2025-01-31 NOTE — ANESTHESIA POSTPROCEDURE EVALUATION
Department of Anesthesiology  Postprocedure Note    Patient: Alistair Hutchison  MRN: [id number]  YOB: 1995  Date of evaluation: 1/31/2025    Procedure Summary       Date: 01/31/25 Room / Location: [facility] PACU    Anesthesia Start: 0738 Anesthesia Stop: 0755    Procedure: ECT W/ ANESTHESIA Diagnosis: Disorganized schizophrenia    Scheduled Providers:  Responsible Provider: Barby Leonard MD    Anesthesia Type: General ASA Status: 3            Anesthesia Type: General    Amalia Phase I: Amalia Score: 7    Amalia Phase II:      Anesthesia Post Evaluation    Comments: POST- ANESTHESIA EVALUATION       Pt Name: Alistair Hutchison  MRN: [id number]  YOB: 1995  Date of evaluation: 1/31/2025  Time:  9:12 AM      BP (!) 133/101   Pulse (!) 104   Temp 98.3 °F (36.8 °C) (Infrared)   Resp 22   Ht 1.651 m (5' 5\")   Wt 113.4 kg (250 lb)   SpO2 98%   BMI 41.60 kg/m²      Consciousness Level  Awake  Cardiopulmonary Status  Stable  Pain Adequately Treated YES  Nausea / Vomiting  NO  Adequate Hydration  YES  Anesthesia Related Complications NONE      Electronically signed by Barby Leonard MD on 1/31/2025 at 9:12 AM           No notable events documented.

## 2025-01-31 NOTE — PROGRESS NOTES
Pre ECT Assessment Note  Psychiatry  1/24/2025      Alistair Hutchison  1995  [id number]      Subjective:     Patient is a 29 y.o.  male seen for an evaluation prior to today's electroconvulsive therapy treatment. Today is treatment number 11, utilizing right unilateral.  This is course number 2.  The patient has previously completed a course of bilateral ECT for a total of 18 treatments that ended on 11/20/2024.    Alistair continues to participate in ECT once weekly, he is present today with his grandmother. Things have not gone well for Alistair this week. He has been argumentative and not agreeable to following expectations at home. Grandmother expresses concern with Alistair continuing to live with them. May pursue group home placement in future. She does not know if ECT has been beneficial. They did begin psychotherapy this week. Therapist has concerns with patient receiving ECT secondary to his cognition. Discussed pausing ECT after this treatment to allow patient to work on behavioral component to his presentation. Grandmother is in agreement. They will meet with Dr. Drake outpatient in 3-4 weeks to discuss plan or if Alistair needs to continue ECT if symptoms worsen. Alistair is denying and suicidal thoughts today. His grandmother does reports that he has been responding to internal stimuli and saying that he sees people who are not present when he is at home. He has been taking his medications. Denies any other concerns at this time.    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    Hyperthyroidism, subclinical 08/22/2024    Moderate mixed hyperlipidemia not requiring statin therapy 08/22/2024    Class 2 obesity without serious comorbidity with body mass index (BMI) of 38.0 to 38.9 in adult 08/22/2024

## 2025-01-31 NOTE — ANESTHESIA PRE PROCEDURE
01/31/25 113.4 kg (250 lb)   01/24/25 113.4 kg (250 lb)   01/17/25 113.4 kg (250 lb)     Body mass index is 41.6 kg/m².    CBC:   Lab Results   Component Value Date/Time    WBC 13.6 12/19/2024 06:38 AM    RBC 4.81 12/19/2024 06:38 AM    HGB 13.6 12/19/2024 06:38 AM    HCT 41.4 12/19/2024 06:38 AM    MCV 86.1 12/19/2024 06:38 AM    RDW 13.9 12/19/2024 06:38 AM     12/19/2024 06:38 AM       CMP:   Lab Results   Component Value Date/Time     12/12/2024 01:30 PM    K 4.4 12/12/2024 01:30 PM     12/12/2024 01:30 PM    CO2 24 12/12/2024 01:30 PM    BUN 11 12/12/2024 01:30 PM    CREATININE 0.8 12/12/2024 01:30 PM    GFRAA >60 10/18/2019 10:05 AM    LABGLOM >90 12/12/2024 01:30 PM    LABGLOM >60 03/17/2023 08:51 AM    GLUCOSE 119 12/12/2024 01:30 PM    CALCIUM 9.4 12/12/2024 01:30 PM    BILITOT 0.4 12/12/2024 01:30 PM    ALKPHOS 82 12/12/2024 01:30 PM    AST 18 12/12/2024 01:30 PM    ALT 22 12/12/2024 01:30 PM       POC Tests:   No results for input(s): \"POCGLU\", \"POCNA\", \"POCK\", \"POCCL\", \"POCBUN\", \"POCHEMO\", \"POCHCT\" in the last 72 hours.      Coags: No results found for: \"PROTIME\", \"INR\", \"APTT\"    HCG (If Applicable): No results found for: \"PREGTESTUR\", \"PREGSERUM\", \"HCG\", \"HCGQUANT\"     ABGs: No results found for: \"PHART\", \"PO2ART\", \"NVL2MBI\", \"CLD0GTI\", \"BEART\", \"R0XNKGTI\"     Type & Screen (If Applicable):  No results found for: \"ABORH\", \"LABANTI\"    Drug/Infectious Status (If Applicable):  Lab Results   Component Value Date/Time    HEPCAB NONREACTIVE 03/17/2023 08:51 AM       COVID-19 Screening (If Applicable): No results found for: \"COVID19\"        Anesthesia Evaluation  Patient summary reviewed and Nursing notes reviewed   no history of anesthetic complications:   Airway: Mallampati: III  TM distance: >3 FB   Neck ROM: full  Mouth opening: < 3 FB   Dental: normal exam         Pulmonary:Negative Pulmonary ROS and normal exam  breath sounds clear to auscultation

## 2025-01-31 NOTE — PROCEDURES
Right Unilateral ECT Procedure Report  Alistair Hutchison   1/31/2025  1995         Attending:Corby Drake MD  Preprocedure diagnosis: disorganized schizophrenia  Postprocedure diagnosis: SAME AS PRE-PROCEDURE DIAGNOSIS  Treatment Number: This is treatment # 12   Patient Status: Outpatient   Type of ECT: Ultra Brief Pulse Right Unilateral  Medications  Preprocedure: Tylenol 650mg  Anesthetic: Methohexital 100 mg  Paralytic: Succinylcholine 120 mg  Post procedure: none needed   Cuff placement: Left Lower Extremity    MECTA Settings 1st Stimulus:     Pulse width: 0.3 ms   Frequency:  60 hz   Duration:   6.0 seconds   Static Impedance: 2591 ohms             Dynamic Impedance: 242 ohms             Motor Seizure Duration: 37 seconds  EEG Seizure Duration: 39 seconds              The patient's ECT treatment was performed using MECTA machine  .  Timeout:  Time out was performed using two patient identifiers and confirmation of procedure.     Patient Preparation: Preprocedure documentation, labs, H&P were all verified and reviewed.  The patient was placed in supine position.  EEG leads were placed for monitoring of seizure.   Amish areas bilaterally cleaned and prepped.  Conductive gel  was applied over stimulus electrode site.   The patient was pre-oxygenated.       Procedure in detail: Medications were administered by anesthesia using intravenous access at the doses listed previously in this summary.  Anesthetic administered and once confirmation the patient is anesthetized, the patient's blood pressure cuff on lower extremity was inflated to 100mmHg in excess of patient's blood pressure.  At this time, the neuromuscular blockade was administered intravenously by anesthesia.  Upper extremity fasciculation were verified followed by lower extremity fasciculation.  Once fasciculations terminated, confirmation of affective neuromuscular blockade demonstrated by absence of withdrawal reflex.  Bite blocks were put

## 2025-01-31 NOTE — H&P
HISTORY and PHYSICAL  [facility]       NAME:  Alistair Hutchison  MRN: [id number]   YOB: 1995   Date: 1/31/2025   Age: 29 y.o.  Gender: male       COMPLAINT AND PRESENT HISTORY:     Alistair Hutchison is 29 y.o.,  male, presents for ECT treatment.     Primary dx:  history of  Disorganized schizophrenia.   HPI:  Alistair Hutchison is 29 y.o.,  male, here for ECT treatment.   Last ECT treatment was 1/24/25 present with his grandmother. Pt tolerated last treatment well. Patient reports that his  symptoms are not  improving. Patient has complaints of some short term memory loss  Pt has had multiple failed treatment modalities. Pt has history of   Disorganized schizophrenia.   Mood is rated at  9/10. With 10 is the best mood pt feeling of hopelessness, helplessness,and  low energy.   Patient's sleep has been  good . Appetite has been good .   Pt denies feeling of suicidal/ homicidal. Pt sometimes has auditory/visual hallucinations.   Pt has been taking psychiatric medications as prescribed.   Pt does not have any other somatic complaints at this time.       Review of additional significant medical hx:  (See chart for additional detail, including current medications /see ROS for current S/S):     NPO status: pt Npo since the past midnight   Medications taken TODAY (with sip of water): none  Anticoagulation status: none    Denies personal hx of blood clots.  Denies personal hx of MRSA infection.  Pt has PONV , he denies any personal or family hx of previous complications w/anesthesia.    PAST MEDICAL HISTORY     Past Medical History:   Diagnosis Date    Fracture     leg    Manic depression (HCC)     Oppositional defiant behavior     PONV (postoperative nausea and vomiting)     Schizophrenia (HCC)        SURGICAL HISTORY       Past Surgical History:   Procedure Laterality Date    EYE SURGERY      TYMPANOSTOMY TUBE PLACEMENT         FAMILY HISTORY     No family history on

## 2025-01-31 NOTE — DISCHARGE INSTRUCTIONS
ELECTROCONVULSIVE THERAPY DISCHARGE INSTRUCTIONS         1. Activity - Rest today. The anesthetic agents you have received can make you feel drowsy or tired.  A responsible person must drive you home and stay with you for 8 hours after discharge from the Hospital. Do not drive a motor vehicle or operate any machinery for 24 hours. .   *Do not make any complex decisions or execute any legal documents until 3 weeks AFTER your last treatment.  If you have any questions regarding this, speak with your psychiatrist first.*    2. Diet - Nothing to eat or drink after midnight the night of your ECT treatment. After ECT, start with clear liquids, if you can drink without coughing, you may proceed with gradually progressing to your usual diet.    No alcoholic beverages for 24 hours.    3. Medications - Take your daily medications as prescribed, after you return home from today's ECT. If you take a Beta Blocker or have been prescribed Imitrex, you may be instructed to take these medications the morning of ECT. If you are unsure please ask the physician.     Take with these medication the morning of ECT with one Tablespoon of water.   Tylenol 650 mg  All blood pressure medications  ***    4. You may experience the following after your treatment:   a. Poor memory   b. Poor balance   c. Headaches   d. Confusion   e. Muscle soreness   f. Nausea      5. Special Precautions - Contact you physician immediately if these occur:   a. Signs of infection: redness, swelling, heat, red streaks at the site of the IV   b. Excessive pain unrelieved by prescription pain medications   c. Nausea and vomiting that persists beyond the first day after your procedure    6. Next Procedure Date:   You have an outpatient visit scheduled for February 26th at 100 pm. You can call the clinic at [phone] for questions or concerns.     If you feel you are having a problem or complication from your ECT treatments and it is during normal business hours

## 2025-01-31 NOTE — PROCEDURES
Right Unilateral ECT Procedure Report  Alistair Hutchison   1/31/2025  1995         Attending:Corby Drake MD  Preprocedure diagnosis: disorganized schizophrenia  Postprocedure diagnosis: SAME AS PRE-PROCEDURE DIAGNOSIS  Treatment Number: This is treatment # 12   Patient Status: Outpatient   Type of ECT: Ultra Brief Pulse Right Unilateral  Medications  Preprocedure: Tylenol 650mg  Anesthetic: Methohexital 100 mg  Paralytic: Succinylcholine 120 mg  Post procedure: none needed   Cuff placement: Left Lower Extremity    MECTA Settings 1st Stimulus:     Pulse width: 0.3 ms   Frequency:  60 hz   Duration:   6.0 seconds   Static Impedance: 3027 ohms             Dynamic Impedance: 274 ohms             Motor Seizure Duration: 32 seconds  EEG Seizure Duration: 32 seconds              The patient's ECT treatment was performed using MECTA machine  .  Timeout:  Time out was performed using two patient identifiers and confirmation of procedure.     Patient Preparation: Preprocedure documentation, labs, H&P were all verified and reviewed.  The patient was placed in supine position.  EEG leads were placed for monitoring of seizure.   Mandaen areas bilaterally cleaned and prepped.  Conductive gel  was applied over stimulus electrode site.   The patient was pre-oxygenated.       Procedure in detail: Medications were administered by anesthesia using intravenous access at the doses listed previously in this summary.  Anesthetic administered and once confirmation the patient is anesthetized, the patient's blood pressure cuff on lower extremity was inflated to 100mmHg in excess of patient's blood pressure.  At this time, the neuromuscular blockade was administered intravenously by anesthesia.  Upper extremity fasciculation were verified followed by lower extremity fasciculation.  Once fasciculations terminated, confirmation of affective neuromuscular blockade demonstrated by absence of withdrawal reflex.  Bite blocks were put

## 2025-02-26 ENCOUNTER — HOSPITAL ENCOUNTER (OUTPATIENT)
Dept: PSYCHIATRY | Age: 30
Setting detail: THERAPIES SERIES
Discharge: HOME | End: 2025-02-26
Payer: COMMERCIAL

## 2025-02-26 DIAGNOSIS — F20.1 DISORGANIZED SCHIZOPHRENIA (HCC): Primary | ICD-10-CM

## 2025-02-26 PROCEDURE — 99213 OFFICE O/P EST LOW 20 MIN: CPT | Performed by: PSYCHIATRY & NEUROLOGY

## 2025-03-06 ENCOUNTER — ANESTHESIA EVENT (OUTPATIENT)
Dept: POSTOP/PACU | Age: 30
End: 2025-03-06
Payer: COMMERCIAL

## 2025-03-06 ASSESSMENT — ENCOUNTER SYMPTOMS
ABDOMINAL PAIN: 0
SINUS PRESSURE: 0
NAUSEA: 0
SHORTNESS OF BREATH: 0

## 2025-03-06 NOTE — H&P (VIEW-ONLY)
HISTORY and PHYSICAL  [facility]       NAME:  Alistair Hutchison  MRN: [id number]   YOB: 1995   Date: 3/7/2025   Age: 29 y.o.  Gender: male       COMPLAINT AND PRESENT HISTORY:     Alistair Hutchison is 29 y.o.,  male, presents for ECT treatment.      Primary dx:  history of  Disorganized schizophrenia. Pt has failed multiple modalities.    Last ECT treatment was 1/31/25,  he is present with his grandmother.  Pt has history of   Disorganized schizophrenia.      Pt tolerated last treatment well. Patient reports that his  symptoms are   not  improving. Patient has complaints of some short term memory loss      Mood is rated at  9/10. With 10 is the best mood.  Pt admits to some depression including , feeling of hopelessness, helplessness,and  low energy.   Patient's sleep has been  good . Appetite has been good .   Pt denies feeling of suicidal/ homicidal. Pt sometimes has auditory/visual hallucinations.   Pt has been taking psychiatric medications as prescribed.   Pt does not have any other somatic complaints at this time.       Review of additional significant medical hx:  (See chart for additional detail, including current medications /see ROS for current S/S):      Pt has PONV , he denies any personal or family hx of previous complications w/anesthesia.  * No procedures listed *  RECENT LABS, IMAGING AND TESTING     Lab Results   Component Value Date    WBC 13.6 (H) 12/19/2024    RBC 4.81 12/19/2024    HGB 13.6 12/19/2024    HCT 41.4 12/19/2024    MCV 86.1 12/19/2024    MCH 28.3 12/19/2024    MCHC 32.9 12/19/2024    RDW 13.9 12/19/2024     12/19/2024    MPV 7.2 12/19/2024        Lab Results   Component Value Date     12/12/2024    K 4.4 12/12/2024     12/12/2024    CO2 24 12/12/2024    BUN 11 12/12/2024    CREATININE 0.8 12/12/2024    GLUCOSE 119 (H) 12/12/2024    CALCIUM 9.4 12/12/2024    BILITOT 0.4 12/12/2024    ALKPHOS 82 12/12/2024    AST 18

## 2025-03-06 NOTE — H&P (VIEW-ONLY)
HISTORY and PHYSICAL  [facility]       NAME:  Alistair Hutchison  MRN: [id number]   YOB: 1995   Date: 3/7/2025   Age: 29 y.o.  Gender: male       COMPLAINT AND PRESENT HISTORY:     Alistair Hutchison is 29 y.o.,  male, presents for ECT treatment.      Primary dx:  history of  Disorganized schizophrenia. Pt has failed multiple modalities.    Last ECT treatment was 1/31/25,  he is present with his grandmother.  Pt has history of   Disorganized schizophrenia.      Pt tolerated last treatment well. Patient reports that his  symptoms are   not  improving. Patient has complaints of some short term memory loss      Mood is rated at  9/10. With 10 is the best mood.  Pt admits to some depression including , feeling of hopelessness, helplessness,and  low energy.   Patient's sleep has been  good . Appetite has been good .   Pt denies feeling of suicidal/ homicidal. Pt sometimes has auditory/visual hallucinations.   Pt has been taking psychiatric medications as prescribed.   Pt does not have any other somatic complaints at this time.       Review of additional significant medical hx:  (See chart for additional detail, including current medications /see ROS for current S/S):      Pt has PONV , he denies any personal or family hx of previous complications w/anesthesia.  * No procedures listed *  RECENT LABS, IMAGING AND TESTING     Lab Results   Component Value Date    WBC 13.6 (H) 12/19/2024    RBC 4.81 12/19/2024    HGB 13.6 12/19/2024    HCT 41.4 12/19/2024    MCV 86.1 12/19/2024    MCH 28.3 12/19/2024    MCHC 32.9 12/19/2024    RDW 13.9 12/19/2024     12/19/2024    MPV 7.2 12/19/2024        Lab Results   Component Value Date     12/12/2024    K 4.4 12/12/2024     12/12/2024    CO2 24 12/12/2024    BUN 11 12/12/2024    CREATININE 0.8 12/12/2024    GLUCOSE 119 (H) 12/12/2024    CALCIUM 9.4 12/12/2024    BILITOT 0.4 12/12/2024    ALKPHOS 82 12/12/2024    AST 18

## 2025-03-07 ENCOUNTER — HOSPITAL ENCOUNTER (OUTPATIENT)
Dept: POSTOP/PACU | Age: 30
Discharge: HOME | End: 2025-03-07
Payer: COMMERCIAL

## 2025-03-07 ENCOUNTER — ANESTHESIA (OUTPATIENT)
Dept: POSTOP/PACU | Age: 30
End: 2025-03-07
Payer: COMMERCIAL

## 2025-03-07 VITALS
BODY MASS INDEX: 41.65 KG/M2 | DIASTOLIC BLOOD PRESSURE: 62 MMHG | HEART RATE: 114 BPM | SYSTOLIC BLOOD PRESSURE: 130 MMHG | HEIGHT: 65 IN | WEIGHT: 250 LBS | TEMPERATURE: 98 F | OXYGEN SATURATION: 96 % | RESPIRATION RATE: 22 BRPM

## 2025-03-07 DIAGNOSIS — F20.1 DISORGANIZED SCHIZOPHRENIA (HCC): Primary | ICD-10-CM

## 2025-03-07 PROCEDURE — 2500000003 HC RX 250 WO HCPCS: Performed by: NURSE ANESTHETIST, CERTIFIED REGISTERED

## 2025-03-07 PROCEDURE — 90870 ELECTROCONVULSIVE THERAPY: CPT | Performed by: PSYCHIATRY & NEUROLOGY

## 2025-03-07 PROCEDURE — 7100000001 HC PACU RECOVERY - ADDTL 15 MIN: Performed by: ANESTHESIOLOGY

## 2025-03-07 PROCEDURE — 6360000002 HC RX W HCPCS: Performed by: NURSE ANESTHETIST, CERTIFIED REGISTERED

## 2025-03-07 PROCEDURE — 3700000000 HC ANESTHESIA ATTENDED CARE: Performed by: ANESTHESIOLOGY

## 2025-03-07 PROCEDURE — 7100000000 HC PACU RECOVERY - FIRST 15 MIN: Performed by: ANESTHESIOLOGY

## 2025-03-07 PROCEDURE — 2580000003 HC RX 258: Performed by: ANESTHESIOLOGY

## 2025-03-07 PROCEDURE — 3700000001 HC ADD 15 MINUTES (ANESTHESIA): Performed by: ANESTHESIOLOGY

## 2025-03-07 PROCEDURE — 90870 ELECTROCONVULSIVE THERAPY: CPT

## 2025-03-07 RX ORDER — SODIUM CHLORIDE, SODIUM LACTATE, POTASSIUM CHLORIDE, CALCIUM CHLORIDE 600; 310; 30; 20 MG/100ML; MG/100ML; MG/100ML; MG/100ML
INJECTION, SOLUTION INTRAVENOUS CONTINUOUS
Status: DISCONTINUED | OUTPATIENT
Start: 2025-03-07 | End: 2025-03-08 | Stop reason: HOSPADM

## 2025-03-07 RX ORDER — HYDROXYZINE HYDROCHLORIDE 50 MG/1
50 TABLET, FILM COATED ORAL 2 TIMES DAILY
COMMUNITY

## 2025-03-07 RX ORDER — SODIUM CHLORIDE 0.9 % (FLUSH) 0.9 %
5-40 SYRINGE (ML) INJECTION EVERY 12 HOURS SCHEDULED
Status: DISCONTINUED | OUTPATIENT
Start: 2025-03-07 | End: 2025-03-08 | Stop reason: HOSPADM

## 2025-03-07 RX ORDER — ONDANSETRON 2 MG/ML
INJECTION INTRAMUSCULAR; INTRAVENOUS
Status: DISCONTINUED | OUTPATIENT
Start: 2025-03-07 | End: 2025-03-07 | Stop reason: SDUPTHER

## 2025-03-07 RX ORDER — LIDOCAINE HYDROCHLORIDE 10 MG/ML
1 INJECTION, SOLUTION EPIDURAL; INFILTRATION; INTRACAUDAL; PERINEURAL
Status: DISCONTINUED | OUTPATIENT
Start: 2025-03-07 | End: 2025-03-08 | Stop reason: HOSPADM

## 2025-03-07 RX ORDER — SODIUM CHLORIDE 0.9 % (FLUSH) 0.9 %
5-40 SYRINGE (ML) INJECTION PRN
Status: DISCONTINUED | OUTPATIENT
Start: 2025-03-07 | End: 2025-03-08 | Stop reason: HOSPADM

## 2025-03-07 RX ORDER — METHOHEXITAL IN WATER/PF 100MG/10ML
SYRINGE (ML) INTRAVENOUS
Status: COMPLETED
Start: 2025-03-07 | End: 2025-03-07

## 2025-03-07 RX ORDER — PALIPERIDONE 3 MG/1
3 TABLET, EXTENDED RELEASE ORAL EVERY MORNING
COMMUNITY

## 2025-03-07 RX ORDER — KETOROLAC TROMETHAMINE 30 MG/ML
INJECTION, SOLUTION INTRAMUSCULAR; INTRAVENOUS
Status: DISCONTINUED | OUTPATIENT
Start: 2025-03-07 | End: 2025-03-07 | Stop reason: SDUPTHER

## 2025-03-07 RX ORDER — SODIUM CHLORIDE 9 MG/ML
INJECTION, SOLUTION INTRAVENOUS PRN
Status: DISCONTINUED | OUTPATIENT
Start: 2025-03-07 | End: 2025-03-08 | Stop reason: HOSPADM

## 2025-03-07 RX ORDER — KETOROLAC TROMETHAMINE 30 MG/ML
INJECTION, SOLUTION INTRAMUSCULAR; INTRAVENOUS
Status: COMPLETED
Start: 2025-03-07 | End: 2025-03-07

## 2025-03-07 RX ORDER — METHOHEXITAL IN WATER/PF 100MG/10ML
SYRINGE (ML) INTRAVENOUS
Status: DISCONTINUED | OUTPATIENT
Start: 2025-03-07 | End: 2025-03-07 | Stop reason: SDUPTHER

## 2025-03-07 RX ORDER — SUCCINYLCHOLINE/SOD CL,ISO/PF 200MG/10ML
SYRINGE (ML) INTRAVENOUS
Status: DISCONTINUED | OUTPATIENT
Start: 2025-03-07 | End: 2025-03-07 | Stop reason: SDUPTHER

## 2025-03-07 RX ORDER — SUCCINYLCHOLINE/SOD CL,ISO/PF 200MG/10ML
SYRINGE (ML) INTRAVENOUS
Status: COMPLETED
Start: 2025-03-07 | End: 2025-03-07

## 2025-03-07 RX ADMIN — ONDANSETRON 4 MG: 2 INJECTION INTRAMUSCULAR; INTRAVENOUS at 08:56

## 2025-03-07 RX ADMIN — SODIUM CHLORIDE, POTASSIUM CHLORIDE, SODIUM LACTATE AND CALCIUM CHLORIDE: 600; 310; 30; 20 INJECTION, SOLUTION INTRAVENOUS at 08:13

## 2025-03-07 RX ADMIN — KETOROLAC TROMETHAMINE 30 MG: 30 INJECTION, SOLUTION INTRAMUSCULAR at 08:56

## 2025-03-07 RX ADMIN — Medication 120 MG: at 09:00

## 2025-03-07 RX ADMIN — Medication 100 MG: at 08:59

## 2025-03-07 ASSESSMENT — PAIN - FUNCTIONAL ASSESSMENT: PAIN_FUNCTIONAL_ASSESSMENT: 0-10

## 2025-03-07 NOTE — PROGRESS NOTES
Oppositional defiant behavior     PONV (postoperative nausea and vomiting)     Schizophrenia (HCC)       Past Surgical History:   Procedure Laterality Date    EYE SURGERY      TYMPANOSTOMY TUBE PLACEMENT        Not in a hospital admission.  Allergies   Allergen Reactions    Chocolate Diarrhea      Social History     Tobacco Use    Smoking status: Never    Smokeless tobacco: Never   Substance Use Topics    Alcohol use: No      History reviewed. No pertinent family history.       Objective:       Mental Status Evaluation:  Appearance:  Wearing gown, on stretcher, well groomed   Behavior:  Engages with interviewer, smiles, childlike   Speech:  Normal rate, volume and tone   Mood:  \"It wasn't a good week\"   Affect:  Brightened with discussion   Thought Process:  Linear and organized   Thought Content:  Denies suicidal ideation   Sensorium:  Does not appear to attend to internal stimuli   Cognition:  Oriented to person, place and general circumstance   Insight:  fair   Judgment:  fair     Assessment:     Diagnosis: Disorganized schizophrenia    Plan:     Maintenance frequency ECT 2 times weekly.    Monitor for stability in symptoms  Taper treatment frequency as tolerated    Update consent and H&P every 30 days while undergoing ECT treatment, update labs every 6 months.   Patient verbalizes understanding of risks/benefits/alternatives to ECT.  Last informed consent signed on  3/7/2025 .

## 2025-03-07 NOTE — H&P
extended release tablet Take 1 tablet by mouth every morning As needed      hydrOXYzine HCl (ATARAX) 50 MG tablet Take 1 tablet by mouth 2 times daily      hyoscyamine (ANASPAZ;LEVSIN) 0.125 MG tablet Take 1 tablet by mouth every 4 hours as needed for Cramping      propranolol (INDERAL) 10 MG tablet Take 1 tablet by mouth daily      cloZAPine (CLOZARIL) 100 MG tablet Take 1 tablet by mouth 3 times daily 90 tablet 0    traZODone (DESYREL) 50 MG tablet Take 1 tablet by mouth nightly as needed for Sleep 30 tablet 0    paliperidone palmitate ER (INVEGA SUSTENNA) 234 MG/1.5ML FRANCHESKA IM injection Inject 234 mg into the muscle every 30 days 1 each 0     No current facility-administered medications on file prior to encounter.     Notation: Above medications are not currently reconciled at time of signing this H&P note, to be reconciled in pre-op per RN.     Review of Systems      GENERAL PHYSICAL EXAM     Vitals: /82   Pulse 99   Temp 97.7 °F (36.5 °C) (Infrared)   Resp 18   Ht 1.651 m (5' 5\")   Wt 113.4 kg (250 lb)   SpO2 99%   BMI 41.60 kg/m²  Body mass index is 41.6 kg/m².                            Physical Exam   PROVISIONAL DIAGNOSES / SURGERY:      ECT treatment.      history of  Disorganized schizophrenia.     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    Hyperthyroidism, subclinical 08/22/2024    Moderate mixed hyperlipidemia not requiring statin therapy 08/22/2024    Class 2 obesity without serious comorbidity with body mass index (BMI) of 38.0 to 38.9 in adult 08/22/2024    Hypovitaminosis D 10/18/2019    Rib pain on left side 10/18/2019           SIDNEY KNOTT, GIORGI - CNP on 3/7/2025 at 8:14 AM          
no guarding.   Neurological:      Mental Status: He is alert and oriented to person, place, and time.   Psychiatric:         Mood and Affect: Mood normal.        PROVISIONAL DIAGNOSES / SURGERY:      Disorganized schizophrenia.      ECT W ANESTHESIA    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    Hyperthyroidism, subclinical 08/22/2024    Moderate mixed hyperlipidemia not requiring statin therapy 08/22/2024    Class 2 obesity without serious comorbidity with body mass index (BMI) of 38.0 to 38.9 in adult 08/22/2024    Hypovitaminosis D 10/18/2019    Rib pain on left side 10/18/2019           SIDNEY KNOTT, GIORGI - CNP on 3/7/2025 at 8:27 AM

## 2025-03-07 NOTE — H&P (VIEW-ONLY)
HISTORY and PHYSICAL  [facility]       NAME:  Alistair Hutchison  MRN: [id number]   YOB: 1995   Date: 3/7/2025   Age: 29 y.o.  Gender: male       COMPLAINT AND PRESENT HISTORY:     Alistair Hutchison is 29 y.o.,  male, presents for ECT treatment.      Primary dx:  history of  Disorganized schizophrenia.   HPI:  Alistair Hutchison is 29 y.o.,  male, here for ECT treatment.   Last ECT treatment was 1/31/25 present with his grandmother. Pt tolerated last treatment well. Patient reports that his  symptoms are not  improving. Patient has complaints of some short term memory loss  Pt has had multiple failed treatment modalities. Pt has history of   Disorganized schizophrenia.   Mood is rated at  9/10. With 10 is the best mood pt feeling of hopelessness, helplessness,and  low energy.   Patient's sleep has been  good . Appetite has been good .   Pt denies feeling of suicidal/ homicidal. Pt sometimes has auditory/visual hallucinations.   Pt has been taking psychiatric medications as prescribed.   Pt does not have any other somatic complaints at this time.       Review of additional significant medical hx:  (See chart for additional detail, including current medications /see ROS for current S/S):        Medications taken TODAY (with sip of water): none  Anticoagulation status: none     Denies personal hx of blood clots.    Pt has PONV , he denies any personal or family hx of previous complications w/anesthesia.  Physical exam done. Alert and oriented. Heart tones reg, lungs clear to auscultation, abd obese, BS X4. Full ROM.       RECENT LABS, IMAGING AND TESTING     Lab Results   Component Value Date    WBC 13.6 (H) 12/19/2024    RBC 4.81 12/19/2024    HGB 13.6 12/19/2024    HCT 41.4 12/19/2024    MCV 86.1 12/19/2024    MCH 28.3 12/19/2024    MCHC 32.9 12/19/2024    RDW 13.9 12/19/2024     12/19/2024    MPV 7.2 12/19/2024        Lab Results   Component Value Date

## 2025-03-07 NOTE — ANESTHESIA POSTPROCEDURE EVALUATION
Department of Anesthesiology  Postprocedure Note    Patient: Alistair Hutchison  MRN: [id number]  YOB: 1995  Date of evaluation: 3/7/2025    Procedure Summary       Date: 03/07/25 Room / Location: [facility] PACU    Anesthesia Start: 0854 Anesthesia Stop: 0915    Procedure: ECT W/ ANESTHESIA Diagnosis: Disorganized schizophrenia (HCC)    Scheduled Providers:  Responsible Provider: Kristel Alvarado MD    Anesthesia Type: General ASA Status: 3            Anesthesia Type: General    Amalia Phase I: Amalia Score: 8    Amalia Phase II:      Anesthesia Post Evaluation    Comments: POST- ANESTHESIA EVALUATION       Pt Name: Alistair Hutchison  MRN: [id number]  YOB: 1995  Date of evaluation: 3/7/2025  Time:  9:57 AM      /62   Pulse (!) 114   Temp 98 °F (36.7 °C)   Resp 22   Ht 1.651 m (5' 5\")   Wt 113.4 kg (250 lb)   SpO2 96%   BMI 41.60 kg/m²      Consciousness Level  Awake  Cardiopulmonary Status  Stable  Pain Adequately Treated YES  Nausea / Vomiting  NO  Adequate Hydration  YES  Anesthesia Related Complications NONE      Electronically signed by Kristel Alvarado MD on 3/7/2025 at 9:57 AM      No notable events documented.

## 2025-03-07 NOTE — H&P (VIEW-ONLY)
HISTORY and PHYSICAL  [facility]       NAME:  Alistair Hutchison  MRN: [id number]   YOB: 1995   Date: 3/7/2025   Age: 29 y.o.  Gender: male       COMPLAINT AND PRESENT HISTORY:     Alistair Hutchison is 29 y.o.,  male, presents for ECT treatment.      Primary dx:  history of  Disorganized schizophrenia.   HPI:  Alistair Hutchison is 29 y.o.,  male, here for ECT treatment.   Last ECT treatment was 1/31/25 present with his grandmother. Pt tolerated last treatment well. Patient reports that his  symptoms are not  improving. Patient has complaints of some short term memory loss  Pt has had multiple failed treatment modalities. Pt has history of   Disorganized schizophrenia.   Mood is rated at  9/10. With 10 is the best mood pt feeling of hopelessness, helplessness,and  low energy.   Patient's sleep has been  good . Appetite has been good .   Pt denies feeling of suicidal/ homicidal. Pt sometimes has auditory/visual hallucinations.   Pt has been taking psychiatric medications as prescribed.   Pt does not have any other somatic complaints at this time.       Review of additional significant medical hx:  (See chart for additional detail, including current medications /see ROS for current S/S):        Medications taken TODAY (with sip of water): none  Anticoagulation status: none     Denies personal hx of blood clots.    Pt has PONV , he denies any personal or family hx of previous complications w/anesthesia.  Physical exam done. Alert and oriented. Heart tones reg, lungs clear to auscultation, abd obese, BS X4. Full ROM.       RECENT LABS, IMAGING AND TESTING     Lab Results   Component Value Date    WBC 13.6 (H) 12/19/2024    RBC 4.81 12/19/2024    HGB 13.6 12/19/2024    HCT 41.4 12/19/2024    MCV 86.1 12/19/2024    MCH 28.3 12/19/2024    MCHC 32.9 12/19/2024    RDW 13.9 12/19/2024     12/19/2024    MPV 7.2 12/19/2024        Lab Results   Component Value Date

## 2025-03-07 NOTE — ANESTHESIA PRE PROCEDURE
injection Inject 234 mg into the muscle every 30 days 1 each 0     Current Facility-Administered Medications   Medication Dose Route Frequency Provider Last Rate Last Admin   • lidocaine PF 1 % injection 1 mL  1 mL IntraDERmal Once PRN Jewel Ji MD       • lactated ringers infusion   IntraVENous Continuous Jewel Ji  mL/hr at 03/07/25 0849 NoRateChange at 03/07/25 0849   • sodium chloride flush 0.9 % injection 5-40 mL  5-40 mL IntraVENous 2 times per day Jewel Ji MD       • sodium chloride flush 0.9 % injection 5-40 mL  5-40 mL IntraVENous PRN Jewel Ji MD       • 0.9 % sodium chloride infusion   IntraVENous PRN Jewel Ji MD       • succinylcholine (ANECTINE) 200 MG/10ML injection            • ketorolac (TORADOL) 30 MG/ML injection            • methohexital (BREVITAL SODIUM) 100 MG/10ML injection                Allergies:    Allergies   Allergen Reactions   • Chocolate Diarrhea       Problem List:    Patient Active Problem List   Diagnosis Code   • Hypovitaminosis D E55.9   • Rib pain on left side R07.81   • Acute psychosis (HCC) F23   • Developmental delay R62.50   • Hyperthyroidism, subclinical E05.90   • Moderate mixed hyperlipidemia not requiring statin therapy E78.2   • Class 2 obesity without serious comorbidity with body mass index (BMI) of 38.0 to 38.9 in adult E66.812, Z68.38   • Disorganized schizophrenia (HCC) F20.1   • Schizophrenia, disorganized (HCC) F20.1       Past Medical History:        Diagnosis Date   • Fracture     leg   • Manic depression ([facility])    • Oppositional defiant behavior    • PONV (postoperative nausea and vomiting)    • Schizophrenia (HCC)        Past Surgical History:        Procedure Laterality Date   • EYE SURGERY     • TYMPANOSTOMY TUBE PLACEMENT         Social History:    Social History     Tobacco Use   • Smoking status: Never   • Smokeless tobacco: Never   Substance Use Topics   • Alcohol use: No

## 2025-03-07 NOTE — H&P (VIEW-ONLY)
extended release tablet Take 1 tablet by mouth every morning As needed      hydrOXYzine HCl (ATARAX) 50 MG tablet Take 1 tablet by mouth 2 times daily      hyoscyamine (ANASPAZ;LEVSIN) 0.125 MG tablet Take 1 tablet by mouth every 4 hours as needed for Cramping      propranolol (INDERAL) 10 MG tablet Take 1 tablet by mouth daily      cloZAPine (CLOZARIL) 100 MG tablet Take 1 tablet by mouth 3 times daily 90 tablet 0    traZODone (DESYREL) 50 MG tablet Take 1 tablet by mouth nightly as needed for Sleep 30 tablet 0    paliperidone palmitate ER (INVEGA SUSTENNA) 234 MG/1.5ML FRANCHESKA IM injection Inject 234 mg into the muscle every 30 days 1 each 0     No current facility-administered medications on file prior to encounter.     Notation: Above medications are not currently reconciled at time of signing this H&P note, to be reconciled in pre-op per RN.     Review of Systems      GENERAL PHYSICAL EXAM     Vitals: /82   Pulse 99   Temp 97.7 °F (36.5 °C) (Infrared)   Resp 18   Ht 1.651 m (5' 5\")   Wt 113.4 kg (250 lb)   SpO2 99%   BMI 41.60 kg/m²  Body mass index is 41.6 kg/m².                            Physical Exam   PROVISIONAL DIAGNOSES / SURGERY:      ECT treatment.      history of  Disorganized schizophrenia.     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    Hyperthyroidism, subclinical 08/22/2024    Moderate mixed hyperlipidemia not requiring statin therapy 08/22/2024    Class 2 obesity without serious comorbidity with body mass index (BMI) of 38.0 to 38.9 in adult 08/22/2024    Hypovitaminosis D 10/18/2019    Rib pain on left side 10/18/2019           SIDNEY KNOTT, GIORGI - CNP on 3/7/2025 at 8:14 AM

## 2025-03-10 ENCOUNTER — ANESTHESIA EVENT (OUTPATIENT)
Dept: POSTOP/PACU | Age: 30
End: 2025-03-10
Payer: COMMERCIAL

## 2025-03-11 ENCOUNTER — HOSPITAL ENCOUNTER (OUTPATIENT)
Dept: POSTOP/PACU | Age: 30
Discharge: HOME | End: 2025-03-11
Payer: COMMERCIAL

## 2025-03-11 ENCOUNTER — ANESTHESIA (OUTPATIENT)
Dept: POSTOP/PACU | Age: 30
End: 2025-03-11
Payer: COMMERCIAL

## 2025-03-11 VITALS
HEART RATE: 95 BPM | SYSTOLIC BLOOD PRESSURE: 130 MMHG | DIASTOLIC BLOOD PRESSURE: 87 MMHG | WEIGHT: 250 LBS | BODY MASS INDEX: 41.65 KG/M2 | HEIGHT: 65 IN | TEMPERATURE: 98 F | OXYGEN SATURATION: 96 % | RESPIRATION RATE: 20 BRPM

## 2025-03-11 DIAGNOSIS — F20.1 SCHIZOPHRENIA, DISORGANIZED (HCC): Primary | ICD-10-CM

## 2025-03-11 PROCEDURE — 90870 ELECTROCONVULSIVE THERAPY: CPT | Performed by: PSYCHIATRY & NEUROLOGY

## 2025-03-11 PROCEDURE — 3700000000 HC ANESTHESIA ATTENDED CARE: Performed by: ANESTHESIOLOGY

## 2025-03-11 PROCEDURE — 7100000001 HC PACU RECOVERY - ADDTL 15 MIN: Performed by: ANESTHESIOLOGY

## 2025-03-11 PROCEDURE — 2500000003 HC RX 250 WO HCPCS: Performed by: NURSE ANESTHETIST, CERTIFIED REGISTERED

## 2025-03-11 PROCEDURE — 6360000002 HC RX W HCPCS: Performed by: NURSE ANESTHETIST, CERTIFIED REGISTERED

## 2025-03-11 PROCEDURE — 2580000003 HC RX 258: Performed by: ANESTHESIOLOGY

## 2025-03-11 PROCEDURE — 3700000001 HC ADD 15 MINUTES (ANESTHESIA): Performed by: ANESTHESIOLOGY

## 2025-03-11 PROCEDURE — 7100000000 HC PACU RECOVERY - FIRST 15 MIN: Performed by: ANESTHESIOLOGY

## 2025-03-11 PROCEDURE — 90870 ELECTROCONVULSIVE THERAPY: CPT | Performed by: ANESTHESIOLOGY

## 2025-03-11 RX ORDER — SODIUM CHLORIDE 0.9 % (FLUSH) 0.9 %
5-40 SYRINGE (ML) INJECTION EVERY 12 HOURS SCHEDULED
Status: DISCONTINUED | OUTPATIENT
Start: 2025-03-11 | End: 2025-03-12 | Stop reason: HOSPADM

## 2025-03-11 RX ORDER — SODIUM CHLORIDE 0.9 % (FLUSH) 0.9 %
5-40 SYRINGE (ML) INJECTION PRN
Status: DISCONTINUED | OUTPATIENT
Start: 2025-03-11 | End: 2025-03-12 | Stop reason: HOSPADM

## 2025-03-11 RX ORDER — METHOHEXITAL IN WATER/PF 100MG/10ML
SYRINGE (ML) INTRAVENOUS
Status: DISPENSED
Start: 2025-03-11 | End: 2025-03-11

## 2025-03-11 RX ORDER — ONDANSETRON 2 MG/ML
INJECTION INTRAMUSCULAR; INTRAVENOUS
Status: DISCONTINUED | OUTPATIENT
Start: 2025-03-11 | End: 2025-03-11 | Stop reason: SDUPTHER

## 2025-03-11 RX ORDER — SUCCINYLCHOLINE/SOD CL,ISO/PF 200MG/10ML
SYRINGE (ML) INTRAVENOUS
Status: DISCONTINUED | OUTPATIENT
Start: 2025-03-11 | End: 2025-03-11 | Stop reason: SDUPTHER

## 2025-03-11 RX ORDER — ONDANSETRON 2 MG/ML
INJECTION INTRAMUSCULAR; INTRAVENOUS
Status: COMPLETED
Start: 2025-03-11 | End: 2025-03-11

## 2025-03-11 RX ORDER — SODIUM CHLORIDE 9 MG/ML
INJECTION, SOLUTION INTRAVENOUS PRN
Status: DISCONTINUED | OUTPATIENT
Start: 2025-03-11 | End: 2025-03-12 | Stop reason: HOSPADM

## 2025-03-11 RX ORDER — ACETAMINOPHEN 325 MG/1
TABLET ORAL EVERY 6 HOURS PRN
COMMUNITY

## 2025-03-11 RX ORDER — METHOHEXITAL IN WATER/PF 100MG/10ML
SYRINGE (ML) INTRAVENOUS
Status: DISCONTINUED | OUTPATIENT
Start: 2025-03-11 | End: 2025-03-11 | Stop reason: SDUPTHER

## 2025-03-11 RX ORDER — SUCCINYLCHOLINE/SOD CL,ISO/PF 200MG/10ML
SYRINGE (ML) INTRAVENOUS
Status: COMPLETED
Start: 2025-03-11 | End: 2025-03-11

## 2025-03-11 RX ORDER — KETOROLAC TROMETHAMINE 30 MG/ML
INJECTION, SOLUTION INTRAMUSCULAR; INTRAVENOUS
Status: COMPLETED
Start: 2025-03-11 | End: 2025-03-11

## 2025-03-11 RX ORDER — SODIUM CHLORIDE, SODIUM LACTATE, POTASSIUM CHLORIDE, CALCIUM CHLORIDE 600; 310; 30; 20 MG/100ML; MG/100ML; MG/100ML; MG/100ML
INJECTION, SOLUTION INTRAVENOUS CONTINUOUS
Status: DISCONTINUED | OUTPATIENT
Start: 2025-03-11 | End: 2025-03-12 | Stop reason: HOSPADM

## 2025-03-11 RX ORDER — KETOROLAC TROMETHAMINE 30 MG/ML
INJECTION, SOLUTION INTRAMUSCULAR; INTRAVENOUS
Status: DISCONTINUED | OUTPATIENT
Start: 2025-03-11 | End: 2025-03-11 | Stop reason: SDUPTHER

## 2025-03-11 RX ORDER — LIDOCAINE HYDROCHLORIDE 10 MG/ML
1 INJECTION, SOLUTION EPIDURAL; INFILTRATION; INTRACAUDAL; PERINEURAL
Status: DISCONTINUED | OUTPATIENT
Start: 2025-03-11 | End: 2025-03-12 | Stop reason: HOSPADM

## 2025-03-11 RX ADMIN — SODIUM CHLORIDE, POTASSIUM CHLORIDE, SODIUM LACTATE AND CALCIUM CHLORIDE: 600; 310; 30; 20 INJECTION, SOLUTION INTRAVENOUS at 07:23

## 2025-03-11 RX ADMIN — Medication 100 MG: at 07:45

## 2025-03-11 RX ADMIN — ONDANSETRON 4 MG: 2 INJECTION INTRAMUSCULAR; INTRAVENOUS at 07:51

## 2025-03-11 RX ADMIN — Medication 120 MG: at 07:45

## 2025-03-11 RX ADMIN — KETOROLAC TROMETHAMINE 30 MG: 30 INJECTION, SOLUTION INTRAMUSCULAR at 07:51

## 2025-03-11 ASSESSMENT — PAIN - FUNCTIONAL ASSESSMENT
PAIN_FUNCTIONAL_ASSESSMENT: NONE - DENIES PAIN
PAIN_FUNCTIONAL_ASSESSMENT: 0-10

## 2025-03-11 NOTE — PROCEDURES
Bilateral ECT Procedure Report  Alistair Hutchison   3/11/2025  1995         Attending:Corby Drake MD  Preprocedure diagnosis: disorganized schizophrenia  Postprocedure diagnosis: SAME AS PRE-PROCEDURE DIAGNOSIS  Treatment Number: This is treatment #14    Patient Status: Outpatient   Type of ECT: Bilateral Brief Pulse  Medications  Preprocedure: Tylenol 650mg and none  Anesthetic: Methohexital 100 mg  Paralytic: Succinylcholine 120 mg  Post procedure: none needed   Cuff placement: Left Lower Extremity    MECTA Settings 1st Stimulus:     Pulse width: 1.0 ms   Frequency:  40 hz   Duration:   2.0 seconds   Static Impedance: 393 ohms             Dynamic Impedance: 212 ohms             Motor Seizure Duration: 42 seconds  EEG Seizure Duration: 42 seconds             The patient's ECT treatment was performed using MECTA machine  .  Timeout:  Time out was performed using two patient identifiers and confirmation of procedure.     Patient Preparation: Preprocedure documentation, labs, H&P were all verified and reviewed.  The patient was placed in supine position.  EEG leads were placed for monitoring of seizure.   Jew areas bilaterally cleaned and prepped.  Conductive gel  was applied over stimulus electrode site.   The patient was pre-oxygenated.       Procedure in detail: Medications were administered by anesthesia using intravenous access at the doses listed previously in this summary.  Anesthetic administered and once confirmation the patient is anesthetized, the patient's blood pressure cuff on lower extremity was inflated to 100mmHg in excess of patient's blood pressure.  At this time, the neuromuscular blockade was administered intravenously by anesthesia.  Upper extremity fasciculation were verified followed by lower extremity fasciculation.  Once fasciculations terminated, confirmation of affective neuromuscular blockade demonstrated by absence of withdrawal reflex.  Bite blocks were put in place.

## 2025-03-11 NOTE — INTERVAL H&P NOTE
Update History & Physical    The patient's History and Physical of March 7, 2025 was reviewed with the patient and I examined the patient.       Alistair Hutchison is 29 y.o., male, here for ECT treatment. Last ECT treatment was 03/07/25. Pt tolerated last treatment well and has voiced improvement. Sleep and appetite are good.   Denies suicidal or homicidal ideation. Has frequent visual and auditory hallucinations. Pt has been taking all medications as prescribed. Pt has no interval changes since last treatment visit.     Pt AAO x 3 in NAD. HRRR. No adventitious lung sounds. No respiratory distress. NPO p MN. Took tylenol this am with sip of water. Denies recent or current chest pain/pressure, palpitations, SOB, recent URI, fever or chills. Review vitals per RN flowsheet.       Electronically signed by GIORGI Shah CNP on 3/11/2025 at 7:28 AM

## 2025-03-11 NOTE — DISCHARGE INSTRUCTIONS
ELECTROCONVULSIVE THERAPY DISCHARGE INSTRUCTIONS         1. Activity - Rest today. The anesthetic agents you have received can make you feel drowsy or tired.  A responsible person must drive you home and stay with you for 8 hours after discharge from the Hospital. Do not drive a motor vehicle or operate any machinery for 24 hours. .   *Do not make any complex decisions or execute any legal documents until 3 weeks AFTER your last treatment.  If you have any questions regarding this, speak with your psychiatrist first.*    2. Diet - Nothing to eat or drink after midnight the night of your ECT treatment. After ECT, start with clear liquids, if you can drink without coughing, you may proceed with gradually progressing to your usual diet.    No alcoholic beverages for 24 hours.    3. Medications - Take your daily medications as prescribed, after you return home from today's ECT. If you take a Beta Blocker or have been prescribed Imitrex, you may be instructed to take these medications the morning of ECT. If you are unsure please ask the physician.     Take with these medication the morning of ECT with one Tablespoon of water.   Tylenol 650 mg  All blood pressure medications  ***    4. You may experience the following after your treatment:   a. Poor memory   b. Poor balance   c. Headaches   d. Confusion   e. Muscle soreness   f. Nausea      5. Special Precautions - Contact you physician immediately if these occur:   a. Signs of infection: redness, swelling, heat, red streaks at the site of the IV   b. Excessive pain unrelieved by prescription pain medications   c. Nausea and vomiting that persists beyond the first day after your procedure    6. Next Procedure Date:   Your next ECT treatment is scheduled for 820 am  on March 14th.  Please arrive to the hospital by 650 am that morning.    If you feel you are having a problem or complication from your ECT treatments and it is during normal business hours call [phone].

## 2025-03-11 NOTE — ANESTHESIA PRE PROCEDURE
Department of Anesthesiology  Preprocedure Note       Name:  Alistair Hutchison   Age:  29 y.o.  :  1995                                          MRN:  [id number]         Date:  3/11/2025      Surgeon: * No surgeons listed *    Procedure: * No procedures listed *    Medications prior to admission:   Prior to Admission medications    Medication Sig Start Date End Date Taking? Authorizing Provider   paliperidone (INVEGA) 3 MG extended release tablet Take 1 tablet by mouth every morning As needed    Shan Curran MD   hydrOXYzine HCl (ATARAX) 50 MG tablet Take 1 tablet by mouth 2 times daily    Shan Curran MD   hyoscyamine (ANASPAZ;LEVSIN) 0.125 MG tablet Take 1 tablet by mouth every 4 hours as needed for Cramping    Shan Curran MD   propranolol (INDERAL) 10 MG tablet Take 1 tablet by mouth daily    Shan Curran MD   cloZAPine (CLOZARIL) 100 MG tablet Take 1 tablet by mouth 3 times daily 24   Corby Drake MD   traZODone (DESYREL) 50 MG tablet Take 1 tablet by mouth nightly as needed for Sleep 10/2/24   Corby Drake MD   paliperidone palmitate ER (INVEGA SUSTENNA) 234 MG/1.5ML FRANCHESKA IM injection Inject 234 mg into the muscle every 30 days 10/24/24   Corby Drake MD       Current medications:    Current Outpatient Medications   Medication Sig Dispense Refill   • paliperidone (INVEGA) 3 MG extended release tablet Take 1 tablet by mouth every morning As needed     • hydrOXYzine HCl (ATARAX) 50 MG tablet Take 1 tablet by mouth 2 times daily     • hyoscyamine (ANASPAZ;LEVSIN) 0.125 MG tablet Take 1 tablet by mouth every 4 hours as needed for Cramping     • propranolol (INDERAL) 10 MG tablet Take 1 tablet by mouth daily     • cloZAPine (CLOZARIL) 100 MG tablet Take 1 tablet by mouth 3 times daily 90 tablet 0   • traZODone (DESYREL) 50 MG tablet Take 1 tablet by mouth nightly as needed for Sleep 30 tablet 0   • paliperidone palmitate ER (INVEGA SUSTENNA) 234

## 2025-03-11 NOTE — ANESTHESIA POSTPROCEDURE EVALUATION
Department of Anesthesiology  Postprocedure Note    Patient: Alistair Hutchison  MRN: [id number]  YOB: 1995  Date of evaluation: 3/11/2025    Procedure Summary       Date: 03/11/25 Room / Location: [facility] PACU    Anesthesia Start: 0742 Anesthesia Stop: 0759    Procedure: ECT W/ ANESTHESIA Diagnosis: Disorganized schizophrenia (HCC)    Scheduled Providers:  Responsible Provider: Zacarias Pandey MD    Anesthesia Type: general ASA Status: 3            Anesthesia Type: No value filed.    Amalia Phase I: Amalia Score: 10    Amalia Phase II:      Anesthesia Post Evaluation    Patient location during evaluation: PACU  Patient participation: complete - patient participated  Level of consciousness: awake and alert  Airway patency: patent  Nausea & Vomiting: no vomiting  Cardiovascular status: hemodynamically stable  Respiratory status: acceptable  Hydration status: euvolemic  Comments: POST- ANESTHESIA EVALUATION       Pt Name: Alistair Hutchison  MRN: [id number]  YOB: 1995  Date of evaluation: 3/11/2025  Time:  9:26 AM      /87   Pulse 95   Temp 98 °F (36.7 °C) (Infrared)   Resp 20   Ht 1.651 m (5' 5\")   Wt 113.4 kg (250 lb)   SpO2 96%   BMI 41.60 kg/m²      Consciousness Level  Awake  Cardiopulmonary Status  Stable  Pain Adequately Treated YES  Nausea / Vomiting  NO  Adequate Hydration  YES  Anesthesia Related Complications NONE      Electronically signed by Zacarias Pandey MD on 3/11/2025 at 9:26 AM         Pain management: satisfactory to patient    No notable events documented.

## 2025-03-12 NOTE — PROGRESS NOTES
Pre ECT Assessment Note  Psychiatry  3/11/2025      Alistair Hutchison  1995  [id number]      Subjective:     Patient is a 29 y.o.  male seen for an evaluation prior to today's electroconvulsive therapy treatment. Today is treatment number  14 , utilizing bilateral (2  BL and 12 RU treatments). This is course number 2.  The patient has previously completed a course of bilateral ECT for a total of 18 treatments that ended on 11/20/2024.    Last ECT treatment 3/7/2025.    William seen prior to ECT with his grandmother Jacqueline Chacko (his legal guardian) at bedside.  He is pleasant with discussion.  They report that he has been less irritable over the last 2 days, and has been doing better with his outbursts.  He did have a frustrating event happened since we last saw him.  He attempted to play basketball with some people at [employer], but they were making fun of him and this was highly upsetting for William.  He denies reacting in alignment or physical matter, but does state that it made him feel very sad.  He does continue to lash out at times and does not do well with boundaries or limit setting.  We recently paused ECT and encouraged therapy.  However, patient's grandmother reports that his therapist did not feel that he benefited from treatment as his ability to comprehend information is limited.  He would not his head indicating understanding of the concepts, but was not able to process them.  Plan to continue ECT with a frequency of 2 times weekly for now.  Next scheduled treatment 3/14/2025.        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    Hyperthyroidism, subclinical 08/22/2024    Moderate mixed hyperlipidemia not requiring statin therapy 08/22/2024    Class 2 obesity without serious comorbidity with body mass index (BMI) of

## 2025-03-13 ENCOUNTER — ANESTHESIA EVENT (OUTPATIENT)
Dept: POSTOP/PACU | Age: 30
End: 2025-03-13
Payer: COMMERCIAL

## 2025-03-14 ENCOUNTER — ANESTHESIA (OUTPATIENT)
Dept: POSTOP/PACU | Age: 30
End: 2025-03-14
Payer: COMMERCIAL

## 2025-03-14 ENCOUNTER — HOSPITAL ENCOUNTER (OUTPATIENT)
Dept: POSTOP/PACU | Age: 30
Discharge: HOME | End: 2025-03-14
Payer: COMMERCIAL

## 2025-03-14 VITALS
BODY MASS INDEX: 41.65 KG/M2 | HEIGHT: 65 IN | HEART RATE: 88 BPM | SYSTOLIC BLOOD PRESSURE: 138 MMHG | DIASTOLIC BLOOD PRESSURE: 91 MMHG | WEIGHT: 250 LBS | RESPIRATION RATE: 18 BRPM | OXYGEN SATURATION: 98 % | TEMPERATURE: 97.3 F

## 2025-03-14 PROCEDURE — 6360000002 HC RX W HCPCS: Performed by: NURSE ANESTHETIST, CERTIFIED REGISTERED

## 2025-03-14 PROCEDURE — 7100000000 HC PACU RECOVERY - FIRST 15 MIN: Performed by: ANESTHESIOLOGY

## 2025-03-14 PROCEDURE — 3700000000 HC ANESTHESIA ATTENDED CARE: Performed by: ANESTHESIOLOGY

## 2025-03-14 PROCEDURE — 2500000003 HC RX 250 WO HCPCS: Performed by: NURSE ANESTHETIST, CERTIFIED REGISTERED

## 2025-03-14 PROCEDURE — 90870 ELECTROCONVULSIVE THERAPY: CPT | Performed by: PSYCHIATRY & NEUROLOGY

## 2025-03-14 PROCEDURE — 2580000003 HC RX 258: Performed by: ANESTHESIOLOGY

## 2025-03-14 PROCEDURE — 7100000001 HC PACU RECOVERY - ADDTL 15 MIN: Performed by: ANESTHESIOLOGY

## 2025-03-14 PROCEDURE — 90870 ELECTROCONVULSIVE THERAPY: CPT | Performed by: ANESTHESIOLOGY

## 2025-03-14 RX ORDER — SODIUM CHLORIDE, SODIUM LACTATE, POTASSIUM CHLORIDE, CALCIUM CHLORIDE 600; 310; 30; 20 MG/100ML; MG/100ML; MG/100ML; MG/100ML
INJECTION, SOLUTION INTRAVENOUS CONTINUOUS
Status: DISCONTINUED | OUTPATIENT
Start: 2025-03-14 | End: 2025-03-15 | Stop reason: HOSPADM

## 2025-03-14 RX ORDER — SUCCINYLCHOLINE/SOD CL,ISO/PF 200MG/10ML
SYRINGE (ML) INTRAVENOUS
Status: DISCONTINUED | OUTPATIENT
Start: 2025-03-14 | End: 2025-03-14 | Stop reason: SDUPTHER

## 2025-03-14 RX ORDER — ONDANSETRON 2 MG/ML
INJECTION INTRAMUSCULAR; INTRAVENOUS
Status: COMPLETED
Start: 2025-03-14 | End: 2025-03-14

## 2025-03-14 RX ORDER — SODIUM CHLORIDE 0.9 % (FLUSH) 0.9 %
5-40 SYRINGE (ML) INJECTION EVERY 12 HOURS SCHEDULED
Status: DISCONTINUED | OUTPATIENT
Start: 2025-03-14 | End: 2025-03-15 | Stop reason: HOSPADM

## 2025-03-14 RX ORDER — LIDOCAINE HYDROCHLORIDE 10 MG/ML
1 INJECTION, SOLUTION EPIDURAL; INFILTRATION; INTRACAUDAL; PERINEURAL
Status: DISCONTINUED | OUTPATIENT
Start: 2025-03-14 | End: 2025-03-15 | Stop reason: HOSPADM

## 2025-03-14 RX ORDER — SODIUM CHLORIDE 9 MG/ML
INJECTION, SOLUTION INTRAVENOUS PRN
Status: DISCONTINUED | OUTPATIENT
Start: 2025-03-14 | End: 2025-03-15 | Stop reason: HOSPADM

## 2025-03-14 RX ORDER — METHOHEXITAL IN WATER/PF 100MG/10ML
SYRINGE (ML) INTRAVENOUS
Status: COMPLETED
Start: 2025-03-14 | End: 2025-03-14

## 2025-03-14 RX ORDER — KETOROLAC TROMETHAMINE 30 MG/ML
INJECTION, SOLUTION INTRAMUSCULAR; INTRAVENOUS
Status: COMPLETED
Start: 2025-03-14 | End: 2025-03-14

## 2025-03-14 RX ORDER — KETOROLAC TROMETHAMINE 30 MG/ML
INJECTION, SOLUTION INTRAMUSCULAR; INTRAVENOUS
Status: DISCONTINUED | OUTPATIENT
Start: 2025-03-14 | End: 2025-03-14 | Stop reason: SDUPTHER

## 2025-03-14 RX ORDER — SUCCINYLCHOLINE/SOD CL,ISO/PF 200MG/10ML
SYRINGE (ML) INTRAVENOUS
Status: COMPLETED
Start: 2025-03-14 | End: 2025-03-14

## 2025-03-14 RX ORDER — ONDANSETRON 2 MG/ML
INJECTION INTRAMUSCULAR; INTRAVENOUS
Status: DISCONTINUED | OUTPATIENT
Start: 2025-03-14 | End: 2025-03-14 | Stop reason: SDUPTHER

## 2025-03-14 RX ORDER — METHOHEXITAL IN WATER/PF 100MG/10ML
SYRINGE (ML) INTRAVENOUS
Status: DISCONTINUED | OUTPATIENT
Start: 2025-03-14 | End: 2025-03-14 | Stop reason: SDUPTHER

## 2025-03-14 RX ORDER — SODIUM CHLORIDE 0.9 % (FLUSH) 0.9 %
5-40 SYRINGE (ML) INJECTION PRN
Status: DISCONTINUED | OUTPATIENT
Start: 2025-03-14 | End: 2025-03-15 | Stop reason: HOSPADM

## 2025-03-14 RX ADMIN — SODIUM CHLORIDE, POTASSIUM CHLORIDE, SODIUM LACTATE AND CALCIUM CHLORIDE: 600; 310; 30; 20 INJECTION, SOLUTION INTRAVENOUS at 07:41

## 2025-03-14 RX ADMIN — Medication 100 MG: at 08:01

## 2025-03-14 RX ADMIN — Medication 120 MG: at 08:01

## 2025-03-14 RX ADMIN — KETOROLAC TROMETHAMINE 30 MG: 30 INJECTION, SOLUTION INTRAMUSCULAR at 08:01

## 2025-03-14 RX ADMIN — ONDANSETRON 4 MG: 2 INJECTION INTRAMUSCULAR; INTRAVENOUS at 08:01

## 2025-03-14 ASSESSMENT — PAIN - FUNCTIONAL ASSESSMENT
PAIN_FUNCTIONAL_ASSESSMENT: 0-10
PAIN_FUNCTIONAL_ASSESSMENT: 0-10

## 2025-03-14 NOTE — ANESTHESIA PRE PROCEDURE
Department of Anesthesiology  Preprocedure Note       Name:  Alistair Hutchison   Age:  29 y.o.  :  1995                                          MRN:  [id number]         Date:  3/14/2025      Surgeon: Dr Drake    Procedure: ECT    Medications prior to admission:   Prior to Admission medications    Medication Sig Start Date End Date Taking? Authorizing Provider   acetaminophen (TYLENOL) 325 MG tablet Take by mouth every 6 hours as needed for Pain    ProviderShan MD   paliperidone (INVEGA) 3 MG extended release tablet Take 1 tablet by mouth every morning As needed  Patient not taking: Reported on 3/11/2025    ProviderShan MD   hydrOXYzine HCl (ATARAX) 50 MG tablet Take 1 tablet by mouth 2 times daily    Shan Curran MD   hyoscyamine (ANASPAZ;LEVSIN) 0.125 MG tablet Take 1 tablet by mouth every 4 hours as needed for Cramping    Shan Curran MD   propranolol (INDERAL) 10 MG tablet Take 1 tablet by mouth daily    Shan Curran MD   cloZAPine (CLOZARIL) 100 MG tablet Take 1 tablet by mouth 3 times daily 24   Corby Drake MD   traZODone (DESYREL) 50 MG tablet Take 1 tablet by mouth nightly as needed for Sleep 10/2/24   Corby Drake MD   paliperidone palmitate ER (INVEGA SUSTENNA) 234 MG/1.5ML FRANCHESKA IM injection Inject 234 mg into the muscle every 30 days 10/24/24   Corby Drake MD       Current medications:    Current Outpatient Medications   Medication Sig Dispense Refill    acetaminophen (TYLENOL) 325 MG tablet Take by mouth every 6 hours as needed for Pain      paliperidone (INVEGA) 3 MG extended release tablet Take 1 tablet by mouth every morning As needed (Patient not taking: Reported on 3/11/2025)      hydrOXYzine HCl (ATARAX) 50 MG tablet Take 1 tablet by mouth 2 times daily      hyoscyamine (ANASPAZ;LEVSIN) 0.125 MG tablet Take 1 tablet by mouth every 4 hours as needed for Cramping      propranolol (INDERAL) 10 MG tablet Take 1 tablet by

## 2025-03-14 NOTE — DISCHARGE INSTRUCTIONS
ELECTROCONVULSIVE THERAPY DISCHARGE INSTRUCTIONS         1. Activity - Rest today. The anesthetic agents you have received can make you feel drowsy or tired.  A responsible person must drive you home and stay with you for 8 hours after discharge from the Hospital. Do not drive a motor vehicle or operate any machinery for 24 hours. .   *Do not make any complex decisions or execute any legal documents until 3 weeks AFTER your last treatment.  If you have any questions regarding this, speak with your psychiatrist first.*    2. Diet - Nothing to eat or drink after midnight the night of your ECT treatment. After ECT, start with clear liquids, if you can drink without coughing, you may proceed with gradually progressing to your usual diet.    No alcoholic beverages for 24 hours.    3. Medications - Take your daily medications as prescribed, after you return home from today's ECT. If you take a Beta Blocker or have been prescribed Imitrex, you may be instructed to take these medications the morning of ECT. If you are unsure please ask the physician.     Take with these medication the morning of ECT with one Tablespoon of water.   Tylenol 650 mg  All blood pressure medications  ***    4. You may experience the following after your treatment:   a. Poor memory   b. Poor balance   c. Headaches   d. Confusion   e. Muscle soreness   f. Nausea      5. Special Precautions - Contact you physician immediately if these occur:   a. Signs of infection: redness, swelling, heat, red streaks at the site of the IV   b. Excessive pain unrelieved by prescription pain medications   c. Nausea and vomiting that persists beyond the first day after your procedure    6. Next Procedure Date:   Your next ECT treatment is scheduled for 8:00 am on Tuesday, March 18, 2025.  Please arrive to [facility] surgery center by 6:30 am.    If you feel you are having a problem or complication from your ECT treatments and it is during normal business

## 2025-03-14 NOTE — INTERVAL H&P NOTE
Update History & Physical    The patient's History and Physical of March 7, 2025 was reviewed with the patient and I examined the patient.       Alistair Hutchison is 29 y.o., male, here for ECT treatment. Last ECT treatment was 03/12/25. Pt tolerated last treatment well and has voiced improvement. Sleep and appetite are good.   Denies suicidal or homicidal ideation. Denies hallucinations. Pt has been taking all medications as prescribed. Pt has no interval changes since last treatment visit.     Pt here today with his guardian, Jacqueline. Pt AAO x 3 in NAD. HRRR. No adventitious lung sounds. No respiratory distress. NPO p MN. Denies recent or current chest pain/pressure, palpitations, SOB, recent URI, fever or chills. Review vitals per RN flowsheet.     Electronically signed by GIORGI Shah CNP on 3/14/2025 at 7:44 AM

## 2025-03-14 NOTE — PROCEDURES
Bilateral ECT Procedure Report  Alistair Hutchison   3/14/2025  1995         Attending:Corby Drake MD  Preprocedure diagnosis: disorganized schizophrenia  Postprocedure diagnosis: SAME AS PRE-PROCEDURE DIAGNOSIS  Treatment Number: This is treatment #15    Patient Status: Outpatient   Type of ECT: Bilateral Brief Pulse  Medications  Preprocedure: Tylenol 650mg and none  Anesthetic: Methohexital 100 mg  Paralytic: Succinylcholine 120 mg  Post procedure: none needed   Cuff placement: Left Lower Extremity    MECTA Settings 1st Stimulus:     Pulse width: 1.0 ms   Frequency:  40 hz   Duration:   2.0 seconds   Static Impedance: 491 ohms             Dynamic Impedance: 216 ohms             Motor Seizure Duration: 31 seconds  EEG Seizure Duration: 31 seconds             The patient's ECT treatment was performed using MECTA machine  .  Timeout:  Time out was performed using two patient identifiers and confirmation of procedure.     Patient Preparation: Preprocedure documentation, labs, H&P were all verified and reviewed.  The patient was placed in supine position.  EEG leads were placed for monitoring of seizure.   Judaism areas bilaterally cleaned and prepped.  Conductive gel  was applied over stimulus electrode site.   The patient was pre-oxygenated.       Procedure in detail: Medications were administered by anesthesia using intravenous access at the doses listed previously in this summary.  Anesthetic administered and once confirmation the patient is anesthetized, the patient's blood pressure cuff on lower extremity was inflated to 100mmHg in excess of patient's blood pressure.  At this time, the neuromuscular blockade was administered intravenously by anesthesia.  Upper extremity fasciculation were verified followed by lower extremity fasciculation.  Once fasciculations terminated, confirmation of affective neuromuscular blockade demonstrated by absence of withdrawal reflex.  Bite blocks were put in place.

## 2025-03-14 NOTE — PROGRESS NOTES
Pre ECT Assessment Note  Psychiatry  03/14/25       Alistair Hutchison  1995  [id number]      Subjective:     Patient is a 29 y.o.  male seen for an evaluation prior to today's electroconvulsive therapy treatment. Today is treatment number  15 , utilizing bilateral (3  BL and 12 RU treatments). This is course number 2.  The patient has previously completed a course of bilateral ECT for a total of 18 treatments that ended on 11/20/2024.    Last ECT treatment 3/11/2025.    William seen prior to ECT with his grandmother Jacqueline Chacko (his legal guardian) at bedside.  He is pleasant with discussion.  William continues to do better with his behavioral outburst and has been more redirectable.  Arguing less with his grandfather.  He has been playing basketball at [employer] and states that the kids are being nicer to him.  He feels things have been going well.  His grandmother agrees.  William denies any perceptual disturbances, but sometimes his grandmother observes him more aloof and he does appear to be internally preoccupied at times at home.  He denies any suicidal thoughts.  Plan to continue ECT with a frequency of 2 times weekly for now.  Next scheduled treatment 3/18/2025.        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    Hyperthyroidism, subclinical 08/22/2024    Moderate mixed hyperlipidemia not requiring statin therapy 08/22/2024    Class 2 obesity without serious comorbidity with body mass index (BMI) of 38.0 to 38.9 in adult 08/22/2024    Hypovitaminosis D 10/18/2019    Rib pain on left side 10/18/2019     Past Medical History:   Diagnosis Date    Fracture     leg    Manic depression (HCC)     Oppositional defiant behavior     PONV (postoperative nausea and vomiting)     Schizophrenia (HCC)       Past Surgical History:   Procedure

## 2025-03-14 NOTE — ANESTHESIA POSTPROCEDURE EVALUATION
Department of Anesthesiology  Postprocedure Note    Patient: Alistair Hutchison  MRN: [id number]  YOB: 1995  Date of evaluation: 3/14/2025    Procedure Summary       Date: 03/14/25 Room / Location: [facility] PACU    Anesthesia Start: 0755 Anesthesia Stop: 0807    Procedure: ECT W/ ANESTHESIA Diagnosis: Disorganized schizophrenia (HCC)    Scheduled Providers:  Responsible Provider: Barby Leonard MD    Anesthesia Type: General ASA Status: 3            Anesthesia Type: General    Amalia Phase I: Amalia Score: 10    Amalia Phase II:      Anesthesia Post Evaluation    Comments: POST- ANESTHESIA EVALUATION       Pt Name: Alistair Hutchison  MRN: [id number]  YOB: 1995  Date of evaluation: 3/14/2025  Time:  9:39 AM      BP (!) 138/91   Pulse 88   Temp 97.3 °F (36.3 °C)   Resp 18   Ht 1.651 m (5' 5\")   Wt 113.4 kg (250 lb)   SpO2 98%   BMI 41.60 kg/m²      Consciousness Level  Awake  Cardiopulmonary Status  Stable  Pain Adequately Treated YES  Nausea / Vomiting  NO  Adequate Hydration  YES  Anesthesia Related Complications NONE      Electronically signed by Barby Leonard MD on 3/14/2025 at 9:39 AM           No notable events documented.

## 2025-03-17 ENCOUNTER — ANESTHESIA EVENT (OUTPATIENT)
Dept: POSTOP/PACU | Age: 30
End: 2025-03-17
Payer: COMMERCIAL

## 2025-03-18 ENCOUNTER — ANESTHESIA (OUTPATIENT)
Dept: POSTOP/PACU | Age: 30
End: 2025-03-18
Payer: COMMERCIAL

## 2025-03-18 ENCOUNTER — HOSPITAL ENCOUNTER (OUTPATIENT)
Dept: POSTOP/PACU | Age: 30
Discharge: HOME | End: 2025-03-18
Payer: COMMERCIAL

## 2025-03-18 VITALS
BODY MASS INDEX: 41.65 KG/M2 | TEMPERATURE: 98.2 F | RESPIRATION RATE: 17 BRPM | DIASTOLIC BLOOD PRESSURE: 127 MMHG | SYSTOLIC BLOOD PRESSURE: 137 MMHG | OXYGEN SATURATION: 98 % | WEIGHT: 250 LBS | HEART RATE: 93 BPM | HEIGHT: 65 IN

## 2025-03-18 DIAGNOSIS — F20.1 SCHIZOPHRENIA, DISORGANIZED (HCC): Primary | ICD-10-CM

## 2025-03-18 PROCEDURE — 6360000002 HC RX W HCPCS: Performed by: NURSE ANESTHETIST, CERTIFIED REGISTERED

## 2025-03-18 PROCEDURE — 3700000000 HC ANESTHESIA ATTENDED CARE: Performed by: ANESTHESIOLOGY

## 2025-03-18 PROCEDURE — 2580000003 HC RX 258: Performed by: ANESTHESIOLOGY

## 2025-03-18 PROCEDURE — 7100000000 HC PACU RECOVERY - FIRST 15 MIN: Performed by: ANESTHESIOLOGY

## 2025-03-18 PROCEDURE — 90870 ELECTROCONVULSIVE THERAPY: CPT | Performed by: PSYCHIATRY & NEUROLOGY

## 2025-03-18 PROCEDURE — 7100000001 HC PACU RECOVERY - ADDTL 15 MIN: Performed by: ANESTHESIOLOGY

## 2025-03-18 PROCEDURE — 6360000002 HC RX W HCPCS: Performed by: ANESTHESIOLOGY

## 2025-03-18 PROCEDURE — 3700000001 HC ADD 15 MINUTES (ANESTHESIA): Performed by: ANESTHESIOLOGY

## 2025-03-18 PROCEDURE — 2500000003 HC RX 250 WO HCPCS: Performed by: NURSE ANESTHETIST, CERTIFIED REGISTERED

## 2025-03-18 PROCEDURE — 90870 ELECTROCONVULSIVE THERAPY: CPT | Performed by: ANESTHESIOLOGY

## 2025-03-18 RX ORDER — KETOROLAC TROMETHAMINE 30 MG/ML
INJECTION, SOLUTION INTRAMUSCULAR; INTRAVENOUS
Status: DISCONTINUED | OUTPATIENT
Start: 2025-03-18 | End: 2025-03-18 | Stop reason: SDUPTHER

## 2025-03-18 RX ORDER — SODIUM CHLORIDE, SODIUM LACTATE, POTASSIUM CHLORIDE, CALCIUM CHLORIDE 600; 310; 30; 20 MG/100ML; MG/100ML; MG/100ML; MG/100ML
INJECTION, SOLUTION INTRAVENOUS CONTINUOUS
Status: DISCONTINUED | OUTPATIENT
Start: 2025-03-18 | End: 2025-03-19 | Stop reason: HOSPADM

## 2025-03-18 RX ORDER — KETOROLAC TROMETHAMINE 30 MG/ML
INJECTION, SOLUTION INTRAMUSCULAR; INTRAVENOUS
Status: COMPLETED
Start: 2025-03-18 | End: 2025-03-18

## 2025-03-18 RX ORDER — SODIUM CHLORIDE 9 MG/ML
INJECTION, SOLUTION INTRAVENOUS PRN
Status: DISCONTINUED | OUTPATIENT
Start: 2025-03-18 | End: 2025-03-19 | Stop reason: HOSPADM

## 2025-03-18 RX ORDER — SODIUM CHLORIDE 0.9 % (FLUSH) 0.9 %
5-40 SYRINGE (ML) INJECTION EVERY 12 HOURS SCHEDULED
Status: DISCONTINUED | OUTPATIENT
Start: 2025-03-18 | End: 2025-03-19 | Stop reason: HOSPADM

## 2025-03-18 RX ORDER — SUCCINYLCHOLINE/SOD CL,ISO/PF 200MG/10ML
SYRINGE (ML) INTRAVENOUS
Status: COMPLETED
Start: 2025-03-18 | End: 2025-03-18

## 2025-03-18 RX ORDER — METHOHEXITAL IN WATER/PF 100MG/10ML
SYRINGE (ML) INTRAVENOUS
Status: DISCONTINUED | OUTPATIENT
Start: 2025-03-18 | End: 2025-03-18 | Stop reason: SDUPTHER

## 2025-03-18 RX ORDER — METHOHEXITAL IN WATER/PF 100MG/10ML
SYRINGE (ML) INTRAVENOUS
Status: COMPLETED
Start: 2025-03-18 | End: 2025-03-18

## 2025-03-18 RX ORDER — SODIUM CHLORIDE 0.9 % (FLUSH) 0.9 %
5-40 SYRINGE (ML) INJECTION PRN
Status: DISCONTINUED | OUTPATIENT
Start: 2025-03-18 | End: 2025-03-19 | Stop reason: HOSPADM

## 2025-03-18 RX ORDER — SUCCINYLCHOLINE/SOD CL,ISO/PF 200MG/10ML
SYRINGE (ML) INTRAVENOUS
Status: DISCONTINUED | OUTPATIENT
Start: 2025-03-18 | End: 2025-03-18 | Stop reason: SDUPTHER

## 2025-03-18 RX ORDER — LIDOCAINE HYDROCHLORIDE 10 MG/ML
1 INJECTION, SOLUTION EPIDURAL; INFILTRATION; INTRACAUDAL; PERINEURAL
Status: DISCONTINUED | OUTPATIENT
Start: 2025-03-18 | End: 2025-03-19 | Stop reason: HOSPADM

## 2025-03-18 RX ORDER — ONDANSETRON 2 MG/ML
4 INJECTION INTRAMUSCULAR; INTRAVENOUS ONCE
Status: COMPLETED | OUTPATIENT
Start: 2025-03-18 | End: 2025-03-18

## 2025-03-18 RX ADMIN — Medication 120 MG: at 07:42

## 2025-03-18 RX ADMIN — SODIUM CHLORIDE, POTASSIUM CHLORIDE, SODIUM LACTATE AND CALCIUM CHLORIDE: 600; 310; 30; 20 INJECTION, SOLUTION INTRAVENOUS at 07:22

## 2025-03-18 RX ADMIN — ONDANSETRON 4 MG: 2 INJECTION, SOLUTION INTRAMUSCULAR; INTRAVENOUS at 08:23

## 2025-03-18 RX ADMIN — Medication 100 MG: at 07:42

## 2025-03-18 RX ADMIN — KETOROLAC TROMETHAMINE 15 MG: 30 INJECTION, SOLUTION INTRAMUSCULAR at 07:42

## 2025-03-18 ASSESSMENT — PAIN - FUNCTIONAL ASSESSMENT
PAIN_FUNCTIONAL_ASSESSMENT: 0-10
PAIN_FUNCTIONAL_ASSESSMENT: 0-10

## 2025-03-18 NOTE — INTERVAL H&P NOTE
Update History & Physical    The patient's History and Physical of March 7, 2025 was reviewed with the patient and I examined the patient. There was no change. The surgical site was confirmed by the patient and me.     UPDATE 3/18/2025  Alistair Hutchison is 30 y.o.,  male, here for ECT treatment.     Last ECT treatment was 3/14/2025. Pt tolerated last treatment well. Patient reports that his symptoms are improving.  Patient has complaints of some short term memory loss    Pt has had multiple failed treatment modalities. Pt has history of Disorganized schizophrenia    Mood is rated at  10/10.  Denies symptoms of hopelessness, helplessness, low energy.   Patient's sleep has been good. Appetite has been good.   Pt is not suicidal. Pt is not homicidal. Pt denies auditory/visual hallucinations.   Pt has been taking psychiatric medications as prescribed.   Pt does not have any other somatic complaints at this time.    NPO since MN  Cardiopulmonary assessment completed.  Unchanged  See nursing flowsheet for vital signs           Electronically signed by GIORGI Rust CNP on 3/18/2025 at 7:19 AM

## 2025-03-18 NOTE — PROGRESS NOTES
Pre ECT Assessment Note  Psychiatry  03/18/25       Alistair Hutchison  1995  [id number]      Subjective:     Patient is a 30 y.o.  male seen for an evaluation prior to today's electroconvulsive therapy treatment. Today is treatment number  16 , utilizing bilateral (3  BL and 12 RU treatments). This is course number 2.  The patient has previously completed a course of bilateral ECT for a total of 18 treatments that ended on 11/20/2024.  Acute alcoholic    Last ECT treatment 3/14/2025.    William seen prior to ECT with his grandfather at bedside.  He is pleasant with discussion.  William continues to do better with his behavioral outburst and has been more redirectable.  He celebrated his birthday over the weekend and states things went well.  He is able to recall the birthday celebration and his gifts.  His grandfather feels that ECT has been helpful and that they have noticed a difference at home.  He is responding less to internal stimuli.  Does not appear to be engaging in self talk.  William denies any perceptual disturbances.  He denies any suicidal thoughts.  Discussed frequency of treatments, and patient's grandfather feels current frequency has been helpful and would like to continue 2 times weekly for now.  Next scheduled treatment 3/21/2025.        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    Hyperthyroidism, subclinical 08/22/2024    Moderate mixed hyperlipidemia not requiring statin therapy 08/22/2024    Class 2 obesity without serious comorbidity with body mass index (BMI) of 38.0 to 38.9 in adult 08/22/2024    Hypovitaminosis D 10/18/2019    Rib pain on left side 10/18/2019     Past Medical History:   Diagnosis Date    Fracture     leg    Manic depression (HCC)     Oppositional defiant behavior     PONV (postoperative nausea

## 2025-03-18 NOTE — ANESTHESIA POSTPROCEDURE EVALUATION
Department of Anesthesiology  Postprocedure Note    Patient: Alistair Hutchison  MRN: [id number]  YOB: 1995  Date of evaluation: 3/18/2025    Procedure Summary       Date: 03/18/25 Room / Location: [facility] PACU    Anesthesia Start: 0738 Anesthesia Stop: 0753    Procedure: ECT W/ ANESTHESIA Diagnosis: Disorganized schizophrenia (HCC)    Scheduled Providers:  Responsible Provider: Kristel Alvarado MD    Anesthesia Type: General ASA Status: 3            Anesthesia Type: General    Amalia Phase I: Amalia Score: 10    Amalia Phase II:      Anesthesia Post Evaluation    Comments: POST- ANESTHESIA EVALUATION       Pt Name: Alistair Hutchison  MRN: [id number]  YOB: 1995  Date of evaluation: 3/18/2025  Time:  9:30 AM      BP (!) 137/127   Pulse 93   Temp 98.2 °F (36.8 °C) (Infrared)   Resp 17   Ht 1.651 m (5' 5\")   Wt 113.4 kg (250 lb)   SpO2 98%   BMI 41.60 kg/m²      Consciousness Level  Awake  Cardiopulmonary Status  Stable  Pain Adequately Treated YES  Nausea / Vomiting  NO  Adequate Hydration  YES  Anesthesia Related Complications NONE      Electronically signed by Kristel Alvarado MD on 3/18/2025 at 9:30 AM      No notable events documented.

## 2025-03-18 NOTE — PROCEDURES
Bilateral ECT Procedure Report  Alistair Hutchison   3/18/2025  1995         Attending:Corby Drake MD  Preprocedure diagnosis: disorganized schizophrenia  Postprocedure diagnosis: SAME AS PRE-PROCEDURE DIAGNOSIS  Treatment Number: This is treatment #16    Patient Status: Outpatient   Type of ECT: Bilateral Brief Pulse  Medications  Preprocedure: Tylenol 650mg and none  Anesthetic: Methohexital 100 mg  Paralytic: Succinylcholine 120 mg  Post procedure: none needed   Cuff placement: Left Lower Extremity    MECTA Settings 1st Stimulus:     Pulse width: 1.0 ms   Frequency:  40 hz   Duration:   2.0 seconds   Static Impedance: 772 ohms             Dynamic Impedance: 213 ohms             Motor Seizure Duration: 29 seconds  EEG Seizure Duration: 29 seconds             The patient's ECT treatment was performed using MECTA machine  .  Timeout:  Time out was performed using two patient identifiers and confirmation of procedure.     Patient Preparation: Preprocedure documentation, labs, H&P were all verified and reviewed.  The patient was placed in supine position.  EEG leads were placed for monitoring of seizure.   Yazidism areas bilaterally cleaned and prepped.  Conductive gel  was applied over stimulus electrode site.   The patient was pre-oxygenated.       Procedure in detail: Medications were administered by anesthesia using intravenous access at the doses listed previously in this summary.  Anesthetic administered and once confirmation the patient is anesthetized, the patient's blood pressure cuff on lower extremity was inflated to 100mmHg in excess of patient's blood pressure.  At this time, the neuromuscular blockade was administered intravenously by anesthesia.  Upper extremity fasciculation were verified followed by lower extremity fasciculation.  Once fasciculations terminated, confirmation of affective neuromuscular blockade demonstrated by absence of withdrawal reflex.  Bite blocks were put in place.

## 2025-03-18 NOTE — ANESTHESIA PRE PROCEDURE
Department of Anesthesiology  Preprocedure Note       Name:  Alistair Hutchison   Age:  30 y.o.  :  1995                                          MRN:  [id number]         Date:  3/18/2025      Surgeon: Dr Drake    Procedure: ECT    Medications prior to admission:   Prior to Admission medications    Medication Sig Start Date End Date Taking? Authorizing Provider   acetaminophen (TYLENOL) 325 MG tablet Take by mouth every 6 hours as needed for Pain   Yes Provider, MD Shan   paliperidone (INVEGA) 3 MG extended release tablet Take 1 tablet by mouth every morning As needed   Yes Provider, MD Shan   hydrOXYzine HCl (ATARAX) 50 MG tablet Take 1 tablet by mouth 2 times daily   Yes Provider, MD Shan   hyoscyamine (ANASPAZ;LEVSIN) 0.125 MG tablet Take 1 tablet by mouth every 4 hours as needed for Cramping   Yes Provider, MD Shan   propranolol (INDERAL) 10 MG tablet Take 1 tablet by mouth daily   Yes Provider, MD Shan   cloZAPine (CLOZARIL) 100 MG tablet Take 1 tablet by mouth 3 times daily 24  Yes Corby Drake MD   traZODone (DESYREL) 50 MG tablet Take 1 tablet by mouth nightly as needed for Sleep 10/2/24  Yes Corby Drake MD   paliperidone palmitate ER (INVEGA SUSTENNA) 234 MG/1.5ML FRANCHESKA IM injection Inject 234 mg into the muscle every 30 days 10/24/24   Corby Drake MD       Current medications:    Current Outpatient Medications   Medication Sig Dispense Refill   • acetaminophen (TYLENOL) 325 MG tablet Take by mouth every 6 hours as needed for Pain     • paliperidone (INVEGA) 3 MG extended release tablet Take 1 tablet by mouth every morning As needed     • hydrOXYzine HCl (ATARAX) 50 MG tablet Take 1 tablet by mouth 2 times daily     • hyoscyamine (ANASPAZ;LEVSIN) 0.125 MG tablet Take 1 tablet by mouth every 4 hours as needed for Cramping     • propranolol (INDERAL) 10 MG tablet Take 1 tablet by mouth daily     • cloZAPine (CLOZARIL) 100 MG tablet Take 1

## 2025-03-18 NOTE — DISCHARGE INSTRUCTIONS
treatments and it is during normal business hours call [phone]. If it is after normal business hours please call [facility] at [phone] to speak with a nurse, or go to your nearest emergency room.     If you need to schedule, reschedule or cancel an appointment, please call the [facility] at [phone]. You may also use this number for any questions regarding the ECT Support Group.    If you have an after hours or weekend cancellation please call [phone], they will forward message to appropriate party.    You will need to arrange transportation to and from the hospital for all outpatient ECT treatments.    Bring a current list of your medications, including dosages with you to every ECT treatment and arrive 1 hour and 30 minutes before your scheduled ECT time.

## 2025-03-20 ENCOUNTER — ANESTHESIA EVENT (OUTPATIENT)
Dept: POSTOP/PACU | Age: 30
End: 2025-03-20
Payer: COMMERCIAL

## 2025-03-21 ENCOUNTER — ANESTHESIA (OUTPATIENT)
Dept: POSTOP/PACU | Age: 30
End: 2025-03-21
Payer: COMMERCIAL

## 2025-03-21 ENCOUNTER — HOSPITAL ENCOUNTER (OUTPATIENT)
Dept: POSTOP/PACU | Age: 30
Discharge: HOME | End: 2025-03-21
Payer: COMMERCIAL

## 2025-03-21 VITALS
DIASTOLIC BLOOD PRESSURE: 82 MMHG | SYSTOLIC BLOOD PRESSURE: 126 MMHG | BODY MASS INDEX: 41.65 KG/M2 | RESPIRATION RATE: 22 BRPM | OXYGEN SATURATION: 98 % | TEMPERATURE: 98 F | HEART RATE: 104 BPM | HEIGHT: 65 IN | WEIGHT: 250 LBS

## 2025-03-21 PROCEDURE — 90870 ELECTROCONVULSIVE THERAPY: CPT | Performed by: PSYCHIATRY & NEUROLOGY

## 2025-03-21 PROCEDURE — 6360000002 HC RX W HCPCS: Performed by: NURSE ANESTHETIST, CERTIFIED REGISTERED

## 2025-03-21 PROCEDURE — 3700000001 HC ADD 15 MINUTES (ANESTHESIA): Performed by: ANESTHESIOLOGY

## 2025-03-21 PROCEDURE — 90870 ELECTROCONVULSIVE THERAPY: CPT | Performed by: ANESTHESIOLOGY

## 2025-03-21 PROCEDURE — 2580000003 HC RX 258: Performed by: ANESTHESIOLOGY

## 2025-03-21 PROCEDURE — 7100000001 HC PACU RECOVERY - ADDTL 15 MIN: Performed by: ANESTHESIOLOGY

## 2025-03-21 PROCEDURE — 3700000000 HC ANESTHESIA ATTENDED CARE: Performed by: ANESTHESIOLOGY

## 2025-03-21 PROCEDURE — 2500000003 HC RX 250 WO HCPCS: Performed by: NURSE ANESTHETIST, CERTIFIED REGISTERED

## 2025-03-21 PROCEDURE — 7100000000 HC PACU RECOVERY - FIRST 15 MIN: Performed by: ANESTHESIOLOGY

## 2025-03-21 RX ORDER — SODIUM CHLORIDE 9 MG/ML
INJECTION, SOLUTION INTRAVENOUS PRN
Status: DISCONTINUED | OUTPATIENT
Start: 2025-03-21 | End: 2025-03-22 | Stop reason: HOSPADM

## 2025-03-21 RX ORDER — METHOHEXITAL IN WATER/PF 100MG/10ML
SYRINGE (ML) INTRAVENOUS
Status: COMPLETED
Start: 2025-03-21 | End: 2025-03-21

## 2025-03-21 RX ORDER — SODIUM CHLORIDE 0.9 % (FLUSH) 0.9 %
5-40 SYRINGE (ML) INJECTION EVERY 12 HOURS SCHEDULED
Status: DISCONTINUED | OUTPATIENT
Start: 2025-03-21 | End: 2025-03-22 | Stop reason: HOSPADM

## 2025-03-21 RX ORDER — KETOROLAC TROMETHAMINE 30 MG/ML
INJECTION, SOLUTION INTRAMUSCULAR; INTRAVENOUS
Status: DISCONTINUED | OUTPATIENT
Start: 2025-03-21 | End: 2025-03-21 | Stop reason: SDUPTHER

## 2025-03-21 RX ORDER — SUCCINYLCHOLINE/SOD CL,ISO/PF 200MG/10ML
SYRINGE (ML) INTRAVENOUS
Status: COMPLETED
Start: 2025-03-21 | End: 2025-03-21

## 2025-03-21 RX ORDER — SUCCINYLCHOLINE/SOD CL,ISO/PF 200MG/10ML
SYRINGE (ML) INTRAVENOUS
Status: DISCONTINUED | OUTPATIENT
Start: 2025-03-21 | End: 2025-03-21 | Stop reason: SDUPTHER

## 2025-03-21 RX ORDER — METHOHEXITAL IN WATER/PF 100MG/10ML
SYRINGE (ML) INTRAVENOUS
Status: DISCONTINUED | OUTPATIENT
Start: 2025-03-21 | End: 2025-03-21 | Stop reason: SDUPTHER

## 2025-03-21 RX ORDER — LIDOCAINE HYDROCHLORIDE 10 MG/ML
1 INJECTION, SOLUTION EPIDURAL; INFILTRATION; INTRACAUDAL; PERINEURAL
Status: DISCONTINUED | OUTPATIENT
Start: 2025-03-21 | End: 2025-03-22 | Stop reason: HOSPADM

## 2025-03-21 RX ORDER — SODIUM CHLORIDE 0.9 % (FLUSH) 0.9 %
5-40 SYRINGE (ML) INJECTION PRN
Status: DISCONTINUED | OUTPATIENT
Start: 2025-03-21 | End: 2025-03-22 | Stop reason: HOSPADM

## 2025-03-21 RX ORDER — KETOROLAC TROMETHAMINE 30 MG/ML
INJECTION, SOLUTION INTRAMUSCULAR; INTRAVENOUS
Status: COMPLETED
Start: 2025-03-21 | End: 2025-03-21

## 2025-03-21 RX ORDER — ONDANSETRON 2 MG/ML
INJECTION INTRAMUSCULAR; INTRAVENOUS
Status: COMPLETED
Start: 2025-03-21 | End: 2025-03-21

## 2025-03-21 RX ORDER — ONDANSETRON 2 MG/ML
INJECTION INTRAMUSCULAR; INTRAVENOUS
Status: DISCONTINUED | OUTPATIENT
Start: 2025-03-21 | End: 2025-03-21 | Stop reason: SDUPTHER

## 2025-03-21 RX ORDER — SODIUM CHLORIDE, SODIUM LACTATE, POTASSIUM CHLORIDE, CALCIUM CHLORIDE 600; 310; 30; 20 MG/100ML; MG/100ML; MG/100ML; MG/100ML
INJECTION, SOLUTION INTRAVENOUS CONTINUOUS
Status: DISCONTINUED | OUTPATIENT
Start: 2025-03-21 | End: 2025-03-22 | Stop reason: HOSPADM

## 2025-03-21 RX ADMIN — SODIUM CHLORIDE, POTASSIUM CHLORIDE, SODIUM LACTATE AND CALCIUM CHLORIDE: 600; 310; 30; 20 INJECTION, SOLUTION INTRAVENOUS at 07:11

## 2025-03-21 RX ADMIN — Medication 120 MG: at 07:53

## 2025-03-21 RX ADMIN — KETOROLAC TROMETHAMINE 30 MG: 30 INJECTION, SOLUTION INTRAMUSCULAR at 07:50

## 2025-03-21 RX ADMIN — Medication 100 MG: at 07:53

## 2025-03-21 RX ADMIN — ONDANSETRON 4 MG: 2 INJECTION INTRAMUSCULAR; INTRAVENOUS at 07:50

## 2025-03-21 ASSESSMENT — PAIN - FUNCTIONAL ASSESSMENT
PAIN_FUNCTIONAL_ASSESSMENT: NONE - DENIES PAIN
PAIN_FUNCTIONAL_ASSESSMENT: 0-10

## 2025-03-21 NOTE — ANESTHESIA POSTPROCEDURE EVALUATION
Department of Anesthesiology  Postprocedure Note    Patient: Alistair Hutchison  MRN: [id number]  YOB: 1995  Date of evaluation: 3/21/2025    Procedure Summary       Date: 03/21/25 Room / Location: [facility] PACU    Anesthesia Start: 0748 Anesthesia Stop: 0806    Procedure: ECT W/ ANESTHESIA Diagnosis: Disorganized schizophrenia (HCC)    Scheduled Providers:  Responsible Provider: Kristel Alvarado MD    Anesthesia Type: general ASA Status: 3            Anesthesia Type: No value filed.    Amalia Phase I: Amalia Score: 4    Amalia Phase II:      Anesthesia Post Evaluation    Comments: POST- ANESTHESIA EVALUATION       Pt Name: Alistair Hutchison  MRN: [id number]  YOB: 1995  Date of evaluation: 3/21/2025  Time:  10:42 AM      /82   Pulse (!) 104   Temp 98 °F (36.7 °C) (Infrared)   Resp 22   Ht 1.651 m (5' 5\")   Wt 113.4 kg (250 lb)   SpO2 98%   BMI 41.60 kg/m²      Consciousness Level  Awake  Cardiopulmonary Status  Stable  Pain Adequately Treated YES  Nausea / Vomiting  NO  Adequate Hydration  YES  Anesthesia Related Complications NONE      Electronically signed by Kristel Alvarado MD on 3/21/2025 at 10:42 AM      No notable events documented.

## 2025-03-21 NOTE — PROCEDURES
Bilateral ECT Procedure Report  Alistair Hutchison   3/21/2025  1995         Attending:Corby Drake MD  Preprocedure diagnosis: disorganized schizophrenia  Postprocedure diagnosis: SAME AS PRE-PROCEDURE DIAGNOSIS  Treatment Number: This is treatment #17    Patient Status: Outpatient   Type of ECT: Bilateral Brief Pulse  Medications  Preprocedure: Tylenol 650mg and none  Anesthetic: Methohexital 100 mg  Paralytic: Succinylcholine 120 mg  Post procedure: none needed   Cuff placement: Left Lower Extremity    MECTA Settings 1st Stimulus:     Pulse width: 1.0 ms   Frequency:  60 hz   Duration:   2.0 seconds   Static Impedance: 789 ohms             Dynamic Impedance: 200ohms             Motor Seizure Duration: 33 seconds  EEG Seizure Duration: 33 seconds             The patient's ECT treatment was performed using MECTA machine  .  Timeout:  Time out was performed using two patient identifiers and confirmation of procedure.     Patient Preparation: Preprocedure documentation, labs, H&P were all verified and reviewed.  The patient was placed in supine position.  EEG leads were placed for monitoring of seizure.   Scientologist areas bilaterally cleaned and prepped.  Conductive gel  was applied over stimulus electrode site.   The patient was pre-oxygenated.       Procedure in detail: Medications were administered by anesthesia using intravenous access at the doses listed previously in this summary.  Anesthetic administered and once confirmation the patient is anesthetized, the patient's blood pressure cuff on lower extremity was inflated to 100mmHg in excess of patient's blood pressure.  At this time, the neuromuscular blockade was administered intravenously by anesthesia.  Upper extremity fasciculation were verified followed by lower extremity fasciculation.  Once fasciculations terminated, confirmation of affective neuromuscular blockade demonstrated by absence of withdrawal reflex.  Bite blocks were put in place.

## 2025-03-21 NOTE — INTERVAL H&P NOTE
Update History & Physical    The patient's History and Physical of March 7, 2025 was reviewed with the patient and I examined the patient.     Alistair Hutchison is 30 y.o., male, here for ECT treatment. Last ECT treatment was 03/18/25. Pt having ECT treatments twice per week. Pt and Jacqueline tolerated last treatment well and has voiced improvement. Sleep and appetite are good. Denies suicidal or homicidal ideation. Jacqueline reports Sean has occasional auditory hallucinations. Pt has been taking all medications as prescribed. Pt has no interval changes since last treatment visit.     Pt AAO x 3 in NAD. HRRR. No adventitious lung sounds. No respiratory distress. NPO p MN. Denies recent or current chest pain/pressure, palpitations, SOB, recent URI, fever or chills. Review vitals per RN flowsheet.       Electronically signed by GIORGI Shah CNP on 3/21/2025 at 6:46 AM

## 2025-03-21 NOTE — ANESTHESIA PRE PROCEDURE
90 tablet 0    traZODone (DESYREL) 50 MG tablet Take 1 tablet by mouth nightly as needed for Sleep 30 tablet 0    paliperidone (INVEGA) 3 MG extended release tablet Take 1 tablet by mouth every morning As needed (Patient not taking: Reported on 3/21/2025)      paliperidone palmitate ER (INVEGA SUSTENNA) 234 MG/1.5ML FRANCHESKA IM injection Inject 234 mg into the muscle every 30 days (Patient not taking: Reported on 3/21/2025) 1 each 0     Current Facility-Administered Medications   Medication Dose Route Frequency Provider Last Rate Last Admin    lidocaine PF 1 % injection 1 mL  1 mL IntraDERmal Once PRN Barby Leonard MD        sodium chloride flush 0.9 % injection 5-40 mL  5-40 mL IntraVENous 2 times per day Barby Leonard MD        sodium chloride flush 0.9 % injection 5-40 mL  5-40 mL IntraVENous PRN Barby Leonard MD        0.9 % sodium chloride infusion   IntraVENous PRN Barby Leonard MD        lactated ringers infusion   IntraVENous Continuous Barby Leonard  mL/hr at 03/21/25 0711 New Bag at 03/21/25 0711    succinylcholine (ANECTINE) 200 MG/10ML injection             ketorolac (TORADOL) 30 MG/ML injection             ondansetron (ZOFRAN) 4 MG/2ML injection             methohexital (BREVITAL SODIUM) 100 MG/10ML injection                Allergies:    Allergies   Allergen Reactions    Chocolate Diarrhea       Problem List:    Patient Active Problem List   Diagnosis Code    Hypovitaminosis D E55.9    Rib pain on left side R07.81    Acute psychosis (HCC) F23    Developmental delay R62.50    Hyperthyroidism, subclinical E05.90    Moderate mixed hyperlipidemia not requiring statin therapy E78.2    Class 2 obesity without serious comorbidity with body mass index (BMI) of 38.0 to 38.9 in adult E66.812, Z68.38    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

## 2025-03-24 DIAGNOSIS — F20.1 DISORGANIZED SCHIZOPHRENIA (HCC): Primary | ICD-10-CM

## 2025-03-25 ENCOUNTER — ANESTHESIA EVENT (OUTPATIENT)
Dept: POSTOP/PACU | Age: 30
End: 2025-03-25
Payer: COMMERCIAL

## 2025-03-26 ENCOUNTER — HOSPITAL ENCOUNTER (OUTPATIENT)
Dept: POSTOP/PACU | Age: 30
Discharge: HOME | End: 2025-03-26
Payer: COMMERCIAL

## 2025-03-26 ENCOUNTER — ANESTHESIA (OUTPATIENT)
Dept: POSTOP/PACU | Age: 30
End: 2025-03-26
Payer: COMMERCIAL

## 2025-03-26 VITALS
WEIGHT: 250 LBS | SYSTOLIC BLOOD PRESSURE: 124 MMHG | BODY MASS INDEX: 41.65 KG/M2 | HEART RATE: 95 BPM | OXYGEN SATURATION: 98 % | TEMPERATURE: 98.2 F | RESPIRATION RATE: 14 BRPM | DIASTOLIC BLOOD PRESSURE: 90 MMHG | HEIGHT: 65 IN

## 2025-03-26 PROCEDURE — 7100000000 HC PACU RECOVERY - FIRST 15 MIN: Performed by: ANESTHESIOLOGY

## 2025-03-26 PROCEDURE — 2580000003 HC RX 258: Performed by: ANESTHESIOLOGY

## 2025-03-26 PROCEDURE — 7100000001 HC PACU RECOVERY - ADDTL 15 MIN: Performed by: ANESTHESIOLOGY

## 2025-03-26 PROCEDURE — 6360000002 HC RX W HCPCS: Performed by: NURSE ANESTHETIST, CERTIFIED REGISTERED

## 2025-03-26 PROCEDURE — 90870 ELECTROCONVULSIVE THERAPY: CPT | Performed by: ANESTHESIOLOGY

## 2025-03-26 PROCEDURE — 2500000003 HC RX 250 WO HCPCS: Performed by: NURSE ANESTHETIST, CERTIFIED REGISTERED

## 2025-03-26 PROCEDURE — 3700000000 HC ANESTHESIA ATTENDED CARE: Performed by: ANESTHESIOLOGY

## 2025-03-26 RX ORDER — SODIUM CHLORIDE 0.9 % (FLUSH) 0.9 %
5-40 SYRINGE (ML) INJECTION PRN
Status: DISCONTINUED | OUTPATIENT
Start: 2025-03-26 | End: 2025-03-27 | Stop reason: HOSPADM

## 2025-03-26 RX ORDER — SODIUM CHLORIDE, SODIUM LACTATE, POTASSIUM CHLORIDE, CALCIUM CHLORIDE 600; 310; 30; 20 MG/100ML; MG/100ML; MG/100ML; MG/100ML
INJECTION, SOLUTION INTRAVENOUS CONTINUOUS
Status: DISCONTINUED | OUTPATIENT
Start: 2025-03-26 | End: 2025-03-27 | Stop reason: HOSPADM

## 2025-03-26 RX ORDER — METHOHEXITAL IN WATER/PF 100MG/10ML
SYRINGE (ML) INTRAVENOUS
Status: DISCONTINUED | OUTPATIENT
Start: 2025-03-26 | End: 2025-03-26 | Stop reason: SDUPTHER

## 2025-03-26 RX ORDER — METHOHEXITAL IN WATER/PF 100MG/10ML
SYRINGE (ML) INTRAVENOUS
Status: COMPLETED
Start: 2025-03-26 | End: 2025-03-26

## 2025-03-26 RX ORDER — SODIUM CHLORIDE 0.9 % (FLUSH) 0.9 %
5-40 SYRINGE (ML) INJECTION EVERY 12 HOURS SCHEDULED
Status: DISCONTINUED | OUTPATIENT
Start: 2025-03-26 | End: 2025-03-27 | Stop reason: HOSPADM

## 2025-03-26 RX ORDER — ONDANSETRON 2 MG/ML
INJECTION INTRAMUSCULAR; INTRAVENOUS
Status: COMPLETED
Start: 2025-03-26 | End: 2025-03-26

## 2025-03-26 RX ORDER — KETOROLAC TROMETHAMINE 30 MG/ML
INJECTION, SOLUTION INTRAMUSCULAR; INTRAVENOUS
Status: COMPLETED
Start: 2025-03-26 | End: 2025-03-26

## 2025-03-26 RX ORDER — KETOROLAC TROMETHAMINE 30 MG/ML
INJECTION, SOLUTION INTRAMUSCULAR; INTRAVENOUS
Status: DISCONTINUED | OUTPATIENT
Start: 2025-03-26 | End: 2025-03-26 | Stop reason: SDUPTHER

## 2025-03-26 RX ORDER — SUCCINYLCHOLINE/SOD CL,ISO/PF 200MG/10ML
SYRINGE (ML) INTRAVENOUS
Status: COMPLETED
Start: 2025-03-26 | End: 2025-03-26

## 2025-03-26 RX ORDER — LIDOCAINE HYDROCHLORIDE 10 MG/ML
1 INJECTION, SOLUTION EPIDURAL; INFILTRATION; INTRACAUDAL; PERINEURAL
Status: DISCONTINUED | OUTPATIENT
Start: 2025-03-26 | End: 2025-03-27 | Stop reason: HOSPADM

## 2025-03-26 RX ORDER — ONDANSETRON 2 MG/ML
INJECTION INTRAMUSCULAR; INTRAVENOUS
Status: DISCONTINUED | OUTPATIENT
Start: 2025-03-26 | End: 2025-03-26 | Stop reason: SDUPTHER

## 2025-03-26 RX ORDER — SODIUM CHLORIDE 9 MG/ML
INJECTION, SOLUTION INTRAVENOUS PRN
Status: DISCONTINUED | OUTPATIENT
Start: 2025-03-26 | End: 2025-03-27 | Stop reason: HOSPADM

## 2025-03-26 RX ORDER — SUCCINYLCHOLINE/SOD CL,ISO/PF 200MG/10ML
SYRINGE (ML) INTRAVENOUS
Status: DISCONTINUED | OUTPATIENT
Start: 2025-03-26 | End: 2025-03-26 | Stop reason: SDUPTHER

## 2025-03-26 RX ADMIN — Medication 100 MG: at 08:09

## 2025-03-26 RX ADMIN — Medication 120 MG: at 08:09

## 2025-03-26 RX ADMIN — KETOROLAC TROMETHAMINE 30 MG: 30 INJECTION, SOLUTION INTRAMUSCULAR at 08:02

## 2025-03-26 RX ADMIN — ONDANSETRON 4 MG: 2 INJECTION INTRAMUSCULAR; INTRAVENOUS at 08:09

## 2025-03-26 RX ADMIN — SODIUM CHLORIDE, POTASSIUM CHLORIDE, SODIUM LACTATE AND CALCIUM CHLORIDE: 600; 310; 30; 20 INJECTION, SOLUTION INTRAVENOUS at 07:09

## 2025-03-26 NOTE — INTERVAL H&P NOTE
Update History & Physical    The patient's History and Physical of March 7, 2025 was reviewed with the patient and I examined the patient. There was no change. The surgical site was confirmed by the patient and me.     UPDATE 3/26/2025  Alistair Hutchison is 30 y.o.,  male, here for ECT treatment.     Last ECT treatment was 3/21/2025. Pt tolerated last treatment well. Patient reports that his symptoms are improving.  Patient has complaints of some short term memory loss    Pt has had multiple failed treatment modalities. Pt has history of Disorganized schizophrenia. Pt has failed multiple modalities. .   Mood is rated at  9/10.    Patient's sleep has been good. Appetite has been good.   Pt is not suicidal. Pt states he has thoughts of beating up an [place] on TV. Pt has auditory/visual hallucinations.   Pt has been taking psychiatric medications as prescribed.   Pt does not have any other somatic complaints at this time.    Cardiopulmonary assessment completed.  Unchanged  See nursing flowsheet for vital signs        Electronically signed by GIORGI Rust CNP on 3/26/2025 at 6:27 AM

## 2025-03-26 NOTE — PROGRESS NOTES
Pre ECT Assessment Note  Psychiatry  03/26/25       Alistair Hutchison  1995  [id number]      Subjective:     Patient is a 30 y.o.  male seen for an evaluation prior to today's electroconvulsive therapy treatment. Today is treatment number  18 , utilizing bilateral (6  BL and 12 RU treatments). This is course number 2.  The patient has previously completed a course of bilateral ECT for a total of 18 treatments that ended on 11/20/2024.      Last ECT treatment 3/21/2025.    William seen prior to ECT with his grandfather at bedside.  He is pleasant with discussion and smiles frequently throughout interaction.  His grandfather continues to report that William has been doing very well. His incontinence has improved.  He denies any suicidal or homicidal thoughts.  He does not express any perceptual disturbances or paranoia.  Denies any noticeable confusion or cognitive side effects.  Plan to continue ECT twice weekly with next scheduled treatment 3/26 and 3/28.  Patient is grandmother/guardian does not feel he is ready to begin tapering treatments at this time.        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    Hyperthyroidism, subclinical 08/22/2024    Moderate mixed hyperlipidemia not requiring statin therapy 08/22/2024    Class 2 obesity without serious comorbidity with body mass index (BMI) of 38.0 to 38.9 in adult 08/22/2024    Hypovitaminosis D 10/18/2019    Rib pain on left side 10/18/2019     Past Medical History:   Diagnosis Date    Fracture     leg    Manic depression ([facility])     Oppositional defiant behavior     PONV (postoperative nausea and vomiting)     Schizophrenia ([facility])       Past Surgical History:   Procedure Laterality Date    EYE SURGERY      TYMPANOSTOMY TUBE PLACEMENT        Not in a hospital admission.  Allergies

## 2025-03-26 NOTE — ANESTHESIA POSTPROCEDURE EVALUATION
Department of Anesthesiology  Postprocedure Note    Patient: Alistair Hutchison  MRN: [id number]  YOB: 1995  Date of evaluation: 3/26/2025    Procedure Summary       Date: 03/26/25 Room / Location: [facility] PACU    Anesthesia Start: 0802 Anesthesia Stop: 0815    Procedure: ECT W/ ANESTHESIA Diagnosis: Disorganized schizophrenia (HCC)    Scheduled Providers:  Responsible Provider: Zacarias Pandey MD    Anesthesia Type: general ASA Status: 3            Anesthesia Type: No value filed.    Amalia Phase I: Amalia Score: 10    Amalia Phase II:      Anesthesia Post Evaluation    Patient location during evaluation: PACU  Patient participation: complete - patient participated  Level of consciousness: awake and alert  Airway patency: patent  Nausea & Vomiting: no vomiting  Cardiovascular status: hemodynamically stable  Respiratory status: acceptable  Hydration status: euvolemic  Comments: POST- ANESTHESIA EVALUATION       Pt Name: Alistair Hutchison  MRN: [id number]  YOB: 1995  Date of evaluation: 3/26/2025  Time:  9:27 AM      BP (!) 124/90   Pulse 95   Temp 98.2 °F (36.8 °C)   Resp 14   Ht 1.651 m (5' 5\")   Wt 113.4 kg (250 lb)   SpO2 98%   BMI 41.60 kg/m²      Consciousness Level  Awake  Cardiopulmonary Status  Stable  Pain Adequately Treated YES  Nausea / Vomiting  NO  Adequate Hydration  YES  Anesthesia Related Complications NONE      Electronically signed by Zacarias Pandey MD on 3/26/2025 at 9:27 AM         Pain management: satisfactory to patient    No notable events documented.

## 2025-03-26 NOTE — PROCEDURES
Bilateral ECT Procedure Report  Alistair Hutchison   3/26/2025  1995         Attending:Corby Drake MD  Preprocedure diagnosis: disorganized schizophrenia  Postprocedure diagnosis: SAME AS PRE-PROCEDURE DIAGNOSIS  Treatment Number: This is treatment #18    Patient Status: Outpatient   Type of ECT: Bilateral Brief Pulse  Medications  Preprocedure: Tylenol 650mg and none  Anesthetic: Methohexital 100 mg  Paralytic: Succinylcholine 120 mg  Post procedure: none needed   Cuff placement: Left Lower Extremity    MECTA Settings 1st Stimulus:     Pulse width: 1.0 ms   Frequency:  60 hz   Duration:   2.0 seconds   Static Impedance: 649 ohms             Dynamic Impedance: 200 ohms             Motor Seizure Duration: 26 seconds  EEG Seizure Duration: 35 seconds             The patient's ECT treatment was performed using MECTA machine  .  Timeout:  Time out was performed using two patient identifiers and confirmation of procedure.     Patient Preparation: Preprocedure documentation, labs, H&P were all verified and reviewed.  The patient was placed in supine position.  EEG leads were placed for monitoring of seizure.   Worship areas bilaterally cleaned and prepped.  Conductive gel  was applied over stimulus electrode site.   The patient was pre-oxygenated.       Procedure in detail: Medications were administered by anesthesia using intravenous access at the doses listed previously in this summary.  Anesthetic administered and once confirmation the patient is anesthetized, the patient's blood pressure cuff on lower extremity was inflated to 100mmHg in excess of patient's blood pressure.  At this time, the neuromuscular blockade was administered intravenously by anesthesia.  Upper extremity fasciculation were verified followed by lower extremity fasciculation.  Once fasciculations terminated, confirmation of affective neuromuscular blockade demonstrated by absence of withdrawal reflex.  Bite blocks were put in place.

## 2025-03-26 NOTE — ANESTHESIA PRE PROCEDURE
Department of Anesthesiology  Preprocedure Note       Name:  Alistair Hutchison   Age:  30 y.o.  :  1995                                          MRN:  [id number]         Date:  3/26/2025      Surgeon: * No surgeons listed *    Procedure: * No procedures listed *    Medications prior to admission:   Prior to Admission medications    Medication Sig Start Date End Date Taking? Authorizing Provider   acetaminophen (TYLENOL) 325 MG tablet Take by mouth every 6 hours as needed for Pain   Yes Shan Curran MD   hydrOXYzine HCl (ATARAX) 50 MG tablet Take 1 tablet by mouth 2 times daily   Yes Shan Curran MD   hyoscyamine (ANASPAZ;LEVSIN) 0.125 MG tablet Take 1 tablet by mouth every 4 hours as needed for Cramping   Yes Shan Curran MD   propranolol (INDERAL) 10 MG tablet Take 1 tablet by mouth daily   Yes Shan Curran MD   cloZAPine (CLOZARIL) 100 MG tablet Take 1 tablet by mouth 3 times daily 24  Yes Corby Drake MD   traZODone (DESYREL) 50 MG tablet Take 1 tablet by mouth nightly as needed for Sleep 10/2/24  Yes Corby Drake MD   paliperidone (INVEGA) 3 MG extended release tablet Take 1 tablet by mouth every morning As needed  Patient not taking: Reported on 3/21/2025    Shan Curran MD   paliperidone palmitate ER (INVEGA SUSTENNA) 234 MG/1.5ML FRANCHESKA IM injection Inject 234 mg into the muscle every 30 days  Patient not taking: Reported on 3/21/2025 10/24/24   Corby Drake MD       Current medications:    Current Outpatient Medications   Medication Sig Dispense Refill   • acetaminophen (TYLENOL) 325 MG tablet Take by mouth every 6 hours as needed for Pain     • hydrOXYzine HCl (ATARAX) 50 MG tablet Take 1 tablet by mouth 2 times daily     • hyoscyamine (ANASPAZ;LEVSIN) 0.125 MG tablet Take 1 tablet by mouth every 4 hours as needed for Cramping     • propranolol (INDERAL) 10 MG tablet Take 1 tablet by mouth daily     • cloZAPine (CLOZARIL) 100 MG

## 2025-03-27 ENCOUNTER — ANESTHESIA EVENT (OUTPATIENT)
Dept: POSTOP/PACU | Age: 30
End: 2025-03-27
Payer: COMMERCIAL

## 2025-03-27 ENCOUNTER — TELEPHONE (OUTPATIENT)
Dept: PSYCHIATRY | Age: 30
End: 2025-03-27

## 2025-03-27 NOTE — TELEPHONE ENCOUNTER
Per Nadeem prior auth for ECT, CPT code 81920, is no longer required. Copy of response scanned to media.

## 2025-03-28 ENCOUNTER — ANESTHESIA (OUTPATIENT)
Dept: POSTOP/PACU | Age: 30
End: 2025-03-28
Payer: COMMERCIAL

## 2025-03-28 ENCOUNTER — HOSPITAL ENCOUNTER (OUTPATIENT)
Dept: POSTOP/PACU | Age: 30
Discharge: HOME | End: 2025-03-28
Payer: COMMERCIAL

## 2025-03-28 VITALS
TEMPERATURE: 98.3 F | BODY MASS INDEX: 41.65 KG/M2 | OXYGEN SATURATION: 94 % | HEART RATE: 102 BPM | SYSTOLIC BLOOD PRESSURE: 132 MMHG | WEIGHT: 250 LBS | RESPIRATION RATE: 20 BRPM | DIASTOLIC BLOOD PRESSURE: 95 MMHG | HEIGHT: 65 IN

## 2025-03-28 PROCEDURE — 7100000000 HC PACU RECOVERY - FIRST 15 MIN: Performed by: ANESTHESIOLOGY

## 2025-03-28 PROCEDURE — 90870 ELECTROCONVULSIVE THERAPY: CPT

## 2025-03-28 PROCEDURE — 3700000000 HC ANESTHESIA ATTENDED CARE: Performed by: ANESTHESIOLOGY

## 2025-03-28 PROCEDURE — 7100000001 HC PACU RECOVERY - ADDTL 15 MIN: Performed by: ANESTHESIOLOGY

## 2025-03-28 PROCEDURE — 6360000002 HC RX W HCPCS: Performed by: ANESTHESIOLOGY

## 2025-03-28 PROCEDURE — 2580000003 HC RX 258: Performed by: ANESTHESIOLOGY

## 2025-03-28 PROCEDURE — 6360000002 HC RX W HCPCS

## 2025-03-28 PROCEDURE — 2500000003 HC RX 250 WO HCPCS

## 2025-03-28 RX ORDER — SODIUM CHLORIDE 9 MG/ML
INJECTION, SOLUTION INTRAVENOUS PRN
Status: DISCONTINUED | OUTPATIENT
Start: 2025-03-28 | End: 2025-03-29 | Stop reason: HOSPADM

## 2025-03-28 RX ORDER — SUCCINYLCHOLINE/SOD CL,ISO/PF 200MG/10ML
SYRINGE (ML) INTRAVENOUS
Status: DISCONTINUED | OUTPATIENT
Start: 2025-03-28 | End: 2025-03-28 | Stop reason: SDUPTHER

## 2025-03-28 RX ORDER — ONDANSETRON 2 MG/ML
INJECTION INTRAMUSCULAR; INTRAVENOUS
Status: DISCONTINUED | OUTPATIENT
Start: 2025-03-28 | End: 2025-03-28 | Stop reason: SDUPTHER

## 2025-03-28 RX ORDER — METHOHEXITAL IN WATER/PF 100MG/10ML
SYRINGE (ML) INTRAVENOUS
Status: COMPLETED
Start: 2025-03-28 | End: 2025-03-28

## 2025-03-28 RX ORDER — KETOROLAC TROMETHAMINE 30 MG/ML
INJECTION, SOLUTION INTRAMUSCULAR; INTRAVENOUS
Status: DISCONTINUED | OUTPATIENT
Start: 2025-03-28 | End: 2025-03-28 | Stop reason: SDUPTHER

## 2025-03-28 RX ORDER — METHOHEXITAL IN WATER/PF 100MG/10ML
SYRINGE (ML) INTRAVENOUS
Status: DISCONTINUED | OUTPATIENT
Start: 2025-03-28 | End: 2025-03-28 | Stop reason: SDUPTHER

## 2025-03-28 RX ORDER — SODIUM CHLORIDE 0.9 % (FLUSH) 0.9 %
5-40 SYRINGE (ML) INJECTION EVERY 12 HOURS SCHEDULED
Status: DISCONTINUED | OUTPATIENT
Start: 2025-03-28 | End: 2025-03-29 | Stop reason: HOSPADM

## 2025-03-28 RX ORDER — SODIUM CHLORIDE, SODIUM LACTATE, POTASSIUM CHLORIDE, CALCIUM CHLORIDE 600; 310; 30; 20 MG/100ML; MG/100ML; MG/100ML; MG/100ML
INJECTION, SOLUTION INTRAVENOUS CONTINUOUS
Status: DISCONTINUED | OUTPATIENT
Start: 2025-03-28 | End: 2025-03-29 | Stop reason: HOSPADM

## 2025-03-28 RX ORDER — ONDANSETRON 2 MG/ML
INJECTION INTRAMUSCULAR; INTRAVENOUS
Status: COMPLETED
Start: 2025-03-28 | End: 2025-03-28

## 2025-03-28 RX ORDER — SUCCINYLCHOLINE/SOD CL,ISO/PF 200MG/10ML
SYRINGE (ML) INTRAVENOUS
Status: COMPLETED
Start: 2025-03-28 | End: 2025-03-28

## 2025-03-28 RX ORDER — KETOROLAC TROMETHAMINE 30 MG/ML
INJECTION, SOLUTION INTRAMUSCULAR; INTRAVENOUS
Status: COMPLETED
Start: 2025-03-28 | End: 2025-03-28

## 2025-03-28 RX ORDER — SODIUM CHLORIDE 0.9 % (FLUSH) 0.9 %
5-40 SYRINGE (ML) INJECTION PRN
Status: DISCONTINUED | OUTPATIENT
Start: 2025-03-28 | End: 2025-03-29 | Stop reason: HOSPADM

## 2025-03-28 RX ORDER — LIDOCAINE HYDROCHLORIDE 10 MG/ML
1 INJECTION, SOLUTION EPIDURAL; INFILTRATION; INTRACAUDAL; PERINEURAL
Status: COMPLETED | OUTPATIENT
Start: 2025-03-28 | End: 2025-03-28

## 2025-03-28 RX ADMIN — SODIUM CHLORIDE, POTASSIUM CHLORIDE, SODIUM LACTATE AND CALCIUM CHLORIDE: 600; 310; 30; 20 INJECTION, SOLUTION INTRAVENOUS at 07:40

## 2025-03-28 RX ADMIN — Medication 120 MG: at 07:59

## 2025-03-28 RX ADMIN — Medication 100 MG: at 07:58

## 2025-03-28 RX ADMIN — KETOROLAC TROMETHAMINE 30 MG: 30 INJECTION, SOLUTION INTRAMUSCULAR at 07:58

## 2025-03-28 RX ADMIN — LIDOCAINE HYDROCHLORIDE 1 ML: 10 INJECTION, SOLUTION EPIDURAL; INFILTRATION; INTRACAUDAL; PERINEURAL at 07:40

## 2025-03-28 RX ADMIN — ONDANSETRON 4 MG: 2 INJECTION, SOLUTION INTRAMUSCULAR; INTRAVENOUS at 07:58

## 2025-03-28 NOTE — ANESTHESIA PRE PROCEDURE
Department of Anesthesiology  Preprocedure Note       Name:  Alistair Hutchison   Age:  30 y.o.  :  1995                                          MRN:  [id number]         Date:  3/28/2025      Surgeon: Dr Drake    Procedure: ECT    Medications prior to admission:   Prior to Admission medications    Medication Sig Start Date End Date Taking? Authorizing Provider   acetaminophen (TYLENOL) 325 MG tablet Take by mouth every 6 hours as needed for Pain    Shan Curran MD   paliperidone (INVEGA) 3 MG extended release tablet Take 1 tablet by mouth every morning As needed  Patient not taking: Reported on 3/21/2025    ProviderShan MD   hydrOXYzine HCl (ATARAX) 50 MG tablet Take 1 tablet by mouth 2 times daily    Shan Curran MD   hyoscyamine (ANASPAZ;LEVSIN) 0.125 MG tablet Take 1 tablet by mouth every 4 hours as needed for Cramping    Shan Curran MD   propranolol (INDERAL) 10 MG tablet Take 1 tablet by mouth daily    Shan Curran MD   cloZAPine (CLOZARIL) 100 MG tablet Take 1 tablet by mouth 3 times daily 24   Corby Drake MD   traZODone (DESYREL) 50 MG tablet Take 1 tablet by mouth nightly as needed for Sleep 10/2/24   Corby Drake MD   paliperidone palmitate ER (INVEGA SUSTENNA) 234 MG/1.5ML FRANCHESKA IM injection Inject 234 mg into the muscle every 30 days  Patient not taking: Reported on 3/21/2025 10/24/24   Corby Drake MD       Current medications:    Current Outpatient Medications   Medication Sig Dispense Refill    acetaminophen (TYLENOL) 325 MG tablet Take by mouth every 6 hours as needed for Pain      paliperidone (INVEGA) 3 MG extended release tablet Take 1 tablet by mouth every morning As needed (Patient not taking: Reported on 3/21/2025)      hydrOXYzine HCl (ATARAX) 50 MG tablet Take 1 tablet by mouth 2 times daily      hyoscyamine (ANASPAZ;LEVSIN) 0.125 MG tablet Take 1 tablet by mouth every 4 hours as needed for Cramping      propranolol

## 2025-03-28 NOTE — ANESTHESIA POSTPROCEDURE EVALUATION
Department of Anesthesiology  Postprocedure Note    Patient: Alistair Hutchison  MRN: [id number]  YOB: 1995  Date of evaluation: 3/28/2025    Procedure Summary       Date: 03/28/25 Room / Location: [facility] [place] PACU    Anesthesia Start: 0756 Anesthesia Stop: 0808    Procedure: ECT W/ ANESTHESIA Diagnosis: Disorganized schizophrenia (HCC)    Scheduled Providers:  Responsible Provider: Jewel Ji MD    Anesthesia Type: general ASA Status: 3            Anesthesia Type: No value filed.    Amalia Phase I: Amalia Score: 10    Amalia Phase II:      Anesthesia Post Evaluation    Comments: POST- ANESTHESIA EVALUATION       Pt Name: Alistair Hutchison  MRN: [id number]  YOB: 1995  Date of evaluation: 3/28/2025  Time:  9:03 AM      BP (!) 132/95   Pulse (!) 102   Temp 98.3 °F (36.8 °C) (Infrared)   Resp 20   Ht 1.651 m (5' 5\")   Wt 113.4 kg (250 lb)   SpO2 94%   BMI 41.60 kg/m²      Consciousness Level  Awake  Cardiopulmonary Status  Stable  Pain Adequately Treated YES  Nausea / Vomiting  NO  Adequate Hydration  YES  Anesthesia Related Complications NONE      Electronically signed by Jewel Ji MD on 3/28/2025 at 9:03 AM           No notable events documented.

## 2025-03-28 NOTE — PROCEDURES
Bilateral ECT Procedure Report  Alistair Hutchison   3/28/2025  1995         Attending:Corby Drake MD  Preprocedure diagnosis: disorganized schizophrenia  Postprocedure diagnosis: SAME AS PRE-PROCEDURE DIAGNOSIS  Treatment Number: This is treatment #19    Patient Status: Outpatient   Type of ECT: Bilateral Brief Pulse  Medications  Preprocedure: Tylenol 650mg and none  Anesthetic: Methohexital 100 mg  Paralytic: Succinylcholine 120 mg  Post procedure: none needed   Cuff placement: Left Lower Extremity    MECTA Settings 1st Stimulus:     Pulse width: 1.0 ms   Frequency:  60 hz   Duration:   3.0 seconds   Static Impedance: 486 ohms             Dynamic Impedance: 190 ohms             Motor Seizure Duration: 29 seconds  EEG Seizure Duration: 29 seconds             The patient's ECT treatment was performed using MECTA machine  .  Timeout:  Time out was performed using two patient identifiers and confirmation of procedure.     Patient Preparation: Preprocedure documentation, labs, H&P were all verified and reviewed.  The patient was placed in supine position.  EEG leads were placed for monitoring of seizure.   Restorationist areas bilaterally cleaned and prepped.  Conductive gel  was applied over stimulus electrode site.   The patient was pre-oxygenated.       Procedure in detail: Medications were administered by anesthesia using intravenous access at the doses listed previously in this summary.  Anesthetic administered and once confirmation the patient is anesthetized, the patient's blood pressure cuff on lower extremity was inflated to 100mmHg in excess of patient's blood pressure.  At this time, the neuromuscular blockade was administered intravenously by anesthesia.  Upper extremity fasciculation were verified followed by lower extremity fasciculation.  Once fasciculations terminated, confirmation of affective neuromuscular blockade demonstrated by absence of withdrawal reflex.  Bite blocks were put in place.

## 2025-03-28 NOTE — PROGRESS NOTES
Hypovitaminosis D 10/18/2019    Rib pain on left side 10/18/2019     Past Medical History:   Diagnosis Date    Fracture     leg    Manic depression (HCC)     Oppositional defiant behavior     PONV (postoperative nausea and vomiting)     Schizophrenia (formerly [facility])       Past Surgical History:   Procedure Laterality Date    EYE SURGERY      TYMPANOSTOMY TUBE PLACEMENT        Not in a hospital admission.  Allergies   Allergen Reactions    Chocolate Diarrhea      Social History     Tobacco Use    Smoking status: Never    Smokeless tobacco: Never   Substance Use Topics    Alcohol use: No      No family history on file.       Objective:       Mental Status Evaluation:  Appearance:  Wearing gown, on stretcher, well groomed   Behavior:  Engages with interviewer, smiles, childlike   Speech:  Normal rate, volume and tone   Mood:  \"good\"   Affect:  Brightened with discussion   Thought Process:  Linear and organized   Thought Content:  Denies suicidal ideation   Sensorium:  Does not appear to attend to internal stimuli   Cognition:  Oriented to person, place and general circumstance   Insight:  fair   Judgment:  fair     Assessment:     Diagnosis: Disorganized schizophrenia    Plan:     Taper frequency of bilateral ECT to once weekly starting this week.  Next scheduled treatment 4/4.  Monitor for stability in symptoms  Taper treatment frequency as tolerated.    Update consent and H&P every 30 days while undergoing ECT treatment, update labs every 6 months.   Patient verbalizes understanding of risks/benefits/alternatives to ECT.  Last informed consent signed on  3/7/2025 .

## 2025-03-28 NOTE — DISCHARGE INSTRUCTIONS
ELECTROCONVULSIVE THERAPY DISCHARGE INSTRUCTIONS         1. Activity - Rest today. The anesthetic agents you have received can make you feel drowsy or tired.  A responsible person must drive you home and stay with you for 8 hours after discharge from the Hospital. Do not drive a motor vehicle or operate any machinery for 24 hours. .   *Do not make any complex decisions or execute any legal documents until 3 weeks AFTER your last treatment.  If you have any questions regarding this, speak with your psychiatrist first.*    2. Diet - Nothing to eat or drink after midnight the night of your ECT treatment. After ECT, start with clear liquids, if you can drink without coughing, you may proceed with gradually progressing to your usual diet.    No alcoholic beverages for 24 hours.    3. Medications - Take your daily medications as prescribed, after you return home from today's ECT. If you take a Beta Blocker or have been prescribed Imitrex, you may be instructed to take these medications the morning of ECT. If you are unsure please ask the physician.     Take with these medication the morning of ECT with one Tablespoon of water.   Tylenol 650 mg  All blood pressure medications  ***    4. You may experience the following after your treatment:   a. Poor memory   b. Poor balance   c. Headaches   d. Confusion   e. Muscle soreness   f. Nausea      5. Special Precautions - Contact you physician immediately if these occur:   a. Signs of infection: redness, swelling, heat, red streaks at the site of the IV   b. Excessive pain unrelieved by prescription pain medications   c. Nausea and vomiting that persists beyond the first day after your procedure    6. Next Procedure Date:   Your next ECT treatment is scheduled for 7:40 am on Friday, April 4, 2025.  Please arrive at 6:20 am    If you feel you are having a problem or complication from your ECT treatments and it is during normal business hours call [phone]. If it is after

## 2025-03-28 NOTE — H&P
Normal heart sounds.   Pulmonary:      Breath sounds: Normal breath sounds.   Abdominal:      General: Bowel sounds are normal.      Tenderness: There is no abdominal tenderness. There is no guarding.   Neurological:      Mental Status: He is alert and oriented to person, place, and time.   Psychiatric:         Mood and Affect: Mood normal.         PROVISIONAL DIAGNOSES / SURGERY:       Disorganized schizophrenia.      ECT W ANESTHESIA          Patient Active Problem List     Diagnosis Date Noted    Developmental delay 03/21/2023    Acute psychosis (HCC) 03/20/2023    Schizophrenia, disorganized (HCC) 12/11/2024    Disorganized schizophrenia (HCC) 09/20/2024    Hyperthyroidism, subclinical 08/22/2024    Moderate mixed hyperlipidemia not requiring statin therapy 08/22/2024    Class 2 obesity without serious comorbidity with body mass index (BMI) of 38.0 to 38.9 in adult 08/22/2024    Hypovitaminosis D 10/18/2019    Rib pain on left side 10/18/2019            SIDNEY KNOTT, GIORGI - CNP on 3/7/2025 at 8:27 AM

## 2025-03-31 ENCOUNTER — HOSPITAL ENCOUNTER (OUTPATIENT)
Dept: POSTOP/PACU | Age: 30
Discharge: HOME | End: 2025-03-31

## 2025-04-03 ENCOUNTER — ANESTHESIA EVENT (OUTPATIENT)
Dept: POSTOP/PACU | Age: 30
End: 2025-04-03
Payer: COMMERCIAL

## 2025-04-04 ENCOUNTER — ANESTHESIA (OUTPATIENT)
Dept: POSTOP/PACU | Age: 30
End: 2025-04-04
Payer: COMMERCIAL

## 2025-04-04 ENCOUNTER — HOSPITAL ENCOUNTER (OUTPATIENT)
Dept: POSTOP/PACU | Age: 30
Discharge: HOME | End: 2025-04-04
Payer: COMMERCIAL

## 2025-04-04 VITALS
SYSTOLIC BLOOD PRESSURE: 139 MMHG | BODY MASS INDEX: 41.65 KG/M2 | RESPIRATION RATE: 35 BRPM | DIASTOLIC BLOOD PRESSURE: 95 MMHG | HEART RATE: 108 BPM | WEIGHT: 250 LBS | TEMPERATURE: 99.1 F | OXYGEN SATURATION: 97 % | HEIGHT: 65 IN

## 2025-04-04 DIAGNOSIS — F20.1 DISORGANIZED SCHIZOPHRENIA (HCC): Primary | ICD-10-CM

## 2025-04-04 PROCEDURE — 6360000002 HC RX W HCPCS: Performed by: ANESTHESIOLOGY

## 2025-04-04 PROCEDURE — 2500000003 HC RX 250 WO HCPCS: Performed by: NURSE ANESTHETIST, CERTIFIED REGISTERED

## 2025-04-04 PROCEDURE — 3700000001 HC ADD 15 MINUTES (ANESTHESIA): Performed by: ANESTHESIOLOGY

## 2025-04-04 PROCEDURE — 7100000001 HC PACU RECOVERY - ADDTL 15 MIN: Performed by: ANESTHESIOLOGY

## 2025-04-04 PROCEDURE — 90870 ELECTROCONVULSIVE THERAPY: CPT

## 2025-04-04 PROCEDURE — 7100000000 HC PACU RECOVERY - FIRST 15 MIN: Performed by: ANESTHESIOLOGY

## 2025-04-04 PROCEDURE — 3700000000 HC ANESTHESIA ATTENDED CARE: Performed by: ANESTHESIOLOGY

## 2025-04-04 PROCEDURE — 2580000003 HC RX 258: Performed by: ANESTHESIOLOGY

## 2025-04-04 PROCEDURE — 6360000002 HC RX W HCPCS: Performed by: NURSE ANESTHETIST, CERTIFIED REGISTERED

## 2025-04-04 RX ORDER — ONDANSETRON 2 MG/ML
INJECTION INTRAMUSCULAR; INTRAVENOUS
Status: DISCONTINUED | OUTPATIENT
Start: 2025-04-04 | End: 2025-04-04 | Stop reason: SDUPTHER

## 2025-04-04 RX ORDER — METHOHEXITAL IN WATER/PF 100MG/10ML
SYRINGE (ML) INTRAVENOUS
Status: COMPLETED
Start: 2025-04-04 | End: 2025-04-04

## 2025-04-04 RX ORDER — SODIUM CHLORIDE 0.9 % (FLUSH) 0.9 %
5-40 SYRINGE (ML) INJECTION EVERY 12 HOURS SCHEDULED
Status: DISCONTINUED | OUTPATIENT
Start: 2025-04-04 | End: 2025-04-05 | Stop reason: HOSPADM

## 2025-04-04 RX ORDER — SUCCINYLCHOLINE/SOD CL,ISO/PF 200MG/10ML
SYRINGE (ML) INTRAVENOUS
Status: COMPLETED
Start: 2025-04-04 | End: 2025-04-04

## 2025-04-04 RX ORDER — ONDANSETRON 2 MG/ML
INJECTION INTRAMUSCULAR; INTRAVENOUS
Status: COMPLETED
Start: 2025-04-04 | End: 2025-04-04

## 2025-04-04 RX ORDER — SODIUM CHLORIDE, SODIUM LACTATE, POTASSIUM CHLORIDE, CALCIUM CHLORIDE 600; 310; 30; 20 MG/100ML; MG/100ML; MG/100ML; MG/100ML
INJECTION, SOLUTION INTRAVENOUS CONTINUOUS
Status: DISCONTINUED | OUTPATIENT
Start: 2025-04-04 | End: 2025-04-05 | Stop reason: HOSPADM

## 2025-04-04 RX ORDER — SODIUM CHLORIDE 9 MG/ML
INJECTION, SOLUTION INTRAVENOUS PRN
Status: DISCONTINUED | OUTPATIENT
Start: 2025-04-04 | End: 2025-04-05 | Stop reason: HOSPADM

## 2025-04-04 RX ORDER — SODIUM CHLORIDE 0.9 % (FLUSH) 0.9 %
5-40 SYRINGE (ML) INJECTION PRN
Status: DISCONTINUED | OUTPATIENT
Start: 2025-04-04 | End: 2025-04-05 | Stop reason: HOSPADM

## 2025-04-04 RX ORDER — METHOHEXITAL IN WATER/PF 100MG/10ML
SYRINGE (ML) INTRAVENOUS
Status: DISCONTINUED | OUTPATIENT
Start: 2025-04-04 | End: 2025-04-04 | Stop reason: SDUPTHER

## 2025-04-04 RX ORDER — LIDOCAINE HYDROCHLORIDE 10 MG/ML
1 INJECTION, SOLUTION EPIDURAL; INFILTRATION; INTRACAUDAL; PERINEURAL
Status: COMPLETED | OUTPATIENT
Start: 2025-04-04 | End: 2025-04-04

## 2025-04-04 RX ORDER — KETOROLAC TROMETHAMINE 30 MG/ML
INJECTION, SOLUTION INTRAMUSCULAR; INTRAVENOUS
Status: DISCONTINUED | OUTPATIENT
Start: 2025-04-04 | End: 2025-04-04 | Stop reason: SDUPTHER

## 2025-04-04 RX ORDER — KETOROLAC TROMETHAMINE 30 MG/ML
INJECTION, SOLUTION INTRAMUSCULAR; INTRAVENOUS
Status: COMPLETED
Start: 2025-04-04 | End: 2025-04-04

## 2025-04-04 RX ORDER — SUCCINYLCHOLINE/SOD CL,ISO/PF 200MG/10ML
SYRINGE (ML) INTRAVENOUS
Status: DISCONTINUED | OUTPATIENT
Start: 2025-04-04 | End: 2025-04-04 | Stop reason: SDUPTHER

## 2025-04-04 RX ADMIN — SODIUM CHLORIDE, POTASSIUM CHLORIDE, SODIUM LACTATE AND CALCIUM CHLORIDE: 600; 310; 30; 20 INJECTION, SOLUTION INTRAVENOUS at 06:53

## 2025-04-04 RX ADMIN — ONDANSETRON 4 MG: 2 INJECTION INTRAMUSCULAR; INTRAVENOUS at 07:31

## 2025-04-04 RX ADMIN — Medication 120 MG: at 07:35

## 2025-04-04 RX ADMIN — Medication 100 MG: at 07:35

## 2025-04-04 RX ADMIN — LIDOCAINE HYDROCHLORIDE 1 ML: 10 INJECTION, SOLUTION EPIDURAL; INFILTRATION; INTRACAUDAL; PERINEURAL at 06:53

## 2025-04-04 RX ADMIN — KETOROLAC TROMETHAMINE 30 MG: 30 INJECTION, SOLUTION INTRAMUSCULAR at 07:31

## 2025-04-04 ASSESSMENT — PAIN - FUNCTIONAL ASSESSMENT
PAIN_FUNCTIONAL_ASSESSMENT: 0-10
PAIN_FUNCTIONAL_ASSESSMENT: 0-10

## 2025-04-04 NOTE — H&P
HISTORY and PHYSICAL  [facility]       NAME:  Alistair Hutchison  MRN: [id number]   YOB: 1995   Date: 4/4/2025   Age: 30 y.o.  Gender: male     H&P Update Note    H&P from 3/7/2025 reviewed and updated. Patient examined.     INTERVAL HISTORY:     Alistair Hutchison is 30 y.o.,  male, here for ECT treatment.   Last ECT treatment was 3/28/2025. Pt tolerated last treatment well. Patient unsure that his symptoms  improve or not yet.  Patient has complaints of some short term memory loss  Pt has had multiple failed treatment modalities. Pt has history of  history of Disorganized schizophrenia. Pt has failed multiple modalities.   Mood is rated at  10/10. With 10 is the best, pt denies feeling of hopelessness, helplessness, low energy.   Patient's sleep has been  good . Appetite has been fair .   Pt denies feeling of  suicidal/ homicidal. Pt has no auditory/visual hallucinations.   Pt has been taking psychiatric medications as prescribed.   Pt does not have any other somatic complaints at this time.    Patient is feeling well today, denies any fever/chills, chest pain, shortness of breath.  No interval changes.   Pt Npo since the past midnight, pt took Tylenol today with sip of water     No interval changes to past medical history, social history, family history.  Review of systems as stated above and otherwise negative.    PHYSICAL EXAM:     Vitals: BP (!) 131/99   Pulse (!) 103   Temp 98.2 °F (36.8 °C) (Infrared)   Resp 16   Ht 1.651 m (5' 5\")   Wt 113.4 kg (250 lb)   SpO2 99%   BMI 41.60 kg/m²  Body mass index is 41.6 kg/m².     Patient is alert and oriented, in no distress. Normotensive. Heart rate tachycardia,  Lungs clear to auscultation bilaterally. Abdomen is soft, non tender. No pedal edema.     No interval changes. I concur with the findings.     GIORGI Baugh CNP on 4/4/2025 at 6:57 AM         HISTORY and PHYSICAL  [facility]         NAME:

## 2025-04-04 NOTE — ANESTHESIA POSTPROCEDURE EVALUATION
Department of Anesthesiology  Postprocedure Note    Patient: Alistair Hutchison  MRN: [id number]  YOB: 1995  Date of evaluation: 4/4/2025    Procedure Summary       Date: 04/04/25 Room / Location: [facility] PACU    Anesthesia Start: 0728 Anesthesia Stop: 0747    Procedure: ECT W/ ANESTHESIA Diagnosis: Disorganized schizophrenia (HCC)    Scheduled Providers:  Responsible Provider: Zacarias Pandey MD    Anesthesia Type: general ASA Status: 3            Anesthesia Type: No value filed.    Amalia Phase I: Amalia Score: 10    Amalia Phase II:      Anesthesia Post Evaluation    Patient location during evaluation: PACU  Patient participation: complete - patient participated  Level of consciousness: awake and alert  Airway patency: patent  Nausea & Vomiting: no vomiting  Cardiovascular status: hemodynamically stable  Respiratory status: acceptable  Hydration status: euvolemic  Comments: POST- ANESTHESIA EVALUATION       Pt Name: Alistair Hutchison  MRN: [id number]  YOB: 1995  Date of evaluation: 4/4/2025  Time:  10:21 AM      BP (!) 139/95   Pulse (!) 108   Temp 99.1 °F (37.3 °C) (Infrared)   Resp (!) 35   Ht 1.651 m (5' 5\")   Wt 113.4 kg (250 lb)   SpO2 97%   BMI 41.60 kg/m²      Consciousness Level  Awake  Cardiopulmonary Status  Stable  Pain Adequately Treated YES  Nausea / Vomiting  NO  Adequate Hydration  YES  Anesthesia Related Complications NONE      Electronically signed by Zacarias Pandey MD on 4/4/2025 at 10:21 AM         Pain management: satisfactory to patient    No notable events documented.

## 2025-04-04 NOTE — ANESTHESIA PRE PROCEDURE
Department of Anesthesiology  Preprocedure Note       Name:  Alistair Hutchison   Age:  30 y.o.  :  1995                                          MRN:  [id number]         Date:  2025      Surgeon: * No surgeons listed *    Procedure: * No procedures listed *    Medications prior to admission:   Prior to Admission medications    Medication Sig Start Date End Date Taking? Authorizing Provider   acetaminophen (TYLENOL) 325 MG tablet Take by mouth every 6 hours as needed for Pain   Yes Shan Curran MD   hydrOXYzine HCl (ATARAX) 50 MG tablet Take 1 tablet by mouth 2 times daily   Yes Shan Curran MD   hyoscyamine (ANASPAZ;LEVSIN) 0.125 MG tablet Take 1 tablet by mouth every 4 hours as needed for Cramping   Yes Shan Curran MD   propranolol (INDERAL) 10 MG tablet Take 1 tablet by mouth daily   Yes Shan Curran MD   cloZAPine (CLOZARIL) 100 MG tablet Take 1 tablet by mouth 3 times daily 24  Yes Corby Drake MD   traZODone (DESYREL) 50 MG tablet Take 1 tablet by mouth nightly as needed for Sleep 10/2/24  Yes Corby Drake MD   paliperidone (INVEGA) 3 MG extended release tablet Take 1 tablet by mouth every morning As needed  Patient not taking: Reported on 2025    Shan Curran MD   paliperidone palmitate ER (INVEGA SUSTENNA) 234 MG/1.5ML FRANCHESKA IM injection Inject 234 mg into the muscle every 30 days  Patient not taking: Reported on 3/21/2025 10/24/24   Corby Drake MD       Current medications:    Current Outpatient Medications   Medication Sig Dispense Refill   • acetaminophen (TYLENOL) 325 MG tablet Take by mouth every 6 hours as needed for Pain     • hydrOXYzine HCl (ATARAX) 50 MG tablet Take 1 tablet by mouth 2 times daily     • hyoscyamine (ANASPAZ;LEVSIN) 0.125 MG tablet Take 1 tablet by mouth every 4 hours as needed for Cramping     • propranolol (INDERAL) 10 MG tablet Take 1 tablet by mouth daily     • cloZAPine (CLOZARIL) 100 MG tablet

## 2025-04-04 NOTE — PROGRESS NOTES
Pre ECT Assessment Note  Psychiatry  04/04/25       Alistair Hutchison  1995  [id number]      Subjective:     Patient is a 30 y.o.  male seen for an evaluation prior to today's electroconvulsive therapy treatment. Today is treatment number  20 , utilizing bilateral (8  BL and 12 RU treatments). This is course number 2.  The patient has previously completed a course of bilateral ECT for a total of 18 treatments that ended on 11/20/2024.      Last ECT treatment 3/28/2025.    William seen prior to ECT with his grandmother.  William is smiling and laughing.  His affect is brightened.  This is the first week where we have attempted to extend treatments to once weekly.  His grandmother reports that he decompensated and that he has been more internally preoccupied and irritable, arguing with his grandfather.  She does not feel that he tolerated extending treatments well and would like to restart 2 times weekly again.  Will plan for treatment Tuesday and Friday next week.  May attempt to extend treatments to every 5 days with a goal to extend treatments to once weekly if able to maintain stability.  Did attempt to discuss decompensation with William, but he has poor ability to understand topics and exhibits loose associations.  When mentioning be his behavior, he states he is excited to go to [employer] later today.  He also mentions his videogames.  There has been discussion regarding William to being placed in a group home, as there continues to be some interpersonal conflict between him and his grandfather.  At this time, he continues to live with his grandparents.        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

## 2025-04-04 NOTE — PROCEDURES
Bilateral ECT Procedure Report  Alistair Hutchison   4/4/2025  1995         Attending:Corby Drake MD  Preprocedure diagnosis: disorganized schizophrenia  Postprocedure diagnosis: SAME AS PRE-PROCEDURE DIAGNOSIS  Treatment Number: This is treatment #20    Patient Status: Outpatient   Type of ECT: Bilateral Brief Pulse  Medications  Preprocedure: Tylenol 650mg and none  Anesthetic: Methohexital 100 mg  Paralytic: Succinylcholine 120 mg  Post procedure: none needed   Cuff placement: Left Lower Extremity    MECTA Settings 1st Stimulus:     Pulse width: 1.0 ms   Frequency:  60 hz   Duration:   3.0 seconds   Static Impedance: 736 ohms             Dynamic Impedance: 216 ohms             Motor Seizure Duration: 29 seconds  EEG Seizure Duration: 29 seconds             The patient's ECT treatment was performed using MECTA machine  .  Timeout:  Time out was performed using two patient identifiers and confirmation of procedure.     Patient Preparation: Preprocedure documentation, labs, H&P were all verified and reviewed.  The patient was placed in supine position.  EEG leads were placed for monitoring of seizure.   Holiness areas bilaterally cleaned and prepped.  Conductive gel  was applied over stimulus electrode site.   The patient was pre-oxygenated.       Procedure in detail: Medications were administered by anesthesia using intravenous access at the doses listed previously in this summary.  Anesthetic administered and once confirmation the patient is anesthetized, the patient's blood pressure cuff on lower extremity was inflated to 100mmHg in excess of patient's blood pressure.  At this time, the neuromuscular blockade was administered intravenously by anesthesia.  Upper extremity fasciculation were verified followed by lower extremity fasciculation.  Once fasciculations terminated, confirmation of affective neuromuscular blockade demonstrated by absence of withdrawal reflex.  Bite blocks were put in place.

## 2025-04-07 ENCOUNTER — ANESTHESIA EVENT (OUTPATIENT)
Dept: POSTOP/PACU | Age: 30
End: 2025-04-07
Payer: COMMERCIAL

## 2025-04-07 DIAGNOSIS — F20.1 SCHIZOPHRENIA, DISORGANIZED (HCC): Primary | ICD-10-CM

## 2025-04-07 ASSESSMENT — ENCOUNTER SYMPTOMS
SINUS PRESSURE: 0
NAUSEA: 0
ABDOMINAL PAIN: 0
SHORTNESS OF BREATH: 0

## 2025-04-07 NOTE — H&P (VIEW-ONLY)
HISTORY and PHYSICAL  [facility]       NAME:  Alistair Hutchison  MRN: [id number]   YOB: 1995   Date: 4/8/2025   Age: 30 y.o.  Gender: male       COMPLAINT AND PRESENT HISTORY:   Alistair Hutchison is 29 y.o.,  male, presents for ECT treatment.      Primary dx:  history of  Disorganized schizophrenia. Pt has failed multiple modalities. Pt comes 2 times a week.    Last ECT treatment was 4/04/25.   He presents with his grandfather.      Pt tolerated last treatment well. Patient unsure that his symptoms  improve or not yet.   Patient has complaints of some short term memory loss    Mood has been pretty good.  Pt admits no depression including , feeling of hopelessness, helplessness,and  low energy.   Patient's sleep has been  good . Appetite has been good .   Pt denies feeling of suicidal/ homicidal. Pt sometimes has auditory/visual hallucinations.   Pt has been taking psychiatric medications as prescribed.   Pt does not have any other somatic complaints at this time.     Patient is very talkative with multiple questions.     Review of additional significant medical hx:  (See chart for additional detail, including current medications /see ROS for current S/S):      Pt has PONV , he denies any personal or family hx of previous complications w/anesthesia.    RECENT LABS, IMAGING AND TESTING     Lab Results   Component Value Date    WBC 13.6 (H) 12/19/2024    RBC 4.81 12/19/2024    HGB 13.6 12/19/2024    HCT 41.4 12/19/2024    MCV 86.1 12/19/2024    MCH 28.3 12/19/2024    MCHC 32.9 12/19/2024    RDW 13.9 12/19/2024     12/19/2024    MPV 7.2 12/19/2024        Lab Results   Component Value Date     12/12/2024    K 4.4 12/12/2024     12/12/2024    CO2 24 12/12/2024    BUN 11 12/12/2024    CREATININE 0.8 12/12/2024    GLUCOSE 119 (H) 12/12/2024    CALCIUM 9.4 12/12/2024    BILITOT 0.4 12/12/2024    ALKPHOS 82 12/12/2024    AST 18 12/12/2024    ALT 22 12/12/2024

## 2025-04-07 NOTE — H&P
HISTORY and PHYSICAL  [facility]       NAME:  Alistair Hutchison  MRN: [id number]   YOB: 1995   Date: 4/8/2025   Age: 30 y.o.  Gender: male       COMPLAINT AND PRESENT HISTORY:   Alistair Hutchison is 29 y.o.,  male, presents for ECT treatment.      Primary dx:  history of  Disorganized schizophrenia. Pt has failed multiple modalities. Pt comes 2 times a week.    Last ECT treatment was 4/04/25.   He presents with his grandfather.      Pt tolerated last treatment well. Patient unsure that his symptoms  improve or not yet.   Patient has complaints of some short term memory loss    Mood has been pretty good.  Pt admits no depression including , feeling of hopelessness, helplessness,and  low energy.   Patient's sleep has been  good . Appetite has been good .   Pt denies feeling of suicidal/ homicidal. Pt sometimes has auditory/visual hallucinations.   Pt has been taking psychiatric medications as prescribed.   Pt does not have any other somatic complaints at this time.     Patient is very talkative with multiple questions.     Review of additional significant medical hx:  (See chart for additional detail, including current medications /see ROS for current S/S):      Pt has PONV , he denies any personal or family hx of previous complications w/anesthesia.    RECENT LABS, IMAGING AND TESTING     Lab Results   Component Value Date    WBC 13.6 (H) 12/19/2024    RBC 4.81 12/19/2024    HGB 13.6 12/19/2024    HCT 41.4 12/19/2024    MCV 86.1 12/19/2024    MCH 28.3 12/19/2024    MCHC 32.9 12/19/2024    RDW 13.9 12/19/2024     12/19/2024    MPV 7.2 12/19/2024        Lab Results   Component Value Date     12/12/2024    K 4.4 12/12/2024     12/12/2024    CO2 24 12/12/2024    BUN 11 12/12/2024    CREATININE 0.8 12/12/2024    GLUCOSE 119 (H) 12/12/2024    CALCIUM 9.4 12/12/2024    BILITOT 0.4 12/12/2024    ALKPHOS 82 12/12/2024    AST 18 12/12/2024    ALT 22 12/12/2024

## 2025-04-07 NOTE — H&P (VIEW-ONLY)
HISTORY and PHYSICAL  [facility]       NAME:  Alistair Hutchison  MRN: [id number]   YOB: 1995   Date: 4/8/2025   Age: 30 y.o.  Gender: male       COMPLAINT AND PRESENT HISTORY:   Alistair Hutchison is 29 y.o.,  male, presents for ECT treatment.      Primary dx:  history of  Disorganized schizophrenia. Pt has failed multiple modalities. Pt comes 2 times a week.    Last ECT treatment was 4/04/25.   He presents with his grandfather.      Pt tolerated last treatment well. Patient unsure that his symptoms  improve or not yet.   Patient has complaints of some short term memory loss    Mood has been pretty good.  Pt admits no depression including , feeling of hopelessness, helplessness,and  low energy.   Patient's sleep has been  good . Appetite has been good .   Pt denies feeling of suicidal/ homicidal. Pt sometimes has auditory/visual hallucinations.   Pt has been taking psychiatric medications as prescribed.   Pt does not have any other somatic complaints at this time.     Patient is very talkative with multiple questions.     Review of additional significant medical hx:  (See chart for additional detail, including current medications /see ROS for current S/S):      Pt has PONV , he denies any personal or family hx of previous complications w/anesthesia.    RECENT LABS, IMAGING AND TESTING     Lab Results   Component Value Date    WBC 13.6 (H) 12/19/2024    RBC 4.81 12/19/2024    HGB 13.6 12/19/2024    HCT 41.4 12/19/2024    MCV 86.1 12/19/2024    MCH 28.3 12/19/2024    MCHC 32.9 12/19/2024    RDW 13.9 12/19/2024     12/19/2024    MPV 7.2 12/19/2024        Lab Results   Component Value Date     12/12/2024    K 4.4 12/12/2024     12/12/2024    CO2 24 12/12/2024    BUN 11 12/12/2024    CREATININE 0.8 12/12/2024    GLUCOSE 119 (H) 12/12/2024    CALCIUM 9.4 12/12/2024    BILITOT 0.4 12/12/2024    ALKPHOS 82 12/12/2024    AST 18 12/12/2024    ALT 22 12/12/2024

## 2025-04-07 NOTE — H&P (VIEW-ONLY)
2 times daily      hyoscyamine (ANASPAZ;LEVSIN) 0.125 MG tablet Take 1 tablet by mouth every 4 hours as needed for Cramping      propranolol (INDERAL) 10 MG tablet Take 1 tablet by mouth daily      cloZAPine (CLOZARIL) 100 MG tablet Take 1 tablet by mouth 3 times daily 90 tablet 0    traZODone (DESYREL) 50 MG tablet Take 1 tablet by mouth nightly as needed for Sleep 30 tablet 0     No current facility-administered medications on file prior to encounter.     Notation: Above medications are not currently reconciled at time of signing this H&P note, to be reconciled in pre-op per RN.     Review of Systems   Constitutional: Negative.    HENT:  Negative for congestion and sinus pressure.    Respiratory:  Negative for shortness of breath.    Cardiovascular:  Negative for leg swelling.   Gastrointestinal:  Negative for abdominal pain and nausea.   Skin: Negative.    Neurological:  Negative for dizziness.   Psychiatric/Behavioral:  Negative for sleep disturbance.         See HPI   All other systems reviewed and are negative.        GENERAL PHYSICAL EXAM     Vitals: /85   Pulse 91   Temp 97 °F (36.1 °C) (Infrared)   Resp 18   Ht 1.651 m (5' 5\")   Wt 113.4 kg (250 lb)   SpO2 99%   BMI 41.60 kg/m²  Body mass index is 41.6 kg/m².                            Physical Exam  Constitutional:       General: He is not in acute distress.  HENT:      Head: Normocephalic.      Right Ear: External ear normal.      Left Ear: External ear normal.      Nose: Nose normal.      Mouth/Throat:      Pharynx: No posterior oropharyngeal erythema.   Eyes:      General:         Right eye: No discharge.         Left eye: No discharge.   Cardiovascular:      Rate and Rhythm: Normal rate and regular rhythm.      Heart sounds: Normal heart sounds.   Pulmonary:      Breath sounds: Normal breath sounds.   Abdominal:      General: Bowel sounds are normal.      Tenderness: There is no abdominal tenderness. There is no guarding.   Neurological:

## 2025-04-08 ENCOUNTER — ANESTHESIA (OUTPATIENT)
Dept: POSTOP/PACU | Age: 30
End: 2025-04-08
Payer: COMMERCIAL

## 2025-04-08 ENCOUNTER — HOSPITAL ENCOUNTER (OUTPATIENT)
Dept: POSTOP/PACU | Age: 30
Discharge: HOME | End: 2025-04-08
Payer: COMMERCIAL

## 2025-04-08 VITALS
TEMPERATURE: 98 F | BODY MASS INDEX: 41.65 KG/M2 | SYSTOLIC BLOOD PRESSURE: 103 MMHG | HEART RATE: 101 BPM | DIASTOLIC BLOOD PRESSURE: 73 MMHG | HEIGHT: 65 IN | RESPIRATION RATE: 16 BRPM | OXYGEN SATURATION: 82 % | WEIGHT: 250 LBS

## 2025-04-08 PROCEDURE — 2500000003 HC RX 250 WO HCPCS: Performed by: NURSE ANESTHETIST, CERTIFIED REGISTERED

## 2025-04-08 PROCEDURE — 7100000001 HC PACU RECOVERY - ADDTL 15 MIN: Performed by: ANESTHESIOLOGY

## 2025-04-08 PROCEDURE — 90870 ELECTROCONVULSIVE THERAPY: CPT

## 2025-04-08 PROCEDURE — 3700000001 HC ADD 15 MINUTES (ANESTHESIA): Performed by: ANESTHESIOLOGY

## 2025-04-08 PROCEDURE — 7100000000 HC PACU RECOVERY - FIRST 15 MIN: Performed by: ANESTHESIOLOGY

## 2025-04-08 PROCEDURE — 6360000002 HC RX W HCPCS: Performed by: NURSE ANESTHETIST, CERTIFIED REGISTERED

## 2025-04-08 PROCEDURE — 2580000003 HC RX 258: Performed by: ANESTHESIOLOGY

## 2025-04-08 PROCEDURE — 3700000000 HC ANESTHESIA ATTENDED CARE: Performed by: ANESTHESIOLOGY

## 2025-04-08 PROCEDURE — 6360000002 HC RX W HCPCS: Performed by: ANESTHESIOLOGY

## 2025-04-08 RX ORDER — SUCCINYLCHOLINE/SOD CL,ISO/PF 200MG/10ML
SYRINGE (ML) INTRAVENOUS
Status: DISCONTINUED | OUTPATIENT
Start: 2025-04-08 | End: 2025-04-08 | Stop reason: SDUPTHER

## 2025-04-08 RX ORDER — KETOROLAC TROMETHAMINE 30 MG/ML
INJECTION, SOLUTION INTRAMUSCULAR; INTRAVENOUS
Status: DISCONTINUED | OUTPATIENT
Start: 2025-04-08 | End: 2025-04-08 | Stop reason: SDUPTHER

## 2025-04-08 RX ORDER — SODIUM CHLORIDE 0.9 % (FLUSH) 0.9 %
5-40 SYRINGE (ML) INJECTION EVERY 12 HOURS SCHEDULED
Status: DISCONTINUED | OUTPATIENT
Start: 2025-04-08 | End: 2025-04-09 | Stop reason: HOSPADM

## 2025-04-08 RX ORDER — SODIUM CHLORIDE 9 MG/ML
INJECTION, SOLUTION INTRAVENOUS PRN
Status: DISCONTINUED | OUTPATIENT
Start: 2025-04-08 | End: 2025-04-09 | Stop reason: HOSPADM

## 2025-04-08 RX ORDER — SODIUM CHLORIDE, SODIUM LACTATE, POTASSIUM CHLORIDE, CALCIUM CHLORIDE 600; 310; 30; 20 MG/100ML; MG/100ML; MG/100ML; MG/100ML
INJECTION, SOLUTION INTRAVENOUS CONTINUOUS
Status: DISCONTINUED | OUTPATIENT
Start: 2025-04-08 | End: 2025-04-09 | Stop reason: HOSPADM

## 2025-04-08 RX ORDER — KETOROLAC TROMETHAMINE 30 MG/ML
INJECTION, SOLUTION INTRAMUSCULAR; INTRAVENOUS
Status: COMPLETED
Start: 2025-04-08 | End: 2025-04-08

## 2025-04-08 RX ORDER — SODIUM CHLORIDE 0.9 % (FLUSH) 0.9 %
5-40 SYRINGE (ML) INJECTION PRN
Status: DISCONTINUED | OUTPATIENT
Start: 2025-04-08 | End: 2025-04-09 | Stop reason: HOSPADM

## 2025-04-08 RX ORDER — METHOHEXITAL IN WATER/PF 100MG/10ML
SYRINGE (ML) INTRAVENOUS
Status: COMPLETED
Start: 2025-04-08 | End: 2025-04-08

## 2025-04-08 RX ORDER — SUCCINYLCHOLINE/SOD CL,ISO/PF 200MG/10ML
SYRINGE (ML) INTRAVENOUS
Status: COMPLETED
Start: 2025-04-08 | End: 2025-04-08

## 2025-04-08 RX ORDER — ONDANSETRON 2 MG/ML
INJECTION INTRAMUSCULAR; INTRAVENOUS
Status: DISCONTINUED | OUTPATIENT
Start: 2025-04-08 | End: 2025-04-08 | Stop reason: SDUPTHER

## 2025-04-08 RX ORDER — METHOHEXITAL IN WATER/PF 100MG/10ML
SYRINGE (ML) INTRAVENOUS
Status: DISCONTINUED | OUTPATIENT
Start: 2025-04-08 | End: 2025-04-08 | Stop reason: SDUPTHER

## 2025-04-08 RX ORDER — ONDANSETRON 2 MG/ML
INJECTION INTRAMUSCULAR; INTRAVENOUS
Status: COMPLETED
Start: 2025-04-08 | End: 2025-04-08

## 2025-04-08 RX ORDER — LIDOCAINE HYDROCHLORIDE 10 MG/ML
1 INJECTION, SOLUTION EPIDURAL; INFILTRATION; INTRACAUDAL; PERINEURAL
Status: COMPLETED | OUTPATIENT
Start: 2025-04-08 | End: 2025-04-08

## 2025-04-08 RX ADMIN — Medication 100 MG: at 07:10

## 2025-04-08 RX ADMIN — SODIUM CHLORIDE, POTASSIUM CHLORIDE, SODIUM LACTATE AND CALCIUM CHLORIDE: 600; 310; 30; 20 INJECTION, SOLUTION INTRAVENOUS at 06:30

## 2025-04-08 RX ADMIN — KETOROLAC TROMETHAMINE 30 MG: 30 INJECTION, SOLUTION INTRAMUSCULAR at 07:10

## 2025-04-08 RX ADMIN — ONDANSETRON 4 MG: 2 INJECTION INTRAMUSCULAR; INTRAVENOUS at 07:10

## 2025-04-08 RX ADMIN — LIDOCAINE HYDROCHLORIDE 1 ML: 10 INJECTION, SOLUTION EPIDURAL; INFILTRATION; INTRACAUDAL; PERINEURAL at 06:29

## 2025-04-08 RX ADMIN — Medication 120 MG: at 07:10

## 2025-04-08 ASSESSMENT — PAIN SCALES - GENERAL: PAINLEVEL_OUTOF10: 0

## 2025-04-08 ASSESSMENT — PAIN - FUNCTIONAL ASSESSMENT: PAIN_FUNCTIONAL_ASSESSMENT: 0-10

## 2025-04-08 NOTE — ANESTHESIA POSTPROCEDURE EVALUATION
Department of Anesthesiology  Postprocedure Note    Patient: Alistair Hutchison  MRN: [id number]  YOB: 1995  Date of evaluation: 4/8/2025    Procedure Summary       Date: 04/08/25 Room / Location: [facility] PACU    Anesthesia Start: 0701 Anesthesia Stop: 0719    Procedure: ECT W/ ANESTHESIA Diagnosis: Disorganized schizophrenia (HCC)    Scheduled Providers:  Responsible Provider: Barby Leonard MD    Anesthesia Type: General ASA Status: 3            Anesthesia Type: General    Amalia Phase I: Amalia Score: 10    Amalia Phase II:      Anesthesia Post Evaluation    Comments: POST- ANESTHESIA EVALUATION       Pt Name: Alistair Hutchison  MRN: [id number]  YOB: 1995  Date of evaluation: 4/8/2025  Time:  1:08 PM      /73   Pulse (!) 101   Temp 98 °F (36.7 °C)   Resp 16   Ht 1.651 m (5' 5\")   Wt 113.4 kg (250 lb)   SpO2 96%   BMI 41.60 kg/m²      Consciousness Level  Awake  Cardiopulmonary Status  Stable  Pain Adequately Treated YES  Nausea / Vomiting  NO  Adequate Hydration  YES  Anesthesia Related Complications NONE      Electronically signed by Barby Leonard MD on 4/8/2025 at 1:08 PM             No notable events documented.

## 2025-04-08 NOTE — ANESTHESIA PRE PROCEDURE
breath sounds clear to auscultation                             Cardiovascular:Negative CV ROS            Rhythm: regular  Rate: normal           Beta Blocker:  Dose within 24 Hrs         Neuro/Psych:   (+) psychiatric history:depression/anxiety             GI/Hepatic/Renal:   (+) morbid obesity          Endo/Other:                     Abdominal:             Vascular: negative vascular ROS.         Other Findings:           Anesthesia Plan      general     ASA 3     (TIVA  Consent signed by Grandma and Legal Gaurdian  Usually gets Toradol intra op for post op Headaches.)  Induction: intravenous.    MIPS: Prophylactic antiemetics administered.  Anesthetic plan and risks discussed with patient.      Plan discussed with CRNA.                  Barby Leonard MD   4/8/2025

## 2025-04-08 NOTE — DISCHARGE INSTRUCTIONS
ELECTROCONVULSIVE THERAPY DISCHARGE INSTRUCTIONS         1. Activity - Rest today. The anesthetic agents you have received can make you feel drowsy or tired.  A responsible person must drive you home and stay with you for 8 hours after discharge from the Hospital. Do not drive a motor vehicle or operate any machinery for 24 hours. .   *Do not make any complex decisions or execute any legal documents until 3 weeks AFTER your last treatment.  If you have any questions regarding this, speak with your psychiatrist first.*    2. Diet - Nothing to eat or drink after midnight the night of your ECT treatment. After ECT, start with clear liquids, if you can drink without coughing, you may proceed with gradually progressing to your usual diet.    No alcoholic beverages for 24 hours.    3. Medications - Take your daily medications as prescribed, after you return home from today's ECT. If you take a Beta Blocker or have been prescribed Imitrex, you may be instructed to take these medications the morning of ECT. If you are unsure please ask the physician.     Take with these medication the morning of ECT with one Tablespoon of water.   Tylenol 650 mg  All blood pressure medications  ***    4. You may experience the following after your treatment:   a. Poor memory   b. Poor balance   c. Headaches   d. Confusion   e. Muscle soreness   f. Nausea      5. Special Precautions - Contact you physician immediately if these occur:   a. Signs of infection: redness, swelling, heat, red streaks at the site of the IV   b. Excessive pain unrelieved by prescription pain medications   c. Nausea and vomiting that persists beyond the first day after your procedure    6. Next Procedure Date:   Your next ECT treatment is scheduled for 810 AM on 04/11/25.  [facility]  AM    If you feel you are having a problem or complication from your ECT treatments and it is during normal business hours call [phone]. If it is after normal business hours

## 2025-04-10 RX ORDER — SODIUM CHLORIDE, SODIUM LACTATE, POTASSIUM CHLORIDE, CALCIUM CHLORIDE 600; 310; 30; 20 MG/100ML; MG/100ML; MG/100ML; MG/100ML
INJECTION, SOLUTION INTRAVENOUS CONTINUOUS
OUTPATIENT
Start: 2025-04-11

## 2025-04-10 RX ORDER — LIDOCAINE HYDROCHLORIDE 10 MG/ML
1 INJECTION, SOLUTION EPIDURAL; INFILTRATION; INTRACAUDAL; PERINEURAL
OUTPATIENT
Start: 2025-04-11

## 2025-04-10 RX ORDER — SODIUM CHLORIDE 9 MG/ML
INJECTION, SOLUTION INTRAVENOUS PRN
OUTPATIENT
Start: 2025-04-11

## 2025-04-10 RX ORDER — SODIUM CHLORIDE 0.9 % (FLUSH) 0.9 %
5-40 SYRINGE (ML) INJECTION PRN
OUTPATIENT
Start: 2025-04-11

## 2025-04-10 RX ORDER — SODIUM CHLORIDE 0.9 % (FLUSH) 0.9 %
5-40 SYRINGE (ML) INJECTION EVERY 12 HOURS SCHEDULED
OUTPATIENT
Start: 2025-04-11

## 2025-04-11 ENCOUNTER — HOSPITAL ENCOUNTER (OUTPATIENT)
Dept: POSTOP/PACU | Age: 30
Discharge: HOME | End: 2025-04-11

## 2025-04-14 ENCOUNTER — ANESTHESIA EVENT (OUTPATIENT)
Dept: POSTOP/PACU | Age: 30
End: 2025-04-14
Payer: COMMERCIAL

## 2025-04-15 ENCOUNTER — ANESTHESIA (OUTPATIENT)
Dept: POSTOP/PACU | Age: 30
End: 2025-04-15
Payer: COMMERCIAL

## 2025-04-15 ENCOUNTER — HOSPITAL ENCOUNTER (OUTPATIENT)
Dept: POSTOP/PACU | Age: 30
Discharge: HOME | End: 2025-04-15
Payer: COMMERCIAL

## 2025-04-15 VITALS
HEIGHT: 65 IN | SYSTOLIC BLOOD PRESSURE: 116 MMHG | DIASTOLIC BLOOD PRESSURE: 85 MMHG | HEART RATE: 99 BPM | WEIGHT: 250 LBS | TEMPERATURE: 98 F | OXYGEN SATURATION: 95 % | RESPIRATION RATE: 18 BRPM | BODY MASS INDEX: 41.65 KG/M2

## 2025-04-15 DIAGNOSIS — F20.1 DISORGANIZED SCHIZOPHRENIA (HCC): Primary | ICD-10-CM

## 2025-04-15 PROCEDURE — 7100000000 HC PACU RECOVERY - FIRST 15 MIN: Performed by: ANESTHESIOLOGY

## 2025-04-15 PROCEDURE — 90870 ELECTROCONVULSIVE THERAPY: CPT

## 2025-04-15 PROCEDURE — 2500000003 HC RX 250 WO HCPCS

## 2025-04-15 PROCEDURE — 7100000001 HC PACU RECOVERY - ADDTL 15 MIN: Performed by: ANESTHESIOLOGY

## 2025-04-15 PROCEDURE — 3700000001 HC ADD 15 MINUTES (ANESTHESIA): Performed by: ANESTHESIOLOGY

## 2025-04-15 PROCEDURE — 90870 ELECTROCONVULSIVE THERAPY: CPT | Performed by: PSYCHIATRY & NEUROLOGY

## 2025-04-15 PROCEDURE — 3700000000 HC ANESTHESIA ATTENDED CARE: Performed by: ANESTHESIOLOGY

## 2025-04-15 PROCEDURE — 6360000002 HC RX W HCPCS

## 2025-04-15 PROCEDURE — 2580000003 HC RX 258: Performed by: ANESTHESIOLOGY

## 2025-04-15 RX ORDER — SODIUM CHLORIDE 0.9 % (FLUSH) 0.9 %
5-40 SYRINGE (ML) INJECTION EVERY 12 HOURS SCHEDULED
Status: DISCONTINUED | OUTPATIENT
Start: 2025-04-15 | End: 2025-04-16 | Stop reason: HOSPADM

## 2025-04-15 RX ORDER — SODIUM CHLORIDE, SODIUM LACTATE, POTASSIUM CHLORIDE, CALCIUM CHLORIDE 600; 310; 30; 20 MG/100ML; MG/100ML; MG/100ML; MG/100ML
INJECTION, SOLUTION INTRAVENOUS CONTINUOUS
Status: DISCONTINUED | OUTPATIENT
Start: 2025-04-15 | End: 2025-04-16 | Stop reason: HOSPADM

## 2025-04-15 RX ORDER — LIDOCAINE HYDROCHLORIDE 10 MG/ML
1 INJECTION, SOLUTION EPIDURAL; INFILTRATION; INTRACAUDAL; PERINEURAL
Status: DISCONTINUED | OUTPATIENT
Start: 2025-04-15 | End: 2025-04-16 | Stop reason: HOSPADM

## 2025-04-15 RX ORDER — ONDANSETRON 2 MG/ML
INJECTION INTRAMUSCULAR; INTRAVENOUS
Status: COMPLETED
Start: 2025-04-15 | End: 2025-04-15

## 2025-04-15 RX ORDER — SUCCINYLCHOLINE/SOD CL,ISO/PF 200MG/10ML
SYRINGE (ML) INTRAVENOUS
Status: DISCONTINUED | OUTPATIENT
Start: 2025-04-15 | End: 2025-04-15 | Stop reason: SDUPTHER

## 2025-04-15 RX ORDER — ONDANSETRON 2 MG/ML
INJECTION INTRAMUSCULAR; INTRAVENOUS
Status: DISCONTINUED | OUTPATIENT
Start: 2025-04-15 | End: 2025-04-15 | Stop reason: SDUPTHER

## 2025-04-15 RX ORDER — METHOHEXITAL IN WATER/PF 100MG/10ML
SYRINGE (ML) INTRAVENOUS
Status: COMPLETED
Start: 2025-04-15 | End: 2025-04-15

## 2025-04-15 RX ORDER — SUCCINYLCHOLINE/SOD CL,ISO/PF 200MG/10ML
SYRINGE (ML) INTRAVENOUS
Status: COMPLETED
Start: 2025-04-15 | End: 2025-04-15

## 2025-04-15 RX ORDER — METHOHEXITAL IN WATER/PF 100MG/10ML
SYRINGE (ML) INTRAVENOUS
Status: DISCONTINUED | OUTPATIENT
Start: 2025-04-15 | End: 2025-04-15 | Stop reason: SDUPTHER

## 2025-04-15 RX ORDER — KETOROLAC TROMETHAMINE 30 MG/ML
INJECTION, SOLUTION INTRAMUSCULAR; INTRAVENOUS
Status: DISCONTINUED | OUTPATIENT
Start: 2025-04-15 | End: 2025-04-15 | Stop reason: SDUPTHER

## 2025-04-15 RX ORDER — SODIUM CHLORIDE 0.9 % (FLUSH) 0.9 %
5-40 SYRINGE (ML) INJECTION PRN
Status: DISCONTINUED | OUTPATIENT
Start: 2025-04-15 | End: 2025-04-16 | Stop reason: HOSPADM

## 2025-04-15 RX ORDER — KETOROLAC TROMETHAMINE 30 MG/ML
INJECTION, SOLUTION INTRAMUSCULAR; INTRAVENOUS
Status: COMPLETED
Start: 2025-04-15 | End: 2025-04-15

## 2025-04-15 RX ORDER — SODIUM CHLORIDE 9 MG/ML
INJECTION, SOLUTION INTRAVENOUS PRN
Status: DISCONTINUED | OUTPATIENT
Start: 2025-04-15 | End: 2025-04-16 | Stop reason: HOSPADM

## 2025-04-15 RX ADMIN — SODIUM CHLORIDE, POTASSIUM CHLORIDE, SODIUM LACTATE AND CALCIUM CHLORIDE: 600; 310; 30; 20 INJECTION, SOLUTION INTRAVENOUS at 07:16

## 2025-04-15 RX ADMIN — Medication 120 MG: at 07:52

## 2025-04-15 RX ADMIN — KETOROLAC TROMETHAMINE 30 MG: 30 INJECTION, SOLUTION INTRAMUSCULAR at 07:50

## 2025-04-15 RX ADMIN — Medication 100 MG: at 07:52

## 2025-04-15 RX ADMIN — ONDANSETRON 4 MG: 2 INJECTION, SOLUTION INTRAMUSCULAR; INTRAVENOUS at 07:50

## 2025-04-15 ASSESSMENT — PAIN - FUNCTIONAL ASSESSMENT
PAIN_FUNCTIONAL_ASSESSMENT: 0-10
PAIN_FUNCTIONAL_ASSESSMENT: 0-10

## 2025-04-15 NOTE — DISCHARGE INSTRUCTIONS
ELECTROCONVULSIVE THERAPY DISCHARGE INSTRUCTIONS         1. Activity - Rest today. The anesthetic agents you have received can make you feel drowsy or tired.  A responsible person must drive you home and stay with you for 8 hours after discharge from the Hospital. Do not drive a motor vehicle or operate any machinery for 24 hours. .   *Do not make any complex decisions or execute any legal documents until 3 weeks AFTER your last treatment.  If you have any questions regarding this, speak with your psychiatrist first.*    2. Diet - Nothing to eat or drink after midnight the night of your ECT treatment. After ECT, start with clear liquids, if you can drink without coughing, you may proceed with gradually progressing to your usual diet.    No alcoholic beverages for 24 hours.    3. Medications - Take your daily medications as prescribed, after you return home from today's ECT. If you take a Beta Blocker or have been prescribed Imitrex, you may be instructed to take these medications the morning of ECT. If you are unsure please ask the physician.     Take with these medication the morning of ECT with one Tablespoon of water.   Tylenol 650 mg  All blood pressure medications  ***    4. You may experience the following after your treatment:   a. Poor memory   b. Poor balance   c. Headaches   d. Confusion   e. Muscle soreness   f. Nausea      5. Special Precautions - Contact you physician immediately if these occur:   a. Signs of infection: redness, swelling, heat, red streaks at the site of the IV   b. Excessive pain unrelieved by prescription pain medications   c. Nausea and vomiting that persists beyond the first day after your procedure    6. Next Procedure Date:   Your next ECT treatment is scheduled for 7:50 am on Friday, April 18, 2025.  Please arrive at [facility] surgery center by 6:20 am.    If you feel you are having a problem or complication from your ECT treatments and it is during normal business

## 2025-04-15 NOTE — PROCEDURES
Bilateral ECT Procedure Report  Alistair Hutchison   4/15/2025  1995         Attending:Corby Drake MD  Preprocedure diagnosis: disorganized schizophrenia  Postprocedure diagnosis: SAME AS PRE-PROCEDURE DIAGNOSIS  Treatment Number: This is treatment #22    Patient Status: Outpatient   Type of ECT: Bilateral Brief Pulse  Medications  Preprocedure: Tylenol 650mg and none  Anesthetic: Methohexital 100 mg  Paralytic: Succinylcholine 120 mg  Post procedure: none needed   Cuff placement: Left Lower Extremity    MECTA Settings 1st Stimulus:     Pulse width: 1.0 ms   Frequency:  60 hz   Duration:   3.0 seconds   Static Impedance: 1100 ohms             Dynamic Impedance: 226 ohms             Motor Seizure Duration: 24 seconds  EEG Seizure Duration: 43 seconds             The patient's ECT treatment was performed using MECTA machine  .  Timeout:  Time out was performed using two patient identifiers and confirmation of procedure.     Patient Preparation: Preprocedure documentation, labs, H&P were all verified and reviewed.  The patient was placed in supine position.  EEG leads were placed for monitoring of seizure.   Yazidism areas bilaterally cleaned and prepped.  Conductive gel  was applied over stimulus electrode site.   The patient was pre-oxygenated.       Procedure in detail: Medications were administered by anesthesia using intravenous access at the doses listed previously in this summary.  Anesthetic administered and once confirmation the patient is anesthetized, the patient's blood pressure cuff on lower extremity was inflated to 100mmHg in excess of patient's blood pressure.  At this time, the neuromuscular blockade was administered intravenously by anesthesia.  Upper extremity fasciculation were verified followed by lower extremity fasciculation.  Once fasciculations terminated, confirmation of affective neuromuscular blockade demonstrated by absence of withdrawal reflex.  Bite blocks were put in place.

## 2025-04-15 NOTE — ANESTHESIA POSTPROCEDURE EVALUATION
Department of Anesthesiology  Postprocedure Note    Patient: Alistair Hutchison  MRN: [id number]  YOB: 1995  Date of evaluation: 4/15/2025    Procedure Summary       Date: 04/15/25 Room / Location: [facility] PACU    Anesthesia Start: 0748 Anesthesia Stop: 0805    Procedure: ECT W/ ANESTHESIA Diagnosis: Disorganized schizophrenia (HCC)    Scheduled Providers:  Responsible Provider: Jewel Ji MD    Anesthesia Type: general ASA Status: 3            Anesthesia Type: No value filed.    Amalia Phase I: Amalia Score: 10    Amalia Phase II:      Anesthesia Post Evaluation    Comments: POST- ANESTHESIA EVALUATION       Pt Name: Alistair Hutchison  MRN: [id number]  YOB: 1995  Date of evaluation: 4/15/2025  Time:  11:00 AM      /85   Pulse 99   Temp 98 °F (36.7 °C)   Resp 18   Ht 1.651 m (5' 5\")   Wt 113.4 kg (250 lb)   SpO2 95%   BMI 41.60 kg/m²      Consciousness Level  Awake  Cardiopulmonary Status  Stable  Pain Adequately Treated YES  Nausea / Vomiting  NO  Adequate Hydration  YES  Anesthesia Related Complications NONE      Electronically signed by Jewel Ji MD on 4/15/2025 at 11:00 AM           No notable events documented.

## 2025-04-15 NOTE — PROGRESS NOTES
Pre ECT Assessment Note  Psychiatry  04/15/25       Alistair Hutchison  1995  [id number]      Subjective:     Patient is a 30 y.o.  male seen for an evaluation prior to today's electroconvulsive therapy treatment. Today is treatment number  22 , utilizing bilateral (10  BL and 12 RU treatments). This is course number 2.  The patient has previously completed a course of bilateral ECT for a total of 18 treatments that ended on 11/20/2024.      Last ECT treatment 4/8/2025.    William seen prior to ECT with his grandfather.  William is pleasant with discussion.  He smiles and reports things have been going well recently.  He is looking forward to [facility] and will be attending [facility] service with his family.  His grandfather reports that Sean has been less argumentative at home.  He has been having fewer incontinence problems.  Sean denies any GI issues today.  He does not appear to be internally preoccupied and denies any suicidal thoughts.  Did attempt to taper frequency of treatments in the past, and his grandmother reported that he decompensated.  Will attempt to taper frequency to once weekly starting next week.        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

## 2025-04-15 NOTE — INTERVAL H&P NOTE
Update History & Physical    The patient's History and Physical of April 8, 2025 was reviewed with the patient and I examined the patient. There was no change. The surgical site was confirmed by the patient and me.     UPDATE 4/15/2025  Alistair Hutchison is 30 y.o.,  male, here for ECT treatment.     Last ECT treatment was 4/8/2025.  Pt tolerated last treatment well. Patient reports that her symptoms are improving.  Patient has complaints of some short term memory loss    Pt has had multiple failed treatment modalities. Pt has history of Disorganized schizophrenia disorder.   Mood is rated at  10/10.    Patient's sleep has been good. Appetite has been good.   Pt is not suicidal. Pt is not homicidal. Pt denies recent auditory/visual hallucinations.   Pt has been taking psychiatric medications as prescribed.   Pt does not have any other somatic complaints at this time.      NPO since MN  Denies h/o complications anesthesia  Cardiopulmonary assessment completed.  Unchanged  Denies recent fever/chills.  Denies recent chest pain or SOB         Electronically signed by GIORGI Rust CNP on 4/15/2025 at 7:21 AM

## 2025-04-17 ENCOUNTER — ANESTHESIA EVENT (OUTPATIENT)
Dept: POSTOP/PACU | Age: 30
End: 2025-04-17
Payer: COMMERCIAL

## 2025-04-17 DIAGNOSIS — F20.1 DISORGANIZED SCHIZOPHRENIA (HCC): Primary | ICD-10-CM

## 2025-04-18 ENCOUNTER — ANESTHESIA (OUTPATIENT)
Dept: POSTOP/PACU | Age: 30
End: 2025-04-18
Payer: COMMERCIAL

## 2025-04-18 ENCOUNTER — HOSPITAL ENCOUNTER (OUTPATIENT)
Dept: POSTOP/PACU | Age: 30
Discharge: HOME | End: 2025-04-18
Payer: COMMERCIAL

## 2025-04-18 VITALS
RESPIRATION RATE: 12 BRPM | TEMPERATURE: 98.2 F | WEIGHT: 228 LBS | SYSTOLIC BLOOD PRESSURE: 127 MMHG | OXYGEN SATURATION: 95 % | BODY MASS INDEX: 37.99 KG/M2 | HEART RATE: 95 BPM | HEIGHT: 65 IN | DIASTOLIC BLOOD PRESSURE: 94 MMHG

## 2025-04-18 PROCEDURE — 7100000001 HC PACU RECOVERY - ADDTL 15 MIN: Performed by: ANESTHESIOLOGY

## 2025-04-18 PROCEDURE — 90870 ELECTROCONVULSIVE THERAPY: CPT

## 2025-04-18 PROCEDURE — 90870 ELECTROCONVULSIVE THERAPY: CPT | Performed by: PSYCHIATRY & NEUROLOGY

## 2025-04-18 PROCEDURE — 3700000000 HC ANESTHESIA ATTENDED CARE: Performed by: ANESTHESIOLOGY

## 2025-04-18 PROCEDURE — 2500000003 HC RX 250 WO HCPCS: Performed by: NURSE ANESTHETIST, CERTIFIED REGISTERED

## 2025-04-18 PROCEDURE — 6360000002 HC RX W HCPCS: Performed by: NURSE ANESTHETIST, CERTIFIED REGISTERED

## 2025-04-18 PROCEDURE — 7100000000 HC PACU RECOVERY - FIRST 15 MIN: Performed by: ANESTHESIOLOGY

## 2025-04-18 PROCEDURE — 2580000003 HC RX 258: Performed by: ANESTHESIOLOGY

## 2025-04-18 RX ORDER — ONDANSETRON 2 MG/ML
INJECTION INTRAMUSCULAR; INTRAVENOUS
Status: COMPLETED
Start: 2025-04-18 | End: 2025-04-18

## 2025-04-18 RX ORDER — SODIUM CHLORIDE 0.9 % (FLUSH) 0.9 %
5-40 SYRINGE (ML) INJECTION PRN
Status: DISCONTINUED | OUTPATIENT
Start: 2025-04-18 | End: 2025-04-19 | Stop reason: HOSPADM

## 2025-04-18 RX ORDER — LIDOCAINE HYDROCHLORIDE 10 MG/ML
1 INJECTION, SOLUTION EPIDURAL; INFILTRATION; INTRACAUDAL; PERINEURAL
Status: DISCONTINUED | OUTPATIENT
Start: 2025-04-18 | End: 2025-04-19 | Stop reason: HOSPADM

## 2025-04-18 RX ORDER — METHOHEXITAL IN WATER/PF 100MG/10ML
SYRINGE (ML) INTRAVENOUS
Status: DISCONTINUED | OUTPATIENT
Start: 2025-04-18 | End: 2025-04-18 | Stop reason: SDUPTHER

## 2025-04-18 RX ORDER — KETOROLAC TROMETHAMINE 30 MG/ML
INJECTION, SOLUTION INTRAMUSCULAR; INTRAVENOUS
Status: COMPLETED
Start: 2025-04-18 | End: 2025-04-18

## 2025-04-18 RX ORDER — KETOROLAC TROMETHAMINE 30 MG/ML
INJECTION, SOLUTION INTRAMUSCULAR; INTRAVENOUS
Status: DISCONTINUED | OUTPATIENT
Start: 2025-04-18 | End: 2025-04-18 | Stop reason: SDUPTHER

## 2025-04-18 RX ORDER — PROPRANOLOL HYDROCHLORIDE 40 MG/1
40 TABLET ORAL EVERY EVENING
COMMUNITY

## 2025-04-18 RX ORDER — METHOHEXITAL IN WATER/PF 100MG/10ML
SYRINGE (ML) INTRAVENOUS
Status: COMPLETED
Start: 2025-04-18 | End: 2025-04-18

## 2025-04-18 RX ORDER — HYOSCYAMINE SULFATE 0.38 MG/1
0.38 TABLET, EXTENDED RELEASE ORAL 2 TIMES DAILY
COMMUNITY

## 2025-04-18 RX ORDER — SODIUM CHLORIDE 0.9 % (FLUSH) 0.9 %
5-40 SYRINGE (ML) INJECTION EVERY 12 HOURS SCHEDULED
Status: DISCONTINUED | OUTPATIENT
Start: 2025-04-18 | End: 2025-04-19 | Stop reason: HOSPADM

## 2025-04-18 RX ORDER — PROPRANOLOL HYDROCHLORIDE 40 MG/1
40 TABLET ORAL DAILY
COMMUNITY

## 2025-04-18 RX ORDER — SUCCINYLCHOLINE/SOD CL,ISO/PF 200MG/10ML
SYRINGE (ML) INTRAVENOUS
Status: DISCONTINUED | OUTPATIENT
Start: 2025-04-18 | End: 2025-04-18 | Stop reason: SDUPTHER

## 2025-04-18 RX ORDER — ONDANSETRON 2 MG/ML
INJECTION INTRAMUSCULAR; INTRAVENOUS
Status: DISCONTINUED | OUTPATIENT
Start: 2025-04-18 | End: 2025-04-18 | Stop reason: SDUPTHER

## 2025-04-18 RX ORDER — SODIUM CHLORIDE, SODIUM LACTATE, POTASSIUM CHLORIDE, CALCIUM CHLORIDE 600; 310; 30; 20 MG/100ML; MG/100ML; MG/100ML; MG/100ML
INJECTION, SOLUTION INTRAVENOUS CONTINUOUS
Status: DISCONTINUED | OUTPATIENT
Start: 2025-04-18 | End: 2025-04-19 | Stop reason: HOSPADM

## 2025-04-18 RX ORDER — SODIUM CHLORIDE 9 MG/ML
INJECTION, SOLUTION INTRAVENOUS PRN
Status: DISCONTINUED | OUTPATIENT
Start: 2025-04-18 | End: 2025-04-19 | Stop reason: HOSPADM

## 2025-04-18 RX ORDER — SUCCINYLCHOLINE/SOD CL,ISO/PF 200MG/10ML
SYRINGE (ML) INTRAVENOUS
Status: COMPLETED
Start: 2025-04-18 | End: 2025-04-18

## 2025-04-18 RX ADMIN — Medication 100 MG: at 07:50

## 2025-04-18 RX ADMIN — SODIUM CHLORIDE, POTASSIUM CHLORIDE, SODIUM LACTATE AND CALCIUM CHLORIDE: 600; 310; 30; 20 INJECTION, SOLUTION INTRAVENOUS at 07:37

## 2025-04-18 RX ADMIN — ONDANSETRON 4 MG: 2 INJECTION, SOLUTION INTRAMUSCULAR; INTRAVENOUS at 07:49

## 2025-04-18 RX ADMIN — KETOROLAC TROMETHAMINE 30 MG: 30 INJECTION, SOLUTION INTRAMUSCULAR at 07:49

## 2025-04-18 RX ADMIN — Medication 120 MG: at 07:50

## 2025-04-18 ASSESSMENT — PAIN - FUNCTIONAL ASSESSMENT: PAIN_FUNCTIONAL_ASSESSMENT: 0-10

## 2025-04-18 NOTE — ANESTHESIA POSTPROCEDURE EVALUATION
Department of Anesthesiology  Postprocedure Note    Patient: Alistair Hutchison  MRN: [id number]  YOB: 1995  Date of evaluation: 4/18/2025    Procedure Summary       Date: 04/18/25 Room / Location: [facility] PACU    Anesthesia Start: 0747 Anesthesia Stop: 0801    Procedure: ECT W/ ANESTHESIA Diagnosis: Disorganized schizophrenia (HCC)    Scheduled Providers:  Responsible Provider: Jewel Ji MD    Anesthesia Type: General ASA Status: 3            Anesthesia Type: General    Amalia Phase I: Amalia Score: 6    Amalia Phase II:      Anesthesia Post Evaluation    Comments: POST- ANESTHESIA EVALUATION       Pt Name: Alistair Hutchison  MRN: [id number]  YOB: 1995  Date of evaluation: 4/18/2025  Time:  9:16 AM      /74   Pulse 92   Temp 98.2 °F (36.8 °C)   Resp 12   Ht 1.651 m (5' 5\")   Wt 103.4 kg (228 lb)   SpO2 96%   BMI 37.94 kg/m²      Consciousness Level  Awake  Cardiopulmonary Status  Stable  Pain Adequately Treated YES  Nausea / Vomiting  NO  Adequate Hydration  YES  Anesthesia Related Complications NONE      Electronically signed by Jewel Ji MD on 4/18/2025 at 9:16 AM           No notable events documented.

## 2025-04-18 NOTE — PROCEDURES
Bilateral ECT Procedure Report  Alistair Hutchison   4/18/2025  1995         Attending:Corby Drake MD  Preprocedure diagnosis: disorganized schizophrenia  Postprocedure diagnosis: SAME AS PRE-PROCEDURE DIAGNOSIS  Treatment Number: This is treatment #23    Patient Status: Outpatient   Type of ECT: Bilateral Brief Pulse  Medications  Preprocedure: Tylenol 650mg and none  Anesthetic: Methohexital 100 mg  Paralytic: Succinylcholine 120 mg  Post procedure: none needed   Cuff placement: Left Lower Extremity    MECTA Settings 1st Stimulus:     Pulse width: 1.0 ms   Frequency:  60 hz   Duration:   3.0 seconds   Static Impedance: 740 ohms             Dynamic Impedance: 208 ohms             Motor Seizure Duration: 28 seconds  EEG Seizure Duration: 28 seconds             The patient's ECT treatment was performed using MECTA machine  .  Timeout:  Time out was performed using two patient identifiers and confirmation of procedure.     Patient Preparation: Preprocedure documentation, labs, H&P were all verified and reviewed.  The patient was placed in supine position.  EEG leads were placed for monitoring of seizure.   Bahai areas bilaterally cleaned and prepped.  Conductive gel  was applied over stimulus electrode site.   The patient was pre-oxygenated.       Procedure in detail: Medications were administered by anesthesia using intravenous access at the doses listed previously in this summary.  Anesthetic administered and once confirmation the patient is anesthetized, the patient's blood pressure cuff on lower extremity was inflated to 100mmHg in excess of patient's blood pressure.  At this time, the neuromuscular blockade was administered intravenously by anesthesia.  Upper extremity fasciculation were verified followed by lower extremity fasciculation.  Once fasciculations terminated, confirmation of affective neuromuscular blockade demonstrated by absence of withdrawal reflex.  Bite blocks were put in place.

## 2025-04-18 NOTE — ANESTHESIA PRE PROCEDURE
Department of Anesthesiology  Preprocedure Note       Name:  Alistair Hutchison   Age:  30 y.o.  :  1995                                          MRN:  [id number]         Date:  2025      Surgeon: Dr Drake    Procedure: ECT    Medications prior to admission:   Prior to Admission medications    Medication Sig Start Date End Date Taking? Authorizing Provider   acetaminophen (TYLENOL) 325 MG tablet Take by mouth every 6 hours as needed for Pain    ProviderShan MD   hydrOXYzine HCl (ATARAX) 50 MG tablet Take 1 tablet by mouth 2 times daily    Shan Curran MD   hyoscyamine (ANASPAZ;LEVSIN) 0.125 MG tablet Take 1 tablet by mouth every 4 hours as needed for Cramping    ProviderShan MD   propranolol (INDERAL) 10 MG tablet Take 1 tablet by mouth daily    Shan Curran MD   cloZAPine (CLOZARIL) 100 MG tablet Take 1 tablet by mouth 3 times daily 24   Corby Drake MD   traZODone (DESYREL) 50 MG tablet Take 1 tablet by mouth nightly as needed for Sleep 10/2/24   Corby Drake MD       Current medications:    Current Outpatient Medications   Medication Sig Dispense Refill    acetaminophen (TYLENOL) 325 MG tablet Take by mouth every 6 hours as needed for Pain      hydrOXYzine HCl (ATARAX) 50 MG tablet Take 1 tablet by mouth 2 times daily      hyoscyamine (ANASPAZ;LEVSIN) 0.125 MG tablet Take 1 tablet by mouth every 4 hours as needed for Cramping      propranolol (INDERAL) 10 MG tablet Take 1 tablet by mouth daily      cloZAPine (CLOZARIL) 100 MG tablet Take 1 tablet by mouth 3 times daily 90 tablet 0    traZODone (DESYREL) 50 MG tablet Take 1 tablet by mouth nightly as needed for Sleep 30 tablet 0     No current facility-administered medications for this encounter.       Allergies:    Allergies   Allergen Reactions    Chocolate Diarrhea       Problem List:    Patient Active Problem List   Diagnosis Code    Hypovitaminosis D E55.9    Rib pain on left side R07.81

## 2025-04-18 NOTE — INTERVAL H&P NOTE
Update History & Physical      The patient's History and Physical of April 8, 2025 was reviewed with the patient and I examined the patient.  Alistair Hutchison is 29 y.o., male, presents for ECT treatment.  Last ECT treatment was 4/15/25.   He presents with his grandfather.   Primary dx:  history of  Disorganized schizophrenia. Pt has failed multiple modalities. Pt comes 2 times a week. To change to  weekly next week.    Pt tolerated last treatment well. Patient unsure that his symptoms  improve or not yet.   Patient has complaints of some short term memory loss    Mood has been pretty good.  Pt admits no depression including , feeling of hopelessness, helplessness,and  low energy.   Patient's sleep has been  good . Appetite has been good .   Pt denies feeling of suicidal/ homicidal. Pt sometimes has auditory/visual hallucinations.   Pt has been taking psychiatric medications as prescribed.     Pt does not have any other somatic complaints at this time.     Patient is very talkative with multiple questions.    Patient denies any personal or family problems with anesthesia.     Electronically signed by GIORGI BREWER CNP on 4/18/2025 at 6:55 AM

## 2025-04-18 NOTE — PROGRESS NOTES
Pre ECT Assessment Note  Psychiatry  04/18/25       Alistair Hutchison  1995  [id number]      Subjective:     Patient is a 30 y.o.  male seen for an evaluation prior to today's electroconvulsive therapy treatment. Today is treatment number  23 , utilizing bilateral (10  BL and 12 RU treatments). This is course number 2.  The patient has previously completed a course of bilateral ECT for a total of 18 treatments that ended on 11/20/2024.      Last ECT treatment 4/15/2025.    William seen prior to ECT with his grandmother.  He is friendly and pleasant with discussion.  He reports things have been going well.  His grandma confirms this and states that he has not seemed as irritable and has not been arguing with his grandfather as much.  He also appears to be less internally preoccupied at home.  He continues to have incontinence with his grandmother noting 3 episodes of diarrhea yesterday.  This has been an ongoing issue for multiple weeks now.  His grandmother reports that they are going to follow-up with both his PCP and psychiatrist to see if medications could be contributing to loose stools.  Noted that his current psychotropic regimen includes clozapine 100 mg 3 times daily, paliperidone 3 mg daily and long-acting injectable Invega Sustenna 234 mg every 21 days.  In addition to this, patient has as needed hydroxyzine, propranolol 40 mg twice daily.  While both hydroxyzine and clozapine are known to have anticholinergic effects, which typically present in constipation, it is possible these medications could have an unintended side effect of loose stools.  Did encourage his grandmother to attend appointment to discuss medication management with his psychiatrist.  Patient otherwise denies any other concerns.  He denies any suicidal thoughts.  Plan to continue ECT once weekly, with next scheduled treatment Friday.      Patient Active Problem List    Diagnosis Date Noted    Developmental delay 03/21/2023

## 2025-04-22 ENCOUNTER — OFFICE VISIT (OUTPATIENT)
Dept: FAMILY MEDICINE CLINIC | Age: 30
End: 2025-04-22
Payer: COMMERCIAL

## 2025-04-22 VITALS
HEIGHT: 65 IN | BODY MASS INDEX: 34.66 KG/M2 | HEART RATE: 95 BPM | WEIGHT: 208 LBS | SYSTOLIC BLOOD PRESSURE: 126 MMHG | DIASTOLIC BLOOD PRESSURE: 86 MMHG

## 2025-04-22 DIAGNOSIS — R19.7 DIARRHEA, UNSPECIFIED TYPE: Primary | ICD-10-CM

## 2025-04-22 PROCEDURE — G8417 CALC BMI ABV UP PARAM F/U: HCPCS

## 2025-04-22 PROCEDURE — 1036F TOBACCO NON-USER: CPT

## 2025-04-22 PROCEDURE — 99213 OFFICE O/P EST LOW 20 MIN: CPT

## 2025-04-22 PROCEDURE — G8427 DOCREV CUR MEDS BY ELIG CLIN: HCPCS

## 2025-04-22 ASSESSMENT — PATIENT HEALTH QUESTIONNAIRE - PHQ9
SUM OF ALL RESPONSES TO PHQ QUESTIONS 1-9: 0
1. LITTLE INTEREST OR PLEASURE IN DOING THINGS: NOT AT ALL
2. FEELING DOWN, DEPRESSED OR HOPELESS: NOT AT ALL
SUM OF ALL RESPONSES TO PHQ QUESTIONS 1-9: 0

## 2025-04-22 ASSESSMENT — ENCOUNTER SYMPTOMS
COUGH: 0
SHORTNESS OF BREATH: 0
ABDOMINAL DISTENTION: 0
DIARRHEA: 1
VOMITING: 1
ABDOMINAL PAIN: 0
RECTAL PAIN: 0
BLOOD IN STOOL: 0

## 2025-04-22 NOTE — PATIENT INSTRUCTIONS
Thank you for letting us take care of you today. We hope all your questions were addressed. If a question was overlooked or something else comes to mind after you return home, please contact a member of your Care Team listed below.      Your Care Team at [facility] is Team #2  Samy Newell M.D. (Faculty)  Dede Latham M.D. (Resident)  Sally Hernández M.D. (Resident)  Myriam Lee M.D. (Resident)  Bonny Cast M.D. (Resident)  Isabela Lion M.D. (Resident)  David Marina, [facility]  Zina Lincoln, [facility]  Alyson Sims,  [facility]  Zora Edwards, [facility]  Luann Delgado, [facility]  Teresa Coto, [facility]  Krystina Ruelas (LJ) Buddy CIARA (Clinical Practice Manager)  Viktoriya Arango [facility] (Clinical Pharmacist)     Office phone number: [phone]    If you need to get in right away due to illness, please be advised we have \"Same Day\" appointments available Monday-Friday. Please call us at [phone] option #3 to schedule your \"Same Day\" appointment.

## 2025-04-22 NOTE — PROGRESS NOTES
Visit Information    Have you changed or started any medications since your last visit including any over-the-counter medicines, vitamins, or herbal medicines? no   Have you stopped taking any of your medications? Is so, why? -  no  Are you having any side effects from any of your medications? - no    Have you seen any other physician or provider since your last visit?  yes    Have you had any other diagnostic tests since your last visit?  no   Have you been seen in the emergency room and/or had an admission in a hospital since we last saw you?  no   Have you had your routine dental cleaning in the past 6 months?  no     Do you have an active MyChart account? If no, what is the barrier?  Yes    Patient Care Team:  Ashwin Burrows MD as PCP - General (Family Medicine)    Medical History Review  Past Medical, Family, and Social History reviewed and does not contribute to the patient presenting condition    Health Maintenance   Topic Date Due    Varicella vaccine (1 of 2 - 13+ 2-dose series) Never done    COVID-19 Vaccine (2 - 2024-25 season) 09/01/2024    Flu vaccine (Season Ended) 08/01/2025    Depression Screen  12/11/2025    DTaP/Tdap/Td vaccine (8 - Td or Tdap) 04/09/2029    Hepatitis B vaccine  Completed    Hib vaccine  Completed    Polio vaccine  Completed    Meningococcal (ACWY) vaccine  Completed    Hepatitis C screen  Completed    HIV screen  Completed    Hepatitis A vaccine  Aged Out    HPV vaccine  Aged Out    Meningococcal B vaccine  Aged Out    Pneumococcal 0-49 years Vaccine  Aged Out    Lipids  Discontinued    Depression Monitoring  Discontinued

## 2025-04-22 NOTE — PROGRESS NOTES
Attending Physician Statement  I  have discussed the care of Alistair Hutchison including pertinent history and exam findings with the resident. I agree with the assessment, plan and orders as documented by the resident.      /86 (BP Site: Right Upper Arm, Patient Position: Sitting, BP Cuff Size: Medium Adult)   Pulse 95   Ht 1.651 m (5' 5\")   Wt 94.3 kg (208 lb)   BMI 34.61 kg/m²    BP Readings from Last 3 Encounters:   04/22/25 126/86   04/18/25 (!) 127/94   04/15/25 116/85     Wt Readings from Last 3 Encounters:   04/22/25 94.3 kg (208 lb)   04/18/25 103.4 kg (228 lb)   04/15/25 113.4 kg (250 lb)          Diagnosis Orders   1. Diarrhea, unspecified type                Keith Addison DO 4/23/2025 5:21 PM

## 2025-04-22 NOTE — PROGRESS NOTES
[facility] - Outpatient Note      Subjective:    Alistair Hutchison is a 30 y.o. male with  has a past medical history of Fracture, Manic depression (HCC), Oppositional defiant behavior, PONV (postoperative nausea and vomiting), and Schizophrenia.    Presented to the office today for:  Chief Complaint   Patient presents with    Diarrhea       Diarrhea   Associated symptoms include vomiting. Pertinent negatives include no abdominal pain, chills, coughing, fever or headaches.       Patient is present with both of his grandparents  Patient has developmental delay, schizophrenia and follows Dr. Oneal for ECT therapy as well as another psychiatrist for his schizophrenia  Patient has not been around anyone sick but does talk to a lot of people  Patient is complaining of on and off diarrhea for the last 2 weeks with some vomiting  Has been wearing depends underwear as he does not have time to make it to the bathroom sometimes  Grandparents unsure if he is holding it in and has no control of what he is not taking  Patient does have excess amounts of pop and junk food  No unintentional weight loss, no fevers or chills  No blood in stool  Says that the diarrhea is around type VI-VII on [place] stool  Vital signs stable, sitting very comfortably in the room  Denies any abdominal pain  No change in activities or appetite   No rectal pain  No blood on toilet paper  Tried pepto bismol with no relief    Review of Systems   Constitutional:  Negative for activity change, appetite change, chills, fatigue, fever and unexpected weight change.   Respiratory:  Negative for cough and shortness of breath.    Cardiovascular:  Negative for chest pain, palpitations and leg swelling.   Gastrointestinal:  Positive for diarrhea and vomiting. Negative for abdominal distention, abdominal pain, blood in stool and rectal pain.   Genitourinary:  Negative for dysuria.   Neurological:

## 2025-04-24 ENCOUNTER — ANESTHESIA EVENT (OUTPATIENT)
Dept: POSTOP/PACU | Age: 30
End: 2025-04-24
Payer: COMMERCIAL

## 2025-04-25 ENCOUNTER — HOSPITAL ENCOUNTER (OUTPATIENT)
Dept: POSTOP/PACU | Age: 30
Discharge: HOME | End: 2025-04-25
Payer: COMMERCIAL

## 2025-04-25 ENCOUNTER — ANESTHESIA (OUTPATIENT)
Dept: POSTOP/PACU | Age: 30
End: 2025-04-25
Payer: COMMERCIAL

## 2025-04-25 VITALS
SYSTOLIC BLOOD PRESSURE: 130 MMHG | RESPIRATION RATE: 20 BRPM | WEIGHT: 208 LBS | BODY MASS INDEX: 34.66 KG/M2 | HEIGHT: 65 IN | HEART RATE: 128 BPM | OXYGEN SATURATION: 93 % | TEMPERATURE: 97.8 F | DIASTOLIC BLOOD PRESSURE: 81 MMHG

## 2025-04-25 DIAGNOSIS — F20.1 SCHIZOPHRENIA, DISORGANIZED (HCC): ICD-10-CM

## 2025-04-25 PROCEDURE — 7100000001 HC PACU RECOVERY - ADDTL 15 MIN: Performed by: ANESTHESIOLOGY

## 2025-04-25 PROCEDURE — 90870 ELECTROCONVULSIVE THERAPY: CPT | Performed by: PSYCHIATRY & NEUROLOGY

## 2025-04-25 PROCEDURE — 2500000003 HC RX 250 WO HCPCS: Performed by: NURSE ANESTHETIST, CERTIFIED REGISTERED

## 2025-04-25 PROCEDURE — 6360000002 HC RX W HCPCS: Performed by: NURSE ANESTHETIST, CERTIFIED REGISTERED

## 2025-04-25 PROCEDURE — 6360000002 HC RX W HCPCS: Performed by: ANESTHESIOLOGY

## 2025-04-25 PROCEDURE — 90870 ELECTROCONVULSIVE THERAPY: CPT

## 2025-04-25 PROCEDURE — 7100000000 HC PACU RECOVERY - FIRST 15 MIN: Performed by: ANESTHESIOLOGY

## 2025-04-25 PROCEDURE — 2580000003 HC RX 258: Performed by: ANESTHESIOLOGY

## 2025-04-25 PROCEDURE — 3700000000 HC ANESTHESIA ATTENDED CARE: Performed by: ANESTHESIOLOGY

## 2025-04-25 RX ORDER — SODIUM CHLORIDE, SODIUM LACTATE, POTASSIUM CHLORIDE, CALCIUM CHLORIDE 600; 310; 30; 20 MG/100ML; MG/100ML; MG/100ML; MG/100ML
INJECTION, SOLUTION INTRAVENOUS CONTINUOUS
Status: DISCONTINUED | OUTPATIENT
Start: 2025-04-25 | End: 2025-04-26 | Stop reason: HOSPADM

## 2025-04-25 RX ORDER — SODIUM CHLORIDE 0.9 % (FLUSH) 0.9 %
5-40 SYRINGE (ML) INJECTION PRN
Status: DISCONTINUED | OUTPATIENT
Start: 2025-04-25 | End: 2025-04-26 | Stop reason: HOSPADM

## 2025-04-25 RX ORDER — SUCCINYLCHOLINE/SOD CL,ISO/PF 200MG/10ML
SYRINGE (ML) INTRAVENOUS
Status: COMPLETED
Start: 2025-04-25 | End: 2025-04-25

## 2025-04-25 RX ORDER — SUCCINYLCHOLINE/SOD CL,ISO/PF 200MG/10ML
SYRINGE (ML) INTRAVENOUS
Status: DISPENSED
Start: 2025-04-25 | End: 2025-04-25

## 2025-04-25 RX ORDER — SODIUM CHLORIDE 9 MG/ML
INJECTION, SOLUTION INTRAVENOUS PRN
Status: DISCONTINUED | OUTPATIENT
Start: 2025-04-25 | End: 2025-04-26 | Stop reason: HOSPADM

## 2025-04-25 RX ORDER — SODIUM CHLORIDE 0.9 % (FLUSH) 0.9 %
5-40 SYRINGE (ML) INJECTION EVERY 12 HOURS SCHEDULED
Status: DISCONTINUED | OUTPATIENT
Start: 2025-04-25 | End: 2025-04-26 | Stop reason: HOSPADM

## 2025-04-25 RX ORDER — KETOROLAC TROMETHAMINE 30 MG/ML
INJECTION, SOLUTION INTRAMUSCULAR; INTRAVENOUS
Status: COMPLETED
Start: 2025-04-25 | End: 2025-04-25

## 2025-04-25 RX ORDER — LIDOCAINE HYDROCHLORIDE 10 MG/ML
1 INJECTION, SOLUTION EPIDURAL; INFILTRATION; INTRACAUDAL; PERINEURAL
Status: COMPLETED | OUTPATIENT
Start: 2025-04-25 | End: 2025-04-25

## 2025-04-25 RX ORDER — KETOROLAC TROMETHAMINE 30 MG/ML
INJECTION, SOLUTION INTRAMUSCULAR; INTRAVENOUS
Status: DISCONTINUED | OUTPATIENT
Start: 2025-04-25 | End: 2025-04-25 | Stop reason: SDUPTHER

## 2025-04-25 RX ORDER — METHOHEXITAL IN WATER/PF 100MG/10ML
SYRINGE (ML) INTRAVENOUS
Status: COMPLETED
Start: 2025-04-25 | End: 2025-04-25

## 2025-04-25 RX ORDER — ONDANSETRON 2 MG/ML
INJECTION INTRAMUSCULAR; INTRAVENOUS
Status: DISCONTINUED | OUTPATIENT
Start: 2025-04-25 | End: 2025-04-25 | Stop reason: SDUPTHER

## 2025-04-25 RX ORDER — ONDANSETRON 2 MG/ML
INJECTION INTRAMUSCULAR; INTRAVENOUS
Status: COMPLETED
Start: 2025-04-25 | End: 2025-04-25

## 2025-04-25 RX ORDER — SUCCINYLCHOLINE/SOD CL,ISO/PF 200MG/10ML
SYRINGE (ML) INTRAVENOUS
Status: DISCONTINUED | OUTPATIENT
Start: 2025-04-25 | End: 2025-04-25 | Stop reason: SDUPTHER

## 2025-04-25 RX ORDER — METHOHEXITAL IN WATER/PF 100MG/10ML
SYRINGE (ML) INTRAVENOUS
Status: DISCONTINUED | OUTPATIENT
Start: 2025-04-25 | End: 2025-04-25 | Stop reason: SDUPTHER

## 2025-04-25 RX ADMIN — KETOROLAC TROMETHAMINE 30 MG: 30 INJECTION, SOLUTION INTRAMUSCULAR at 07:04

## 2025-04-25 RX ADMIN — Medication 120 MG: at 07:07

## 2025-04-25 RX ADMIN — LIDOCAINE HYDROCHLORIDE 1 ML: 10 INJECTION, SOLUTION EPIDURAL; INFILTRATION; INTRACAUDAL; PERINEURAL at 06:42

## 2025-04-25 RX ADMIN — ONDANSETRON 4 MG: 2 INJECTION INTRAMUSCULAR; INTRAVENOUS at 07:04

## 2025-04-25 RX ADMIN — Medication 100 MG: at 07:06

## 2025-04-25 RX ADMIN — SODIUM CHLORIDE, POTASSIUM CHLORIDE, SODIUM LACTATE AND CALCIUM CHLORIDE: 600; 310; 30; 20 INJECTION, SOLUTION INTRAVENOUS at 06:41

## 2025-04-25 ASSESSMENT — PAIN - FUNCTIONAL ASSESSMENT: PAIN_FUNCTIONAL_ASSESSMENT: 0-10

## 2025-04-25 NOTE — ANESTHESIA PRE PROCEDURE
Department of Anesthesiology  Preprocedure Note       Name:  Alistair Hutchison   Age:  30 y.o.  :  1995                                          MRN:  [id number]         Date:  2025      Surgeon: Dr Drake    Procedure: ECT    Medications prior to admission:   Prior to Admission medications    Medication Sig Start Date End Date Taking? Authorizing Provider   Paliperidone (INVEGA PO) Take 1 tablet by mouth daily as needed 3 mg daily prn   Yes Shan Curran MD   propranolol (INDERAL) 40 MG tablet Take 1 tablet by mouth daily   Yes Shan Curran MD   propranolol (INDERAL) 40 MG tablet Take 1 tablet by mouth every evening Takes at 3:00 pm   Yes Shan Curran MD   hyoscyamine (LEVBID) 0.375 MG extended release tablet Take 1 tablet by mouth 2 times daily   Yes Shan Curran MD   acetaminophen (TYLENOL) 325 MG tablet Take by mouth every 6 hours as needed for Pain   Yes Shan Curran MD   hydrOXYzine HCl (ATARAX) 50 MG tablet Take 1 tablet by mouth 2 times daily Pt takes at noon and 6 pm   Yes Shan Curran MD   cloZAPine (CLOZARIL) 100 MG tablet Take 1 tablet by mouth 3 times daily  Patient taking differently: Take 2 tablets by mouth nightly 24  Yes Corby Drake MD   traZODone (DESYREL) 50 MG tablet Take 1 tablet by mouth nightly as needed for Sleep 10/2/24  Yes Corby Drake MD   paliperidone palmitate ER (INVEGA SUSTENNA) 234 MG/1.5ML FRANCHESKA IM injection Inject 234 mg into the muscle every 21 days    Shan Curran MD       Current medications:    Current Outpatient Medications   Medication Sig Dispense Refill    Paliperidone (INVEGA PO) Take 1 tablet by mouth daily as needed 3 mg daily prn      propranolol (INDERAL) 40 MG tablet Take 1 tablet by mouth daily      propranolol (INDERAL) 40 MG tablet Take 1 tablet by mouth every evening Takes at 3:00 pm      hyoscyamine (LEVBID) 0.375 MG extended release tablet Take 1 tablet by mouth 2 times

## 2025-04-25 NOTE — ANESTHESIA POSTPROCEDURE EVALUATION
Department of Anesthesiology  Postprocedure Note    Patient: Alistair Hutchison  MRN: [id number]  YOB: 1995  Date of evaluation: 4/25/2025    Procedure Summary       Date: 04/25/25 Room / Location: [facility] PACU    Anesthesia Start: 0702 Anesthesia Stop: 0715    Procedure: ECT W/ ANESTHESIA Diagnosis: Disorganized schizophrenia (HCC)    Scheduled Providers:  Responsible Provider: Jewel Ji MD    Anesthesia Type: General ASA Status: 3            Anesthesia Type: General    Amalia Phase I: Amalia Score: 10    Amalia Phase II:      Anesthesia Post Evaluation    Comments: POST- ANESTHESIA EVALUATION       Pt Name: Alistair Hutchison  MRN: [id number]  YOB: 1995  Date of evaluation: 4/25/2025  Time:  10:54 AM      /81   Pulse (!) 128   Temp 97.8 °F (36.6 °C) (Infrared)   Resp 20   Ht 1.651 m (5' 5\")   Wt 94.3 kg (208 lb)   SpO2 93%   BMI 34.61 kg/m²      Consciousness Level  Awake  Cardiopulmonary Status  Stable  Pain Adequately Treated YES  Nausea / Vomiting  NO  Adequate Hydration  YES  Anesthesia Related Complications NONE      Electronically signed by Jewel Ji MD on 4/25/2025 at 10:54 AM           No notable events documented.

## 2025-04-25 NOTE — PROCEDURES
Bilateral ECT Procedure Report  Alistair Hutchison   4/25/2025  1995         Attending:Corby Drake MD  Preprocedure diagnosis: disorganized schizophrenia  Postprocedure diagnosis: SAME AS PRE-PROCEDURE DIAGNOSIS  Treatment Number: This is treatment #24    Patient Status: Outpatient   Type of ECT: Bilateral Brief Pulse  Medications  Preprocedure: Tylenol 650mg and none  Anesthetic: Methohexital 100 mg  Paralytic: Succinylcholine 120 mg  Post procedure: none needed   Cuff placement: Left Lower Extremity    MECTA Settings 1st Stimulus:     Pulse width: 1.0 ms   Frequency:  60 hz   Duration:   3.0 seconds   Static Impedance: 623 ohms             Dynamic Impedance: 220 ohms             Motor Seizure Duration: 25 seconds  EEG Seizure Duration: 39 seconds             The patient's ECT treatment was performed using MECTA machine  .  Timeout:  Time out was performed using two patient identifiers and confirmation of procedure.     Patient Preparation: Preprocedure documentation, labs, H&P were all verified and reviewed.  The patient was placed in supine position.  EEG leads were placed for monitoring of seizure.   Rastafari areas bilaterally cleaned and prepped.  Conductive gel  was applied over stimulus electrode site.   The patient was pre-oxygenated.       Procedure in detail: Medications were administered by anesthesia using intravenous access at the doses listed previously in this summary.  Anesthetic administered and once confirmation the patient is anesthetized, the patient's blood pressure cuff on lower extremity was inflated to 100mmHg in excess of patient's blood pressure.  At this time, the neuromuscular blockade was administered intravenously by anesthesia.  Upper extremity fasciculation were verified followed by lower extremity fasciculation.  Once fasciculations terminated, confirmation of affective neuromuscular blockade demonstrated by absence of withdrawal reflex.  Bite blocks were put in place.

## 2025-04-25 NOTE — PROGRESS NOTES
disorganized ([facility]) 12/11/2024    Disorganized schizophrenia ([facility]) 09/20/2024    Hyperthyroidism, subclinical 08/22/2024    Moderate mixed hyperlipidemia not requiring statin therapy 08/22/2024    Class 2 obesity without serious comorbidity with body mass index (BMI) of 38.0 to 38.9 in adult 08/22/2024    Hypovitaminosis D 10/18/2019    Rib pain on left side 10/18/2019     Past Medical History:   Diagnosis Date    Fracture     leg    Manic depression ([facility])     Oppositional defiant behavior     PONV (postoperative nausea and vomiting)     Schizophrenia ([facility])       Past Surgical History:   Procedure Laterality Date    EYE SURGERY      OTHER SURGICAL HISTORY      ECT    TYMPANOSTOMY TUBE PLACEMENT        Not in a hospital admission.  Allergies   Allergen Reactions    Chocolate Diarrhea      Social History     Tobacco Use    Smoking status: Never    Smokeless tobacco: Never   Substance Use Topics    Alcohol use: No      History reviewed. No pertinent family history.       Objective:       Mental Status Evaluation:  Appearance:  Wearing gown, on stretcher, well groomed   Behavior:  Engages with interviewer, smiles, childlike   Speech:  Normal rate, loud volume and happy tone   Mood:  \"I had a bad week\"   Affect:  Elevated   Thought Process:  loose   Thought Content:  Denies suicidal ideation   Sensorium:  Does not appear to attend to internal stimuli   Cognition:  Oriented to person, place and general circumstance   Insight:  fair   Judgment:  fair     Assessment:     Diagnosis: Disorganized schizophrenia    Plan:     Continue frequency of treatment once weekly.  Patient symptoms decompensated when attempting to taper frequency once biweekly.  Monitor for stability in symptoms  Taper treatment frequency as tolerated.    Update consent and H&P every 30 days while undergoing ECT treatment, update labs every 6 months.   Patient verbalizes understanding of risks/benefits/alternatives to ECT.  Last informed consent signed on

## 2025-04-25 NOTE — INTERVAL H&P NOTE
Update History & Physical    The patient's History and Physical of April 8, 2025 was reviewed with the patient and I examined the patient.   The patient's History and Physical of April 18, 2025 was reviewed with the patient and I examined the patient.  Alistair Hutchison is 29 y.o., male, presents for ECT treatment.  Last ECT treatment was 4/15/25.   He presents with his grandmother.   Primary dx:  history of  Disorganized schizophrenia. Pt comes To change to  weekly next week.    Pt tolerated last treatment well. Patient unsure that his symptoms  improve or not yet.   Patient has complaints of some short term memory loss    Mood has been not so good.  Pt admits no depression including , feeling of hopelessness, helplessness,and  low energy.   Patient's sleep has been  good . Appetite has been good .   Pt denies feeling of suicidal/ pt states that he has  homicidal thoughts about kids in neighborhood.  Pt sometimes has auditory/visual hallucinations.   Pt has been taking psychiatric medications as prescribed.      Pt does not have any other somatic complaints at this time.     Patient is very talkative with multiple questions.    Patient denies any personal or family problems with anesthesia.        Electronically signed by GIORGI BREWER CNP on 4/25/2025 at 6:33 AM

## 2025-05-01 ENCOUNTER — ANESTHESIA EVENT (OUTPATIENT)
Dept: POSTOP/PACU | Age: 30
End: 2025-05-01
Payer: COMMERCIAL

## 2025-05-02 ENCOUNTER — ANESTHESIA (OUTPATIENT)
Dept: POSTOP/PACU | Age: 30
End: 2025-05-02
Payer: COMMERCIAL

## 2025-05-02 ENCOUNTER — HOSPITAL ENCOUNTER (OUTPATIENT)
Dept: POSTOP/PACU | Age: 30
Discharge: HOME | End: 2025-05-02
Payer: COMMERCIAL

## 2025-05-02 VITALS
TEMPERATURE: 98.3 F | BODY MASS INDEX: 34.66 KG/M2 | OXYGEN SATURATION: 100 % | RESPIRATION RATE: 10 BRPM | DIASTOLIC BLOOD PRESSURE: 83 MMHG | WEIGHT: 208 LBS | HEART RATE: 103 BPM | SYSTOLIC BLOOD PRESSURE: 129 MMHG | HEIGHT: 65 IN

## 2025-05-02 DIAGNOSIS — F20.1 DISORGANIZED SCHIZOPHRENIA (HCC): Primary | ICD-10-CM

## 2025-05-02 PROCEDURE — 2500000003 HC RX 250 WO HCPCS: Performed by: NURSE ANESTHETIST, CERTIFIED REGISTERED

## 2025-05-02 PROCEDURE — 7100000000 HC PACU RECOVERY - FIRST 15 MIN: Performed by: ANESTHESIOLOGY

## 2025-05-02 PROCEDURE — 3700000001 HC ADD 15 MINUTES (ANESTHESIA): Performed by: ANESTHESIOLOGY

## 2025-05-02 PROCEDURE — 90870 ELECTROCONVULSIVE THERAPY: CPT | Performed by: PSYCHIATRY & NEUROLOGY

## 2025-05-02 PROCEDURE — 90870 ELECTROCONVULSIVE THERAPY: CPT

## 2025-05-02 PROCEDURE — 6360000002 HC RX W HCPCS: Performed by: NURSE ANESTHETIST, CERTIFIED REGISTERED

## 2025-05-02 PROCEDURE — 3700000000 HC ANESTHESIA ATTENDED CARE: Performed by: ANESTHESIOLOGY

## 2025-05-02 PROCEDURE — 2580000003 HC RX 258: Performed by: ANESTHESIOLOGY

## 2025-05-02 PROCEDURE — 7100000001 HC PACU RECOVERY - ADDTL 15 MIN: Performed by: ANESTHESIOLOGY

## 2025-05-02 RX ORDER — ONDANSETRON 2 MG/ML
INJECTION INTRAMUSCULAR; INTRAVENOUS
Status: DISCONTINUED | OUTPATIENT
Start: 2025-05-02 | End: 2025-05-02 | Stop reason: SDUPTHER

## 2025-05-02 RX ORDER — SODIUM CHLORIDE, SODIUM LACTATE, POTASSIUM CHLORIDE, CALCIUM CHLORIDE 600; 310; 30; 20 MG/100ML; MG/100ML; MG/100ML; MG/100ML
INJECTION, SOLUTION INTRAVENOUS CONTINUOUS
Status: DISCONTINUED | OUTPATIENT
Start: 2025-05-02 | End: 2025-05-03 | Stop reason: HOSPADM

## 2025-05-02 RX ORDER — KETOROLAC TROMETHAMINE 30 MG/ML
INJECTION, SOLUTION INTRAMUSCULAR; INTRAVENOUS
Status: DISCONTINUED | OUTPATIENT
Start: 2025-05-02 | End: 2025-05-02 | Stop reason: SDUPTHER

## 2025-05-02 RX ORDER — SUCCINYLCHOLINE/SOD CL,ISO/PF 200MG/10ML
SYRINGE (ML) INTRAVENOUS
Status: COMPLETED
Start: 2025-05-02 | End: 2025-05-02

## 2025-05-02 RX ORDER — SUCCINYLCHOLINE/SOD CL,ISO/PF 200MG/10ML
SYRINGE (ML) INTRAVENOUS
Status: DISCONTINUED | OUTPATIENT
Start: 2025-05-02 | End: 2025-05-02 | Stop reason: SDUPTHER

## 2025-05-02 RX ORDER — SODIUM CHLORIDE 0.9 % (FLUSH) 0.9 %
5-40 SYRINGE (ML) INJECTION EVERY 12 HOURS SCHEDULED
Status: DISCONTINUED | OUTPATIENT
Start: 2025-05-02 | End: 2025-05-03 | Stop reason: HOSPADM

## 2025-05-02 RX ORDER — KETOROLAC TROMETHAMINE 30 MG/ML
INJECTION, SOLUTION INTRAMUSCULAR; INTRAVENOUS
Status: COMPLETED
Start: 2025-05-02 | End: 2025-05-02

## 2025-05-02 RX ORDER — METHOHEXITAL IN WATER/PF 100MG/10ML
SYRINGE (ML) INTRAVENOUS
Status: COMPLETED
Start: 2025-05-02 | End: 2025-05-02

## 2025-05-02 RX ORDER — METHOHEXITAL IN WATER/PF 100MG/10ML
SYRINGE (ML) INTRAVENOUS
Status: DISCONTINUED | OUTPATIENT
Start: 2025-05-02 | End: 2025-05-02 | Stop reason: SDUPTHER

## 2025-05-02 RX ORDER — SODIUM CHLORIDE 9 MG/ML
INJECTION, SOLUTION INTRAVENOUS PRN
Status: DISCONTINUED | OUTPATIENT
Start: 2025-05-02 | End: 2025-05-03 | Stop reason: HOSPADM

## 2025-05-02 RX ORDER — LIDOCAINE HYDROCHLORIDE 10 MG/ML
1 INJECTION, SOLUTION EPIDURAL; INFILTRATION; INTRACAUDAL; PERINEURAL
Status: DISCONTINUED | OUTPATIENT
Start: 2025-05-02 | End: 2025-05-03 | Stop reason: HOSPADM

## 2025-05-02 RX ORDER — SODIUM CHLORIDE 0.9 % (FLUSH) 0.9 %
5-40 SYRINGE (ML) INJECTION PRN
Status: DISCONTINUED | OUTPATIENT
Start: 2025-05-02 | End: 2025-05-03 | Stop reason: HOSPADM

## 2025-05-02 RX ORDER — ONDANSETRON 2 MG/ML
INJECTION INTRAMUSCULAR; INTRAVENOUS
Status: COMPLETED
Start: 2025-05-02 | End: 2025-05-02

## 2025-05-02 RX ADMIN — Medication 100 MG: at 07:41

## 2025-05-02 RX ADMIN — SODIUM CHLORIDE, POTASSIUM CHLORIDE, SODIUM LACTATE AND CALCIUM CHLORIDE: 600; 310; 30; 20 INJECTION, SOLUTION INTRAVENOUS at 07:23

## 2025-05-02 RX ADMIN — KETOROLAC TROMETHAMINE 30 MG: 30 INJECTION, SOLUTION INTRAMUSCULAR at 07:39

## 2025-05-02 RX ADMIN — Medication 120 MG: at 07:41

## 2025-05-02 RX ADMIN — ONDANSETRON 4 MG: 2 INJECTION, SOLUTION INTRAMUSCULAR; INTRAVENOUS at 07:39

## 2025-05-02 ASSESSMENT — PAIN - FUNCTIONAL ASSESSMENT: PAIN_FUNCTIONAL_ASSESSMENT: 0-10

## 2025-05-02 NOTE — ANESTHESIA POSTPROCEDURE EVALUATION
Department of Anesthesiology  Postprocedure Note    Patient: Alistair Hutchison  MRN: [id number]  YOB: 1995  Date of evaluation: 5/2/2025    Procedure Summary       Date: 05/02/25 Room / Location: [facility] PACU    Anesthesia Start: 0737 Anesthesia Stop: 0754    Procedure: ECT W/ ANESTHESIA Diagnosis: Disorganized schizophrenia (HCC)    Scheduled Providers:  Responsible Provider:     Anesthesia Type: General ASA Status: 3            Anesthesia Type: General    Amalia Phase I: Amalia Score: 6    Amalia Phase II:      Anesthesia Post Evaluation    Comments: POST- ANESTHESIA EVALUATION       Pt Name: Alistair Hutchison  MRN: [id number]  YOB: 1995  Date of evaluation: 5/2/2025  Time:  10:20 AM      /83   Pulse (!) 103   Temp 98.3 °F (36.8 °C)   Resp 10   Ht 1.651 m (5' 5\")   Wt 94.3 kg (208 lb)   SpO2 100%   BMI 34.61 kg/m²      Consciousness Level  Awake  Cardiopulmonary Status  Stable  Pain Adequately Treated YES  Nausea / Vomiting  NO  Adequate Hydration  YES  Anesthesia Related Complications NONE      Electronically signed by Barby Leonard MD on 5/2/2025 at 10:20 AM           No notable events documented.

## 2025-05-02 NOTE — PROCEDURES
Bilateral ECT Procedure Report  Alistair Hutchison   5/2/2025  1995         Attending:Corby Drake MD  Preprocedure diagnosis: disorganized schizophrenia  Postprocedure diagnosis: SAME AS PRE-PROCEDURE DIAGNOSIS  Treatment Number: This is treatment #25    Patient Status: Outpatient   Type of ECT: Bilateral Brief Pulse  Medications  Preprocedure: Tylenol 650mg and none  Anesthetic: Methohexital 100 mg  Paralytic: Succinylcholine 120 mg  Post procedure: none needed   Cuff placement: Left Lower Extremity    MECTA Settings 1st Stimulus:     Pulse width: 1.0 ms   Frequency:  60 hz   Duration:   3.0 seconds   Static Impedance: 1025 ohms             Dynamic Impedance: 223 ohms             Motor Seizure Duration: 32 seconds  EEG Seizure Duration: 55 seconds             The patient's ECT treatment was performed using MECTA machine  .  Timeout:  Time out was performed using two patient identifiers and confirmation of procedure.     Patient Preparation: Preprocedure documentation, labs, H&P were all verified and reviewed.  The patient was placed in supine position.  EEG leads were placed for monitoring of seizure.   Baptism areas bilaterally cleaned and prepped.  Conductive gel  was applied over stimulus electrode site.   The patient was pre-oxygenated.       Procedure in detail: Medications were administered by anesthesia using intravenous access at the doses listed previously in this summary.  Anesthetic administered and once confirmation the patient is anesthetized, the patient's blood pressure cuff on lower extremity was inflated to 100mmHg in excess of patient's blood pressure.  At this time, the neuromuscular blockade was administered intravenously by anesthesia.  Upper extremity fasciculation were verified followed by lower extremity fasciculation.  Once fasciculations terminated, confirmation of affective neuromuscular blockade demonstrated by absence of withdrawal reflex.  Bite blocks were put in place.

## 2025-05-02 NOTE — INTERVAL H&P NOTE
Update History & Physical    The patient's History and Physical of April 8, 2025 was reviewed with the patient and I examined the patient. There was no change. The surgical site was confirmed by the patient and me.       Alistair Hutchison is 30 y.o.,  male, here for ECT treatment.     Last ECT treatment was 4/25/2025. Pt tolerated last treatment well. Patient reports that her symptoms are improving.  Patient has complaints of some short term memory loss    Pt has had multiple failed treatment modalities. Pt has history of Disorganized schizophrenia   Mood is rated at 9/10.    Patient's sleep has been good. Appetite has been good.   Pt is not suicidal. Pt is not homicidal. Pt denies auditory/visual hallucinations.   Pt has been taking psychiatric medications as prescribed.   Pt does not have any other somatic complaints at this time.      NPO since MN  PONV.  Denies h/o complications with anesthesia  Denies h/o blood clots  Denies h/o MRSA  Cardiopulmonary assessment completed  Unchanged  See nursing flowsheet for vital signs.         Electronically signed by GIORGI Rust CNP on 5/2/2025 at 6:31 AM

## 2025-05-02 NOTE — ANESTHESIA PRE PROCEDURE
Department of Anesthesiology  Preprocedure Note       Name:  Alistair Hutchison   Age:  30 y.o.  :  1995                                          MRN:  [id number]         Date:  2025      Surgeon: Dr Drake    Procedure: ECT    Medications prior to admission:   Prior to Admission medications    Medication Sig Start Date End Date Taking? Authorizing Provider   Paliperidone (INVEGA PO) Take 1 tablet by mouth daily as needed 3 mg daily prn    Shan Curran MD   paliperidone palmitate ER (INVEGA SUSTENNA) 234 MG/1.5ML FRANCHESKA IM injection Inject 234 mg into the muscle every 21 days    Shan Curran MD   propranolol (INDERAL) 40 MG tablet Take 1 tablet by mouth daily    Shan Curran MD   propranolol (INDERAL) 40 MG tablet Take 1 tablet by mouth every evening Takes at 3:00 pm    hSan Curran MD   hyoscyamine (LEVBID) 0.375 MG extended release tablet Take 1 tablet by mouth 2 times daily    Shan Curran MD   acetaminophen (TYLENOL) 325 MG tablet Take by mouth every 6 hours as needed for Pain    Shan Curran MD   hydrOXYzine HCl (ATARAX) 50 MG tablet Take 1 tablet by mouth 2 times daily Pt takes at noon and 6 pm    Shan Curran MD   cloZAPine (CLOZARIL) 100 MG tablet Take 1 tablet by mouth 3 times daily  Patient taking differently: Take 2 tablets by mouth nightly 24   Corby Drake MD   traZODone (DESYREL) 50 MG tablet Take 1 tablet by mouth nightly as needed for Sleep 10/2/24   Corby Drake MD       Current medications:    Current Outpatient Medications   Medication Sig Dispense Refill    Paliperidone (INVEGA PO) Take 1 tablet by mouth daily as needed 3 mg daily prn      paliperidone palmitate ER (INVEGA SUSTENNA) 234 MG/1.5ML FRANCHESKA IM injection Inject 234 mg into the muscle every 21 days      propranolol (INDERAL) 40 MG tablet Take 1 tablet by mouth daily      propranolol (INDERAL) 40 MG tablet Take 1 tablet by mouth every evening Takes

## 2025-05-02 NOTE — DISCHARGE INSTRUCTIONS
your ECT treatments and it is during normal business hours call [phone]. If it is after normal business hours please call [facility] at [phone] to speak with a nurse, or go to your nearest emergency room.     If you need to schedule, reschedule or cancel an appointment, please call the [facility] at [phone]. You may also use this number for any questions regarding the ECT Support Group.    If you have an after hours or weekend cancellation please call [phone], they will forward message to appropriate party.    You will need to arrange transportation to and from the hospital for all outpatient ECT treatments.    Bring a current list of your medications, including dosages with you to every ECT treatment and arrive 1 hour and 15 minutes before your scheduled ECT time.             
N/A

## 2025-05-02 NOTE — PROGRESS NOTES
Pre ECT Assessment Note  Psychiatry  05/02/25       Alistair Hutchison  1995  [id number]      Subjective:     Patient is a 30 y.o.  male seen for an evaluation prior to today's electroconvulsive therapy treatment. Today is treatment number  25 , utilizing bilateral ( 13   BL and 12 RU treatments). This is course number 2.  The patient has previously completed a course of bilateral ECT for a total of 18 treatments that ended on 11/20/2024.      Last ECT treatment 4/25/2025.    William seen prior to ECT with his grandmother.  He is friendly and pleasant with discussion.  His affect is bright, but reports he had a difficult week. He continues to have incontinence of stool every other day. Today, William expresses feeling that he has to go to the bathroom before he is incontinent. He does not know why he doesn't get up or try to use the toilet. His PCP is not concerned about this. His grandmother states he has been more \"behavioral\" this week. He gets frustrated with his grandfather and they argue. She does feel that he remains less \"in his head\" and does not appear to be internally preoccupied. He has not expressed any suicidal or homicidal thoughts. He does not reports any cognitive side effects. Patient and his guardian would like to continue ECT treatments at a frequency of once weekly.       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    Hyperthyroidism, subclinical 08/22/2024    Moderate mixed hyperlipidemia not requiring statin therapy 08/22/2024    Class 2 obesity without serious comorbidity with body mass index (BMI) of 38.0 to 38.9 in adult 08/22/2024    Hypovitaminosis D 10/18/2019    Rib pain on left side 10/18/2019     Past Medical History:   Diagnosis Date    Fracture     leg    Manic depression (HCC)     Oppositional defiant

## 2025-05-08 ENCOUNTER — ANESTHESIA EVENT (OUTPATIENT)
Dept: POSTOP/PACU | Age: 30
End: 2025-05-08
Payer: COMMERCIAL

## 2025-05-08 DIAGNOSIS — F20.1 SCHIZOPHRENIA, DISORGANIZED (HCC): Primary | ICD-10-CM

## 2025-05-09 ENCOUNTER — HOSPITAL ENCOUNTER (OUTPATIENT)
Dept: POSTOP/PACU | Age: 30
Discharge: HOME | End: 2025-05-09
Payer: COMMERCIAL

## 2025-05-09 ENCOUNTER — ANESTHESIA (OUTPATIENT)
Dept: POSTOP/PACU | Age: 30
End: 2025-05-09
Payer: COMMERCIAL

## 2025-05-09 VITALS
TEMPERATURE: 97.8 F | SYSTOLIC BLOOD PRESSURE: 131 MMHG | DIASTOLIC BLOOD PRESSURE: 72 MMHG | HEART RATE: 99 BPM | OXYGEN SATURATION: 94 % | RESPIRATION RATE: 15 BRPM

## 2025-05-09 PROCEDURE — 90870 ELECTROCONVULSIVE THERAPY: CPT | Performed by: ANESTHESIOLOGY

## 2025-05-09 PROCEDURE — 7100000000 HC PACU RECOVERY - FIRST 15 MIN: Performed by: ANESTHESIOLOGY

## 2025-05-09 PROCEDURE — 2580000003 HC RX 258: Performed by: ANESTHESIOLOGY

## 2025-05-09 PROCEDURE — 7100000001 HC PACU RECOVERY - ADDTL 15 MIN: Performed by: ANESTHESIOLOGY

## 2025-05-09 PROCEDURE — 6360000002 HC RX W HCPCS: Performed by: NURSE ANESTHETIST, CERTIFIED REGISTERED

## 2025-05-09 PROCEDURE — 3700000001 HC ADD 15 MINUTES (ANESTHESIA): Performed by: ANESTHESIOLOGY

## 2025-05-09 PROCEDURE — 2500000003 HC RX 250 WO HCPCS: Performed by: NURSE ANESTHETIST, CERTIFIED REGISTERED

## 2025-05-09 PROCEDURE — 3700000000 HC ANESTHESIA ATTENDED CARE: Performed by: ANESTHESIOLOGY

## 2025-05-09 PROCEDURE — 90870 ELECTROCONVULSIVE THERAPY: CPT | Performed by: PSYCHIATRY & NEUROLOGY

## 2025-05-09 RX ORDER — SUCCINYLCHOLINE/SOD CL,ISO/PF 200MG/10ML
SYRINGE (ML) INTRAVENOUS
Status: DISCONTINUED | OUTPATIENT
Start: 2025-05-09 | End: 2025-05-09 | Stop reason: SDUPTHER

## 2025-05-09 RX ORDER — SODIUM CHLORIDE, SODIUM LACTATE, POTASSIUM CHLORIDE, CALCIUM CHLORIDE 600; 310; 30; 20 MG/100ML; MG/100ML; MG/100ML; MG/100ML
INJECTION, SOLUTION INTRAVENOUS CONTINUOUS
Status: DISCONTINUED | OUTPATIENT
Start: 2025-05-09 | End: 2025-05-10 | Stop reason: HOSPADM

## 2025-05-09 RX ORDER — KETOROLAC TROMETHAMINE 30 MG/ML
INJECTION, SOLUTION INTRAMUSCULAR; INTRAVENOUS
Status: DISCONTINUED | OUTPATIENT
Start: 2025-05-09 | End: 2025-05-09 | Stop reason: SDUPTHER

## 2025-05-09 RX ORDER — ONDANSETRON 2 MG/ML
INJECTION INTRAMUSCULAR; INTRAVENOUS
Status: DISCONTINUED | OUTPATIENT
Start: 2025-05-09 | End: 2025-05-09 | Stop reason: SDUPTHER

## 2025-05-09 RX ORDER — KETOROLAC TROMETHAMINE 30 MG/ML
INJECTION, SOLUTION INTRAMUSCULAR; INTRAVENOUS
Status: COMPLETED
Start: 2025-05-09 | End: 2025-05-09

## 2025-05-09 RX ORDER — METHOHEXITAL IN WATER/PF 100MG/10ML
SYRINGE (ML) INTRAVENOUS
Status: DISCONTINUED | OUTPATIENT
Start: 2025-05-09 | End: 2025-05-09 | Stop reason: SDUPTHER

## 2025-05-09 RX ORDER — METHOHEXITAL IN WATER/PF 100MG/10ML
SYRINGE (ML) INTRAVENOUS
Status: COMPLETED
Start: 2025-05-09 | End: 2025-05-09

## 2025-05-09 RX ORDER — SODIUM CHLORIDE 9 MG/ML
INJECTION, SOLUTION INTRAVENOUS PRN
Status: DISCONTINUED | OUTPATIENT
Start: 2025-05-09 | End: 2025-05-10 | Stop reason: HOSPADM

## 2025-05-09 RX ORDER — SODIUM CHLORIDE 0.9 % (FLUSH) 0.9 %
5-40 SYRINGE (ML) INJECTION EVERY 12 HOURS SCHEDULED
Status: DISCONTINUED | OUTPATIENT
Start: 2025-05-09 | End: 2025-05-10 | Stop reason: HOSPADM

## 2025-05-09 RX ORDER — LIDOCAINE HYDROCHLORIDE 10 MG/ML
1 INJECTION, SOLUTION EPIDURAL; INFILTRATION; INTRACAUDAL; PERINEURAL
Status: DISCONTINUED | OUTPATIENT
Start: 2025-05-09 | End: 2025-05-10 | Stop reason: HOSPADM

## 2025-05-09 RX ORDER — SUCCINYLCHOLINE/SOD CL,ISO/PF 200MG/10ML
SYRINGE (ML) INTRAVENOUS
Status: COMPLETED
Start: 2025-05-09 | End: 2025-05-09

## 2025-05-09 RX ORDER — ONDANSETRON 2 MG/ML
INJECTION INTRAMUSCULAR; INTRAVENOUS
Status: COMPLETED
Start: 2025-05-09 | End: 2025-05-09

## 2025-05-09 RX ORDER — SODIUM CHLORIDE 0.9 % (FLUSH) 0.9 %
5-40 SYRINGE (ML) INJECTION PRN
Status: DISCONTINUED | OUTPATIENT
Start: 2025-05-09 | End: 2025-05-10 | Stop reason: HOSPADM

## 2025-05-09 RX ADMIN — KETOROLAC TROMETHAMINE 30 MG: 30 INJECTION, SOLUTION INTRAMUSCULAR at 08:33

## 2025-05-09 RX ADMIN — SODIUM CHLORIDE, POTASSIUM CHLORIDE, SODIUM LACTATE AND CALCIUM CHLORIDE: 600; 310; 30; 20 INJECTION, SOLUTION INTRAVENOUS at 08:30

## 2025-05-09 RX ADMIN — Medication 120 MG: at 08:36

## 2025-05-09 RX ADMIN — Medication 100 MG: at 08:36

## 2025-05-09 RX ADMIN — ONDANSETRON 4 MG: 2 INJECTION, SOLUTION INTRAMUSCULAR; INTRAVENOUS at 08:33

## 2025-05-09 ASSESSMENT — ENCOUNTER SYMPTOMS
BACK PAIN: 0
GASTROINTESTINAL NEGATIVE: 1
SHORTNESS OF BREATH: 0
EYES NEGATIVE: 1
TROUBLE SWALLOWING: 0
APNEA: 0
SORE THROAT: 0
COUGH: 1
ABDOMINAL PAIN: 0

## 2025-05-09 ASSESSMENT — PAIN - FUNCTIONAL ASSESSMENT
PAIN_FUNCTIONAL_ASSESSMENT: NONE - DENIES PAIN
PAIN_FUNCTIONAL_ASSESSMENT: NONE - DENIES PAIN

## 2025-05-09 NOTE — ANESTHESIA PRE PROCEDURE
Department of Anesthesiology  Preprocedure Note       Name:  Alistair Hutchison   Age:  30 y.o.  :  1995                                          MRN:  [id number]         Date:  2025      Surgeon: Dr Drake    Procedure: ECT    Medications prior to admission:   Prior to Admission medications    Medication Sig Start Date End Date Taking? Authorizing Provider   Paliperidone (INVEGA PO) Take 1 tablet by mouth daily as needed 3 mg daily prn   Yes ProviderShan MD   paliperidone palmitate ER (INVEGA SUSTENNA) 234 MG/1.5ML FRANCHESKA IM injection Inject 234 mg into the muscle every 21 days   Yes ProviderShan MD   propranolol (INDERAL) 40 MG tablet Take 1 tablet by mouth every evening Takes at 3:00 pm   Yes ProviderShan MD   hyoscyamine (LEVBID) 0.375 MG extended release tablet Take 1 tablet by mouth 2 times daily   Yes ProviderShan MD   acetaminophen (TYLENOL) 325 MG tablet Take by mouth every 6 hours as needed for Pain   Yes Provider, MD Shan   hydrOXYzine HCl (ATARAX) 50 MG tablet Take 1 tablet by mouth 2 times daily Pt takes at noon and 6 pm   Yes Provider, MD Shan   cloZAPine (CLOZARIL) 100 MG tablet Take 1 tablet by mouth 3 times daily  Patient taking differently: Take 2 tablets by mouth nightly 24  Yes Corby Drake MD   traZODone (DESYREL) 50 MG tablet Take 1 tablet by mouth nightly as needed for Sleep 10/2/24  Yes Corby Drake MD       Current medications:    Current Outpatient Medications   Medication Sig Dispense Refill    Paliperidone (INVEGA PO) Take 1 tablet by mouth daily as needed 3 mg daily prn      paliperidone palmitate ER (INVEGA SUSTENNA) 234 MG/1.5ML FRANCHESKA IM injection Inject 234 mg into the muscle every 21 days      propranolol (INDERAL) 40 MG tablet Take 1 tablet by mouth every evening Takes at 3:00 pm      hyoscyamine (LEVBID) 0.375 MG extended release tablet Take 1 tablet by mouth 2 times daily      acetaminophen (TYLENOL) 325

## 2025-05-09 NOTE — H&P (VIEW-ONLY)
Effort: Pulmonary effort is normal. No respiratory distress.      Breath sounds: Normal breath sounds. No wheezing, rhonchi or rales.   Abdominal:      General: Bowel sounds are normal. There is no distension.      Tenderness: There is no abdominal tenderness. There is no guarding.   Musculoskeletal:         General: No swelling.      Cervical back: Normal range of motion.      Right lower leg: No edema.      Left lower leg: No edema.   Skin:     General: Skin is dry.   Neurological:      Mental Status: He is alert and oriented to person, place, and time.   Psychiatric:         Mood and Affect: Mood normal.         Behavior: Behavior normal.                   PROVISIONAL DIAGNOSES / SURGERY:      ECT WITH ANESTHESIA    Disorganized schizophrenia     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    Hyperthyroidism, subclinical 08/22/2024    Moderate mixed hyperlipidemia not requiring statin therapy 08/22/2024    Class 2 obesity without serious comorbidity with body mass index (BMI) of 38.0 to 38.9 in adult 08/22/2024    Hypovitaminosis D 10/18/2019    Rib pain on left side 10/18/2019           GIORGI Rust - CNP on 5/9/2025 at 7:39 AM

## 2025-05-09 NOTE — H&P (VIEW-ONLY)
HISTORY and PHYSICAL  [facility]       NAME:  Alistair Hutchison  MRN: [id number]   YOB: 1995   Date: 5/9/2025   Age: 30 y.o.  Gender: male       COMPLAINT AND PRESENT HISTORY:     Alistair Hutchison is 30 y.o.,  male, presents for  ECT WITH ANESTHESIA  Primary dx: Disorganized schizophrenia     HPI    Alistair Hutchison is 30 y.o.,  male, here for ECT treatment.     Last ECT treatment was 5/2/2025. Pt tolerated last treatment well. Patient reports that her symptoms are improving.  Patient has complaints of some short term memory loss    Pt has had multiple failed treatment modalities. Pt has history of Disorganized schizophrenia  disorder.   Mood is rated at  10/10.    Patient's sleep has been good. Appetite has been good.   Pt is not suicidal. Pt is upset with kids in the neighborhood. Pt denies auditory/visual hallucinations.   Pt has been taking psychiatric medications as prescribed.   Pt does not have any other somatic complaints at this time.        Review of additional significant medical hx:  (See chart for additional detail, including current medications /see ROS for current S/S):     NPO status: NPO since MN  Anticoagulation status: Denies    Denies personal hx of blood clots.  Denies personal hx of MRSA infection.  PONV.  Denies any personal or family hx of previous complications w/anesthesia.  PAST MEDICAL HISTORY     Past Medical History:   Diagnosis Date    Fracture     leg    Manic depression (HCC)     Oppositional defiant behavior     PONV (postoperative nausea and vomiting)     Schizophrenia (HCC)        SURGICAL HISTORY       Past Surgical History:   Procedure Laterality Date    EYE SURGERY      OTHER SURGICAL HISTORY      ECT    TYMPANOSTOMY TUBE PLACEMENT         FAMILY HISTORY     History reviewed. No pertinent family history.    SOCIAL HISTORY       Social History     Socioeconomic History    Marital status: Single     Spouse name: None    Number

## 2025-05-09 NOTE — H&P
of children: None    Years of education: None    Highest education level: None   Tobacco Use    Smoking status: Never    Smokeless tobacco: Never   Vaping Use    Vaping status: Never Used   Substance and Sexual Activity    Alcohol use: No    Drug use: No    Sexual activity: Never     Social Drivers of Health     Financial Resource Strain: Low Risk  (8/22/2024)    Overall Financial Resource Strain (CARDIA)     Difficulty of Paying Living Expenses: Not hard at all   Food Insecurity: No Food Insecurity (12/11/2024)    Hunger Vital Sign     Worried About Running Out of Food in the Last Year: Never true     Ran Out of Food in the Last Year: Never true   Transportation Needs: No Transportation Needs (12/11/2024)    PRAPARE - Transportation     Lack of Transportation (Medical): No     Lack of Transportation (Non-Medical): No   Housing Stability: Low Risk  (12/11/2024)    Housing Stability Vital Sign     Unable to Pay for Housing in the Last Year: No     Number of Times Moved in the Last Year: 1     Homeless in the Last Year: No           REVIEW OF SYSTEMS      Allergies   Allergen Reactions    Chocolate Diarrhea       Current Outpatient Medications on File Prior to Encounter   Medication Sig Dispense Refill    Paliperidone (INVEGA PO) Take 1 tablet by mouth daily as needed 3 mg daily prn      paliperidone palmitate ER (INVEGA SUSTENNA) 234 MG/1.5ML FRANCHESKA IM injection Inject 234 mg into the muscle every 21 days      propranolol (INDERAL) 40 MG tablet Take 1 tablet by mouth every evening Takes at 3:00 pm      hyoscyamine (LEVBID) 0.375 MG extended release tablet Take 1 tablet by mouth 2 times daily      acetaminophen (TYLENOL) 325 MG tablet Take by mouth every 6 hours as needed for Pain      hydrOXYzine HCl (ATARAX) 50 MG tablet Take 1 tablet by mouth 2 times daily Pt takes at noon and 6 pm      cloZAPine (CLOZARIL) 100 MG tablet Take 1 tablet by mouth 3 times daily (Patient taking differently: Take 2 tablets by mouth

## 2025-05-09 NOTE — ANESTHESIA POSTPROCEDURE EVALUATION
Department of Anesthesiology  Postprocedure Note    Patient: Alistair Hutchison  MRN: [id number]  YOB: 1995  Date of evaluation: 5/9/2025    Procedure Summary       Date: 05/09/25 Room / Location: [facility] PACU    Anesthesia Start: 0830 Anesthesia Stop: 0848    Procedure: ECT W/ ANESTHESIA Diagnosis: Disorganized schizophrenia (HCC)    Scheduled Providers:  Responsible Provider: Kristel Alvarado MD    Anesthesia Type: General ASA Status: 3            Anesthesia Type: General    Amalia Phase I: Amalia Score: 10    Amalia Phase II:      Anesthesia Post Evaluation    Comments: POST- ANESTHESIA EVALUATION       Pt Name: Alistair Hutchison  MRN: [id number]  YOB: 1995  Date of evaluation: 5/9/2025  Time:  9:49 AM      /72   Pulse 99   Temp 97.8 °F (36.6 °C)   Resp 15   SpO2 94%      Consciousness Level  Awake  Cardiopulmonary Status  Stable  Pain Adequately Treated YES  Nausea / Vomiting  NO  Adequate Hydration  YES  Anesthesia Related Complications NONE      Electronically signed by Kristel Alvarado MD on 5/9/2025 at 9:49 AM      No notable events documented.

## 2025-05-09 NOTE — PROCEDURES
Bilateral ECT Procedure Report  Alistair Hutchison   5/9/2025  1995         Attending:Corby Drake MD  Preprocedure diagnosis: disorganized schizophrenia  Postprocedure diagnosis: SAME AS PRE-PROCEDURE DIAGNOSIS  Treatment Number: This is treatment #26    Patient Status: Outpatient   Type of ECT: Bilateral Brief Pulse  Medications  Preprocedure: Tylenol 650mg and none  Anesthetic: Methohexital 100 mg  Paralytic: Succinylcholine 120 mg  Post procedure: none needed   Cuff placement: Left Lower Extremity    MECTA Settings 1st Stimulus:     Pulse width: 1.0 ms   Frequency:  60 hz   Duration:   3.0 seconds   Static Impedance: 615 ohms             Dynamic Impedance: 228 ohms             Motor Seizure Duration: 28 seconds  EEG Seizure Duration: 28 seconds             The patient's ECT treatment was performed using MECTA machine  .  Timeout:  Time out was performed using two patient identifiers and confirmation of procedure.     Patient Preparation: Preprocedure documentation, labs, H&P were all verified and reviewed.  The patient was placed in supine position.  EEG leads were placed for monitoring of seizure.   Advent areas bilaterally cleaned and prepped.  Conductive gel  was applied over stimulus electrode site.   The patient was pre-oxygenated.       Procedure in detail: Medications were administered by anesthesia using intravenous access at the doses listed previously in this summary.  Anesthetic administered and once confirmation the patient is anesthetized, the patient's blood pressure cuff on lower extremity was inflated to 100mmHg in excess of patient's blood pressure.  At this time, the neuromuscular blockade was administered intravenously by anesthesia.  Upper extremity fasciculation were verified followed by lower extremity fasciculation.  Once fasciculations terminated, confirmation of affective neuromuscular blockade demonstrated by absence of withdrawal reflex.  Bite blocks were put in place.

## 2025-05-09 NOTE — H&P (VIEW-ONLY)
HISTORY and PHYSICAL  [facility]       NAME:  Alistair Hutchison  MRN: [id number]   YOB: 1995   Date: 5/9/2025   Age: 30 y.o.  Gender: male       COMPLAINT AND PRESENT HISTORY:     Alistair Hutchison is 30 y.o.,  male, presents for  ECT WITH ANESTHESIA  Primary dx: Disorganized schizophrenia     HPI    Alistair Hutchison is 30 y.o.,  male, here for ECT treatment.     Last ECT treatment was 5/2/2025. Pt tolerated last treatment well. Patient reports that her symptoms are improving.  Patient has complaints of some short term memory loss    Pt has had multiple failed treatment modalities. Pt has history of Disorganized schizophrenia  disorder.   Mood is rated at  10/10.    Patient's sleep has been good. Appetite has been good.   Pt is not suicidal. Pt is upset with kids in the neighborhood. Pt denies auditory/visual hallucinations.   Pt has been taking psychiatric medications as prescribed.   Pt does not have any other somatic complaints at this time.        Review of additional significant medical hx:  (See chart for additional detail, including current medications /see ROS for current S/S):     NPO status: NPO since MN  Anticoagulation status: Denies    Denies personal hx of blood clots.  Denies personal hx of MRSA infection.  PONV.  Denies any personal or family hx of previous complications w/anesthesia.  PAST MEDICAL HISTORY     Past Medical History:   Diagnosis Date    Fracture     leg    Manic depression (HCC)     Oppositional defiant behavior     PONV (postoperative nausea and vomiting)     Schizophrenia (HCC)        SURGICAL HISTORY       Past Surgical History:   Procedure Laterality Date    EYE SURGERY      OTHER SURGICAL HISTORY      ECT    TYMPANOSTOMY TUBE PLACEMENT         FAMILY HISTORY     History reviewed. No pertinent family history.    SOCIAL HISTORY       Social History     Socioeconomic History    Marital status: Single     Spouse name: None    Number

## 2025-05-09 NOTE — DISCHARGE INSTRUCTIONS
ELECTROCONVULSIVE THERAPY DISCHARGE INSTRUCTIONS         1. Activity - Rest today. The anesthetic agents you have received can make you feel drowsy or tired.  A responsible person must drive you home and stay with you for 8 hours after discharge from the Hospital. Do not drive a motor vehicle or operate any machinery for 24 hours. .   *Do not make any complex decisions or execute any legal documents until 3 weeks AFTER your last treatment.  If you have any questions regarding this, speak with your psychiatrist first.*    2. Diet - Nothing to eat or drink after midnight the night of your ECT treatment. After ECT, start with clear liquids, if you can drink without coughing, you may proceed with gradually progressing to your usual diet.    No alcoholic beverages for 24 hours.    3. Medications - Take your daily medications as prescribed, after you return home from today's ECT. If you take a Beta Blocker or have been prescribed Imitrex, you may be instructed to take these medications the morning of ECT. If you are unsure please ask the physician.     Take with these medication the morning of ECT with one Tablespoon of water.   Tylenol 650 mg  All blood pressure medications  ***    4. You may experience the following after your treatment:   a. Poor memory   b. Poor balance   c. Headaches   d. Confusion   e. Muscle soreness   f. Nausea      5. Special Precautions - Contact you physician immediately if these occur:   a. Signs of infection: redness, swelling, heat, red streaks at the site of the IV   b. Excessive pain unrelieved by prescription pain medications   c. Nausea and vomiting that persists beyond the first day after your procedure    6. Next Procedure Date:   Your next ECT treatment is scheduled for 0750 on 5/16.  Arrive at 0620    If you feel you are having a problem or complication from your ECT treatments and it is during normal business hours call [phone]. If it is after normal business hours please

## 2025-05-09 NOTE — PROGRESS NOTES
Pre ECT Assessment Note  Psychiatry  5/9/2025      Alistair Hutchison  1995  [id number]      Subjective:     Patient is a 30 y.o.  male seen for an evaluation prior to today's electroconvulsive therapy treatment. Today is treatment number 26 , utilizing bilateral ( 13  BL and 12 RU treatments). This is course number 2.  The patient has previously completed a course of bilateral ECT for a total of 18 treatments that ended on 11/20/2024.     Last ECT treatment 4/25/2025.     William seen prior to ECT with his grandmother.  He is friendly and pleasant with discussion. His affect is bright. He describes his mood as \"good\". He reports difficulty socializing with neighborhood kids. He continues to struggle with emotional regulation and understanding social cues. He admits to easily agitation and frequent anger outbursts. He reports recent arguments with his grandfather. He reports watching [employer] and playing videos games helps him relax. He is looking forwards to going to [employer] study tomorrow. His grandmother reports he has been \"all over the place\" this week. She further reports patient becomes angry and behaviorally explosive when kids in the neighborhood are not pleasant towards him.  She denies violence towards others in the home. His grandmother reports patient is frequently incontinent of stool and wearing an absorbent garment at home. She reports he is motivated to attend to his incontinence needs independently but most times requires assistance. His grandmother reports he is compliant taking medications. He denies any cognitive side effects from treatment. His grandmother reports he requires at nap after most treatments. He denies depression or anxiety. He denies suicidal ideation or homicidal thoughts. He denies hallucinations or paranoia. He does not appear internally preoccupied. Patient and his guardian would like to continue ECT treatments at a frequency of once weekly.          Patient Active

## 2025-05-09 NOTE — H&P (VIEW-ONLY)
HISTORY and PHYSICAL  [facility]       NAME:  Alistair Hutchison  MRN: [id number]   YOB: 1995   Date: 5/9/2025   Age: 30 y.o.  Gender: male       COMPLAINT AND PRESENT HISTORY:     Alistair Hutchison is 30 y.o.,  male, presents for  ECT WITH ANESTHESIA  Primary dx: Disorganized schizophrenia     HPI    Alistair Hutchison is 30 y.o.,  male, here for ECT treatment.     Last ECT treatment was 5/2/2025. Pt tolerated last treatment well. Patient reports that her symptoms are improving.  Patient has complaints of some short term memory loss    Pt has had multiple failed treatment modalities. Pt has history of Disorganized schizophrenia  disorder.   Mood is rated at  10/10.    Patient's sleep has been good. Appetite has been good.   Pt is not suicidal. Pt is upset with kids in the neighborhood. Pt denies auditory/visual hallucinations.   Pt has been taking psychiatric medications as prescribed.   Pt does not have any other somatic complaints at this time.        Review of additional significant medical hx:  (See chart for additional detail, including current medications /see ROS for current S/S):     NPO status: NPO since MN  Anticoagulation status: Denies    Denies personal hx of blood clots.  Denies personal hx of MRSA infection.  PONV.  Denies any personal or family hx of previous complications w/anesthesia.  PAST MEDICAL HISTORY     Past Medical History:   Diagnosis Date    Fracture     leg    Manic depression (HCC)     Oppositional defiant behavior     PONV (postoperative nausea and vomiting)     Schizophrenia (HCC)        SURGICAL HISTORY       Past Surgical History:   Procedure Laterality Date    EYE SURGERY      OTHER SURGICAL HISTORY      ECT    TYMPANOSTOMY TUBE PLACEMENT         FAMILY HISTORY     History reviewed. No pertinent family history.    SOCIAL HISTORY       Social History     Socioeconomic History    Marital status: Single     Spouse name: None    Number

## 2025-05-14 DIAGNOSIS — F20.1 DISORGANIZED SCHIZOPHRENIA (HCC): Primary | ICD-10-CM

## 2025-05-15 ENCOUNTER — ANESTHESIA EVENT (OUTPATIENT)
Dept: POSTOP/PACU | Age: 30
End: 2025-05-15
Payer: COMMERCIAL

## 2025-05-16 ENCOUNTER — ANESTHESIA (OUTPATIENT)
Dept: POSTOP/PACU | Age: 30
End: 2025-05-16
Payer: COMMERCIAL

## 2025-05-16 ENCOUNTER — HOSPITAL ENCOUNTER (OUTPATIENT)
Dept: POSTOP/PACU | Age: 30
Discharge: HOME | End: 2025-05-16
Payer: COMMERCIAL

## 2025-05-16 VITALS
TEMPERATURE: 98.9 F | BODY MASS INDEX: 34.66 KG/M2 | SYSTOLIC BLOOD PRESSURE: 124 MMHG | WEIGHT: 208 LBS | HEART RATE: 97 BPM | RESPIRATION RATE: 21 BRPM | OXYGEN SATURATION: 97 % | DIASTOLIC BLOOD PRESSURE: 95 MMHG | HEIGHT: 65 IN

## 2025-05-16 PROCEDURE — 2580000003 HC RX 258: Performed by: ANESTHESIOLOGY

## 2025-05-16 PROCEDURE — 7100000000 HC PACU RECOVERY - FIRST 15 MIN: Performed by: ANESTHESIOLOGY

## 2025-05-16 PROCEDURE — 90870 ELECTROCONVULSIVE THERAPY: CPT | Performed by: ANESTHESIOLOGY

## 2025-05-16 PROCEDURE — 90870 ELECTROCONVULSIVE THERAPY: CPT | Performed by: PSYCHIATRY & NEUROLOGY

## 2025-05-16 PROCEDURE — 7100000001 HC PACU RECOVERY - ADDTL 15 MIN: Performed by: ANESTHESIOLOGY

## 2025-05-16 PROCEDURE — 3700000001 HC ADD 15 MINUTES (ANESTHESIA): Performed by: ANESTHESIOLOGY

## 2025-05-16 PROCEDURE — 6360000002 HC RX W HCPCS: Performed by: NURSE ANESTHETIST, CERTIFIED REGISTERED

## 2025-05-16 PROCEDURE — 2500000003 HC RX 250 WO HCPCS: Performed by: NURSE ANESTHETIST, CERTIFIED REGISTERED

## 2025-05-16 PROCEDURE — 3700000000 HC ANESTHESIA ATTENDED CARE: Performed by: ANESTHESIOLOGY

## 2025-05-16 RX ORDER — KETOROLAC TROMETHAMINE 30 MG/ML
INJECTION, SOLUTION INTRAMUSCULAR; INTRAVENOUS
Status: DISCONTINUED | OUTPATIENT
Start: 2025-05-16 | End: 2025-05-16 | Stop reason: SDUPTHER

## 2025-05-16 RX ORDER — LIDOCAINE HYDROCHLORIDE 10 MG/ML
1 INJECTION, SOLUTION EPIDURAL; INFILTRATION; INTRACAUDAL; PERINEURAL
Status: DISCONTINUED | OUTPATIENT
Start: 2025-05-16 | End: 2025-05-17 | Stop reason: HOSPADM

## 2025-05-16 RX ORDER — KETOROLAC TROMETHAMINE 30 MG/ML
INJECTION, SOLUTION INTRAMUSCULAR; INTRAVENOUS
Status: COMPLETED
Start: 2025-05-16 | End: 2025-05-16

## 2025-05-16 RX ORDER — ONDANSETRON 2 MG/ML
INJECTION INTRAMUSCULAR; INTRAVENOUS
Status: DISCONTINUED | OUTPATIENT
Start: 2025-05-16 | End: 2025-05-16 | Stop reason: SDUPTHER

## 2025-05-16 RX ORDER — SODIUM CHLORIDE 0.9 % (FLUSH) 0.9 %
5-40 SYRINGE (ML) INJECTION EVERY 12 HOURS SCHEDULED
Status: DISCONTINUED | OUTPATIENT
Start: 2025-05-16 | End: 2025-05-17 | Stop reason: HOSPADM

## 2025-05-16 RX ORDER — SODIUM CHLORIDE, SODIUM LACTATE, POTASSIUM CHLORIDE, CALCIUM CHLORIDE 600; 310; 30; 20 MG/100ML; MG/100ML; MG/100ML; MG/100ML
INJECTION, SOLUTION INTRAVENOUS CONTINUOUS
Status: DISCONTINUED | OUTPATIENT
Start: 2025-05-16 | End: 2025-05-17 | Stop reason: HOSPADM

## 2025-05-16 RX ORDER — ONDANSETRON 2 MG/ML
INJECTION INTRAMUSCULAR; INTRAVENOUS
Status: COMPLETED
Start: 2025-05-16 | End: 2025-05-16

## 2025-05-16 RX ORDER — SUCCINYLCHOLINE/SOD CL,ISO/PF 200MG/10ML
SYRINGE (ML) INTRAVENOUS
Status: COMPLETED
Start: 2025-05-16 | End: 2025-05-16

## 2025-05-16 RX ORDER — SUCCINYLCHOLINE/SOD CL,ISO/PF 200MG/10ML
SYRINGE (ML) INTRAVENOUS
Status: DISCONTINUED | OUTPATIENT
Start: 2025-05-16 | End: 2025-05-16 | Stop reason: SDUPTHER

## 2025-05-16 RX ORDER — SODIUM CHLORIDE 0.9 % (FLUSH) 0.9 %
5-40 SYRINGE (ML) INJECTION PRN
Status: DISCONTINUED | OUTPATIENT
Start: 2025-05-16 | End: 2025-05-17 | Stop reason: HOSPADM

## 2025-05-16 RX ORDER — SODIUM CHLORIDE 9 MG/ML
INJECTION, SOLUTION INTRAVENOUS PRN
Status: DISCONTINUED | OUTPATIENT
Start: 2025-05-16 | End: 2025-05-17 | Stop reason: HOSPADM

## 2025-05-16 RX ORDER — METHOHEXITAL IN WATER/PF 100MG/10ML
SYRINGE (ML) INTRAVENOUS
Status: COMPLETED
Start: 2025-05-16 | End: 2025-05-16

## 2025-05-16 RX ORDER — METHOHEXITAL IN WATER/PF 100MG/10ML
SYRINGE (ML) INTRAVENOUS
Status: DISCONTINUED | OUTPATIENT
Start: 2025-05-16 | End: 2025-05-16 | Stop reason: SDUPTHER

## 2025-05-16 RX ADMIN — Medication 120 MG: at 07:48

## 2025-05-16 RX ADMIN — SODIUM CHLORIDE, POTASSIUM CHLORIDE, SODIUM LACTATE AND CALCIUM CHLORIDE: 600; 310; 30; 20 INJECTION, SOLUTION INTRAVENOUS at 07:00

## 2025-05-16 RX ADMIN — Medication 100 MG: at 07:48

## 2025-05-16 RX ADMIN — KETOROLAC TROMETHAMINE 30 MG: 30 INJECTION, SOLUTION INTRAMUSCULAR at 07:48

## 2025-05-16 RX ADMIN — ONDANSETRON 4 MG: 2 INJECTION INTRAMUSCULAR; INTRAVENOUS at 07:48

## 2025-05-16 ASSESSMENT — PAIN SCALES - GENERAL: PAINLEVEL_OUTOF10: 0

## 2025-05-16 ASSESSMENT — PAIN - FUNCTIONAL ASSESSMENT
PAIN_FUNCTIONAL_ASSESSMENT: 0-10
PAIN_FUNCTIONAL_ASSESSMENT: 0-10

## 2025-05-16 NOTE — INTERVAL H&P NOTE
Update History & Physical    The patient's History and Physical of May 9, 2025 was reviewed with the patient and I examined the patient. There was no change. The surgical site was confirmed by the patient and me.       Alistair Hutchison is 30 y.o.,  male, here for ECT treatment.     Last ECT treatment was 5/9/2025. Pt tolerated last treatment well. Patient reports that his symptoms are improving.  Patient has complaints of some short term memory loss    Pt has had multiple failed treatment modalities. Pt has history of  Disorganized schizophrenia   Mood is rated at  7/10.     Patient's sleep has been good. Appetite has been good.   Pt is not suicidal. Pt is upset with neighborhood kids without thoughts of hurting them.  Pt denies auditory/visual hallucinations.   Pt has been taking psychiatric medications as prescribed.   Pt does not have any other somatic complaints at this time.      NPO since MN  Denies h/o complications with general anesthesia  See nursing flowsheet for vital signs  Cardiopulmonary assessment completed.  unchanged        Electronically signed by GIORGI Rust CNP on 5/16/2025 at 6:23 AM

## 2025-05-16 NOTE — DISCHARGE INSTRUCTIONS
business hours please call [facility] at [phone] to speak with a nurse, or go to your nearest emergency room.     If you need to schedule, reschedule or cancel an appointment, please call the [facility] at [phone]. You may also use this number for any questions regarding the ECT Support Group.    If you have an after hours or weekend cancellation please call [phone], they will forward message to appropriate party.    You will need to arrange transportation to and from the hospital for all outpatient ECT treatments.    Bring a current list of your medications, including dosages with you to every ECT treatment and arrive 1 hour and 15 minutes before your scheduled ECT time.           Sedation or General Anesthesia, Adult  Care After  Refer to this sheet in the next 24 hours. These instructions provide you with information on caring for yourself after your procedure. Your caregiver may also give you more specific instructions. Your treatment has been planned according to current medical practices, but problems sometimes occur. Call your caregiver if you have any problems or questions after your procedure.   HOME CARE INSTRUCTIONS   Do not participate in any activities that require you to be alert or coordinated. Do not:  Drive.  Swim.  Ride a bicycle.  Operate heavy machinery.  Cook.  Use power tools.  Climb ladders.  Work at heights.  Take a bath.  Do not drink alcohol.  Do not make any important decisions or sign legal documents.  Stay with an adult.  The first meal following your procedure should be light and small. Avoid solid foods if you feel sick to your stomach (nauseous) or if you throw up (vomit).  Drink enough fluids to keep your urine clear or pale yellow.  Only take your usual medicines or new medicines if your caregiver approves them.  Only take over-the-counter or prescription medicines for pain, discomfort, or fever as directed by your

## 2025-05-16 NOTE — ANESTHESIA PRE PROCEDURE
Department of Anesthesiology  Preprocedure Note       Name:  Alistair Hutchison   Age:  30 y.o.  :  1995                                          MRN:  [id number]         Date:  2025      Surgeon: Dr Drake    Procedure: ECT    Medications prior to admission:   Prior to Admission medications    Medication Sig Start Date End Date Taking? Authorizing Provider   Paliperidone (INVEGA PO) Take 1 tablet by mouth daily as needed 3 mg daily prn   Yes ProviderShan MD   paliperidone palmitate ER (INVEGA SUSTENNA) 234 MG/1.5ML FRANCHESKA IM injection Inject 234 mg into the muscle every 21 days   Yes ProviderShan MD   propranolol (INDERAL) 40 MG tablet Take 1 tablet by mouth every evening Takes at 3:00 pm   Yes ProviderShan MD   hyoscyamine (LEVBID) 0.375 MG extended release tablet Take 1 tablet by mouth 2 times daily   Yes ProviderShan MD   acetaminophen (TYLENOL) 325 MG tablet Take by mouth every 6 hours as needed for Pain   Yes Provider, MD Shan   hydrOXYzine HCl (ATARAX) 50 MG tablet Take 1 tablet by mouth 2 times daily Pt takes at noon and 6 pm   Yes Provider, MD Shan   cloZAPine (CLOZARIL) 100 MG tablet Take 1 tablet by mouth 3 times daily  Patient taking differently: Take 2 tablets by mouth nightly 24  Yes Corby Drake MD   traZODone (DESYREL) 50 MG tablet Take 1 tablet by mouth nightly as needed for Sleep 10/2/24  Yes Corby Drake MD       Current medications:    Current Outpatient Medications   Medication Sig Dispense Refill    Paliperidone (INVEGA PO) Take 1 tablet by mouth daily as needed 3 mg daily prn      paliperidone palmitate ER (INVEGA SUSTENNA) 234 MG/1.5ML FRANCHESKA IM injection Inject 234 mg into the muscle every 21 days      propranolol (INDERAL) 40 MG tablet Take 1 tablet by mouth every evening Takes at 3:00 pm      hyoscyamine (LEVBID) 0.375 MG extended release tablet Take 1 tablet by mouth 2 times daily      acetaminophen (TYLENOL) 325

## 2025-05-16 NOTE — PROGRESS NOTES
Pre ECT Assessment Note  Psychiatry  5/16/2025      Alistair Hutchison  1995  [id number]      Subjective:     Patient is a 30 y.o.  male seen for an evaluation prior to today's electroconvulsive therapy treatment. Today is treatment number 27, utilizing bilateral (15 BL and 12 RU treatments). This is course number 2.  The patient has previously completed a course of bilateral ECT for a total of 18 treatments that ended on 11/20/2024.     Last ECT treatment 5/9/2025     William seen prior to ECT with his grandmother.  He is friendly and pleasant with discussion. His affect is bright. He describes his mood as \"good\". He reports difficulty socializing with neighborhood kids, who William feels are mean to him.  This makes him agitated, which continues at home.  He has been arguing with his grandfather, particularly about his incontinence and his refusal to clean up after he has an accident.  His grandmother reports that he has been talking about himself in the third person more frequently this week.  She denies any other changes or concerns.  She reports he is compliant in taking medications. He denies any cognitive side effects from treatment. His grandmother reports he requires at nap after most treatments. He denies depression or anxiety. He denies suicidal ideation or homicidal thoughts. He denies hallucinations or paranoia. He does not appear internally preoccupied. Patient and his guardian would like to continue ECT treatments at a frequency of once weekly.          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    Hyperthyroidism, subclinical 08/22/2024    Moderate mixed hyperlipidemia not requiring statin therapy 08/22/2024    Class 2 obesity without serious comorbidity with body mass index (BMI) of 38.0 to 38.9 in adult 08/22/2024

## 2025-05-16 NOTE — ANESTHESIA POSTPROCEDURE EVALUATION
Department of Anesthesiology  Postprocedure Note    Patient: Alistair Hutchisno  MRN: [id number]  YOB: 1995  Date of evaluation: 5/16/2025    Procedure Summary       Date: 05/16/25 Room / Location: [facility] PACU    Anesthesia Start: 0736 Anesthesia Stop: 0756    Procedure: ECT W/ ANESTHESIA Diagnosis: Disorganized schizophrenia (HCC)    Scheduled Providers:  Responsible Provider: Kristel Alvarado MD    Anesthesia Type: general ASA Status: 3            Anesthesia Type: No value filed.    Amalia Phase I: Amalia Score: 10    Amalia Phase II:      Anesthesia Post Evaluation    Comments: POST- ANESTHESIA EVALUATION       Pt Name: Alistair Hutchison  MRN: [id number]  YOB: 1995  Date of evaluation: 5/16/2025  Time:  9:00 AM      BP (!) 124/95   Pulse 97   Temp 98.9 °F (37.2 °C)   Resp 21   Ht 1.651 m (5' 5\")   Wt 94.3 kg (208 lb)   SpO2 97%   BMI 34.61 kg/m²      Consciousness Level  Awake  Cardiopulmonary Status  Stable  Pain Adequately Treated YES  Nausea / Vomiting  NO  Adequate Hydration  YES  Anesthesia Related Complications NONE      Electronically signed by Kristel Alvarado MD on 5/16/2025 at 9:00 AM      No notable events documented.

## 2025-05-22 ENCOUNTER — ANESTHESIA EVENT (OUTPATIENT)
Dept: POSTOP/PACU | Age: 30
End: 2025-05-22
Payer: COMMERCIAL

## 2025-05-23 ENCOUNTER — ANESTHESIA (OUTPATIENT)
Dept: POSTOP/PACU | Age: 30
End: 2025-05-23
Payer: COMMERCIAL

## 2025-05-23 ENCOUNTER — HOSPITAL ENCOUNTER (OUTPATIENT)
Dept: POSTOP/PACU | Age: 30
Discharge: HOME | End: 2025-05-23
Payer: COMMERCIAL

## 2025-05-23 VITALS
SYSTOLIC BLOOD PRESSURE: 132 MMHG | BODY MASS INDEX: 34.66 KG/M2 | TEMPERATURE: 98.4 F | OXYGEN SATURATION: 97 % | HEIGHT: 65 IN | DIASTOLIC BLOOD PRESSURE: 87 MMHG | WEIGHT: 208 LBS | HEART RATE: 93 BPM | RESPIRATION RATE: 14 BRPM

## 2025-05-23 DIAGNOSIS — F20.1 SCHIZOPHRENIA, DISORGANIZED (HCC): Primary | ICD-10-CM

## 2025-05-23 PROCEDURE — 2500000003 HC RX 250 WO HCPCS: Performed by: NURSE ANESTHETIST, CERTIFIED REGISTERED

## 2025-05-23 PROCEDURE — 6360000002 HC RX W HCPCS: Performed by: NURSE ANESTHETIST, CERTIFIED REGISTERED

## 2025-05-23 PROCEDURE — 7100000000 HC PACU RECOVERY - FIRST 15 MIN: Performed by: ANESTHESIOLOGY

## 2025-05-23 PROCEDURE — 2580000003 HC RX 258: Performed by: ANESTHESIOLOGY

## 2025-05-23 PROCEDURE — 90870 ELECTROCONVULSIVE THERAPY: CPT

## 2025-05-23 PROCEDURE — 90870 ELECTROCONVULSIVE THERAPY: CPT | Performed by: PSYCHIATRY & NEUROLOGY

## 2025-05-23 PROCEDURE — 3700000000 HC ANESTHESIA ATTENDED CARE: Performed by: ANESTHESIOLOGY

## 2025-05-23 PROCEDURE — 7100000001 HC PACU RECOVERY - ADDTL 15 MIN: Performed by: ANESTHESIOLOGY

## 2025-05-23 RX ORDER — ONDANSETRON 2 MG/ML
INJECTION INTRAMUSCULAR; INTRAVENOUS
Status: DISCONTINUED | OUTPATIENT
Start: 2025-05-23 | End: 2025-05-23 | Stop reason: SDUPTHER

## 2025-05-23 RX ORDER — SUCCINYLCHOLINE/SOD CL,ISO/PF 200MG/10ML
SYRINGE (ML) INTRAVENOUS
Status: COMPLETED
Start: 2025-05-23 | End: 2025-05-23

## 2025-05-23 RX ORDER — SODIUM CHLORIDE 9 MG/ML
INJECTION, SOLUTION INTRAVENOUS PRN
Status: DISCONTINUED | OUTPATIENT
Start: 2025-05-23 | End: 2025-05-24 | Stop reason: HOSPADM

## 2025-05-23 RX ORDER — METHOHEXITAL IN WATER/PF 100MG/10ML
SYRINGE (ML) INTRAVENOUS
Status: COMPLETED
Start: 2025-05-23 | End: 2025-05-23

## 2025-05-23 RX ORDER — LIDOCAINE HYDROCHLORIDE 10 MG/ML
1 INJECTION, SOLUTION EPIDURAL; INFILTRATION; INTRACAUDAL; PERINEURAL
Status: DISCONTINUED | OUTPATIENT
Start: 2025-05-23 | End: 2025-05-24 | Stop reason: HOSPADM

## 2025-05-23 RX ORDER — KETOROLAC TROMETHAMINE 30 MG/ML
INJECTION, SOLUTION INTRAMUSCULAR; INTRAVENOUS
Status: DISCONTINUED | OUTPATIENT
Start: 2025-05-23 | End: 2025-05-23 | Stop reason: SDUPTHER

## 2025-05-23 RX ORDER — SODIUM CHLORIDE 0.9 % (FLUSH) 0.9 %
5-40 SYRINGE (ML) INJECTION PRN
Status: DISCONTINUED | OUTPATIENT
Start: 2025-05-23 | End: 2025-05-24 | Stop reason: HOSPADM

## 2025-05-23 RX ORDER — KETOROLAC TROMETHAMINE 30 MG/ML
INJECTION, SOLUTION INTRAMUSCULAR; INTRAVENOUS
Status: COMPLETED
Start: 2025-05-23 | End: 2025-05-23

## 2025-05-23 RX ORDER — SODIUM CHLORIDE 0.9 % (FLUSH) 0.9 %
5-40 SYRINGE (ML) INJECTION EVERY 12 HOURS SCHEDULED
Status: DISCONTINUED | OUTPATIENT
Start: 2025-05-23 | End: 2025-05-24 | Stop reason: HOSPADM

## 2025-05-23 RX ORDER — SODIUM CHLORIDE, SODIUM LACTATE, POTASSIUM CHLORIDE, CALCIUM CHLORIDE 600; 310; 30; 20 MG/100ML; MG/100ML; MG/100ML; MG/100ML
INJECTION, SOLUTION INTRAVENOUS CONTINUOUS
Status: DISCONTINUED | OUTPATIENT
Start: 2025-05-23 | End: 2025-05-24 | Stop reason: HOSPADM

## 2025-05-23 RX ORDER — ONDANSETRON 2 MG/ML
INJECTION INTRAMUSCULAR; INTRAVENOUS
Status: COMPLETED
Start: 2025-05-23 | End: 2025-05-23

## 2025-05-23 RX ORDER — SUCCINYLCHOLINE/SOD CL,ISO/PF 200MG/10ML
SYRINGE (ML) INTRAVENOUS
Status: DISCONTINUED | OUTPATIENT
Start: 2025-05-23 | End: 2025-05-23 | Stop reason: SDUPTHER

## 2025-05-23 RX ORDER — METHOHEXITAL IN WATER/PF 100MG/10ML
SYRINGE (ML) INTRAVENOUS
Status: DISCONTINUED | OUTPATIENT
Start: 2025-05-23 | End: 2025-05-23 | Stop reason: SDUPTHER

## 2025-05-23 RX ADMIN — Medication 100 MG: at 07:07

## 2025-05-23 RX ADMIN — SODIUM CHLORIDE, POTASSIUM CHLORIDE, SODIUM LACTATE AND CALCIUM CHLORIDE: 600; 310; 30; 20 INJECTION, SOLUTION INTRAVENOUS at 06:23

## 2025-05-23 RX ADMIN — KETOROLAC TROMETHAMINE 30 MG: 30 INJECTION, SOLUTION INTRAMUSCULAR at 07:07

## 2025-05-23 RX ADMIN — Medication 120 MG: at 07:07

## 2025-05-23 RX ADMIN — ONDANSETRON 4 MG: 2 INJECTION INTRAMUSCULAR; INTRAVENOUS at 07:07

## 2025-05-23 ASSESSMENT — PAIN - FUNCTIONAL ASSESSMENT
PAIN_FUNCTIONAL_ASSESSMENT: 0-10
PAIN_FUNCTIONAL_ASSESSMENT: 0-10

## 2025-05-23 NOTE — ANESTHESIA PRE PROCEDURE
\"PROTIME\", \"INR\", \"APTT\"    HCG (If Applicable): No results found for: \"PREGTESTUR\", \"PREGSERUM\", \"HCG\", \"HCGQUANT\"     ABGs: No results found for: \"PHART\", \"PO2ART\", \"PYH4VGN\", \"YQN2ZKT\", \"BEART\", \"S3EUHLLU\"     Type & Screen (If Applicable):  No results found for: \"ABORH\", \"LABANTI\"    Drug/Infectious Status (If Applicable):  Lab Results   Component Value Date/Time    HEPCAB NONREACTIVE 03/17/2023 08:51 AM       COVID-19 Screening (If Applicable): No results found for: \"COVID19\"        Anesthesia Evaluation  Patient summary reviewed and Nursing notes reviewed   history of anesthetic complications: PONV.  Airway: Mallampati: III  TM distance: >3 FB   Neck ROM: full  Mouth opening: < 3 FB   Dental: normal exam         Pulmonary:Negative Pulmonary ROS and normal exam  breath sounds clear to auscultation                             Cardiovascular:Negative CV ROS            Rhythm: regular  Rate: normal           Beta Blocker:  Dose within 24 Hrs         Neuro/Psych:   (+) psychiatric history:depression/anxiety             GI/Hepatic/Renal:   (+) morbid obesity          Endo/Other:                     Abdominal:             Vascular: negative vascular ROS.         Other Findings:           Anesthesia Plan      general     ASA 3     (TIVA  Consent signed by grandmother - legal guardian  Toradol and Zofran intraop)  Induction: intravenous.    MIPS: Prophylactic antiemetics administered.  Anesthetic plan and risks discussed with patient and legal guardian.      Plan discussed with CRNA.                  Jewel Ji MD   5/23/2025

## 2025-05-23 NOTE — PROGRESS NOTES
SURGERY      OTHER SURGICAL HISTORY      ECT    TYMPANOSTOMY TUBE PLACEMENT        Not in a hospital admission.  Allergies   Allergen Reactions    Chocolate Diarrhea      Social History     Tobacco Use    Smoking status: Never    Smokeless tobacco: Never   Substance Use Topics    Alcohol use: No      History reviewed. No pertinent family history.       Objective:       Mental Status Evaluation:  Appearance:  Wearing gown, on stretcher, well groomed   Behavior:  Engages with interviewer, smiles, childlike    Speech:  Normal rate, volume and happy tone. Talkative   Mood:  \"Good\"    Affect:   Elevated   Thought Process:  Loose   Thought Content:  Denies suicidal or homicidal ideation   Sensorium:  Does not appear to attend to internal stimuli   Cognition:  Oriented to person, place and general circumstance   Insight:  Fair   Judgment:  Fair     Assessment:     Diagnosis: Disorganized schizophrenia     Plan:     Continue frequency of treatment once weekly.    Monitor for stability in symptoms  Taper treatment frequency as tolerated.     Update consent and H&P every 30 days while undergoing ECT treatment, update labs every 6 months.   Patient verbalizes understanding of risks/benefits/alternatives to ECT.  Last informed consent signed by legal guardian on 5/23/2025.

## 2025-05-23 NOTE — PROCEDURES
Bilateral ECT Procedure Report  Alistair Hutchison   5/23/2025  1995         Attending:Corby Drake MD  Preprocedure diagnosis: disorganized schizophrenia  Postprocedure diagnosis: SAME AS PRE-PROCEDURE DIAGNOSIS  Treatment Number: This is treatment #28    Patient Status: Outpatient   Type of ECT: Bilateral Brief Pulse  Medications  Preprocedure: Tylenol 650mg and none  Anesthetic: Methohexital 100 mg  Paralytic: Succinylcholine 120 mg  Post procedure: none needed   Cuff placement: Left Lower Extremity    MECTA Settings 1st Stimulus:     Pulse width: 1.0 ms   Frequency:  60 hz   Duration:   3.0 seconds   Static Impedance: 1117 ohms             Dynamic Impedance: 218 ohms             Motor Seizure Duration: 27 seconds  EEG Seizure Duration: 31 seconds             The patient's ECT treatment was performed using MECTA machine  .  Timeout:  Time out was performed using two patient identifiers and confirmation of procedure.     Patient Preparation: Preprocedure documentation, labs, H&P were all verified and reviewed.  The patient was placed in supine position.  EEG leads were placed for monitoring of seizure.   Zoroastrian areas bilaterally cleaned and prepped.  Conductive gel  was applied over stimulus electrode site.   The patient was pre-oxygenated.       Procedure in detail: Medications were administered by anesthesia using intravenous access at the doses listed previously in this summary.  Anesthetic administered and once confirmation the patient is anesthetized, the patient's blood pressure cuff on lower extremity was inflated to 100mmHg in excess of patient's blood pressure.  At this time, the neuromuscular blockade was administered intravenously by anesthesia.  Upper extremity fasciculation were verified followed by lower extremity fasciculation.  Once fasciculations terminated, confirmation of affective neuromuscular blockade demonstrated by absence of withdrawal reflex.  Bite blocks were put in place.

## 2025-05-23 NOTE — ANESTHESIA POSTPROCEDURE EVALUATION
Department of Anesthesiology  Postprocedure Note    Patient: Alistair Hutchison  MRN: [id number]  YOB: 1995  Date of evaluation: 5/23/2025    Procedure Summary       Date: 05/23/25 Room / Location: [facility] PACU    Anesthesia Start: 0704 Anesthesia Stop: 0718    Procedure: ECT W/ ANESTHESIA Diagnosis: Disorganized schizophrenia (HCC)    Scheduled Providers:  Responsible Provider: Jewel Ji MD    Anesthesia Type: general ASA Status: 3            Anesthesia Type: No value filed.    Amalia Phase I: Amalia Score: 5    Amalia Phase II:      Anesthesia Post Evaluation    Comments: POST- ANESTHESIA EVALUATION       Pt Name: Alistair Hutchison  MRN: [id number]  YOB: 1995  Date of evaluation: 5/23/2025  Time:  8:53 AM      /87   Pulse 97   Temp 98.4 °F (36.9 °C) (Infrared)   Resp 18   Ht 1.651 m (5' 5\")   Wt 94.3 kg (208 lb)   SpO2 97%   BMI 34.61 kg/m²      Consciousness Level  Awake  Cardiopulmonary Status  Stable  Pain Adequately Treated YES  Nausea / Vomiting  NO  Adequate Hydration  YES  Anesthesia Related Complications NONE      Electronically signed by Jewel Ji MD on 5/23/2025 at 8:53 AM           No notable events documented.

## 2025-05-23 NOTE — INTERVAL H&P NOTE
Update History & Physical    The patient's History and Physical of May 9, 2025 was reviewed with the patient and I examined the patient. There was no change. The surgical site was confirmed by the patient and me.       Alistair Hutchison is 30 y.o.,  male, here for ECT treatment.     Last ECT treatment was 5/16/2025. Pt tolerated last treatment well. Patient reports that his symptoms are improving.  Patient has complaints of some short term memory loss    Pt has had multiple failed treatment modalities. Pt has history of Disorganized schizophrenia    Mood is rated at  10/10.    Patient's sleep has been good. Appetite has been good.   Pt is not suicidal. Pt is not homicidal but the kids in the neighborhood upset him.  Pt denies auditory/visual hallucinations.   Pt has been taking psychiatric medications as prescribed.   Pt does not have any other somatic complaints at this time.      NPO since MN  PONV Denies any other h/o complications with general anesthesia  See nursing flowsheet for vital signs  Cardiopulmonary assessment completed.  Unchanged  Medical history reviewed         Electronically signed by GIORGI Rust CNP on 5/23/2025 at 5:54 AM

## 2025-05-29 ENCOUNTER — ANESTHESIA EVENT (OUTPATIENT)
Dept: POSTOP/PACU | Age: 30
End: 2025-05-29
Payer: COMMERCIAL

## 2025-05-29 ENCOUNTER — HOSPITAL ENCOUNTER (OUTPATIENT)
Age: 30
Setting detail: SPECIMEN
Discharge: HOME | End: 2025-05-29

## 2025-05-29 ENCOUNTER — OFFICE VISIT (OUTPATIENT)
Age: 30
End: 2025-05-29
Payer: COMMERCIAL

## 2025-05-29 VITALS
TEMPERATURE: 97 F | HEART RATE: 100 BPM | WEIGHT: 199.4 LBS | DIASTOLIC BLOOD PRESSURE: 83 MMHG | OXYGEN SATURATION: 93 % | BODY MASS INDEX: 33.22 KG/M2 | HEIGHT: 65 IN | SYSTOLIC BLOOD PRESSURE: 129 MMHG

## 2025-05-29 DIAGNOSIS — Z00.00 INITIAL MEDICARE ANNUAL WELLNESS VISIT: ICD-10-CM

## 2025-05-29 DIAGNOSIS — E05.90 HYPERTHYROIDISM, SUBCLINICAL: ICD-10-CM

## 2025-05-29 DIAGNOSIS — E05.90 HYPERTHYROIDISM, SUBCLINICAL: Primary | ICD-10-CM

## 2025-05-29 LAB
T4 FREE SERPL-MCNC: 1.7 NG/DL (ref 0.9–1.7)
TSH SERPL DL<=0.05 MIU/L-ACNC: 1.96 UIU/ML (ref 0.27–4.2)

## 2025-05-29 PROCEDURE — G0438 PPPS, INITIAL VISIT: HCPCS

## 2025-05-29 ASSESSMENT — PATIENT HEALTH QUESTIONNAIRE - PHQ9
SUM OF ALL RESPONSES TO PHQ QUESTIONS 1-9: 0
1. LITTLE INTEREST OR PLEASURE IN DOING THINGS: NOT AT ALL
SUM OF ALL RESPONSES TO PHQ QUESTIONS 1-9: 0
2. FEELING DOWN, DEPRESSED OR HOPELESS: NOT AT ALL

## 2025-05-29 NOTE — PROGRESS NOTES
Visit Information    Have you changed or started any medications since your last visit including any over-the-counter medicines, vitamins, or herbal medicines? no   Are you having any side effects from any of your medications? -  no  Have you stopped taking any of your medications? Is so, why? -  no    Have you seen any other physician or provider since your last visit? No  Have you had any other diagnostic tests since your last visit? No  Have you been seen in the emergency room and/or had an admission to a hospital since we last saw you? No  Have you had your routine dental cleaning in the past 6 months? no    Have you activated your [employer] account? If not, what are your barriers? Yes     Patient Care Team:  Ashwin Burrows MD as PCP - General (Family Medicine)    Medical History Review  Past Medical, Family, and Social History reviewed and does not contribute to the patient presenting condition    Health Maintenance   Topic Date Due    Varicella vaccine (1 of 2 - 13+ 2-dose series) Never done    COVID-19 Vaccine (2 - 2024-25 season) 09/01/2024    Flu vaccine (Season Ended) 08/01/2025    Depression Screen  04/22/2026    DTaP/Tdap/Td vaccine (8 - Td or Tdap) 04/09/2029    Hepatitis B vaccine  Completed    Hib vaccine  Completed    Polio vaccine  Completed    Meningococcal (ACWY) vaccine  Completed    Hepatitis C screen  Completed    HIV screen  Completed    Hepatitis A vaccine  Aged Out    HPV vaccine  Aged Out    Meningococcal B vaccine  Aged Out    Pneumococcal 0-49 years Vaccine  Aged Out    Lipids  Discontinued    Depression Monitoring  Discontinued

## 2025-05-29 NOTE — PROGRESS NOTES
Medicare Annual Wellness Visit    Alistair Hutchison is here for Medicare AWV and Back Pain    Assessment & Plan   Initial Medicare annual wellness visit     Return in 6 weeks (on 7/10/2025).     Subjective     30-year-old male with schizoaffective disorder refractory to multiple treatments currently receiving ECT weekly recently had 28 session on 05/23/2025 with Dr. Oneal.  Is here today with grandmother for annual wellness visit, guardian documents reviewed Jacqueline is the guardian    Patient's complete Health Risk Assessment and screening values have been reviewed and are found in Flowsheets. The following problems were reviewed today and where indicated follow up appointments were made and/or referrals ordered.    Positive Risk Factor Screenings with Interventions:    Fall Risk:  Do you feel unsteady or are you worried about falling? : (!) yes  2 or more falls in past year?: (!) yes  Fall with injury in past year?: no     Interventions:    Reviewed medications, home hazards, visual acuity, and co-morbidities that can increase risk for falls  See AVS for additional education material            General HRA Questions:  Select all that apply: (!) Anger, Stress, Social Isolation, Loneliness, New or Increased Fatigue, New or Increased Pain  Interventions - Pain:  See A/P for plan and any pertinent orders  Interventions Fatigue:  See AVS for additional education material  Interventions - Loneliness:  See AVS for additional education material  Interventions - Social Isolation:  See AVS for additional education material  Interventions - Stress:  See AVS for additional education material  Interventions - Anger:  See AVS for additional education material        Abnormal BMI (obese):  Body mass index is 33.18 kg/m². (!) Abnormal  Interventions:  Patient did lose some weight, patient on his last visit in August/2020 weight was 108 kg however today 90 kg  Patient did have a recent visit with diarrhea probably suffering

## 2025-05-29 NOTE — PROGRESS NOTES
ATTENDING NOTE    Attending Physician Statement  I have discussed the care of Jovonincluding pertinent history and exam findings,  with the resident. I have reviewed the key elements of all parts of the encounter with the resident.  I agree with the assessment, plan and orders as documented by the resident.  (GE Modifier)    Past Medical History:   Diagnosis Date    Fracture     leg    Manic depression (HCC)     Oppositional defiant behavior     PONV (postoperative nausea and vomiting)     Schizophrenia (HCC)        .Alistair \"William\" was seen today for medicare awv and back pain.    Diagnoses and all orders for this visit:    Hyperthyroidism, subclinical  -     TSH; Future  -     T4, Free; Future    Initial Medicare annual wellness visit

## 2025-05-29 NOTE — PATIENT INSTRUCTIONS
Thank you for letting us take care of you today. We hope all your questions were addressed. If a question was overlooked or something else comes to mind after you return home, please contact a member of your Care Team listed below.      Your Care Team at [facility] is Team #  Josh Menendez M.D. (Faculty)  Roberth Garza M.D. (Resident)  Ashwin Burrows M.D. (Resident)   Kelsey Crouch M.D. (Resident)  Betito Workman M.D. (Resident)  Sharita Cornelius M.D. (Resident)  Alyson Sims., [facility]  Rose Marian, [facility] hospital  Teresa Coto, [facility]  David Lincoln, [facility]  Zora Edwards, [facility]  Luann Delgado, Critical access hospital  Krystina Ruelas (LJ) TAHMINA Goodwin (Clinical Practice Manager)  Viktoriya Arango [facility] (Clinical Pharmacist)     Office phone number: [phone]    If you need to get in right away due to illness, please be advised we have \"Same Day\" appointments available Monday-Friday. Please call us at [phone] option #3 to schedule your \"Same Day\" appointment.          Preventing Falls: Care Instructions  Injuries and health problems such as trouble walking or poor eyesight can increase your risk of falling. So can some medicines. But there are things you can do to help prevent falls. You can exercise to get stronger. You can also arrange your home to make it safer.    Talk to your doctor about the medicines you take. Ask if any of them increase the risk of falls and whether they can be changed or stopped.   Try to exercise regularly. It can help improve your strength and balance. This can help lower your risk of falling.         Practice fall safety and prevention.   Wear low-heeled shoes that fit well and give your feet good support. Talk to your doctor if you have foot problems that make this hard.  Carry a cellphone or wear a medical alert device that you can use to call for help.  Use stepladders instead of chairs to reach high objects. Don't climb if you're at risk for falls. Ask for help, if needed.  Wear the correct

## 2025-05-30 ENCOUNTER — HOSPITAL ENCOUNTER (OUTPATIENT)
Dept: POSTOP/PACU | Age: 30
Discharge: HOME | End: 2025-05-30
Payer: COMMERCIAL

## 2025-05-30 ENCOUNTER — ANESTHESIA (OUTPATIENT)
Dept: POSTOP/PACU | Age: 30
End: 2025-05-30
Payer: COMMERCIAL

## 2025-05-30 VITALS
BODY MASS INDEX: 33.15 KG/M2 | SYSTOLIC BLOOD PRESSURE: 125 MMHG | OXYGEN SATURATION: 100 % | HEART RATE: 93 BPM | HEIGHT: 65 IN | RESPIRATION RATE: 17 BRPM | WEIGHT: 199 LBS | DIASTOLIC BLOOD PRESSURE: 87 MMHG | TEMPERATURE: 98 F

## 2025-05-30 DIAGNOSIS — F20.1 DISORGANIZED SCHIZOPHRENIA (HCC): Primary | ICD-10-CM

## 2025-05-30 PROCEDURE — 7100000000 HC PACU RECOVERY - FIRST 15 MIN: Performed by: ANESTHESIOLOGY

## 2025-05-30 PROCEDURE — 6360000002 HC RX W HCPCS: Performed by: ANESTHESIOLOGY

## 2025-05-30 PROCEDURE — 2500000003 HC RX 250 WO HCPCS

## 2025-05-30 PROCEDURE — 6360000002 HC RX W HCPCS

## 2025-05-30 PROCEDURE — 90870 ELECTROCONVULSIVE THERAPY: CPT

## 2025-05-30 PROCEDURE — 7100000001 HC PACU RECOVERY - ADDTL 15 MIN: Performed by: ANESTHESIOLOGY

## 2025-05-30 PROCEDURE — 2580000003 HC RX 258: Performed by: ANESTHESIOLOGY

## 2025-05-30 PROCEDURE — 90870 ELECTROCONVULSIVE THERAPY: CPT | Performed by: PSYCHIATRY & NEUROLOGY

## 2025-05-30 PROCEDURE — 3700000000 HC ANESTHESIA ATTENDED CARE: Performed by: ANESTHESIOLOGY

## 2025-05-30 RX ORDER — METHOHEXITAL IN WATER/PF 100MG/10ML
SYRINGE (ML) INTRAVENOUS
Status: DISCONTINUED | OUTPATIENT
Start: 2025-05-30 | End: 2025-05-30 | Stop reason: SDUPTHER

## 2025-05-30 RX ORDER — SODIUM CHLORIDE 0.9 % (FLUSH) 0.9 %
5-40 SYRINGE (ML) INJECTION EVERY 12 HOURS SCHEDULED
Status: DISCONTINUED | OUTPATIENT
Start: 2025-05-30 | End: 2025-05-31 | Stop reason: HOSPADM

## 2025-05-30 RX ORDER — METHOHEXITAL IN WATER/PF 100MG/10ML
SYRINGE (ML) INTRAVENOUS
Status: COMPLETED
Start: 2025-05-30 | End: 2025-05-30

## 2025-05-30 RX ORDER — ONDANSETRON 2 MG/ML
INJECTION INTRAMUSCULAR; INTRAVENOUS
Status: DISCONTINUED | OUTPATIENT
Start: 2025-05-30 | End: 2025-05-30 | Stop reason: SDUPTHER

## 2025-05-30 RX ORDER — SUCCINYLCHOLINE/SOD CL,ISO/PF 200MG/10ML
SYRINGE (ML) INTRAVENOUS
Status: DISCONTINUED | OUTPATIENT
Start: 2025-05-30 | End: 2025-05-30 | Stop reason: SDUPTHER

## 2025-05-30 RX ORDER — KETOROLAC TROMETHAMINE 30 MG/ML
INJECTION, SOLUTION INTRAMUSCULAR; INTRAVENOUS
Status: COMPLETED
Start: 2025-05-30 | End: 2025-05-30

## 2025-05-30 RX ORDER — SODIUM CHLORIDE 0.9 % (FLUSH) 0.9 %
5-40 SYRINGE (ML) INJECTION PRN
Status: DISCONTINUED | OUTPATIENT
Start: 2025-05-30 | End: 2025-05-31 | Stop reason: HOSPADM

## 2025-05-30 RX ORDER — ONDANSETRON 2 MG/ML
INJECTION INTRAMUSCULAR; INTRAVENOUS
Status: COMPLETED
Start: 2025-05-30 | End: 2025-05-30

## 2025-05-30 RX ORDER — KETOROLAC TROMETHAMINE 30 MG/ML
INJECTION, SOLUTION INTRAMUSCULAR; INTRAVENOUS
Status: DISCONTINUED | OUTPATIENT
Start: 2025-05-30 | End: 2025-05-30 | Stop reason: SDUPTHER

## 2025-05-30 RX ORDER — SODIUM CHLORIDE, SODIUM LACTATE, POTASSIUM CHLORIDE, CALCIUM CHLORIDE 600; 310; 30; 20 MG/100ML; MG/100ML; MG/100ML; MG/100ML
INJECTION, SOLUTION INTRAVENOUS CONTINUOUS
Status: DISCONTINUED | OUTPATIENT
Start: 2025-05-30 | End: 2025-05-31 | Stop reason: HOSPADM

## 2025-05-30 RX ORDER — LIDOCAINE HYDROCHLORIDE 10 MG/ML
1 INJECTION, SOLUTION EPIDURAL; INFILTRATION; INTRACAUDAL; PERINEURAL
Status: COMPLETED | OUTPATIENT
Start: 2025-05-30 | End: 2025-05-30

## 2025-05-30 RX ORDER — SODIUM CHLORIDE 9 MG/ML
INJECTION, SOLUTION INTRAVENOUS PRN
Status: DISCONTINUED | OUTPATIENT
Start: 2025-05-30 | End: 2025-05-31 | Stop reason: HOSPADM

## 2025-05-30 RX ORDER — SUCCINYLCHOLINE/SOD CL,ISO/PF 200MG/10ML
SYRINGE (ML) INTRAVENOUS
Status: COMPLETED
Start: 2025-05-30 | End: 2025-05-30

## 2025-05-30 RX ADMIN — SODIUM CHLORIDE, POTASSIUM CHLORIDE, SODIUM LACTATE AND CALCIUM CHLORIDE: 600; 310; 30; 20 INJECTION, SOLUTION INTRAVENOUS at 07:49

## 2025-05-30 RX ADMIN — LIDOCAINE HYDROCHLORIDE 1 ML: 10 INJECTION, SOLUTION EPIDURAL; INFILTRATION; INTRACAUDAL; PERINEURAL at 07:49

## 2025-05-30 RX ADMIN — ONDANSETRON 4 MG: 2 INJECTION, SOLUTION INTRAMUSCULAR; INTRAVENOUS at 08:49

## 2025-05-30 RX ADMIN — KETOROLAC TROMETHAMINE 30 MG: 30 INJECTION, SOLUTION INTRAMUSCULAR at 08:49

## 2025-05-30 RX ADMIN — Medication 120 MG: at 08:51

## 2025-05-30 RX ADMIN — Medication 100 MG: at 08:50

## 2025-05-30 ASSESSMENT — PAIN - FUNCTIONAL ASSESSMENT
PAIN_FUNCTIONAL_ASSESSMENT: 0-10
PAIN_FUNCTIONAL_ASSESSMENT: ADULT NONVERBAL PAIN SCALE (NPVS)

## 2025-05-30 NOTE — ANESTHESIA POSTPROCEDURE EVALUATION
Department of Anesthesiology  Postprocedure Note    Patient: Alistair Hutchison  MRN: [id number]  YOB: 1995  Date of evaluation: 5/30/2025    Procedure Summary       Date: 05/30/25 Room / Location: [facility] PACU    Anesthesia Start: 0847 Anesthesia Stop: 0859    Procedure: ECT W/ ANESTHESIA Diagnosis: Disorganized schizophrenia (HCC)    Scheduled Providers:  Responsible Provider: Jewel Ji MD    Anesthesia Type: general ASA Status: 3            Anesthesia Type: No value filed.    Amalia Phase I: Amalia Score: 10    Amalia Phase II:      Anesthesia Post Evaluation    Comments: POST- ANESTHESIA EVALUATION       Pt Name: Alistair Hutchison  MRN: [id number]  YOB: 1995  Date of evaluation: 5/30/2025  Time:  9:58 AM      /87   Pulse 93   Temp 98 °F (36.7 °C)   Resp 17   Ht 1.651 m (5' 5\")   Wt 90.3 kg (199 lb)   SpO2 100%   BMI 33.12 kg/m²      Consciousness Level  Awake  Cardiopulmonary Status  Stable  Pain Adequately Treated YES  Nausea / Vomiting  NO  Adequate Hydration  YES  Anesthesia Related Complications NONE      Electronically signed by Jewel Ji MD on 5/30/2025 at 9:58 AM           No notable events documented.

## 2025-05-30 NOTE — INTERVAL H&P NOTE
Update History & Physical    The patient's History and Physical of May 9, 2025 was reviewed with the patient and I examined the patient.   Alistair Hutchison is 30 y.o.,  male, here for ECT treatment.    Pt has history of Disorganized schizophrenia  disorder.  He is coming once a week.     Last ECT treatment was 5/23/2025. Pt tolerated last treatment well. Patient reports that her symptoms are improving.  Patient has complaints of some short term memory loss   Mood has been not so good this time.   Pt states that he has a video game.      Patient's sleep has been good. Appetite has been good.   Pt is not suicidal. Pt is upset with kids in the neighborhood. Pt denies auditory/visual hallucinations.   Pt has been taking psychiatric medications as prescribed.   Pt does not have any other somatic complaints at this time.       Anticoagulation status: Denies     PONV.  Denies any personal or family hx of previous complications w/anesthesia.    Electronically signed by GIORGI BREWER CNP on 5/30/2025 at 7:43 AM

## 2025-05-30 NOTE — ANESTHESIA PRE PROCEDURE
Department of Anesthesiology  Preprocedure Note       Name:  Alistair Hutchison   Age:  30 y.o.  :  1995                                          MRN:  [id number]         Date:  2025      Surgeon: Dr Drake    Procedure: ECT    Medications prior to admission:   Prior to Admission medications    Medication Sig Start Date End Date Taking? Authorizing Provider   Paliperidone (INVEGA PO) Take 1 tablet by mouth daily as needed 3 mg daily prn   Yes ProviderShan MD   paliperidone palmitate ER (INVEGA SUSTENNA) 234 MG/1.5ML FRANCHESKA IM injection Inject 234 mg into the muscle every 21 days   Yes ProviderShan MD   propranolol (INDERAL) 40 MG tablet Take 1 tablet by mouth every evening Takes at 3:00 pm   Yes Shan Curran MD   hyoscyamine (LEVBID) 0.375 MG extended release tablet Take 1 tablet by mouth 2 times daily   Yes ProviderShan MD   acetaminophen (TYLENOL) 325 MG tablet Take by mouth every 6 hours as needed for Pain   Yes Provider, MD Shan   hydrOXYzine HCl (ATARAX) 50 MG tablet Take 1 tablet by mouth 2 times daily Pt takes at noon and 6 pm   Yes ProviderShan MD   cloZAPine (CLOZARIL) 100 MG tablet Take 1 tablet by mouth 3 times daily 24  Yes Corby Drake MD   traZODone (DESYREL) 50 MG tablet Take 1 tablet by mouth nightly as needed for Sleep 10/2/24  Yes Corby Drake MD       Current medications:    Current Outpatient Medications   Medication Sig Dispense Refill    Paliperidone (INVEGA PO) Take 1 tablet by mouth daily as needed 3 mg daily prn      paliperidone palmitate ER (INVEGA SUSTENNA) 234 MG/1.5ML FRANCHESKA IM injection Inject 234 mg into the muscle every 21 days      propranolol (INDERAL) 40 MG tablet Take 1 tablet by mouth every evening Takes at 3:00 pm      hyoscyamine (LEVBID) 0.375 MG extended release tablet Take 1 tablet by mouth 2 times daily      acetaminophen (TYLENOL) 325 MG tablet Take by mouth every 6 hours as needed for Pain

## 2025-06-05 ENCOUNTER — ANESTHESIA EVENT (OUTPATIENT)
Dept: POSTOP/PACU | Age: 30
End: 2025-06-05
Payer: COMMERCIAL

## 2025-06-06 ENCOUNTER — ANESTHESIA (OUTPATIENT)
Dept: POSTOP/PACU | Age: 30
End: 2025-06-06
Payer: COMMERCIAL

## 2025-06-06 ENCOUNTER — HOSPITAL ENCOUNTER (OUTPATIENT)
Dept: POSTOP/PACU | Age: 30
Discharge: HOME | End: 2025-06-06
Payer: COMMERCIAL

## 2025-06-06 VITALS
BODY MASS INDEX: 33.12 KG/M2 | DIASTOLIC BLOOD PRESSURE: 98 MMHG | RESPIRATION RATE: 18 BRPM | SYSTOLIC BLOOD PRESSURE: 134 MMHG | WEIGHT: 199 LBS | TEMPERATURE: 98.3 F | HEART RATE: 94 BPM | OXYGEN SATURATION: 95 %

## 2025-06-06 DIAGNOSIS — F20.1 SCHIZOPHRENIA, DISORGANIZED (HCC): Primary | ICD-10-CM

## 2025-06-06 LAB
ANION GAP SERPL CALCULATED.3IONS-SCNC: 10 MMOL/L (ref 9–16)
BASOPHILS # BLD: 0.04 K/UL (ref 0–0.2)
BASOPHILS NFR BLD: 1 % (ref 0–2)
BUN SERPL-MCNC: 11 MG/DL (ref 6–20)
CALCIUM SERPL-MCNC: 9.2 MG/DL (ref 8.6–10.4)
CHLORIDE SERPL-SCNC: 105 MMOL/L (ref 98–107)
CO2 SERPL-SCNC: 23 MMOL/L (ref 20–31)
CREAT SERPL-MCNC: 0.8 MG/DL (ref 0.7–1.2)
EOSINOPHIL # BLD: 0.22 K/UL (ref 0–0.44)
EOSINOPHILS RELATIVE PERCENT: 3 % (ref 0–4)
ERYTHROCYTE [DISTWIDTH] IN BLOOD BY AUTOMATED COUNT: 13.4 % (ref 11.5–14.9)
GFR, ESTIMATED: >90 ML/MIN/1.73M2
GLUCOSE SERPL-MCNC: 113 MG/DL (ref 74–99)
HCT VFR BLD AUTO: 41.4 % (ref 41–53)
HGB BLD-MCNC: 14.2 G/DL (ref 13.5–17.5)
IMM GRANULOCYTES # BLD AUTO: 0.03 K/UL (ref 0–0.3)
IMM GRANULOCYTES NFR BLD: 0 %
LYMPHOCYTES NFR BLD: 1.56 K/UL (ref 1.1–3.7)
LYMPHOCYTES RELATIVE PERCENT: 20 % (ref 24–44)
MCH RBC QN AUTO: 28.6 PG (ref 26–34)
MCHC RBC AUTO-ENTMCNC: 34.3 G/DL (ref 31–37)
MCV RBC AUTO: 83.5 FL (ref 80–100)
MONOCYTES NFR BLD: 0.57 K/UL (ref 0.1–1.2)
MONOCYTES NFR BLD: 7 % (ref 3–12)
NEUTROPHILS NFR BLD: 69 % (ref 36–66)
NEUTS SEG NFR BLD: 5.58 K/UL (ref 1.5–8.1)
NRBC BLD-RTO: 0 PER 100 WBC
PLATELET # BLD AUTO: 233 K/UL (ref 150–450)
PMV BLD AUTO: 9.2 FL (ref 8–13.5)
POTASSIUM SERPL-SCNC: 4.4 MMOL/L (ref 3.7–5.3)
RBC # BLD AUTO: 4.96 M/UL (ref 4.21–5.77)
SODIUM SERPL-SCNC: 138 MMOL/L (ref 136–145)
WBC OTHER # BLD: 8 K/UL (ref 3.5–11)

## 2025-06-06 PROCEDURE — 2580000003 HC RX 258: Performed by: ANESTHESIOLOGY

## 2025-06-06 PROCEDURE — 6360000002 HC RX W HCPCS: Performed by: NURSE ANESTHETIST, CERTIFIED REGISTERED

## 2025-06-06 PROCEDURE — 85025 COMPLETE CBC W/AUTO DIFF WBC: CPT

## 2025-06-06 PROCEDURE — 90870 ELECTROCONVULSIVE THERAPY: CPT | Performed by: PSYCHIATRY & NEUROLOGY

## 2025-06-06 PROCEDURE — 90870 ELECTROCONVULSIVE THERAPY: CPT | Performed by: ANESTHESIOLOGY

## 2025-06-06 PROCEDURE — 7100000000 HC PACU RECOVERY - FIRST 15 MIN: Performed by: ANESTHESIOLOGY

## 2025-06-06 PROCEDURE — 7100000001 HC PACU RECOVERY - ADDTL 15 MIN: Performed by: ANESTHESIOLOGY

## 2025-06-06 PROCEDURE — 3700000000 HC ANESTHESIA ATTENDED CARE: Performed by: ANESTHESIOLOGY

## 2025-06-06 PROCEDURE — 80048 BASIC METABOLIC PNL TOTAL CA: CPT

## 2025-06-06 PROCEDURE — 36415 COLL VENOUS BLD VENIPUNCTURE: CPT

## 2025-06-06 PROCEDURE — 2500000003 HC RX 250 WO HCPCS: Performed by: NURSE ANESTHETIST, CERTIFIED REGISTERED

## 2025-06-06 RX ORDER — METHOHEXITAL IN WATER/PF 100MG/10ML
SYRINGE (ML) INTRAVENOUS
Status: DISCONTINUED | OUTPATIENT
Start: 2025-06-06 | End: 2025-06-06 | Stop reason: SDUPTHER

## 2025-06-06 RX ORDER — SODIUM CHLORIDE 9 MG/ML
INJECTION, SOLUTION INTRAVENOUS PRN
Status: DISCONTINUED | OUTPATIENT
Start: 2025-06-06 | End: 2025-06-07 | Stop reason: HOSPADM

## 2025-06-06 RX ORDER — KETOROLAC TROMETHAMINE 30 MG/ML
INJECTION, SOLUTION INTRAMUSCULAR; INTRAVENOUS
Status: COMPLETED
Start: 2025-06-06 | End: 2025-06-06

## 2025-06-06 RX ORDER — SODIUM CHLORIDE 0.9 % (FLUSH) 0.9 %
5-40 SYRINGE (ML) INJECTION EVERY 12 HOURS SCHEDULED
Status: DISCONTINUED | OUTPATIENT
Start: 2025-06-06 | End: 2025-06-07 | Stop reason: HOSPADM

## 2025-06-06 RX ORDER — KETOROLAC TROMETHAMINE 30 MG/ML
INJECTION, SOLUTION INTRAMUSCULAR; INTRAVENOUS
Status: DISPENSED
Start: 2025-06-06 | End: 2025-06-06

## 2025-06-06 RX ORDER — ONDANSETRON 2 MG/ML
INJECTION INTRAMUSCULAR; INTRAVENOUS
Status: COMPLETED
Start: 2025-06-06 | End: 2025-06-06

## 2025-06-06 RX ORDER — SODIUM CHLORIDE, SODIUM LACTATE, POTASSIUM CHLORIDE, CALCIUM CHLORIDE 600; 310; 30; 20 MG/100ML; MG/100ML; MG/100ML; MG/100ML
INJECTION, SOLUTION INTRAVENOUS CONTINUOUS
Status: DISCONTINUED | OUTPATIENT
Start: 2025-06-06 | End: 2025-06-07 | Stop reason: HOSPADM

## 2025-06-06 RX ORDER — SODIUM CHLORIDE 0.9 % (FLUSH) 0.9 %
5-40 SYRINGE (ML) INJECTION PRN
Status: DISCONTINUED | OUTPATIENT
Start: 2025-06-06 | End: 2025-06-07 | Stop reason: HOSPADM

## 2025-06-06 RX ORDER — SUCCINYLCHOLINE/SOD CL,ISO/PF 200MG/10ML
SYRINGE (ML) INTRAVENOUS
Status: DISCONTINUED | OUTPATIENT
Start: 2025-06-06 | End: 2025-06-06 | Stop reason: SDUPTHER

## 2025-06-06 RX ORDER — SUCCINYLCHOLINE/SOD CL,ISO/PF 200MG/10ML
SYRINGE (ML) INTRAVENOUS
Status: COMPLETED
Start: 2025-06-06 | End: 2025-06-06

## 2025-06-06 RX ORDER — METHOHEXITAL IN WATER/PF 100MG/10ML
SYRINGE (ML) INTRAVENOUS
Status: COMPLETED
Start: 2025-06-06 | End: 2025-06-06

## 2025-06-06 RX ORDER — ONDANSETRON 2 MG/ML
INJECTION INTRAMUSCULAR; INTRAVENOUS
Status: DISCONTINUED | OUTPATIENT
Start: 2025-06-06 | End: 2025-06-06 | Stop reason: SDUPTHER

## 2025-06-06 RX ORDER — LIDOCAINE HYDROCHLORIDE 10 MG/ML
1 INJECTION, SOLUTION EPIDURAL; INFILTRATION; INTRACAUDAL; PERINEURAL
Status: DISCONTINUED | OUTPATIENT
Start: 2025-06-06 | End: 2025-06-07 | Stop reason: HOSPADM

## 2025-06-06 RX ORDER — KETOROLAC TROMETHAMINE 30 MG/ML
INJECTION, SOLUTION INTRAMUSCULAR; INTRAVENOUS
Status: DISCONTINUED | OUTPATIENT
Start: 2025-06-06 | End: 2025-06-06 | Stop reason: SDUPTHER

## 2025-06-06 RX ADMIN — ONDANSETRON 4 MG: 2 INJECTION INTRAMUSCULAR; INTRAVENOUS at 08:53

## 2025-06-06 RX ADMIN — KETOROLAC TROMETHAMINE 30 MG: 30 INJECTION, SOLUTION INTRAMUSCULAR at 08:53

## 2025-06-06 RX ADMIN — Medication 100 MG: at 08:53

## 2025-06-06 RX ADMIN — SODIUM CHLORIDE, POTASSIUM CHLORIDE, SODIUM LACTATE AND CALCIUM CHLORIDE: 600; 310; 30; 20 INJECTION, SOLUTION INTRAVENOUS at 08:52

## 2025-06-06 RX ADMIN — Medication 120 MG: at 08:53

## 2025-06-06 ASSESSMENT — PAIN - FUNCTIONAL ASSESSMENT
PAIN_FUNCTIONAL_ASSESSMENT: NONE - DENIES PAIN
PAIN_FUNCTIONAL_ASSESSMENT: ADULT NONVERBAL PAIN SCALE (NPVS)

## 2025-06-06 NOTE — ANESTHESIA PRE PROCEDURE
Department of Anesthesiology  Preprocedure Note       Name:  Alistair Hutchison   Age:  30 y.o.  :  1995                                          MRN:  [id number]         Date:  2025      Surgeon: Dr Drake    Procedure: ECT    Medications prior to admission:   Prior to Admission medications    Medication Sig Start Date End Date Taking? Authorizing Provider   Paliperidone (INVEGA PO) Take 1 tablet by mouth daily as needed 3 mg daily prn   Yes ProviderShan MD   paliperidone palmitate ER (INVEGA SUSTENNA) 234 MG/1.5ML FRANCHESKA IM injection Inject 234 mg into the muscle every 21 days   Yes ProviderShan MD   propranolol (INDERAL) 40 MG tablet Take 1 tablet by mouth every evening Takes at 3:00 pm   Yes Shan Curran MD   hyoscyamine (LEVBID) 0.375 MG extended release tablet Take 1 tablet by mouth 2 times daily   Yes ProviderShan MD   acetaminophen (TYLENOL) 325 MG tablet Take by mouth every 6 hours as needed for Pain   Yes Provider, MD Shan   hydrOXYzine HCl (ATARAX) 50 MG tablet Take 1 tablet by mouth 2 times daily Pt takes at noon and 6 pm   Yes ProviderShan MD   cloZAPine (CLOZARIL) 100 MG tablet Take 1 tablet by mouth 3 times daily 24  Yes Corby Drake MD   traZODone (DESYREL) 50 MG tablet Take 1 tablet by mouth nightly as needed for Sleep 10/2/24  Yes Corby Drake MD       Current medications:    Current Outpatient Medications   Medication Sig Dispense Refill    Paliperidone (INVEGA PO) Take 1 tablet by mouth daily as needed 3 mg daily prn      paliperidone palmitate ER (INVEGA SUSTENNA) 234 MG/1.5ML FRANCHESKA IM injection Inject 234 mg into the muscle every 21 days      propranolol (INDERAL) 40 MG tablet Take 1 tablet by mouth every evening Takes at 3:00 pm      hyoscyamine (LEVBID) 0.375 MG extended release tablet Take 1 tablet by mouth 2 times daily      acetaminophen (TYLENOL) 325 MG tablet Take by mouth every 6 hours as needed for Pain

## 2025-06-06 NOTE — DISCHARGE INSTRUCTIONS
ELECTROCONVULSIVE THERAPY DISCHARGE INSTRUCTIONS         1. Activity - Rest today. The anesthetic agents you have received can make you feel drowsy or tired.  A responsible person must drive you home and stay with you for 8 hours after discharge from the Hospital. Do not drive a motor vehicle or operate any machinery for 24 hours. .   *Do not make any complex decisions or execute any legal documents until 3 weeks AFTER your last treatment.  If you have any questions regarding this, speak with your psychiatrist first.*    2. Diet - Nothing to eat or drink after midnight the night of your ECT treatment. After ECT, start with clear liquids, if you can drink without coughing, you may proceed with gradually progressing to your usual diet.    No alcoholic beverages for 24 hours.    3. Medications - Take your daily medications as prescribed, after you return home from today's ECT. If you take a Beta Blocker or have been prescribed Imitrex, you may be instructed to take these medications the morning of ECT. If you are unsure please ask the physician.     Take with these medication the morning of ECT with one Tablespoon of water.   Tylenol 650 mg  All blood pressure medications  ***    4. You may experience the following after your treatment:   a. Poor memory   b. Poor balance   c. Headaches   d. Confusion   e. Muscle soreness   f. Nausea      5. Special Precautions - Contact you physician immediately if these occur:   a. Signs of infection: redness, swelling, heat, red streaks at the site of the IV   b. Excessive pain unrelieved by prescription pain medications   c. Nausea and vomiting that persists beyond the first day after your procedure    6. Next Procedure Date:   Your next ECT treatment is scheduled for 7:20 am on Friday, June 13, 2025.  Please arrive to [facility] by 6:00 am.     If you feel you are having a problem or complication from your ECT treatments and it is during normal business

## 2025-06-06 NOTE — INTERVAL H&P NOTE
Update History & Physical    The patient's History and Physical of May 9, 2025 was reviewed with the patient and I examined the patient. Any changes are as documented below.    Alistair Hutchison is 30 y.o., male, here for ECT treatment. Pt presents today with Jacqueline, his grandmother, guardian. Last ECT treatment was 05/30/25. Pt tolerated last treatment well and has voiced improvement. Sleep has been good. Pt reports decreased appetite. Does vomit with over eating. Jacqueline reports pt is starting to recognize when he is full and stop eating to prevent vomiting. Jacqueline reports has discussed with PCP.  Denies suicidal or homicidal ideation. Denies hallucinations. Pt has been taking all medications as prescribed. Pt has no interval changes since last treatment visit.     Pt AAO x 3 in NAD. HRRR. No adventitious lung sounds. No respiratory distress. NPO p MN. Denies recent or current chest pain/pressure, palpitations, SOB, recent URI, fever or chills. Review vitals per RN flowsheet.       Electronically signed by GIORGI Shah CNP on 6/6/2025 at 8:23 AM

## 2025-06-06 NOTE — ANESTHESIA POSTPROCEDURE EVALUATION
Department of Anesthesiology  Postprocedure Note    Patient: Alistair Hutchison  MRN: [id number]  YOB: 1995  Date of evaluation: 6/6/2025    Procedure Summary       Date: 06/06/25 Room / Location: [facility] PACU    Anesthesia Start: 0852 Anesthesia Stop: 0906    Procedure: ECT W/ ANESTHESIA Diagnosis: Disorganized schizophrenia (HCC)    Scheduled Providers:  Responsible Provider: Barby Leonard MD    Anesthesia Type: general ASA Status: 3            Anesthesia Type: No value filed.    Amalia Phase I: Amalia Score: 10    Amalia Phase II:      Anesthesia Post Evaluation    Comments: POST- ANESTHESIA EVALUATION       Pt Name: Alistair Hutchison  MRN: [id number]  YOB: 1995  Date of evaluation: 6/6/2025  Time:  10:10 AM      BP (!) 134/98   Pulse 94   Temp 98.3 °F (36.8 °C)   Resp 18   Wt 90.3 kg (199 lb)   SpO2 95%   BMI 33.12 kg/m²      Consciousness Level  Awake  Cardiopulmonary Status  Stable  Pain Adequately Treated YES  Nausea / Vomiting  NO  Adequate Hydration  YES  Anesthesia Related Complications NONE      Electronically signed by Barby Leonard MD on 6/6/2025 at 10:10 AM           No notable events documented.

## 2025-06-06 NOTE — PROCEDURES
Bilateral ECT Procedure Report  Alistair Hutchison   6/6/2025  1995         Attending:Corby Drake MD  Preprocedure diagnosis: disorganized schizophrenia  Postprocedure diagnosis: SAME AS PRE-PROCEDURE DIAGNOSIS  Treatment Number: This is treatment #30    Patient Status: Outpatient   Type of ECT: Bilateral Brief Pulse  Medications  Preprocedure: Tylenol 650mg and none  Anesthetic: Methohexital 100 mg  Paralytic: Succinylcholine 120 mg  Post procedure: none needed   Cuff placement: Left Lower Extremity    MECTA Settings 1st Stimulus:     Pulse width: 1.0 ms   Frequency:  60 hz   Duration:   3.0 seconds   Static Impedance: 276 ohms             Dynamic Impedance: 207 ohms             Motor Seizure Duration: 25 seconds  EEG Seizure Duration: 32 seconds             The patient's ECT treatment was performed using MECTA machine  .  Timeout:  Time out was performed using two patient identifiers and confirmation of procedure.     Patient Preparation: Preprocedure documentation, labs, H&P were all verified and reviewed.  The patient was placed in supine position.  EEG leads were placed for monitoring of seizure.   Worship areas bilaterally cleaned and prepped.  Conductive gel  was applied over stimulus electrode site.   The patient was pre-oxygenated.       Procedure in detail: Medications were administered by anesthesia using intravenous access at the doses listed previously in this summary.  Anesthetic administered and once confirmation the patient is anesthetized, the patient's blood pressure cuff on lower extremity was inflated to 100mmHg in excess of patient's blood pressure.  At this time, the neuromuscular blockade was administered intravenously by anesthesia.  Upper extremity fasciculation were verified followed by lower extremity fasciculation.  Once fasciculations terminated, confirmation of affective neuromuscular blockade demonstrated by absence of withdrawal reflex.  Bite blocks were put in place.

## 2025-06-12 ENCOUNTER — ANESTHESIA EVENT (OUTPATIENT)
Dept: POSTOP/PACU | Age: 30
End: 2025-06-12
Payer: COMMERCIAL

## 2025-06-12 DIAGNOSIS — F20.1 SCHIZOPHRENIA, DISORGANIZED (HCC): Primary | ICD-10-CM

## 2025-06-13 ENCOUNTER — ANESTHESIA (OUTPATIENT)
Dept: POSTOP/PACU | Age: 30
End: 2025-06-13
Payer: COMMERCIAL

## 2025-06-13 ENCOUNTER — HOSPITAL ENCOUNTER (OUTPATIENT)
Dept: POSTOP/PACU | Age: 30
Discharge: HOME | End: 2025-06-13
Payer: COMMERCIAL

## 2025-06-13 VITALS
OXYGEN SATURATION: 95 % | WEIGHT: 199 LBS | HEART RATE: 96 BPM | TEMPERATURE: 98.1 F | BODY MASS INDEX: 33.15 KG/M2 | HEIGHT: 65 IN | RESPIRATION RATE: 12 BRPM | SYSTOLIC BLOOD PRESSURE: 135 MMHG | DIASTOLIC BLOOD PRESSURE: 85 MMHG

## 2025-06-13 PROCEDURE — 6360000002 HC RX W HCPCS: Performed by: ANESTHESIOLOGY

## 2025-06-13 PROCEDURE — 2500000003 HC RX 250 WO HCPCS: Performed by: NURSE ANESTHETIST, CERTIFIED REGISTERED

## 2025-06-13 PROCEDURE — 7100000000 HC PACU RECOVERY - FIRST 15 MIN: Performed by: ANESTHESIOLOGY

## 2025-06-13 PROCEDURE — 3700000001 HC ADD 15 MINUTES (ANESTHESIA): Performed by: ANESTHESIOLOGY

## 2025-06-13 PROCEDURE — 3700000000 HC ANESTHESIA ATTENDED CARE: Performed by: ANESTHESIOLOGY

## 2025-06-13 PROCEDURE — 90870 ELECTROCONVULSIVE THERAPY: CPT | Performed by: ANESTHESIOLOGY

## 2025-06-13 PROCEDURE — 2580000003 HC RX 258: Performed by: ANESTHESIOLOGY

## 2025-06-13 PROCEDURE — 90870 ELECTROCONVULSIVE THERAPY: CPT | Performed by: PSYCHIATRY & NEUROLOGY

## 2025-06-13 PROCEDURE — 6360000002 HC RX W HCPCS: Performed by: NURSE ANESTHETIST, CERTIFIED REGISTERED

## 2025-06-13 PROCEDURE — 7100000001 HC PACU RECOVERY - ADDTL 15 MIN: Performed by: ANESTHESIOLOGY

## 2025-06-13 RX ORDER — SUCCINYLCHOLINE/SOD CL,ISO/PF 200MG/10ML
SYRINGE (ML) INTRAVENOUS
Status: COMPLETED
Start: 2025-06-13 | End: 2025-06-13

## 2025-06-13 RX ORDER — SODIUM CHLORIDE, SODIUM LACTATE, POTASSIUM CHLORIDE, CALCIUM CHLORIDE 600; 310; 30; 20 MG/100ML; MG/100ML; MG/100ML; MG/100ML
INJECTION, SOLUTION INTRAVENOUS CONTINUOUS
Status: DISCONTINUED | OUTPATIENT
Start: 2025-06-13 | End: 2025-06-14 | Stop reason: HOSPADM

## 2025-06-13 RX ORDER — SODIUM CHLORIDE 9 MG/ML
INJECTION, SOLUTION INTRAVENOUS PRN
Status: DISCONTINUED | OUTPATIENT
Start: 2025-06-13 | End: 2025-06-14 | Stop reason: HOSPADM

## 2025-06-13 RX ORDER — LIDOCAINE HYDROCHLORIDE 10 MG/ML
1 INJECTION, SOLUTION EPIDURAL; INFILTRATION; INTRACAUDAL; PERINEURAL
Status: COMPLETED | OUTPATIENT
Start: 2025-06-13 | End: 2025-06-13

## 2025-06-13 RX ORDER — METHOHEXITAL IN WATER/PF 100MG/10ML
SYRINGE (ML) INTRAVENOUS
Status: COMPLETED
Start: 2025-06-13 | End: 2025-06-13

## 2025-06-13 RX ORDER — KETOROLAC TROMETHAMINE 30 MG/ML
INJECTION, SOLUTION INTRAMUSCULAR; INTRAVENOUS
Status: DISCONTINUED | OUTPATIENT
Start: 2025-06-13 | End: 2025-06-13 | Stop reason: SDUPTHER

## 2025-06-13 RX ORDER — ONDANSETRON 2 MG/ML
INJECTION INTRAMUSCULAR; INTRAVENOUS
Status: COMPLETED
Start: 2025-06-13 | End: 2025-06-13

## 2025-06-13 RX ORDER — KETOROLAC TROMETHAMINE 30 MG/ML
INJECTION, SOLUTION INTRAMUSCULAR; INTRAVENOUS
Status: COMPLETED
Start: 2025-06-13 | End: 2025-06-13

## 2025-06-13 RX ORDER — METHOHEXITAL IN WATER/PF 100MG/10ML
SYRINGE (ML) INTRAVENOUS
Status: DISCONTINUED | OUTPATIENT
Start: 2025-06-13 | End: 2025-06-13 | Stop reason: SDUPTHER

## 2025-06-13 RX ORDER — SODIUM CHLORIDE 0.9 % (FLUSH) 0.9 %
5-40 SYRINGE (ML) INJECTION EVERY 12 HOURS SCHEDULED
Status: DISCONTINUED | OUTPATIENT
Start: 2025-06-13 | End: 2025-06-14 | Stop reason: HOSPADM

## 2025-06-13 RX ORDER — SODIUM CHLORIDE 0.9 % (FLUSH) 0.9 %
5-40 SYRINGE (ML) INJECTION PRN
Status: DISCONTINUED | OUTPATIENT
Start: 2025-06-13 | End: 2025-06-14 | Stop reason: HOSPADM

## 2025-06-13 RX ORDER — ONDANSETRON 2 MG/ML
INJECTION INTRAMUSCULAR; INTRAVENOUS
Status: DISCONTINUED | OUTPATIENT
Start: 2025-06-13 | End: 2025-06-13 | Stop reason: SDUPTHER

## 2025-06-13 RX ORDER — SUCCINYLCHOLINE/SOD CL,ISO/PF 200MG/10ML
SYRINGE (ML) INTRAVENOUS
Status: DISCONTINUED | OUTPATIENT
Start: 2025-06-13 | End: 2025-06-13 | Stop reason: SDUPTHER

## 2025-06-13 RX ADMIN — Medication 100 MG: at 07:55

## 2025-06-13 RX ADMIN — KETOROLAC TROMETHAMINE 30 MG: 30 INJECTION, SOLUTION INTRAMUSCULAR at 07:52

## 2025-06-13 RX ADMIN — Medication 120 MG: at 07:55

## 2025-06-13 RX ADMIN — SODIUM CHLORIDE, POTASSIUM CHLORIDE, SODIUM LACTATE AND CALCIUM CHLORIDE: 600; 310; 30; 20 INJECTION, SOLUTION INTRAVENOUS at 07:33

## 2025-06-13 RX ADMIN — ONDANSETRON 4 MG: 2 INJECTION, SOLUTION INTRAMUSCULAR; INTRAVENOUS at 07:52

## 2025-06-13 RX ADMIN — LIDOCAINE HYDROCHLORIDE 1 ML: 10 INJECTION, SOLUTION EPIDURAL; INFILTRATION; INTRACAUDAL; PERINEURAL at 07:33

## 2025-06-13 ASSESSMENT — ENCOUNTER SYMPTOMS
DIARRHEA: 1
VOMITING: 1
RESPIRATORY NEGATIVE: 1

## 2025-06-13 ASSESSMENT — PAIN - FUNCTIONAL ASSESSMENT
PAIN_FUNCTIONAL_ASSESSMENT: ADULT NONVERBAL PAIN SCALE (NPVS)
PAIN_FUNCTIONAL_ASSESSMENT: 0-10

## 2025-06-13 NOTE — H&P (VIEW-ONLY)
HISTORY and PHYSICAL  [facility]       NAME:  Alistair Hutchison  MRN: [id number]   YOB: 1995   Date: 6/13/2025   Age: 30 y.o.  Gender: male       COMPLAINT AND PRESENT HISTORY:     Alistair Hutchison is 30 y.o.,  male, presents ECT WITH ANESTHESIA for     Primary dx: Disorganized schizophrenia   HPI:  Alistair Hutchison is 30 y.o.,  male, here for ECT treatment.   Last ECT treatment was 6/6/25. Pt tolerated last treatment well. Patient reports that his  symptoms are  not improving.  Patient has complaints of some short term memory loss  Pt has had multiple failed treatment modalities. Pt has history of  Disorganized schizophrenia disorder.   Mood is rated at  10/10.  With 10 is the best  pt denies feeling of hopelessness, helplessness, low energy.   Patient's sleep has been  good . Appetite has been poor .   Pt denies feeling of suicidal/homicidal. Pt has no auditory/visual hallucinations.   Pt has been taking psychiatric medications as prescribed.   Pt does not have any other somatic complaints at this time.        Review of additional significant medical hx:  (See chart for additional detail, including current medications /see ROS for current S/S):     NPO status: pt NPO since the past midnight   Medications taken TODAY (with sip of water): none  Anticoagulation status: none    Denies personal hx of blood clots.  Denies personal hx of MRSA infection.  Denies any personal or family hx of previous complications w/anesthesia.      RECENT IMAGING R/T HPI   No results found.    RECENT LABS, IMAGING AND TESTING     Lab Results   Component Value Date    WBC 8.0 06/06/2025    RBC 4.96 06/06/2025    HGB 14.2 06/06/2025    HCT 41.4 06/06/2025    MCV 83.5 06/06/2025    MCH 28.6 06/06/2025    MCHC 34.3 06/06/2025    RDW 13.4 06/06/2025     06/06/2025    MPV 9.2 06/06/2025        Lab Results   Component Value Date     06/06/2025    K 4.4 06/06/2025

## 2025-06-13 NOTE — PROCEDURES
Bilateral ECT Procedure Report  Alistair Hutchison   6/13/2025  1995         Attending:Corby Drake MD  Preprocedure diagnosis: disorganized schizophrenia  Postprocedure diagnosis: SAME AS PRE-PROCEDURE DIAGNOSIS  Treatment Number: This is treatment #31    Patient Status: Outpatient   Type of ECT: Bilateral Brief Pulse  Medications  Preprocedure: Tylenol 650mg and none  Anesthetic: Methohexital 100 mg  Paralytic: Succinylcholine 120 mg  Post procedure: none needed   Cuff placement: Left Lower Extremity    MECTA Settings 1st Stimulus:     Pulse width: 1.0 ms   Frequency:  60 hz   Duration:   3.0 seconds   Static Impedance: 568 ohms             Dynamic Impedance: 205 ohms             Motor Seizure Duration: 27 seconds  EEG Seizure Duration: 29 seconds             The patient's ECT treatment was performed using MECTA machine  .  Timeout:  Time out was performed using two patient identifiers and confirmation of procedure.     Patient Preparation: Preprocedure documentation, labs, H&P were all verified and reviewed.  The patient was placed in supine position.  EEG leads were placed for monitoring of seizure.   Jain areas bilaterally cleaned and prepped.  Conductive gel  was applied over stimulus electrode site.   The patient was pre-oxygenated.       Procedure in detail: Medications were administered by anesthesia using intravenous access at the doses listed previously in this summary.  Anesthetic administered and once confirmation the patient is anesthetized, the patient's blood pressure cuff on lower extremity was inflated to 100mmHg in excess of patient's blood pressure.  At this time, the neuromuscular blockade was administered intravenously by anesthesia.  Upper extremity fasciculation were verified followed by lower extremity fasciculation.  Once fasciculations terminated, confirmation of affective neuromuscular blockade demonstrated by absence of withdrawal reflex.  Bite blocks were put in place.

## 2025-06-13 NOTE — PROGRESS NOTES
Pre ECT Assessment Note  Psychiatry  6/13/2025      Alistair Hutchison  1995  [id number]      Subjective:     Patient is a 30 y.o.  male seen for an evaluation prior to today's electroconvulsive therapy treatment. Today is treatment number 31, utilizing bilateral (18 BL and 12 RU treatments). This is course number 2.  The patient has previously completed a course of bilateral ECT for a total of 18 treatments that ended on 11/20/2024.     Last ECT treatment 6/6/2025    William was seen at the bedside prior to ECT treatments with his grandmother present.  He was friendly and pleasant.  He did report some anxiety in that he is worried about having an \"accident\" prior to and during ECT treatment.  Grandmother reports that patient has had some episodes of incontinence lately.  He has also been having some gastrointestinal upset, including occasional diarrhea and weight loss, and will be seeing a gastroenterologist in the near future.  Patient is denying any memory loss as a side effect to ECT treatment, however grandmother disagrees with this and states that patient has been showing forgetfulness.  Patient reports that his mood has been \"crazy\" and described it as being up-and-down lately.  He described arguing with grandpa as one of his stressors.  He is also reporting that auditory and visual hallucinations are intermittent.  Patient reports good sleep and appetite.  He is denying any suicidal or homicidal ideation.  He continues to report distress in regards to neighborhood bullying.  Grandma states that overall patient's mood is more stable.  Current plan is to continue treatments weekly.    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    Hyperthyroidism, subclinical 08/22/2024    Moderate mixed hyperlipidemia not requiring statin

## 2025-06-13 NOTE — H&P (VIEW-ONLY)
HISTORY and PHYSICAL  [facility]       NAME:  Alistair Hutchison  MRN: [id number]   YOB: 1995   Date: 6/13/2025   Age: 30 y.o.  Gender: male       COMPLAINT AND PRESENT HISTORY:     Alistair Hutchison is 30 y.o.,  male, presents ECT WITH ANESTHESIA for     Primary dx: Disorganized schizophrenia   HPI:  Alistair Hutchison is 30 y.o.,  male, here for ECT treatment.   Last ECT treatment was 6/6/25. Pt tolerated last treatment well. Patient reports that his  symptoms are  not improving.  Patient has complaints of some short term memory loss  Pt has had multiple failed treatment modalities. Pt has history of  Disorganized schizophrenia disorder.   Mood is rated at  10/10.  With 10 is the best  pt denies feeling of hopelessness, helplessness, low energy.   Patient's sleep has been  good . Appetite has been poor .   Pt denies feeling of suicidal/homicidal. Pt has no auditory/visual hallucinations.   Pt has been taking psychiatric medications as prescribed.   Pt does not have any other somatic complaints at this time.        Review of additional significant medical hx:  (See chart for additional detail, including current medications /see ROS for current S/S):     NPO status: pt NPO since the past midnight   Medications taken TODAY (with sip of water): none  Anticoagulation status: none    Denies personal hx of blood clots.  Denies personal hx of MRSA infection.  Denies any personal or family hx of previous complications w/anesthesia.      RECENT IMAGING R/T HPI   No results found.    RECENT LABS, IMAGING AND TESTING     Lab Results   Component Value Date    WBC 8.0 06/06/2025    RBC 4.96 06/06/2025    HGB 14.2 06/06/2025    HCT 41.4 06/06/2025    MCV 83.5 06/06/2025    MCH 28.6 06/06/2025    MCHC 34.3 06/06/2025    RDW 13.4 06/06/2025     06/06/2025    MPV 9.2 06/06/2025        Lab Results   Component Value Date     06/06/2025    K 4.4 06/06/2025

## 2025-06-13 NOTE — H&P (VIEW-ONLY)
Moved in the Last Year: 1     Homeless in the Last Year: No           REVIEW OF SYSTEMS      Allergies   Allergen Reactions    Chocolate Diarrhea       Current Outpatient Medications on File Prior to Encounter   Medication Sig Dispense Refill    Paliperidone (INVEGA PO) Take 1 tablet by mouth daily as needed 3 mg daily prn      paliperidone palmitate ER (INVEGA SUSTENNA) 234 MG/1.5ML FRANCHESKA IM injection Inject 234 mg into the muscle every 21 days      propranolol (INDERAL) 40 MG tablet Take 1 tablet by mouth every evening Takes at 3:00 pm      hyoscyamine (LEVBID) 0.375 MG extended release tablet Take 1 tablet by mouth 2 times daily      acetaminophen (TYLENOL) 325 MG tablet Take by mouth every 6 hours as needed for Pain      hydrOXYzine HCl (ATARAX) 50 MG tablet Take 1 tablet by mouth 2 times daily Pt takes at noon and 6 pm      cloZAPine (CLOZARIL) 100 MG tablet Take 1 tablet by mouth 3 times daily 90 tablet 0    traZODone (DESYREL) 50 MG tablet Take 1 tablet by mouth nightly as needed for Sleep 30 tablet 0     No current facility-administered medications on file prior to encounter.       Review of Systems   Constitutional: Negative.    HENT: Negative.     Respiratory: Negative.     Cardiovascular: Negative.    Gastrointestinal:  Positive for diarrhea and vomiting.   Genitourinary: Negative.    Musculoskeletal: Negative.    Skin: Negative.    Neurological: Negative.    Hematological: Negative.    Psychiatric/Behavioral: Negative.           GENERAL PHYSICAL EXAM     Vitals: BP (!) 135/94   Pulse 95   Temp 97.5 °F (36.4 °C) (Infrared)   Resp 16   Ht 1.651 m (5' 5\")   Wt 90.3 kg (199 lb)   SpO2 99%   BMI 33.12 kg/m²  Body mass index is 33.12 kg/m².     GENERAL APPEARANCE:   Alistair Hutchison is 30 y.o.,  male, mildly obese, nourished, conscious, alert.  Does not appear to be distress or pain at this time.                            Physical Exam  Constitutional:       Appearance: Normal appearance. He is

## 2025-06-13 NOTE — H&P (VIEW-ONLY)
HISTORY and PHYSICAL  [facility]       NAME:  Alistair Hutchison  MRN: [id number]   YOB: 1995   Date: 6/13/2025   Age: 30 y.o.  Gender: male       COMPLAINT AND PRESENT HISTORY:     Alistair Hutchison is 30 y.o.,  male, presents ECT WITH ANESTHESIA for     Primary dx: Disorganized schizophrenia   HPI:  Alistair Hutchison is 30 y.o.,  male, here for ECT treatment.   Last ECT treatment was 6/6/25. Pt tolerated last treatment well. Patient reports that his  symptoms are  not improving.  Patient has complaints of some short term memory loss  Pt has had multiple failed treatment modalities. Pt has history of  Disorganized schizophrenia disorder.   Mood is rated at  10/10.  With 10 is the best  pt denies feeling of hopelessness, helplessness, low energy.   Patient's sleep has been  good . Appetite has been poor .   Pt denies feeling of suicidal/homicidal. Pt has no auditory/visual hallucinations.   Pt has been taking psychiatric medications as prescribed.   Pt does not have any other somatic complaints at this time.        Review of additional significant medical hx:  (See chart for additional detail, including current medications /see ROS for current S/S):     NPO status: pt NPO since the past midnight   Medications taken TODAY (with sip of water): none  Anticoagulation status: none    Denies personal hx of blood clots.  Denies personal hx of MRSA infection.  Denies any personal or family hx of previous complications w/anesthesia.      RECENT IMAGING R/T HPI   No results found.    RECENT LABS, IMAGING AND TESTING     Lab Results   Component Value Date    WBC 8.0 06/06/2025    RBC 4.96 06/06/2025    HGB 14.2 06/06/2025    HCT 41.4 06/06/2025    MCV 83.5 06/06/2025    MCH 28.6 06/06/2025    MCHC 34.3 06/06/2025    RDW 13.4 06/06/2025     06/06/2025    MPV 9.2 06/06/2025        Lab Results   Component Value Date     06/06/2025    K 4.4 06/06/2025

## 2025-06-13 NOTE — H&P
HISTORY and PHYSICAL  [facility]       NAME:  Alistair Hutchison  MRN: [id number]   YOB: 1995   Date: 6/13/2025   Age: 30 y.o.  Gender: male       COMPLAINT AND PRESENT HISTORY:     Alistair Hutchison is 30 y.o.,  male, presents ECT WITH ANESTHESIA for     Primary dx: Disorganized schizophrenia   HPI:  Alistair Hutchison is 30 y.o.,  male, here for ECT treatment.   Last ECT treatment was 6/6/25. Pt tolerated last treatment well. Patient reports that his  symptoms are  not improving.  Patient has complaints of some short term memory loss  Pt has had multiple failed treatment modalities. Pt has history of  Disorganized schizophrenia disorder.   Mood is rated at  10/10.  With 10 is the best  pt denies feeling of hopelessness, helplessness, low energy.   Patient's sleep has been  good . Appetite has been poor .   Pt denies feeling of suicidal/homicidal. Pt has no auditory/visual hallucinations.   Pt has been taking psychiatric medications as prescribed.   Pt does not have any other somatic complaints at this time.        Review of additional significant medical hx:  (See chart for additional detail, including current medications /see ROS for current S/S):     NPO status: pt NPO since the past midnight   Medications taken TODAY (with sip of water): none  Anticoagulation status: none    Denies personal hx of blood clots.  Denies personal hx of MRSA infection.  Denies any personal or family hx of previous complications w/anesthesia.      RECENT IMAGING R/T HPI   No results found.    RECENT LABS, IMAGING AND TESTING     Lab Results   Component Value Date    WBC 8.0 06/06/2025    RBC 4.96 06/06/2025    HGB 14.2 06/06/2025    HCT 41.4 06/06/2025    MCV 83.5 06/06/2025    MCH 28.6 06/06/2025    MCHC 34.3 06/06/2025    RDW 13.4 06/06/2025     06/06/2025    MPV 9.2 06/06/2025        Lab Results   Component Value Date     06/06/2025    K 4.4 06/06/2025

## 2025-06-13 NOTE — DISCHARGE INSTRUCTIONS
ELECTROCONVULSIVE THERAPY DISCHARGE INSTRUCTIONS         1. Activity - Rest today. The anesthetic agents you have received can make you feel drowsy or tired.  A responsible person must drive you home and stay with you for 8 hours after discharge from the Hospital. Do not drive a motor vehicle or operate any machinery for 24 hours. .   *Do not make any complex decisions or execute any legal documents until 3 weeks AFTER your last treatment.  If you have any questions regarding this, speak with your psychiatrist first.*    2. Diet - Nothing to eat or drink after midnight the night of your ECT treatment. After ECT, start with clear liquids, if you can drink without coughing, you may proceed with gradually progressing to your usual diet.    No alcoholic beverages for 24 hours.    3. Medications - Take your daily medications as prescribed, after you return home from today's ECT. If you take a Beta Blocker or have been prescribed Imitrex, you may be instructed to take these medications the morning of ECT. If you are unsure please ask the physician.     Take with these medication the morning of ECT with one Tablespoon of water.   Tylenol 650 mg  All blood pressure medications  ***    4. You may experience the following after your treatment:   a. Poor memory   b. Poor balance   c. Headaches   d. Confusion   e. Muscle soreness   f. Nausea      5. Special Precautions - Contact you physician immediately if these occur:   a. Signs of infection: redness, swelling, heat, red streaks at the site of the IV   b. Excessive pain unrelieved by prescription pain medications   c. Nausea and vomiting that persists beyond the first day after your procedure    6. Next Procedure Date:   Your next ECT treatment is scheduled for 7:40 on 6-20, arrive at 6:20.        If you feel you are having a problem or complication from your ECT treatments and it is during normal business hours call Shandra New, ECT Coordinator at [phone]. If it

## 2025-06-13 NOTE — ANESTHESIA POSTPROCEDURE EVALUATION
Department of Anesthesiology  Postprocedure Note    Patient: Alistair Hutchison  MRN: [id number]  YOB: 1995  Date of evaluation: 6/13/2025    Procedure Summary       Date: 06/13/25 Room / Location: [facility] PACU    Anesthesia Start: 0751 Anesthesia Stop: 0807    Procedure: ECT W/ ANESTHESIA Diagnosis: Disorganized schizophrenia (HCC)    Scheduled Providers:  Responsible Provider: Zacarias Pandey MD    Anesthesia Type: general ASA Status: 3            Anesthesia Type: No value filed.    Amalia Phase I: Amalia Score: 10    Amalia Phase II:      Anesthesia Post Evaluation    Patient location during evaluation: PACU  Patient participation: complete - patient participated  Level of consciousness: awake and alert  Airway patency: patent  Nausea & Vomiting: no vomiting  Cardiovascular status: hemodynamically stable  Respiratory status: acceptable  Hydration status: euvolemic  Comments: POST- ANESTHESIA EVALUATION       Pt Name: Alistair Hutchison  MRN: [id number]  YOB: 1995  Date of evaluation: 6/13/2025  Time:  9:36 AM      /85   Pulse 96   Temp 98.1 °F (36.7 °C)   Resp 12   Ht 1.651 m (5' 5\")   Wt 90.3 kg (199 lb)   SpO2 95%   BMI 33.12 kg/m²      Consciousness Level  Awake  Cardiopulmonary Status  Stable  Pain Adequately Treated YES  Nausea / Vomiting  NO  Adequate Hydration  YES  Anesthesia Related Complications NONE      Electronically signed by Zacarias Pandey MD on 6/13/2025 at 9:36 AM         Pain management: satisfactory to patient    No notable events documented.

## 2025-06-19 ENCOUNTER — ANESTHESIA EVENT (OUTPATIENT)
Dept: POSTOP/PACU | Age: 30
End: 2025-06-19
Payer: COMMERCIAL

## 2025-06-20 ENCOUNTER — HOSPITAL ENCOUNTER (OUTPATIENT)
Dept: POSTOP/PACU | Age: 30
Discharge: HOME | End: 2025-06-20
Payer: COMMERCIAL

## 2025-06-20 ENCOUNTER — ANESTHESIA (OUTPATIENT)
Dept: POSTOP/PACU | Age: 30
End: 2025-06-20
Payer: COMMERCIAL

## 2025-06-20 VITALS
TEMPERATURE: 99.1 F | DIASTOLIC BLOOD PRESSURE: 80 MMHG | OXYGEN SATURATION: 96 % | RESPIRATION RATE: 18 BRPM | SYSTOLIC BLOOD PRESSURE: 112 MMHG | HEART RATE: 94 BPM

## 2025-06-20 DIAGNOSIS — F20.1 DISORGANIZED SCHIZOPHRENIA (HCC): Primary | ICD-10-CM

## 2025-06-20 PROCEDURE — 90870 ELECTROCONVULSIVE THERAPY: CPT | Performed by: PSYCHIATRY & NEUROLOGY

## 2025-06-20 PROCEDURE — 3700000000 HC ANESTHESIA ATTENDED CARE: Performed by: ANESTHESIOLOGY

## 2025-06-20 PROCEDURE — 2500000003 HC RX 250 WO HCPCS: Performed by: NURSE ANESTHETIST, CERTIFIED REGISTERED

## 2025-06-20 PROCEDURE — 6360000002 HC RX W HCPCS: Performed by: ANESTHESIOLOGY

## 2025-06-20 PROCEDURE — 90870 ELECTROCONVULSIVE THERAPY: CPT

## 2025-06-20 PROCEDURE — 2580000003 HC RX 258: Performed by: ANESTHESIOLOGY

## 2025-06-20 PROCEDURE — 6360000002 HC RX W HCPCS: Performed by: NURSE ANESTHETIST, CERTIFIED REGISTERED

## 2025-06-20 PROCEDURE — 7100000001 HC PACU RECOVERY - ADDTL 15 MIN: Performed by: ANESTHESIOLOGY

## 2025-06-20 PROCEDURE — 3700000001 HC ADD 15 MINUTES (ANESTHESIA): Performed by: ANESTHESIOLOGY

## 2025-06-20 PROCEDURE — 7100000000 HC PACU RECOVERY - FIRST 15 MIN: Performed by: ANESTHESIOLOGY

## 2025-06-20 RX ORDER — ONDANSETRON 2 MG/ML
INJECTION INTRAMUSCULAR; INTRAVENOUS
Status: COMPLETED
Start: 2025-06-20 | End: 2025-06-20

## 2025-06-20 RX ORDER — SUCCINYLCHOLINE/SOD CL,ISO/PF 200MG/10ML
SYRINGE (ML) INTRAVENOUS
Status: DISCONTINUED | OUTPATIENT
Start: 2025-06-20 | End: 2025-06-20 | Stop reason: SDUPTHER

## 2025-06-20 RX ORDER — METHOHEXITAL IN WATER/PF 100MG/10ML
SYRINGE (ML) INTRAVENOUS
Status: DISCONTINUED | OUTPATIENT
Start: 2025-06-20 | End: 2025-06-20 | Stop reason: SDUPTHER

## 2025-06-20 RX ORDER — METHOHEXITAL IN WATER/PF 100MG/10ML
SYRINGE (ML) INTRAVENOUS
Status: COMPLETED
Start: 2025-06-20 | End: 2025-06-20

## 2025-06-20 RX ORDER — KETOROLAC TROMETHAMINE 30 MG/ML
INJECTION, SOLUTION INTRAMUSCULAR; INTRAVENOUS
Status: DISCONTINUED | OUTPATIENT
Start: 2025-06-20 | End: 2025-06-20 | Stop reason: SDUPTHER

## 2025-06-20 RX ORDER — SODIUM CHLORIDE 9 MG/ML
INJECTION, SOLUTION INTRAVENOUS PRN
Status: DISCONTINUED | OUTPATIENT
Start: 2025-06-20 | End: 2025-06-21 | Stop reason: HOSPADM

## 2025-06-20 RX ORDER — KETOROLAC TROMETHAMINE 30 MG/ML
INJECTION, SOLUTION INTRAMUSCULAR; INTRAVENOUS
Status: COMPLETED
Start: 2025-06-20 | End: 2025-06-20

## 2025-06-20 RX ORDER — SODIUM CHLORIDE, SODIUM LACTATE, POTASSIUM CHLORIDE, CALCIUM CHLORIDE 600; 310; 30; 20 MG/100ML; MG/100ML; MG/100ML; MG/100ML
INJECTION, SOLUTION INTRAVENOUS CONTINUOUS
Status: DISCONTINUED | OUTPATIENT
Start: 2025-06-20 | End: 2025-06-21 | Stop reason: HOSPADM

## 2025-06-20 RX ORDER — SODIUM CHLORIDE 0.9 % (FLUSH) 0.9 %
5-40 SYRINGE (ML) INJECTION PRN
Status: DISCONTINUED | OUTPATIENT
Start: 2025-06-20 | End: 2025-06-21 | Stop reason: HOSPADM

## 2025-06-20 RX ORDER — SUCCINYLCHOLINE/SOD CL,ISO/PF 200MG/10ML
SYRINGE (ML) INTRAVENOUS
Status: COMPLETED
Start: 2025-06-20 | End: 2025-06-20

## 2025-06-20 RX ORDER — SODIUM CHLORIDE 0.9 % (FLUSH) 0.9 %
5-40 SYRINGE (ML) INJECTION EVERY 12 HOURS SCHEDULED
Status: DISCONTINUED | OUTPATIENT
Start: 2025-06-20 | End: 2025-06-21 | Stop reason: HOSPADM

## 2025-06-20 RX ORDER — LIDOCAINE HYDROCHLORIDE 10 MG/ML
1 INJECTION, SOLUTION EPIDURAL; INFILTRATION; INTRACAUDAL; PERINEURAL
Status: COMPLETED | OUTPATIENT
Start: 2025-06-20 | End: 2025-06-20

## 2025-06-20 RX ORDER — ONDANSETRON 2 MG/ML
INJECTION INTRAMUSCULAR; INTRAVENOUS
Status: DISCONTINUED | OUTPATIENT
Start: 2025-06-20 | End: 2025-06-20 | Stop reason: SDUPTHER

## 2025-06-20 RX ADMIN — Medication 120 MG: at 07:48

## 2025-06-20 RX ADMIN — LIDOCAINE HYDROCHLORIDE 1 ML: 10 INJECTION, SOLUTION EPIDURAL; INFILTRATION; INTRACAUDAL; PERINEURAL at 06:57

## 2025-06-20 RX ADMIN — Medication 100 MG: at 07:48

## 2025-06-20 RX ADMIN — KETOROLAC TROMETHAMINE 30 MG: 30 INJECTION, SOLUTION INTRAMUSCULAR at 07:48

## 2025-06-20 RX ADMIN — SODIUM CHLORIDE, POTASSIUM CHLORIDE, SODIUM LACTATE AND CALCIUM CHLORIDE: 600; 310; 30; 20 INJECTION, SOLUTION INTRAVENOUS at 07:46

## 2025-06-20 RX ADMIN — ONDANSETRON 4 MG: 2 INJECTION INTRAMUSCULAR; INTRAVENOUS at 07:48

## 2025-06-20 ASSESSMENT — PAIN - FUNCTIONAL ASSESSMENT
PAIN_FUNCTIONAL_ASSESSMENT: 0-10
PAIN_FUNCTIONAL_ASSESSMENT: NONE - DENIES PAIN

## 2025-06-20 NOTE — ANESTHESIA PRE PROCEDURE
Department of Anesthesiology  Preprocedure Note       Name:  Alistair Hutchison   Age:  30 y.o.  :  1995                                          MRN:  [id number]         Date:  2025      Surgeon: Dr Drake    Procedure: ECT    Medications prior to admission:   Prior to Admission medications    Medication Sig Start Date End Date Taking? Authorizing Provider   Paliperidone (INVEGA PO) Take 1 tablet by mouth daily as needed 3 mg daily prn   Yes Provider, MD Shan   paliperidone palmitate ER (INVEGA SUSTENNA) 234 MG/1.5ML FRANCHESKA IM injection Inject 234 mg into the muscle every 21 days   Yes Provider, MD Shan   propranolol (INDERAL) 40 MG tablet Take 1 tablet by mouth 2 times daily Takes at 9am and 3:00 pm   Yes ProviderShan MD   hyoscyamine (LEVBID) 0.375 MG extended release tablet Take 1 tablet by mouth 2 times daily   Yes ProviderShan MD   acetaminophen (TYLENOL) 325 MG tablet Take by mouth every 6 hours as needed for Pain   Yes Provider, MD Shan   hydrOXYzine HCl (ATARAX) 50 MG tablet Take 1 tablet by mouth 2 times daily Pt takes at noon and 6 pm   Yes Provider, MD Shan   cloZAPine (CLOZARIL) 100 MG tablet Take 1 tablet by mouth 3 times daily 24  Yes Corby Drake MD   traZODone (DESYREL) 50 MG tablet Take 1 tablet by mouth nightly as needed for Sleep 10/2/24  Yes Corby Drake MD       Current medications:    Current Outpatient Medications   Medication Sig Dispense Refill    Paliperidone (INVEGA PO) Take 1 tablet by mouth daily as needed 3 mg daily prn      paliperidone palmitate ER (INVEGA SUSTENNA) 234 MG/1.5ML FRANCHESKA IM injection Inject 234 mg into the muscle every 21 days      propranolol (INDERAL) 40 MG tablet Take 1 tablet by mouth 2 times daily Takes at 9am and 3:00 pm      hyoscyamine (LEVBID) 0.375 MG extended release tablet Take 1 tablet by mouth 2 times daily      acetaminophen (TYLENOL) 325 MG tablet Take by mouth every 6 hours as

## 2025-06-20 NOTE — PROGRESS NOTES
Pre ECT Assessment Note  Psychiatry  06/20/25       Alistair Hutchison  1995  [id number]      Subjective:     Patient is a 30 y.o.  male seen for an evaluation prior to today's electroconvulsive therapy treatment. Today is treatment number 32, utilizing bilateral.  He received 12 RU treatments during this course prior to transitioning to bilateral. This is course number 2.  The patient has previously completed a course of bilateral ECT for a total of 18 treatments that ended on 11/20/2024.     Last ECT treatment 6/13/2025.    William was seen at the bedside prior to ECT treatments with his grandmother present.  He is friendly and outgoing today.  Reports continued distress regarding children in his neighborhood bullying him.  He does not know how to address this, as he expresses they approach him and that he does not want to stay confined in his house and enjoys being able to go for walks.  However, this always leads to emotional distress.  His grandmother reports that he approaches them and that they have been working on trying to redirect him from engaging with these neighborhood children.  They also report that William has again been having incontinence of stool only while playing video games or watching [employer]illa [employer].  He is able to toilet appropriately when he is at [employer], playing sports, or otherwise not distracted by electronic devices.  His grandmother notes that Sean still engages in self talk to himself in the third person.  He does appear to be responding, but ECT treatments have helped him to maintain better control and he is less agitated.  They do not feel that he has been experiencing any significant cognitive side effects.  Plan to continue ECT once weekly on a maintenance basis.    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    Disorganized schizophrenia (HCC)

## 2025-06-20 NOTE — ANESTHESIA POSTPROCEDURE EVALUATION
Department of Anesthesiology  Postprocedure Note    Patient: Alistair Hutchison  MRN: [id number]  YOB: 1995  Date of evaluation: 6/20/2025    Procedure Summary       Date: 06/20/25 Room / Location: [facility] PACU    Anesthesia Start: 0747 Anesthesia Stop: 0802    Procedure: ECT W/ ANESTHESIA Diagnosis: Disorganized schizophrenia (HCC)    Scheduled Providers:  Responsible Provider: Jewel Ji MD    Anesthesia Type: general ASA Status: 3            Anesthesia Type: No value filed.    Amalia Phase I: Amalia Score: 9    Amalia Phase II:      Anesthesia Post Evaluation    Comments: POST- ANESTHESIA EVALUATION       Pt Name: Alistair Hutchison  MRN: [id number]  YOB: 1995  Date of evaluation: 6/20/2025  Time:  2:02 PM      /80   Pulse 94   Temp 99.1 °F (37.3 °C) (Infrared)   Resp 18   SpO2 96%      Consciousness Level  Awake  Cardiopulmonary Status  Stable  Pain Adequately Treated YES  Nausea / Vomiting  NO  Adequate Hydration  YES  Anesthesia Related Complications NONE      Electronically signed by Jewel Ji MD on 6/20/2025 at 2:02 PM           No notable events documented.

## 2025-06-20 NOTE — INTERVAL H&P NOTE
Update History & Physical    The patient's History and Physical of June 13, 2025 was reviewed with the patient and I examined the patient. Any changes are as documented below.      Alistair Hutchison is 30 y.o., male, here for ECT treatment. Last ECT treatment was 06/13/25. Pt tolerated last treatment well and has voiced improvement. Sleep and appetite are fair. Denies suicidal or homicidal ideation. Denies hallucinations. Pt has been taking all medications as prescribed. Pt has no interval changes since last treatment visit.     Presents today with Jacqueline, guardian. Pt AAO x 3 in NAD. HRRR. No adventitious lung sounds. No respiratory distress. NPO p MN. Denies recent or current chest pain/pressure, palpitations, SOB, recent URI, fever or chills. Review vitals per RN flowsheet.         Electronically signed by GIORGI Shah CNP on 6/20/2025 at 6:24 AM

## 2025-06-26 ENCOUNTER — ANESTHESIA EVENT (OUTPATIENT)
Dept: POSTOP/PACU | Age: 30
End: 2025-06-26
Payer: COMMERCIAL

## 2025-06-27 ENCOUNTER — ANESTHESIA (OUTPATIENT)
Dept: POSTOP/PACU | Age: 30
End: 2025-06-27
Payer: COMMERCIAL

## 2025-06-27 ENCOUNTER — HOSPITAL ENCOUNTER (OUTPATIENT)
Dept: POSTOP/PACU | Age: 30
Discharge: HOME | End: 2025-06-27
Payer: COMMERCIAL

## 2025-06-27 VITALS
OXYGEN SATURATION: 97 % | HEART RATE: 87 BPM | DIASTOLIC BLOOD PRESSURE: 86 MMHG | TEMPERATURE: 98.9 F | BODY MASS INDEX: 33.17 KG/M2 | HEIGHT: 65 IN | WEIGHT: 199.08 LBS | RESPIRATION RATE: 17 BRPM | SYSTOLIC BLOOD PRESSURE: 129 MMHG

## 2025-06-27 DIAGNOSIS — F20.1 DISORGANIZED SCHIZOPHRENIA (HCC): Primary | ICD-10-CM

## 2025-06-27 PROCEDURE — 3700000001 HC ADD 15 MINUTES (ANESTHESIA): Performed by: ANESTHESIOLOGY

## 2025-06-27 PROCEDURE — 90870 ELECTROCONVULSIVE THERAPY: CPT | Performed by: PSYCHIATRY & NEUROLOGY

## 2025-06-27 PROCEDURE — 2580000003 HC RX 258: Performed by: ANESTHESIOLOGY

## 2025-06-27 PROCEDURE — 6360000002 HC RX W HCPCS: Performed by: NURSE ANESTHETIST, CERTIFIED REGISTERED

## 2025-06-27 PROCEDURE — 7100000000 HC PACU RECOVERY - FIRST 15 MIN: Performed by: ANESTHESIOLOGY

## 2025-06-27 PROCEDURE — APPNB15 APP NON BILLABLE TIME 0-15 MINS: Performed by: NURSE PRACTITIONER

## 2025-06-27 PROCEDURE — 90870 ELECTROCONVULSIVE THERAPY: CPT | Performed by: ANESTHESIOLOGY

## 2025-06-27 PROCEDURE — 2500000003 HC RX 250 WO HCPCS: Performed by: NURSE ANESTHETIST, CERTIFIED REGISTERED

## 2025-06-27 PROCEDURE — 7100000001 HC PACU RECOVERY - ADDTL 15 MIN: Performed by: ANESTHESIOLOGY

## 2025-06-27 PROCEDURE — 3700000000 HC ANESTHESIA ATTENDED CARE: Performed by: ANESTHESIOLOGY

## 2025-06-27 RX ORDER — KETOROLAC TROMETHAMINE 30 MG/ML
INJECTION, SOLUTION INTRAMUSCULAR; INTRAVENOUS
Status: DISCONTINUED | OUTPATIENT
Start: 2025-06-27 | End: 2025-06-27 | Stop reason: SDUPTHER

## 2025-06-27 RX ORDER — METHOHEXITAL IN WATER/PF 100MG/10ML
SYRINGE (ML) INTRAVENOUS
Status: COMPLETED
Start: 2025-06-27 | End: 2025-06-27

## 2025-06-27 RX ORDER — SODIUM CHLORIDE 0.9 % (FLUSH) 0.9 %
5-40 SYRINGE (ML) INJECTION PRN
Status: DISCONTINUED | OUTPATIENT
Start: 2025-06-27 | End: 2025-06-28 | Stop reason: HOSPADM

## 2025-06-27 RX ORDER — SUCCINYLCHOLINE/SOD CL,ISO/PF 200MG/10ML
SYRINGE (ML) INTRAVENOUS
Status: COMPLETED
Start: 2025-06-27 | End: 2025-06-27

## 2025-06-27 RX ORDER — SODIUM CHLORIDE 0.9 % (FLUSH) 0.9 %
5-40 SYRINGE (ML) INJECTION EVERY 12 HOURS SCHEDULED
Status: DISCONTINUED | OUTPATIENT
Start: 2025-06-27 | End: 2025-06-28 | Stop reason: HOSPADM

## 2025-06-27 RX ORDER — ONDANSETRON 2 MG/ML
INJECTION INTRAMUSCULAR; INTRAVENOUS
Status: DISCONTINUED | OUTPATIENT
Start: 2025-06-27 | End: 2025-06-27 | Stop reason: SDUPTHER

## 2025-06-27 RX ORDER — METHOHEXITAL IN WATER/PF 100MG/10ML
SYRINGE (ML) INTRAVENOUS
Status: DISCONTINUED | OUTPATIENT
Start: 2025-06-27 | End: 2025-06-27 | Stop reason: SDUPTHER

## 2025-06-27 RX ORDER — KETOROLAC TROMETHAMINE 30 MG/ML
INJECTION, SOLUTION INTRAMUSCULAR; INTRAVENOUS
Status: COMPLETED
Start: 2025-06-27 | End: 2025-06-27

## 2025-06-27 RX ORDER — SODIUM CHLORIDE, SODIUM LACTATE, POTASSIUM CHLORIDE, CALCIUM CHLORIDE 600; 310; 30; 20 MG/100ML; MG/100ML; MG/100ML; MG/100ML
INJECTION, SOLUTION INTRAVENOUS CONTINUOUS
Status: DISCONTINUED | OUTPATIENT
Start: 2025-06-27 | End: 2025-06-28 | Stop reason: HOSPADM

## 2025-06-27 RX ORDER — SODIUM CHLORIDE 9 MG/ML
INJECTION, SOLUTION INTRAVENOUS PRN
Status: DISCONTINUED | OUTPATIENT
Start: 2025-06-27 | End: 2025-06-28 | Stop reason: HOSPADM

## 2025-06-27 RX ORDER — LIDOCAINE HYDROCHLORIDE 10 MG/ML
1 INJECTION, SOLUTION EPIDURAL; INFILTRATION; INTRACAUDAL; PERINEURAL
Status: DISCONTINUED | OUTPATIENT
Start: 2025-06-27 | End: 2025-06-28 | Stop reason: HOSPADM

## 2025-06-27 RX ORDER — SUCCINYLCHOLINE/SOD CL,ISO/PF 200MG/10ML
SYRINGE (ML) INTRAVENOUS
Status: DISCONTINUED | OUTPATIENT
Start: 2025-06-27 | End: 2025-06-27 | Stop reason: SDUPTHER

## 2025-06-27 RX ORDER — ONDANSETRON 2 MG/ML
INJECTION INTRAMUSCULAR; INTRAVENOUS
Status: COMPLETED
Start: 2025-06-27 | End: 2025-06-27

## 2025-06-27 RX ADMIN — Medication 100 MG: at 09:16

## 2025-06-27 RX ADMIN — Medication 120 MG: at 09:16

## 2025-06-27 RX ADMIN — KETOROLAC TROMETHAMINE 30 MG: 30 INJECTION, SOLUTION INTRAMUSCULAR at 09:10

## 2025-06-27 RX ADMIN — ONDANSETRON 4 MG: 2 INJECTION INTRAMUSCULAR; INTRAVENOUS at 09:10

## 2025-06-27 RX ADMIN — SODIUM CHLORIDE, POTASSIUM CHLORIDE, SODIUM LACTATE AND CALCIUM CHLORIDE: 600; 310; 30; 20 INJECTION, SOLUTION INTRAVENOUS at 08:52

## 2025-06-27 ASSESSMENT — PAIN - FUNCTIONAL ASSESSMENT: PAIN_FUNCTIONAL_ASSESSMENT: 0-10

## 2025-06-27 NOTE — PROGRESS NOTES
Pre ECT Assessment Note  Psychiatry  06/27/25       Alistair Hutchison  1995  [id number]      Subjective:     Patient is a 30 y.o.  male seen for an evaluation prior to today's electroconvulsive therapy treatment. Today is treatment number 33, utilizing bilateral.  He received 12 RU treatments during this course prior to transitioning to bilateral. This is course number 2.  The patient has previously completed a course of bilateral ECT for a total of 18 treatments that ended on 11/20/2024.     Last ECT treatment 6/20/2025.    William was seen at the bedside prior to ECT treatments with his grandmother present.  He was pleasant and cooperative today.  Patient stated that his mood is good but he admits to frequent ongoing accidents of loose stools due to electronics distraction.  He is denying that he is feeling any significant depression or anxiety.  He was not distressed about neighborhood bullies today.  Patient feels that treatment has been helpful.  He denied that he was feeling depressed or anxious.  He does occasionally appear to be responding to internal stimuli with self talk.  He has made no suicidal or homicidal statements.  He expressed that he feels happy typically.  Grandma does not feel that patient has experienced any cognitive side effects.  They would like to continue once weekly treatments at this time.    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    Hyperthyroidism, subclinical 08/22/2024    Moderate mixed hyperlipidemia not requiring statin therapy 08/22/2024    Class 2 obesity without serious comorbidity with body mass index (BMI) of 38.0 to 38.9 in adult 08/22/2024    Hypovitaminosis D 10/18/2019    Rib pain on left side 10/18/2019     Past Medical History:   Diagnosis Date    Fracture     leg    Manic

## 2025-06-27 NOTE — ANESTHESIA POSTPROCEDURE EVALUATION
Department of Anesthesiology  Postprocedure Note    Patient: Alistair Hutchison  MRN: [id number]  YOB: 1995  Date of evaluation: 6/27/2025    Procedure Summary       Date: 06/27/25 Room / Location: [facility] PACU    Anesthesia Start: 0906 Anesthesia Stop: 0925    Procedure: ECT W/ ANESTHESIA Diagnosis: Disorganized schizophrenia (HCC)    Scheduled Providers:  Responsible Provider: Barby Leonard MD    Anesthesia Type: general ASA Status: 3            Anesthesia Type: No value filed.    Amalia Phase I: Amalia Score: 10    Amalia Phase II:      Anesthesia Post Evaluation    Comments: POST- ANESTHESIA EVALUATION       Pt Name: Alistair Hutchison  MRN: [id number]  YOB: 1995  Date of evaluation: 6/27/2025  Time:  10:33 AM      /86   Pulse 87   Temp 98.9 °F (37.2 °C)   Resp 17   Ht 1.651 m (5' 5\")   Wt 90.3 kg (199 lb 1.2 oz)   SpO2 97%   BMI 33.13 kg/m²      Consciousness Level  Awake  Cardiopulmonary Status  Stable  Pain Adequately Treated YES  Nausea / Vomiting  NO  Adequate Hydration  YES  Anesthesia Related Complications NONE      Electronically signed by Barby Leonard MD on 6/27/2025 at 10:33 AM           No notable events documented.

## 2025-06-27 NOTE — ANESTHESIA PRE PROCEDURE
\"POCHCT\" in the last 72 hours.      Coags: No results found for: \"PROTIME\", \"INR\", \"APTT\"    HCG (If Applicable): No results found for: \"PREGTESTUR\", \"PREGSERUM\", \"HCG\", \"HCGQUANT\"     ABGs: No results found for: \"PHART\", \"PO2ART\", \"CHV9AGZ\", \"EPH2BZS\", \"BEART\", \"T0UADHAJ\"     Type & Screen (If Applicable):  No results found for: \"ABORH\", \"LABANTI\"    Drug/Infectious Status (If Applicable):  Lab Results   Component Value Date/Time    HEPCAB NONREACTIVE 03/17/2023 08:51 AM       COVID-19 Screening (If Applicable): No results found for: \"COVID19\"        Anesthesia Evaluation  Patient summary reviewed and Nursing notes reviewed   history of anesthetic complications: PONV.  Airway: Mallampati: III  TM distance: >3 FB   Neck ROM: full  Mouth opening: < 3 FB   Dental: normal exam         Pulmonary:Negative Pulmonary ROS and normal exam  breath sounds clear to auscultation                             Cardiovascular:Negative CV ROS            Rhythm: regular  Rate: normal           Beta Blocker:  Dose within 24 Hrs         Neuro/Psych:   (+) psychiatric history:depression/anxiety             GI/Hepatic/Renal:   (+) morbid obesity          Endo/Other:                     Abdominal:             Vascular: negative vascular ROS.         Other Findings:           Anesthesia Plan      general     ASA 3     (TIVA  Consent signed by grandmother - legal guardian  Toradol and Zofran intraop)  Induction: intravenous.    MIPS: Prophylactic antiemetics administered.  Anesthetic plan and risks discussed with patient and legal guardian.      Plan discussed with CRNA.                  Barby Leonard MD   6/27/2025

## 2025-06-27 NOTE — PROCEDURES
Bilateral ECT Procedure Report  Alistair Hutchison   6/27/2025  1995         Attending:Corby Drake MD  Preprocedure diagnosis: disorganized schizophrenia  Postprocedure diagnosis: SAME AS PRE-PROCEDURE DIAGNOSIS  Treatment Number: This is treatment #33    Patient Status: Outpatient   Type of ECT: Bilateral Brief Pulse  Medications  Preprocedure: Tylenol 650mg and none  Anesthetic: Methohexital 100 mg  Paralytic: Succinylcholine 120 mg  Post procedure: none needed   Cuff placement: Left Lower Extremity    MECTA Settings 1st Stimulus:     Pulse width: 1.0 ms   Frequency:  60 hz   Duration:   3.0 seconds   Static Impedance: 496 ohms             Dynamic Impedance: 203 ohms             Motor Seizure Duration: 27 seconds  EEG Seizure Duration: 27 seconds             The patient's ECT treatment was performed using MECTA machine  .  Timeout:  Time out was performed using two patient identifiers and confirmation of procedure.     Patient Preparation: Preprocedure documentation, labs, H&P were all verified and reviewed.  The patient was placed in supine position.  EEG leads were placed for monitoring of seizure.   Buddhist areas bilaterally cleaned and prepped.  Conductive gel  was applied over stimulus electrode site.   The patient was pre-oxygenated.       Procedure in detail: Medications were administered by anesthesia using intravenous access at the doses listed previously in this summary.  Anesthetic administered and once confirmation the patient is anesthetized, the patient's blood pressure cuff on lower extremity was inflated to 100mmHg in excess of patient's blood pressure.  At this time, the neuromuscular blockade was administered intravenously by anesthesia.  Upper extremity fasciculation were verified followed by lower extremity fasciculation.  Once fasciculations terminated, confirmation of affective neuromuscular blockade demonstrated by absence of withdrawal reflex.  Bite blocks were put in place.

## 2025-07-02 ENCOUNTER — ANESTHESIA (OUTPATIENT)
Dept: POSTOP/PACU | Age: 30
End: 2025-07-02
Payer: COMMERCIAL

## 2025-07-02 ENCOUNTER — ANESTHESIA EVENT (OUTPATIENT)
Dept: POSTOP/PACU | Age: 30
End: 2025-07-02
Payer: COMMERCIAL

## 2025-07-02 ENCOUNTER — HOSPITAL ENCOUNTER (OUTPATIENT)
Dept: POSTOP/PACU | Age: 30
Discharge: HOME | End: 2025-07-02
Payer: COMMERCIAL

## 2025-07-02 VITALS
RESPIRATION RATE: 19 BRPM | BODY MASS INDEX: 33.15 KG/M2 | DIASTOLIC BLOOD PRESSURE: 77 MMHG | HEART RATE: 90 BPM | OXYGEN SATURATION: 98 % | SYSTOLIC BLOOD PRESSURE: 129 MMHG | WEIGHT: 199 LBS | TEMPERATURE: 97.8 F | HEIGHT: 65 IN

## 2025-07-02 DIAGNOSIS — F20.1 DISORGANIZED SCHIZOPHRENIA (HCC): Primary | ICD-10-CM

## 2025-07-02 PROCEDURE — 3700000000 HC ANESTHESIA ATTENDED CARE: Performed by: ANESTHESIOLOGY

## 2025-07-02 PROCEDURE — 7100000000 HC PACU RECOVERY - FIRST 15 MIN: Performed by: ANESTHESIOLOGY

## 2025-07-02 PROCEDURE — 2500000003 HC RX 250 WO HCPCS: Performed by: NURSE ANESTHETIST, CERTIFIED REGISTERED

## 2025-07-02 PROCEDURE — 90870 ELECTROCONVULSIVE THERAPY: CPT | Performed by: PSYCHIATRY & NEUROLOGY

## 2025-07-02 PROCEDURE — 3700000001 HC ADD 15 MINUTES (ANESTHESIA): Performed by: ANESTHESIOLOGY

## 2025-07-02 PROCEDURE — 7100000001 HC PACU RECOVERY - ADDTL 15 MIN: Performed by: ANESTHESIOLOGY

## 2025-07-02 PROCEDURE — 90870 ELECTROCONVULSIVE THERAPY: CPT | Performed by: ANESTHESIOLOGY

## 2025-07-02 PROCEDURE — 6360000002 HC RX W HCPCS: Performed by: NURSE ANESTHETIST, CERTIFIED REGISTERED

## 2025-07-02 RX ORDER — METHOHEXITAL IN WATER/PF 100MG/10ML
SYRINGE (ML) INTRAVENOUS
Status: COMPLETED
Start: 2025-07-02 | End: 2025-07-02

## 2025-07-02 RX ORDER — ONDANSETRON 2 MG/ML
INJECTION INTRAMUSCULAR; INTRAVENOUS
Status: COMPLETED
Start: 2025-07-02 | End: 2025-07-02

## 2025-07-02 RX ORDER — KETOROLAC TROMETHAMINE 30 MG/ML
INJECTION, SOLUTION INTRAMUSCULAR; INTRAVENOUS
Status: DISCONTINUED | OUTPATIENT
Start: 2025-07-02 | End: 2025-07-02 | Stop reason: SDUPTHER

## 2025-07-02 RX ORDER — SUCCINYLCHOLINE/SOD CL,ISO/PF 200MG/10ML
SYRINGE (ML) INTRAVENOUS
Status: DISCONTINUED | OUTPATIENT
Start: 2025-07-02 | End: 2025-07-02 | Stop reason: SDUPTHER

## 2025-07-02 RX ORDER — ONDANSETRON 2 MG/ML
INJECTION INTRAMUSCULAR; INTRAVENOUS
Status: DISCONTINUED | OUTPATIENT
Start: 2025-07-02 | End: 2025-07-02 | Stop reason: SDUPTHER

## 2025-07-02 RX ORDER — SUCCINYLCHOLINE/SOD CL,ISO/PF 200MG/10ML
SYRINGE (ML) INTRAVENOUS
Status: COMPLETED
Start: 2025-07-02 | End: 2025-07-02

## 2025-07-02 RX ORDER — METHOHEXITAL IN WATER/PF 100MG/10ML
SYRINGE (ML) INTRAVENOUS
Status: DISCONTINUED | OUTPATIENT
Start: 2025-07-02 | End: 2025-07-02 | Stop reason: SDUPTHER

## 2025-07-02 RX ORDER — KETOROLAC TROMETHAMINE 30 MG/ML
INJECTION, SOLUTION INTRAMUSCULAR; INTRAVENOUS
Status: COMPLETED
Start: 2025-07-02 | End: 2025-07-02

## 2025-07-02 RX ADMIN — Medication 100 MG: at 08:11

## 2025-07-02 RX ADMIN — Medication 120 MG: at 08:11

## 2025-07-02 RX ADMIN — ONDANSETRON 4 MG: 2 INJECTION INTRAMUSCULAR; INTRAVENOUS at 08:11

## 2025-07-02 RX ADMIN — KETOROLAC TROMETHAMINE 30 MG: 30 INJECTION, SOLUTION INTRAMUSCULAR at 08:11

## 2025-07-02 ASSESSMENT — PAIN SCALES - GENERAL: PAINLEVEL_OUTOF10: 0

## 2025-07-02 ASSESSMENT — PAIN - FUNCTIONAL ASSESSMENT: PAIN_FUNCTIONAL_ASSESSMENT: 0-10

## 2025-07-02 NOTE — PROGRESS NOTES
depression (HCC)     Oppositional defiant behavior     PONV (postoperative nausea and vomiting)     Schizophrenia (HCC)       Past Surgical History:   Procedure Laterality Date    EYE SURGERY      OTHER SURGICAL HISTORY      ECT    TYMPANOSTOMY TUBE PLACEMENT        Not in a hospital admission.  Allergies   Allergen Reactions    Chocolate Diarrhea      Social History     Tobacco Use    Smoking status: Never    Smokeless tobacco: Never   Substance Use Topics    Alcohol use: No      History reviewed. No pertinent family history.       Objective:       Mental Status Evaluation:  Appearance:  Wearing gown, on stretcher, fair grooming   Behavior:  Engages with interviewer, smiles, childlike    Speech:  Normal rate, volume and happy tone. Talkative   Mood:  \"Good\"   Affect:  Exaggerated   Thought Process:  Loose   Thought Content:  Denies suicidal or homicidal ideation   Sensorium:  Does not appear to attend to internal stimuli   Cognition:  Oriented to person, place and general circumstance   Insight:  Fair   Judgment:  Fair     Assessment:     Diagnosis: Disorganized schizophrenia     Plan:     Continue frequency of treatment once weekly.    Monitor for stability in symptoms  Taper treatment frequency as tolerated.     Update consent and H&P every 30 days while undergoing ECT treatment, update labs every 6 months.   Patient verbalizes understanding of risks/benefits/alternatives to ECT.  Last informed consent signed by legal guardian on 7/2/2025.

## 2025-07-02 NOTE — PROCEDURES
Bilateral ECT Procedure Report  Alistair Hutchison   7/2/2025  1995         Attending:Dalton Dumont MD  Preprocedure diagnosis: disorganized schizophrenia  Postprocedure diagnosis: SAME AS PRE-PROCEDURE DIAGNOSIS  Treatment Number: This is treatment #34  Patient Status: Outpatient   Type of ECT: Bilateral Brief Pulse  Medications  Preprocedure: Tylenol 650mg and none  Anesthetic: Methohexital 100 mg  Paralytic: Succinylcholine 120 mg  Post procedure: none needed   Cuff placement: Left Lower Extremity    MECTA Settings 1st Stimulus:     Pulse width: 1.0 ms   Frequency:  60 hz   Duration:   3.0 seconds   Static Impedance: 409 ohms             Dynamic Impedance: 204 ohms             Motor Seizure Duration: 32 seconds  EEG Seizure Duration: 32 seconds             The patient's ECT treatment was performed using MECTA machine  .  Timeout:  Time out was performed using two patient identifiers and confirmation of procedure.     Patient Preparation: Preprocedure documentation, labs, H&P were all verified and reviewed.  The patient was placed in supine position.  EEG leads were placed for monitoring of seizure.   Congregation areas bilaterally cleaned and prepped.  Conductive gel  was applied over stimulus electrode site.   The patient was pre-oxygenated.       Procedure in detail: Medications were administered by anesthesia using intravenous access at the doses listed previously in this summary.  Anesthetic administered and once confirmation the patient is anesthetized, the patient's blood pressure cuff on lower extremity was inflated to 100mmHg in excess of patient's blood pressure.  At this time, the neuromuscular blockade was administered intravenously by anesthesia.  Upper extremity fasciculation were verified followed by lower extremity fasciculation.  Once fasciculations terminated, confirmation of affective neuromuscular blockade demonstrated by absence of withdrawal reflex.  Bite blocks were put in place.

## 2025-07-02 NOTE — ANESTHESIA POSTPROCEDURE EVALUATION
Department of Anesthesiology  Postprocedure Note    Patient: Alistair Hutchison  MRN: [id number]  YOB: 1995  Date of evaluation: 7/2/2025    Procedure Summary       Date: 07/02/25 Room / Location: [facility] PACU    Anesthesia Start: 0808 Anesthesia Stop: 0824    Procedure: ECT W/ ANESTHESIA Diagnosis: Disorganized schizophrenia (HCC)    Scheduled Providers:  Responsible Provider: Barby Leonard MD    Anesthesia Type: General ASA Status: 3            Anesthesia Type: General    Amalia Phase I: Amalia Score: 10    Amalia Phase II:      Anesthesia Post Evaluation    Comments: POST- ANESTHESIA EVALUATION       Pt Name: Alistair Hutchison  MRN: [id number]  YOB: 1995  Date of evaluation: 7/2/2025  Time:  9:29 AM      /77   Pulse 90   Temp 97.8 °F (36.6 °C)   Resp 19   Ht 1.651 m (5' 5\")   Wt 90.3 kg (199 lb)   SpO2 98%   BMI 33.12 kg/m²      Consciousness Level  Awake  Cardiopulmonary Status  Stable  Pain Adequately Treated YES  Nausea / Vomiting  NO  Adequate Hydration  YES  Anesthesia Related Complications NONE      Electronically signed by Barby Leonard MD on 7/2/2025 at 9:29 AM           No notable events documented.

## 2025-07-02 NOTE — INTERVAL H&P NOTE
Update History & Physical    The patient's History and Physical of June 13, 2025 was reviewed with the patient and I examined the patient. Any changes are as documented below.     Alistair Hutchison is 30 y.o., male, here for ECT treatment. Last ECT treatment was 06/27/25. Pt tolerated last treatment well and has voiced improvement. Sleep is fair. Appetite is good. Denies suicidal or homicidal ideation. Denies hallucinations. Pt has been taking all medications as prescribed. Pt has no interval changes since last treatment visit.     Pt AAO x 3 in NAD. HRRR. No adventitious lung sounds. No respiratory distress.   NPO p MN. Denies recent or current chest pain/pressure, palpitations, SOB, recent URI, fever or chills.   Review vitals per RN flowsheet.       Electronically signed by GIORGI Shah CNP on 7/2/2025 at 7:00 AM

## 2025-07-02 NOTE — DISCHARGE INSTRUCTIONS
ELECTROCONVULSIVE THERAPY DISCHARGE INSTRUCTIONS         1. Activity - Rest today. The anesthetic agents you have received can make you feel drowsy or tired.  A responsible person must drive you home and stay with you for 8 hours after discharge from the Hospital. Do not drive a motor vehicle or operate any machinery for 24 hours. .   *Do not make any complex decisions or execute any legal documents until 3 weeks AFTER your last treatment.  If you have any questions regarding this, speak with your psychiatrist first.*    2. Diet - Nothing to eat or drink after midnight the night of your ECT treatment. After ECT, start with clear liquids, if you can drink without coughing, you may proceed with gradually progressing to your usual diet.    No alcoholic beverages for 24 hours.    3. Medications - Take your daily medications as prescribed, after you return home from today's ECT. If you take a Beta Blocker or have been prescribed Imitrex, you may be instructed to take these medications the morning of ECT. If you are unsure please ask the physician.     Take with these medication the morning of ECT with one Tablespoon of water.   Tylenol 650 mg  All blood pressure medications  ***    4. You may experience the following after your treatment:   a. Poor memory   b. Poor balance   c. Headaches   d. Confusion   e. Muscle soreness   f. Nausea      5. Special Precautions - Contact you physician immediately if these occur:   a. Signs of infection: redness, swelling, heat, red streaks at the site of the IV   b. Excessive pain unrelieved by prescription pain medications   c. Nausea and vomiting that persists beyond the first day after your procedure    6. Next Procedure Date:   Your next ECT treatment is scheduled for 800AM on 07/11/25.  ARRIVE AT 630AM    If you feel you are having a problem or complication from your ECT treatments and it is during normal business hours call [phone]. If it is after normal business hours

## 2025-07-02 NOTE — ANESTHESIA PRE PROCEDURE
consumption: 1930                        Date of last liquid consumption: 07/01/25                        Date of last solid food consumption: 07/01/25    BMI:   Wt Readings from Last 3 Encounters:   07/02/25 90.3 kg (199 lb)   06/27/25 90.3 kg (199 lb 1.2 oz)   06/13/25 90.3 kg (199 lb)     Body mass index is 33.12 kg/m².    CBC:   Lab Results   Component Value Date/Time    WBC 8.0 06/06/2025 08:35 AM    RBC 4.96 06/06/2025 08:35 AM    HGB 14.2 06/06/2025 08:35 AM    HCT 41.4 06/06/2025 08:35 AM    MCV 83.5 06/06/2025 08:35 AM    RDW 13.4 06/06/2025 08:35 AM     06/06/2025 08:35 AM       CMP:   Lab Results   Component Value Date/Time     06/06/2025 09:14 AM    K 4.4 06/06/2025 09:14 AM     06/06/2025 09:14 AM    CO2 23 06/06/2025 09:14 AM    BUN 11 06/06/2025 09:14 AM    CREATININE 0.8 06/06/2025 09:14 AM    GFRAA >60 10/18/2019 10:05 AM    LABGLOM >90 06/06/2025 09:14 AM    LABGLOM >60 03/17/2023 08:51 AM    GLUCOSE 113 06/06/2025 09:14 AM    CALCIUM 9.2 06/06/2025 09:14 AM    BILITOT 0.4 12/12/2024 01:30 PM    ALKPHOS 82 12/12/2024 01:30 PM    AST 18 12/12/2024 01:30 PM    ALT 22 12/12/2024 01:30 PM       POC Tests:   No results for input(s): \"POCGLU\", \"POCNA\", \"POCK\", \"POCCL\", \"POCBUN\", \"POCHEMO\", \"POCHCT\" in the last 72 hours.      Coags: No results found for: \"PROTIME\", \"INR\", \"APTT\"    HCG (If Applicable): No results found for: \"PREGTESTUR\", \"PREGSERUM\", \"HCG\", \"HCGQUANT\"     ABGs: No results found for: \"PHART\", \"PO2ART\", \"EFL4XLC\", \"GQK8BSN\", \"BEART\", \"M4YYPJDD\"     Type & Screen (If Applicable):  No results found for: \"ABORH\", \"LABANTI\"    Drug/Infectious Status (If Applicable):  Lab Results   Component Value Date/Time    HEPCAB NONREACTIVE 03/17/2023 08:51 AM       COVID-19 Screening (If Applicable): No results found for: \"COVID19\"        Anesthesia Evaluation  Patient summary reviewed and Nursing notes reviewed   history of anesthetic complications: PONV.  Airway: Mallampati: III  TM

## 2025-07-09 DIAGNOSIS — F20.1 DISORGANIZED SCHIZOPHRENIA (HCC): Primary | ICD-10-CM

## 2025-07-10 ENCOUNTER — ANESTHESIA EVENT (OUTPATIENT)
Dept: POSTOP/PACU | Age: 30
End: 2025-07-10
Payer: COMMERCIAL

## 2025-07-11 ENCOUNTER — ANESTHESIA (OUTPATIENT)
Dept: POSTOP/PACU | Age: 30
End: 2025-07-11
Payer: COMMERCIAL

## 2025-07-11 ENCOUNTER — HOSPITAL ENCOUNTER (OUTPATIENT)
Dept: POSTOP/PACU | Age: 30
Discharge: HOME | End: 2025-07-11
Payer: COMMERCIAL

## 2025-07-11 VITALS
HEART RATE: 101 BPM | SYSTOLIC BLOOD PRESSURE: 121 MMHG | WEIGHT: 199 LBS | TEMPERATURE: 98 F | BODY MASS INDEX: 33.15 KG/M2 | RESPIRATION RATE: 17 BRPM | DIASTOLIC BLOOD PRESSURE: 88 MMHG | HEIGHT: 65 IN | OXYGEN SATURATION: 96 %

## 2025-07-11 PROCEDURE — 2500000003 HC RX 250 WO HCPCS: Performed by: NURSE ANESTHETIST, CERTIFIED REGISTERED

## 2025-07-11 PROCEDURE — 7100000000 HC PACU RECOVERY - FIRST 15 MIN: Performed by: ANESTHESIOLOGY

## 2025-07-11 PROCEDURE — 6360000002 HC RX W HCPCS: Performed by: NURSE ANESTHETIST, CERTIFIED REGISTERED

## 2025-07-11 PROCEDURE — 7100000001 HC PACU RECOVERY - ADDTL 15 MIN: Performed by: ANESTHESIOLOGY

## 2025-07-11 PROCEDURE — 2580000003 HC RX 258: Performed by: ANESTHESIOLOGY

## 2025-07-11 PROCEDURE — 90870 ELECTROCONVULSIVE THERAPY: CPT | Performed by: PSYCHIATRY & NEUROLOGY

## 2025-07-11 PROCEDURE — 90870 ELECTROCONVULSIVE THERAPY: CPT | Performed by: ANESTHESIOLOGY

## 2025-07-11 PROCEDURE — 3700000000 HC ANESTHESIA ATTENDED CARE: Performed by: ANESTHESIOLOGY

## 2025-07-11 RX ORDER — SODIUM CHLORIDE 9 MG/ML
INJECTION, SOLUTION INTRAVENOUS PRN
Status: DISCONTINUED | OUTPATIENT
Start: 2025-07-11 | End: 2025-07-12 | Stop reason: HOSPADM

## 2025-07-11 RX ORDER — ONDANSETRON 2 MG/ML
INJECTION INTRAMUSCULAR; INTRAVENOUS
Status: DISCONTINUED | OUTPATIENT
Start: 2025-07-11 | End: 2025-07-11 | Stop reason: SDUPTHER

## 2025-07-11 RX ORDER — KETOROLAC TROMETHAMINE 30 MG/ML
INJECTION, SOLUTION INTRAMUSCULAR; INTRAVENOUS
Status: DISCONTINUED | OUTPATIENT
Start: 2025-07-11 | End: 2025-07-11 | Stop reason: SDUPTHER

## 2025-07-11 RX ORDER — ONDANSETRON 2 MG/ML
INJECTION INTRAMUSCULAR; INTRAVENOUS
Status: COMPLETED
Start: 2025-07-11 | End: 2025-07-11

## 2025-07-11 RX ORDER — SUCCINYLCHOLINE/SOD CL,ISO/PF 200MG/10ML
SYRINGE (ML) INTRAVENOUS
Status: DISCONTINUED | OUTPATIENT
Start: 2025-07-11 | End: 2025-07-11 | Stop reason: SDUPTHER

## 2025-07-11 RX ORDER — LIDOCAINE HYDROCHLORIDE 10 MG/ML
1 INJECTION, SOLUTION EPIDURAL; INFILTRATION; INTRACAUDAL; PERINEURAL
Status: DISCONTINUED | OUTPATIENT
Start: 2025-07-11 | End: 2025-07-12 | Stop reason: HOSPADM

## 2025-07-11 RX ORDER — SODIUM CHLORIDE 0.9 % (FLUSH) 0.9 %
5-40 SYRINGE (ML) INJECTION PRN
Status: DISCONTINUED | OUTPATIENT
Start: 2025-07-11 | End: 2025-07-12 | Stop reason: HOSPADM

## 2025-07-11 RX ORDER — METHOHEXITAL IN WATER/PF 100MG/10ML
SYRINGE (ML) INTRAVENOUS
Status: COMPLETED
Start: 2025-07-11 | End: 2025-07-11

## 2025-07-11 RX ORDER — KETOROLAC TROMETHAMINE 30 MG/ML
INJECTION, SOLUTION INTRAMUSCULAR; INTRAVENOUS
Status: COMPLETED
Start: 2025-07-11 | End: 2025-07-11

## 2025-07-11 RX ORDER — SODIUM CHLORIDE, SODIUM LACTATE, POTASSIUM CHLORIDE, CALCIUM CHLORIDE 600; 310; 30; 20 MG/100ML; MG/100ML; MG/100ML; MG/100ML
INJECTION, SOLUTION INTRAVENOUS CONTINUOUS
Status: DISCONTINUED | OUTPATIENT
Start: 2025-07-11 | End: 2025-07-12 | Stop reason: HOSPADM

## 2025-07-11 RX ORDER — SUCCINYLCHOLINE/SOD CL,ISO/PF 200MG/10ML
SYRINGE (ML) INTRAVENOUS
Status: COMPLETED
Start: 2025-07-11 | End: 2025-07-11

## 2025-07-11 RX ORDER — SODIUM CHLORIDE 0.9 % (FLUSH) 0.9 %
5-40 SYRINGE (ML) INJECTION EVERY 12 HOURS SCHEDULED
Status: DISCONTINUED | OUTPATIENT
Start: 2025-07-11 | End: 2025-07-12 | Stop reason: HOSPADM

## 2025-07-11 RX ORDER — METHOHEXITAL IN WATER/PF 100MG/10ML
SYRINGE (ML) INTRAVENOUS
Status: DISCONTINUED | OUTPATIENT
Start: 2025-07-11 | End: 2025-07-11 | Stop reason: SDUPTHER

## 2025-07-11 RX ADMIN — ONDANSETRON 4 MG: 2 INJECTION INTRAMUSCULAR; INTRAVENOUS at 08:03

## 2025-07-11 RX ADMIN — Medication 100 MG: at 08:04

## 2025-07-11 RX ADMIN — SODIUM CHLORIDE, POTASSIUM CHLORIDE, SODIUM LACTATE AND CALCIUM CHLORIDE: 600; 310; 30; 20 INJECTION, SOLUTION INTRAVENOUS at 07:44

## 2025-07-11 RX ADMIN — Medication 120 MG: at 08:05

## 2025-07-11 RX ADMIN — KETOROLAC TROMETHAMINE 30 MG: 30 INJECTION, SOLUTION INTRAMUSCULAR at 08:03

## 2025-07-11 ASSESSMENT — PAIN - FUNCTIONAL ASSESSMENT: PAIN_FUNCTIONAL_ASSESSMENT: 0-10

## 2025-07-11 NOTE — INTERVAL H&P NOTE
Update History & Physical    The patient's History and Physical of June 13, 2025 was reviewed with the patient and I examined the patient.  Alistair Hutchison is 30 y.o.,  male, here for ECT treatment.   Pt has history of  Disorganized schizophrenia disorder.  Pt is coming once weekly.     Last ECT treatment was 7/02/25. Pt tolerated last treatment well. Patient reports that his  symptoms are  not improving.  Patient has complaints of some short term memory loss.     Pt states that he has not being good lately, \" I keep having accidents\",  \" I still have deal with the kids in the neighborhood\"   Patient's sleep has been  good . Appetite has been good.  \" I go to the store to drink pop\"   Pt denies feeling of suicidal/homicidal. Pt has no auditory/visual hallucinations.   Pt has been taking psychiatric medications as prescribed.   Pt does not have any other somatic complaints at this time.     patient is drooling  a lot.       Denies any personal or family hx of previous complications w/anesthesia.     Electronically signed by GIORGI BREWER CNP on 7/11/2025 at 7:13 AM

## 2025-07-11 NOTE — PROGRESS NOTES
Pre ECT Assessment Note  Psychiatry  07/11/25       Alistair Hutchison  1995  [id number]      Subjective:     Patient is a 30 y.o.  male seen for an evaluation prior to today's electroconvulsive therapy treatment. Today is treatment number 35, utilizing bilateral. He received 12 RU treatments during this course prior to transitioning to bilateral. This is course number 2. The patient has previously completed a course of bilateral ECT for a total of 18 treatments that ended on 11/20/2024.     Last ECT treatment 7/2/2025.    William was seen at the bedside prior to ECT treatments with his grandmother present. He was pleasant and cooperative today. He states that he is \"terrible\" as he has been having frequent ongoing accidents of loose stools. Per grandmother, he will be seeing a gastroenterologist soon to address this concern. His grandmother describes his mood as being \"so-so\" but reports that he is \"more stable.\" He admits to recent anger related to the neighborhood bullies and being told what to do. Per grandmother, he has been \"lashing out\" verbally. However, she reports that his anger has improved as he shows less physical aggression now. He denies side effects from ECT, but grandmother expresses concern about memory loss as patient struggles to remember what to do and is \"repetitive.\" At this time, they would like to continue once weekly treatments.    Patient Active Problem List    Diagnosis Date Noted    Developmental delay 03/21/2023    Acute psychosis (HCC) 03/20/2023    Schizophrenia, disorganized ([facility]) 12/11/2024    Disorganized schizophrenia (HCC) 09/20/2024    Hyperthyroidism, subclinical 08/22/2024    Moderate mixed hyperlipidemia not requiring statin therapy 08/22/2024    Class 2 obesity without serious comorbidity with body mass index (BMI) of 38.0 to 38.9 in adult 08/22/2024    Hypovitaminosis D 10/18/2019    Rib pain on left side 10/18/2019     Past Medical History:   Diagnosis Date

## 2025-07-11 NOTE — ANESTHESIA POSTPROCEDURE EVALUATION
Department of Anesthesiology  Postprocedure Note    Patient: Alistair Hutchison  MRN: [id number]  YOB: 1995  Date of evaluation: 7/11/2025    Procedure Summary       Date: 07/11/25 Room / Location: [facility] PACU    Anesthesia Start: 0800 Anesthesia Stop: 0813    Procedure: ECT W/ ANESTHESIA Diagnosis: Disorganized schizophrenia (HCC)    Scheduled Providers:  Responsible Provider: Barby Leonard MD    Anesthesia Type: General ASA Status: 3            Anesthesia Type: General    Amalia Phase I: Amalia Score: 10    Amalia Phase II:      Anesthesia Post Evaluation    Comments: POST- ANESTHESIA EVALUATION       Pt Name: Alistair Hutchison  MRN: [id number]  YOB: 1995  Date of evaluation: 7/11/2025  Time:  1:14 PM      /88   Pulse (!) 101   Temp 98 °F (36.7 °C)   Resp 17   Ht 1.651 m (5' 5\")   Wt 90.3 kg (199 lb)   SpO2 96%   BMI 33.12 kg/m²      Consciousness Level  Awake  Cardiopulmonary Status  Stable  Pain Adequately Treated YES  Nausea / Vomiting  NO  Adequate Hydration  YES  Anesthesia Related Complications NONE      Electronically signed by Barby Leonard MD on 7/11/2025 at 1:14 PM           No notable events documented.

## 2025-07-11 NOTE — ANESTHESIA PRE PROCEDURE
Department of Anesthesiology  Preprocedure Note       Name:  Alistair Hutchison   Age:  30 y.o.  :  1995                                          MRN:  [id number]         Date:  2025      Surgeon: Dr Drake    Procedure: ECT    Medications prior to admission:   Prior to Admission medications    Medication Sig Start Date End Date Taking? Authorizing Provider   Paliperidone (INVEGA PO) Take 1 tablet by mouth daily as needed 3 mg daily prn   Yes Shan Curran MD   propranolol (INDERAL) 40 MG tablet Take 1 tablet by mouth 2 times daily Takes at 9am and 3:00 pm   Yes Shan Curran MD   hyoscyamine (LEVBID) 0.375 MG extended release tablet Take 1 tablet by mouth 2 times daily   Yes Shan Curran MD   hydrOXYzine HCl (ATARAX) 50 MG tablet Take 1 tablet by mouth 2 times daily Pt takes at noon and 6 pm   Yes Shan Curran MD   cloZAPine (CLOZARIL) 100 MG tablet Take 1 tablet by mouth 3 times daily 24  Yes Corby Drake MD   traZODone (DESYREL) 50 MG tablet Take 1 tablet by mouth nightly as needed for Sleep 10/2/24  Yes Corby Drake MD   paliperidone palmitate ER (INVEGA SUSTENNA) 234 MG/1.5ML FRANCHESKA IM injection Inject 234 mg into the muscle every 21 days    ProviderShan MD   acetaminophen (TYLENOL) 325 MG tablet Take by mouth every 6 hours as needed for Pain    ProviderShan MD       Current medications:    Current Outpatient Medications   Medication Sig Dispense Refill    Paliperidone (INVEGA PO) Take 1 tablet by mouth daily as needed 3 mg daily prn      propranolol (INDERAL) 40 MG tablet Take 1 tablet by mouth 2 times daily Takes at 9am and 3:00 pm      hyoscyamine (LEVBID) 0.375 MG extended release tablet Take 1 tablet by mouth 2 times daily      hydrOXYzine HCl (ATARAX) 50 MG tablet Take 1 tablet by mouth 2 times daily Pt takes at noon and 6 pm      cloZAPine (CLOZARIL) 100 MG tablet Take 1 tablet by mouth 3 times daily 90 tablet 0    traZODone

## 2025-07-17 ENCOUNTER — ANESTHESIA EVENT (OUTPATIENT)
Dept: POSTOP/PACU | Age: 30
End: 2025-07-17
Payer: COMMERCIAL

## 2025-07-18 ENCOUNTER — ANESTHESIA (OUTPATIENT)
Dept: POSTOP/PACU | Age: 30
End: 2025-07-18
Payer: COMMERCIAL

## 2025-07-18 ENCOUNTER — HOSPITAL ENCOUNTER (OUTPATIENT)
Dept: POSTOP/PACU | Age: 30
Discharge: HOME | End: 2025-07-18
Payer: COMMERCIAL

## 2025-07-18 VITALS
BODY MASS INDEX: 33.15 KG/M2 | DIASTOLIC BLOOD PRESSURE: 85 MMHG | RESPIRATION RATE: 24 BRPM | HEIGHT: 65 IN | WEIGHT: 199 LBS | OXYGEN SATURATION: 96 % | SYSTOLIC BLOOD PRESSURE: 132 MMHG | TEMPERATURE: 98.4 F | HEART RATE: 109 BPM

## 2025-07-18 PROCEDURE — 2580000003 HC RX 258: Performed by: ANESTHESIOLOGY

## 2025-07-18 PROCEDURE — 7100000000 HC PACU RECOVERY - FIRST 15 MIN: Performed by: ANESTHESIOLOGY

## 2025-07-18 PROCEDURE — 2500000003 HC RX 250 WO HCPCS: Performed by: NURSE ANESTHETIST, CERTIFIED REGISTERED

## 2025-07-18 PROCEDURE — 6360000002 HC RX W HCPCS: Performed by: NURSE ANESTHETIST, CERTIFIED REGISTERED

## 2025-07-18 PROCEDURE — 90870 ELECTROCONVULSIVE THERAPY: CPT | Performed by: ANESTHESIOLOGY

## 2025-07-18 PROCEDURE — 7100000001 HC PACU RECOVERY - ADDTL 15 MIN: Performed by: ANESTHESIOLOGY

## 2025-07-18 PROCEDURE — 3700000000 HC ANESTHESIA ATTENDED CARE: Performed by: ANESTHESIOLOGY

## 2025-07-18 PROCEDURE — 6360000002 HC RX W HCPCS: Performed by: ANESTHESIOLOGY

## 2025-07-18 PROCEDURE — 90870 ELECTROCONVULSIVE THERAPY: CPT | Performed by: PSYCHIATRY & NEUROLOGY

## 2025-07-18 RX ORDER — LIDOCAINE HYDROCHLORIDE 10 MG/ML
1 INJECTION, SOLUTION EPIDURAL; INFILTRATION; INTRACAUDAL; PERINEURAL
Status: COMPLETED | OUTPATIENT
Start: 2025-07-18 | End: 2025-07-18

## 2025-07-18 RX ORDER — ONDANSETRON 2 MG/ML
INJECTION INTRAMUSCULAR; INTRAVENOUS
Status: DISCONTINUED | OUTPATIENT
Start: 2025-07-18 | End: 2025-07-18 | Stop reason: SDUPTHER

## 2025-07-18 RX ORDER — SUCCINYLCHOLINE/SOD CL,ISO/PF 200MG/10ML
SYRINGE (ML) INTRAVENOUS
Status: DISCONTINUED | OUTPATIENT
Start: 2025-07-18 | End: 2025-07-18 | Stop reason: SDUPTHER

## 2025-07-18 RX ORDER — SODIUM CHLORIDE, SODIUM LACTATE, POTASSIUM CHLORIDE, CALCIUM CHLORIDE 600; 310; 30; 20 MG/100ML; MG/100ML; MG/100ML; MG/100ML
INJECTION, SOLUTION INTRAVENOUS CONTINUOUS
Status: DISCONTINUED | OUTPATIENT
Start: 2025-07-18 | End: 2025-07-19 | Stop reason: HOSPADM

## 2025-07-18 RX ORDER — ONDANSETRON 2 MG/ML
INJECTION INTRAMUSCULAR; INTRAVENOUS
Status: COMPLETED
Start: 2025-07-18 | End: 2025-07-18

## 2025-07-18 RX ORDER — METHOHEXITAL IN WATER/PF 100MG/10ML
SYRINGE (ML) INTRAVENOUS
Status: DISCONTINUED | OUTPATIENT
Start: 2025-07-18 | End: 2025-07-18 | Stop reason: SDUPTHER

## 2025-07-18 RX ORDER — KETOROLAC TROMETHAMINE 30 MG/ML
INJECTION, SOLUTION INTRAMUSCULAR; INTRAVENOUS
Status: DISCONTINUED | OUTPATIENT
Start: 2025-07-18 | End: 2025-07-18 | Stop reason: SDUPTHER

## 2025-07-18 RX ORDER — SODIUM CHLORIDE 9 MG/ML
INJECTION, SOLUTION INTRAVENOUS PRN
Status: DISCONTINUED | OUTPATIENT
Start: 2025-07-18 | End: 2025-07-19 | Stop reason: HOSPADM

## 2025-07-18 RX ORDER — SODIUM CHLORIDE 0.9 % (FLUSH) 0.9 %
5-40 SYRINGE (ML) INJECTION EVERY 12 HOURS SCHEDULED
Status: DISCONTINUED | OUTPATIENT
Start: 2025-07-18 | End: 2025-07-19 | Stop reason: HOSPADM

## 2025-07-18 RX ORDER — SUCCINYLCHOLINE/SOD CL,ISO/PF 200MG/10ML
SYRINGE (ML) INTRAVENOUS
Status: COMPLETED
Start: 2025-07-18 | End: 2025-07-18

## 2025-07-18 RX ORDER — METHOHEXITAL IN WATER/PF 100MG/10ML
SYRINGE (ML) INTRAVENOUS
Status: COMPLETED
Start: 2025-07-18 | End: 2025-07-18

## 2025-07-18 RX ORDER — KETOROLAC TROMETHAMINE 30 MG/ML
INJECTION, SOLUTION INTRAMUSCULAR; INTRAVENOUS
Status: COMPLETED
Start: 2025-07-18 | End: 2025-07-18

## 2025-07-18 RX ORDER — SODIUM CHLORIDE 0.9 % (FLUSH) 0.9 %
5-40 SYRINGE (ML) INJECTION PRN
Status: DISCONTINUED | OUTPATIENT
Start: 2025-07-18 | End: 2025-07-19 | Stop reason: HOSPADM

## 2025-07-18 RX ADMIN — KETOROLAC TROMETHAMINE 30 MG: 30 INJECTION, SOLUTION INTRAMUSCULAR at 07:13

## 2025-07-18 RX ADMIN — Medication 100 MG: at 07:14

## 2025-07-18 RX ADMIN — SODIUM CHLORIDE, POTASSIUM CHLORIDE, SODIUM LACTATE AND CALCIUM CHLORIDE: 600; 310; 30; 20 INJECTION, SOLUTION INTRAVENOUS at 06:27

## 2025-07-18 RX ADMIN — Medication 120 MG: at 07:14

## 2025-07-18 RX ADMIN — ONDANSETRON 4 MG: 2 INJECTION, SOLUTION INTRAMUSCULAR; INTRAVENOUS at 07:13

## 2025-07-18 RX ADMIN — LIDOCAINE HYDROCHLORIDE 1 ML: 10 INJECTION, SOLUTION EPIDURAL; INFILTRATION; INTRACAUDAL; PERINEURAL at 06:26

## 2025-07-18 ASSESSMENT — ENCOUNTER SYMPTOMS
ABDOMINAL PAIN: 0
COUGH: 0
SHORTNESS OF BREATH: 0
GASTROINTESTINAL NEGATIVE: 1
EYES NEGATIVE: 1
TROUBLE SWALLOWING: 0
SORE THROAT: 0
BACK PAIN: 0

## 2025-07-18 ASSESSMENT — PAIN - FUNCTIONAL ASSESSMENT: PAIN_FUNCTIONAL_ASSESSMENT: 0-10

## 2025-07-18 NOTE — ANESTHESIA PRE PROCEDURE
Department of Anesthesiology  Preprocedure Note       Name:  Alistair Hutchison   Age:  30 y.o.  :  1995                                          MRN:  [id number]         Date:  2025      Surgeon: * No surgeons listed *    Procedure: * No procedures listed *    Medications prior to admission:   Prior to Admission medications    Medication Sig Start Date End Date Taking? Authorizing Provider   Paliperidone (INVEGA PO) Take 1 tablet by mouth daily as needed 3 mg daily prn   Yes Shan Curran MD   propranolol (INDERAL) 40 MG tablet Take 1 tablet by mouth 2 times daily Takes at 9am and 3:00 pm   Yes Shan Curran MD   hyoscyamine (LEVBID) 0.375 MG extended release tablet Take 1 tablet by mouth 2 times daily   Yes Shan Curran MD   acetaminophen (TYLENOL) 325 MG tablet Take by mouth every 6 hours as needed for Pain   Yes Shan Curran MD   hydrOXYzine HCl (ATARAX) 50 MG tablet Take 1 tablet by mouth 2 times daily Pt takes at noon and 6 pm   Yes Shan Curran MD   cloZAPine (CLOZARIL) 100 MG tablet Take 1 tablet by mouth 3 times daily 24  Yes Corby Drake MD   traZODone (DESYREL) 50 MG tablet Take 1 tablet by mouth nightly as needed for Sleep 10/2/24  Yes Corby Drake MD   paliperidone palmitate ER (INVEGA SUSTENNA) 234 MG/1.5ML FRANCHESKA IM injection Inject 234 mg into the muscle every 21 days    Shan Curran MD       Current medications:    Current Outpatient Medications   Medication Sig Dispense Refill   • Paliperidone (INVEGA PO) Take 1 tablet by mouth daily as needed 3 mg daily prn     • propranolol (INDERAL) 40 MG tablet Take 1 tablet by mouth 2 times daily Takes at 9am and 3:00 pm     • hyoscyamine (LEVBID) 0.375 MG extended release tablet Take 1 tablet by mouth 2 times daily     • acetaminophen (TYLENOL) 325 MG tablet Take by mouth every 6 hours as needed for Pain     • hydrOXYzine HCl (ATARAX) 50 MG tablet Take 1 tablet by mouth 2 times

## 2025-07-18 NOTE — H&P (VIEW-ONLY)
HISTORY and PHYSICAL  [facility]       NAME:  Alistair Hutchison  MRN: [id number]   YOB: 1995   Date: 7/18/2025   Age: 30 y.o.  Gender: male       COMPLAINT AND PRESENT HISTORY:     Alistair Hutchison is 30 y.o.,  male, presents for ECT WITH ANESTHESIA  Primary dx: Disorganized schizophrenia     HPI  Alistair Hutchison is 30 y.o.,  male, here for ECT treatment.     Last ECT treatment was 7/11/2025. Pt tolerated last treatment well. Patient reports that his symptoms are improving.  Patient has complaints of some short term memory loss    Pt has had multiple failed treatment modalities. Pt has history of Disorganized schizophrenia.   Mood is rated at  10/10.  Denies symptoms of hopelessness, helplessness, low energy.   Patient's sleep has been fair. Appetite has been fair.   Pt is not suicidal. Pt is not homicidal. Pt denies auditory/visual hallucinations.   Pt has been taking psychiatric medications as prescribed.   Pt does not have any other somatic complaints at this time.        Review of additional significant medical hx:  (See chart for additional detail, including current medications /see ROS for current S/S):     NPO status: NPO since MN  Anticoagulation status: Denies    Denies personal hx of blood clots.  Denies personal hx of MRSA infection.  PONV.  Denies any personal or family hx of previous complications w/anesthesia.    RECENT IMAGING R/T HPI   No results found.    RECENT LABS, IMAGING AND TESTING     Lab Results   Component Value Date    WBC 8.0 06/06/2025    RBC 4.96 06/06/2025    HGB 14.2 06/06/2025    HCT 41.4 06/06/2025    MCV 83.5 06/06/2025    MCH 28.6 06/06/2025    MCHC 34.3 06/06/2025    RDW 13.4 06/06/2025     06/06/2025    MPV 9.2 06/06/2025        Lab Results   Component Value Date     06/06/2025    K 4.4 06/06/2025     06/06/2025    CO2 23 06/06/2025    BUN 11 06/06/2025    CREATININE 0.8 06/06/2025    GLUCOSE 113 (H)

## 2025-07-18 NOTE — H&P
HISTORY and PHYSICAL  [facility]       NAME:  Alistair Hutchison  MRN: [id number]   YOB: 1995   Date: 7/18/2025   Age: 30 y.o.  Gender: male       COMPLAINT AND PRESENT HISTORY:     Alistair Hutchison is 30 y.o.,  male, presents for ECT WITH ANESTHESIA  Primary dx: Disorganized schizophrenia     HPI  Alistair Hutchison is 30 y.o.,  male, here for ECT treatment.     Last ECT treatment was 7/11/2025. Pt tolerated last treatment well. Patient reports that his symptoms are improving.  Patient has complaints of some short term memory loss    Pt has had multiple failed treatment modalities. Pt has history of Disorganized schizophrenia.   Mood is rated at  10/10.  Denies symptoms of hopelessness, helplessness, low energy.   Patient's sleep has been fair. Appetite has been fair.   Pt is not suicidal. Pt is not homicidal. Pt denies auditory/visual hallucinations.   Pt has been taking psychiatric medications as prescribed.   Pt does not have any other somatic complaints at this time.        Review of additional significant medical hx:  (See chart for additional detail, including current medications /see ROS for current S/S):     NPO status: NPO since MN  Anticoagulation status: Denies    Denies personal hx of blood clots.  Denies personal hx of MRSA infection.  PONV.  Denies any personal or family hx of previous complications w/anesthesia.    RECENT IMAGING R/T HPI   No results found.    RECENT LABS, IMAGING AND TESTING     Lab Results   Component Value Date    WBC 8.0 06/06/2025    RBC 4.96 06/06/2025    HGB 14.2 06/06/2025    HCT 41.4 06/06/2025    MCV 83.5 06/06/2025    MCH 28.6 06/06/2025    MCHC 34.3 06/06/2025    RDW 13.4 06/06/2025     06/06/2025    MPV 9.2 06/06/2025        Lab Results   Component Value Date     06/06/2025    K 4.4 06/06/2025     06/06/2025    CO2 23 06/06/2025    BUN 11 06/06/2025    CREATININE 0.8 06/06/2025    GLUCOSE 113 (H)

## 2025-07-18 NOTE — ANESTHESIA POSTPROCEDURE EVALUATION
Department of Anesthesiology  Postprocedure Note    Patient: Alistair Hutchison  MRN: [id number]  YOB: 1995  Date of evaluation: 7/18/2025    Procedure Summary       Date: 07/18/25 Room / Location: [facility] PACU    Anesthesia Start: 0710 Anesthesia Stop: 0723    Procedure: ECT W/ ANESTHESIA Diagnosis: Disorganized schizophrenia (HCC)    Scheduled Providers:  Responsible Provider: Zacarias Pandey MD    Anesthesia Type: general ASA Status: 3            Anesthesia Type: No value filed.    Amalia Phase I: Amalia Score: 10    Amalia Phase II:      Anesthesia Post Evaluation    Patient location during evaluation: PACU  Patient participation: complete - patient participated  Level of consciousness: awake and alert  Airway patency: patent  Nausea & Vomiting: no vomiting  Cardiovascular status: hemodynamically stable  Respiratory status: acceptable  Hydration status: euvolemic  Comments: POST- ANESTHESIA EVALUATION       Pt Name: Alistair Hutchison  MRN: [id number]  YOB: 1995  Date of evaluation: 7/18/2025  Time:  9:58 AM      /85   Pulse (!) 109   Temp 98.4 °F (36.9 °C)   Resp 24   Ht 1.651 m (5' 5\")   Wt 90.3 kg (199 lb)   SpO2 96%   BMI 33.12 kg/m²      Consciousness Level  Awake  Cardiopulmonary Status  Stable  Pain Adequately Treated YES  Nausea / Vomiting  NO  Adequate Hydration  YES  Anesthesia Related Complications NONE      Electronically signed by Zacarias Pandey MD on 7/18/2025 at 9:58 AM         Pain management: satisfactory to patient    No notable events documented.

## 2025-07-18 NOTE — PROGRESS NOTES
Pre ECT Assessment Note  Psychiatry  07/18/25       Alistair Hutchison  1995  [id number]      Subjective:     Patient is a 30 y.o.  male seen for an evaluation prior to today's electroconvulsive therapy treatment. Today is treatment number 36, utilizing bilateral. He received 12 RU treatments during this course prior to transitioning to bilateral. This is course number 2. The patient has previously completed a course of bilateral ECT for a total of 18 treatments that ended on 11/20/2024.     Last ECT treatment 7/11/2025.    William was seen at the bedside prior to ECT treatments with his grandmother present. He was pleasant and cooperative today.  Patient reports things have been going well.  Denies any significant change regarding kids in the neighborhood picking on him.  He still struggles with anger particularly, towards his grandfather.  He has not been physically aggressive, but does not do well with requests to do chores.  His grandmother does express that William can be confused at times, and believes this is likely due to his weekly ECTs.  We did discuss tapering frequency to once every 2 weeks, but he did decompensate.  At this time, they would like to continue once weekly treatments for stability.    Patient Active Problem List    Diagnosis Date Noted    Developmental delay 03/21/2023    Acute psychosis (HCC) 03/20/2023    Schizophrenia, disorganized ([facility]) 12/11/2024    Disorganized schizophrenia ([facility]) 09/20/2024    Hyperthyroidism, subclinical 08/22/2024    Moderate mixed hyperlipidemia not requiring statin therapy 08/22/2024    Class 2 obesity without serious comorbidity with body mass index (BMI) of 38.0 to 38.9 in adult 08/22/2024    Hypovitaminosis D 10/18/2019    Rib pain on left side 10/18/2019     Past Medical History:   Diagnosis Date    Fracture     leg    Manic depression ([facility])     Oppositional defiant behavior     PONV (postoperative nausea and vomiting)     Schizophrenia ([facility])

## 2025-07-18 NOTE — H&P (VIEW-ONLY)
06/06/2025    CALCIUM 9.2 06/06/2025    BILITOT 0.4 12/12/2024    ALKPHOS 82 12/12/2024    AST 18 12/12/2024    ALT 22 12/12/2024       PAST MEDICAL HISTORY     Past Medical History:   Diagnosis Date    Fracture     leg    Manic depression (HCC)     Oppositional defiant behavior     PONV (postoperative nausea and vomiting)     Schizophrenia (HCC)        SURGICAL HISTORY       Past Surgical History:   Procedure Laterality Date    EYE SURGERY      OTHER SURGICAL HISTORY      ECT    TYMPANOSTOMY TUBE PLACEMENT         FAMILY HISTORY     No family history on file.    SOCIAL HISTORY       Social History     Socioeconomic History    Marital status: Single   Tobacco Use    Smoking status: Never    Smokeless tobacco: Never   Vaping Use    Vaping status: Never Used   Substance and Sexual Activity    Alcohol use: No    Drug use: No    Sexual activity: Never     Social Drivers of Health     Financial Resource Strain: Low Risk  (8/22/2024)    Overall Financial Resource Strain (CARDIA)     Difficulty of Paying Living Expenses: Not hard at all   Food Insecurity: No Food Insecurity (12/11/2024)    Hunger Vital Sign     Worried About Running Out of Food in the Last Year: Never true     Ran Out of Food in the Last Year: Never true   Transportation Needs: No Transportation Needs (12/11/2024)    PRAPARE - Transportation     Lack of Transportation (Medical): No     Lack of Transportation (Non-Medical): No   Physical Activity: Sufficiently Active (5/29/2025)    Exercise Vital Sign     Days of Exercise per Week: 7 days     Minutes of Exercise per Session: 60 min   Housing Stability: Low Risk  (12/11/2024)    Housing Stability Vital Sign     Unable to Pay for Housing in the Last Year: No     Number of Times Moved in the Last Year: 1     Homeless in the Last Year: No           REVIEW OF SYSTEMS      Allergies   Allergen Reactions    Chocolate Diarrhea       Current Outpatient Medications on File Prior to Encounter   Medication Sig Dispense

## 2025-07-18 NOTE — PROCEDURES
Bilateral ECT Procedure Report  Alistair Hutchison   7/18/2025  1995         Attending:Corby Drake MD  Preprocedure diagnosis: disorganized schizophrenia  Postprocedure diagnosis: SAME AS PRE-PROCEDURE DIAGNOSIS  Treatment Number: This is treatment #36    Patient Status: Outpatient   Type of ECT: Bilateral Brief Pulse  Medications  Preprocedure: Tylenol 650mg and none  Anesthetic: Methohexital 100 mg  Paralytic: Succinylcholine 120 mg  Post procedure: none needed   Cuff placement: Left Lower Extremity    MECTA Settings 1st Stimulus:     Pulse width: 1.0 ms   Frequency:  60 hz   Duration:   3.0 seconds   Static Impedance: 620 ohms             Dynamic Impedance: 213 ohms             Motor Seizure Duration: 27 seconds  EEG Seizure Duration: 31 seconds             The patient's ECT treatment was performed using MECTA machine  .  Timeout:  Time out was performed using two patient identifiers and confirmation of procedure.     Patient Preparation: Preprocedure documentation, labs, H&P were all verified and reviewed.  The patient was placed in supine position.  EEG leads were placed for monitoring of seizure.   Buddhist areas bilaterally cleaned and prepped.  Conductive gel  was applied over stimulus electrode site.   The patient was pre-oxygenated.       Procedure in detail: Medications were administered by anesthesia using intravenous access at the doses listed previously in this summary.  Anesthetic administered and once confirmation the patient is anesthetized, the patient's blood pressure cuff on lower extremity was inflated to 100mmHg in excess of patient's blood pressure.  At this time, the neuromuscular blockade was administered intravenously by anesthesia.  Upper extremity fasciculation were verified followed by lower extremity fasciculation.  Once fasciculations terminated, confirmation of affective neuromuscular blockade demonstrated by absence of withdrawal reflex.  Bite blocks were put in place.

## 2025-07-24 ENCOUNTER — ANESTHESIA EVENT (OUTPATIENT)
Dept: POSTOP/PACU | Age: 30
End: 2025-07-24
Payer: COMMERCIAL

## 2025-07-25 ENCOUNTER — ANESTHESIA (OUTPATIENT)
Dept: POSTOP/PACU | Age: 30
End: 2025-07-25
Payer: COMMERCIAL

## 2025-07-25 ENCOUNTER — HOSPITAL ENCOUNTER (OUTPATIENT)
Dept: POSTOP/PACU | Age: 30
Discharge: HOME | End: 2025-07-25
Payer: COMMERCIAL

## 2025-07-25 VITALS
SYSTOLIC BLOOD PRESSURE: 116 MMHG | OXYGEN SATURATION: 99 % | HEART RATE: 98 BPM | RESPIRATION RATE: 17 BRPM | DIASTOLIC BLOOD PRESSURE: 87 MMHG | TEMPERATURE: 97.3 F

## 2025-07-25 DIAGNOSIS — F20.1 DISORGANIZED SCHIZOPHRENIA (HCC): Primary | ICD-10-CM

## 2025-07-25 PROCEDURE — 2580000003 HC RX 258: Performed by: ANESTHESIOLOGY

## 2025-07-25 PROCEDURE — 7100000001 HC PACU RECOVERY - ADDTL 15 MIN: Performed by: ANESTHESIOLOGY

## 2025-07-25 PROCEDURE — 90870 ELECTROCONVULSIVE THERAPY: CPT | Performed by: ANESTHESIOLOGY

## 2025-07-25 PROCEDURE — 2500000003 HC RX 250 WO HCPCS: Performed by: NURSE ANESTHETIST, CERTIFIED REGISTERED

## 2025-07-25 PROCEDURE — 90870 ELECTROCONVULSIVE THERAPY: CPT | Performed by: PSYCHIATRY & NEUROLOGY

## 2025-07-25 PROCEDURE — 3700000000 HC ANESTHESIA ATTENDED CARE: Performed by: ANESTHESIOLOGY

## 2025-07-25 PROCEDURE — 6360000002 HC RX W HCPCS: Performed by: ANESTHESIOLOGY

## 2025-07-25 PROCEDURE — 7100000000 HC PACU RECOVERY - FIRST 15 MIN: Performed by: ANESTHESIOLOGY

## 2025-07-25 PROCEDURE — 6360000002 HC RX W HCPCS: Performed by: NURSE ANESTHETIST, CERTIFIED REGISTERED

## 2025-07-25 RX ORDER — SODIUM CHLORIDE, SODIUM LACTATE, POTASSIUM CHLORIDE, CALCIUM CHLORIDE 600; 310; 30; 20 MG/100ML; MG/100ML; MG/100ML; MG/100ML
INJECTION, SOLUTION INTRAVENOUS CONTINUOUS
Status: DISCONTINUED | OUTPATIENT
Start: 2025-07-25 | End: 2025-07-26 | Stop reason: HOSPADM

## 2025-07-25 RX ORDER — ONDANSETRON 2 MG/ML
INJECTION INTRAMUSCULAR; INTRAVENOUS
Status: DISCONTINUED | OUTPATIENT
Start: 2025-07-25 | End: 2025-07-25 | Stop reason: SDUPTHER

## 2025-07-25 RX ORDER — ONDANSETRON 2 MG/ML
INJECTION INTRAMUSCULAR; INTRAVENOUS
Status: COMPLETED
Start: 2025-07-25 | End: 2025-07-25

## 2025-07-25 RX ORDER — SODIUM CHLORIDE 0.9 % (FLUSH) 0.9 %
5-40 SYRINGE (ML) INJECTION PRN
Status: DISCONTINUED | OUTPATIENT
Start: 2025-07-25 | End: 2025-07-26 | Stop reason: HOSPADM

## 2025-07-25 RX ORDER — KETOROLAC TROMETHAMINE 30 MG/ML
INJECTION, SOLUTION INTRAMUSCULAR; INTRAVENOUS
Status: COMPLETED
Start: 2025-07-25 | End: 2025-07-25

## 2025-07-25 RX ORDER — SODIUM CHLORIDE 0.9 % (FLUSH) 0.9 %
5-40 SYRINGE (ML) INJECTION EVERY 12 HOURS SCHEDULED
Status: DISCONTINUED | OUTPATIENT
Start: 2025-07-25 | End: 2025-07-26 | Stop reason: HOSPADM

## 2025-07-25 RX ORDER — KETOROLAC TROMETHAMINE 30 MG/ML
INJECTION, SOLUTION INTRAMUSCULAR; INTRAVENOUS
Status: DISCONTINUED | OUTPATIENT
Start: 2025-07-25 | End: 2025-07-25 | Stop reason: SDUPTHER

## 2025-07-25 RX ORDER — LIDOCAINE HYDROCHLORIDE 10 MG/ML
1 INJECTION, SOLUTION EPIDURAL; INFILTRATION; INTRACAUDAL; PERINEURAL
Status: COMPLETED | OUTPATIENT
Start: 2025-07-25 | End: 2025-07-25

## 2025-07-25 RX ORDER — SUCCINYLCHOLINE/SOD CL,ISO/PF 200MG/10ML
SYRINGE (ML) INTRAVENOUS
Status: DISCONTINUED | OUTPATIENT
Start: 2025-07-25 | End: 2025-07-25 | Stop reason: SDUPTHER

## 2025-07-25 RX ORDER — METHOHEXITAL IN WATER/PF 100MG/10ML
SYRINGE (ML) INTRAVENOUS
Status: DISCONTINUED | OUTPATIENT
Start: 2025-07-25 | End: 2025-07-25 | Stop reason: SDUPTHER

## 2025-07-25 RX ORDER — SUCCINYLCHOLINE/SOD CL,ISO/PF 200MG/10ML
SYRINGE (ML) INTRAVENOUS
Status: COMPLETED
Start: 2025-07-25 | End: 2025-07-25

## 2025-07-25 RX ORDER — SODIUM CHLORIDE 9 MG/ML
INJECTION, SOLUTION INTRAVENOUS PRN
Status: DISCONTINUED | OUTPATIENT
Start: 2025-07-25 | End: 2025-07-26 | Stop reason: HOSPADM

## 2025-07-25 RX ORDER — METHOHEXITAL IN WATER/PF 100MG/10ML
SYRINGE (ML) INTRAVENOUS
Status: COMPLETED
Start: 2025-07-25 | End: 2025-07-25

## 2025-07-25 RX ADMIN — Medication 100 MG: at 07:32

## 2025-07-25 RX ADMIN — SODIUM CHLORIDE, POTASSIUM CHLORIDE, SODIUM LACTATE AND CALCIUM CHLORIDE: 600; 310; 30; 20 INJECTION, SOLUTION INTRAVENOUS at 07:28

## 2025-07-25 RX ADMIN — LIDOCAINE HYDROCHLORIDE 1 ML: 10 INJECTION, SOLUTION EPIDURAL; INFILTRATION; INTRACAUDAL; PERINEURAL at 06:27

## 2025-07-25 RX ADMIN — KETOROLAC TROMETHAMINE 30 MG: 30 INJECTION, SOLUTION INTRAMUSCULAR at 07:28

## 2025-07-25 RX ADMIN — ONDANSETRON 4 MG: 2 INJECTION, SOLUTION INTRAMUSCULAR; INTRAVENOUS at 07:28

## 2025-07-25 ASSESSMENT — PAIN - FUNCTIONAL ASSESSMENT
PAIN_FUNCTIONAL_ASSESSMENT: ADULT NONVERBAL PAIN SCALE (NPVS)
PAIN_FUNCTIONAL_ASSESSMENT: NONE - DENIES PAIN

## 2025-07-25 NOTE — PROGRESS NOTES
(postoperative nausea and vomiting)     Schizophrenia (HCC)       Past Surgical History:   Procedure Laterality Date    EYE SURGERY      OTHER SURGICAL HISTORY      ECT    TYMPANOSTOMY TUBE PLACEMENT        Not in a hospital admission.  Allergies   Allergen Reactions    Chocolate Diarrhea      Social History     Tobacco Use    Smoking status: Never    Smokeless tobacco: Never   Substance Use Topics    Alcohol use: No      History reviewed. No pertinent family history.       Objective:       Mental Status Evaluation:  Appearance:  Wearing gown, on stretcher, fair grooming   Behavior:  Engages with interviewer, smiles, childlike    Speech:  Normal rate, volume and happy tone. Talkative   Mood:  \"Good\"   Affect:  Exaggerated   Thought Process:  Loose   Thought Content:  Denies suicidal or homicidal ideation   Sensorium:  Does not appear to attend to internal stimuli   Cognition:  Oriented to person, place and general circumstance   Insight:  fair   Judgment:  fair     Assessment:     Diagnosis: Disorganized schizophrenia     Plan:     Continue frequency of treatment once weekly.    Monitor for stability in symptoms.  Taper treatment frequency as tolerated.     Update consent and H&P every 30 days while undergoing ECT treatment, update labs every 6 months. Patient verbalizes understanding of risks/benefits/alternatives to ECT. Last informed consent signed by legal guardian on 6/27/2025.

## 2025-07-25 NOTE — INTERVAL H&P NOTE
Update History & Physical    The patient's History and Physical of July 18, 2025 was reviewed with the patient and I examined the patient. There was no change. The surgical site was confirmed by the patient and me.       Alistair Hutchison is 30 y.o.,  male, here for ECT treatment.     Last ECT treatment was 7/18/2025. Pt tolerated last treatment well. Patient reports that his symptoms are improving.  Patient has complaints of some short term memory loss    Pt has had multiple failed treatment modalities. Pt has history of Disorganized schizophrenia    Mood is rated at  8/10.  Denies symptoms of: hopelessness, helplessness, low energy.   Patient's sleep has been good. Appetite has been good.   Pt is not suicidal. Pt is not homicidal. Pt denies auditory/visual hallucinations.   Pt has been taking psychiatric medications as prescribed.   Pt does not have any other somatic complaints at this time.      NPO since MN  Anticoagulation status: Denies     Denies personal hx of blood clots.  Denies personal hx of MRSA infection.  PONV.  Denies any personal or family hx of previous complications w/anesthesia.  Cardiopulmonary assessment completed/unchanged    See Nursing flowsheet for vital signs        Electronically signed by GIORGI Rust CNP on 7/25/2025 at 5:58 AM

## 2025-07-25 NOTE — ANESTHESIA POSTPROCEDURE EVALUATION
Department of Anesthesiology  Postprocedure Note    Patient: Alisatir Hutchison  MRN: [id number]  YOB: 1995  Date of evaluation: 7/25/2025    Procedure Summary       Date: 07/25/25 Room / Location: [facility] PACU    Anesthesia Start: 0728 Anesthesia Stop: 0741    Procedure: ECT W/ ANESTHESIA Diagnosis: Disorganized schizophrenia (HCC)    Scheduled Providers:  Responsible Provider: Kristel Alvarado MD    Anesthesia Type: general, TIVA ASA Status: 3            Anesthesia Type: No value filed.    Amalia Phase I: Amalia Score: 10    Amalia Phase II:      Anesthesia Post Evaluation    Comments: POST- ANESTHESIA EVALUATION       Pt Name: Alistair Hutchison  MRN: [id number]  YOB: 1995  Date of evaluation: 7/25/2025  Time:  9:31 AM      /87   Pulse 98   Temp 97.3 °F (36.3 °C)   Resp 17   SpO2 99%      Consciousness Level  Awake  Cardiopulmonary Status  Stable  Pain Adequately Treated YES  Nausea / Vomiting  NO  Adequate Hydration  YES  Anesthesia Related Complications NONE      Electronically signed by Kristel Alvarado MD on 7/25/2025 at 9:31 AM      No notable events documented.

## 2025-07-25 NOTE — PROCEDURES
Bilateral ECT Procedure Report  Alistair Hutchison   7/25/2025  1995         Attending:Corby Drake MD  Preprocedure diagnosis: disorganized schizophrenia  Postprocedure diagnosis: SAME AS PRE-PROCEDURE DIAGNOSIS  Treatment Number: This is treatment #37    Patient Status: Outpatient   Type of ECT: Bilateral Brief Pulse  Medications  Preprocedure: Tylenol 650mg and none  Anesthetic: Methohexital 100 mg  Paralytic: Succinylcholine 120 mg  Post procedure: none needed   Cuff placement: Left Lower Extremity    MECTA Settings 1st Stimulus:     Pulse width: 1.0 ms   Frequency:  60 hz   Duration:   3.0 seconds   Static Impedance: 859 ohms             Dynamic Impedance: 224 ohms             Motor Seizure Duration: 22 seconds  EEG Seizure Duration: 22 seconds             The patient's ECT treatment was performed using MECTA machine  .  Timeout:  Time out was performed using two patient identifiers and confirmation of procedure.     Patient Preparation: Preprocedure documentation, labs, H&P were all verified and reviewed.  The patient was placed in supine position.  EEG leads were placed for monitoring of seizure.   Moravian areas bilaterally cleaned and prepped.  Conductive gel  was applied over stimulus electrode site.   The patient was pre-oxygenated.       Procedure in detail: Medications were administered by anesthesia using intravenous access at the doses listed previously in this summary.  Anesthetic administered and once confirmation the patient is anesthetized, the patient's blood pressure cuff on lower extremity was inflated to 100mmHg in excess of patient's blood pressure.  At this time, the neuromuscular blockade was administered intravenously by anesthesia.  Upper extremity fasciculation were verified followed by lower extremity fasciculation.  Once fasciculations terminated, confirmation of affective neuromuscular blockade demonstrated by absence of withdrawal reflex.  Bite blocks were put in place.

## 2025-07-25 NOTE — ANESTHESIA PRE PROCEDURE
Department of Anesthesiology  Preprocedure Note       Name:  Alistair Hutchison   Age:  30 y.o.  :  1995                                          MRN:  [id number]         Date:  2025      Surgeon: Dr Drake    Procedure: ECT    Medications prior to admission:   Prior to Admission medications    Medication Sig Start Date End Date Taking? Authorizing Provider   paliperidone palmitate ER (INVEGA SUSTENNA) 234 MG/1.5ML FRANCHESKA IM injection Inject 234 mg into the muscle every 21 days   Yes Shan Curran MD   propranolol (INDERAL) 40 MG tablet Take 1 tablet by mouth 2 times daily Takes at 9am and 3:00 pm   Yes Shan Curran MD   hyoscyamine (LEVBID) 0.375 MG extended release tablet Take 1 tablet by mouth 2 times daily   Yes Shan Curran MD   acetaminophen (TYLENOL) 325 MG tablet Take by mouth every 6 hours as needed for Pain   Yes Shan Curran MD   hydrOXYzine HCl (ATARAX) 50 MG tablet Take 1 tablet by mouth 2 times daily Pt takes at noon and 6 pm   Yes Shan Curran MD   cloZAPine (CLOZARIL) 100 MG tablet Take 1 tablet by mouth 3 times daily 24  Yes Corby Drake MD   traZODone (DESYREL) 50 MG tablet Take 1 tablet by mouth nightly as needed for Sleep 10/2/24  Yes Corby Drake MD   Paliperidone (INVEGA PO) Take 1 tablet by mouth daily as needed 3 mg daily prn  Patient not taking: Reported on 2025    Shan Curran MD       Current medications:    Current Outpatient Medications   Medication Sig Dispense Refill    paliperidone palmitate ER (INVEGA SUSTENNA) 234 MG/1.5ML FRANCHESKA IM injection Inject 234 mg into the muscle every 21 days      propranolol (INDERAL) 40 MG tablet Take 1 tablet by mouth 2 times daily Takes at 9am and 3:00 pm      hyoscyamine (LEVBID) 0.375 MG extended release tablet Take 1 tablet by mouth 2 times daily      acetaminophen (TYLENOL) 325 MG tablet Take by mouth every 6 hours as needed for Pain      hydrOXYzine HCl (ATARAX)

## 2025-07-25 NOTE — DISCHARGE INSTRUCTIONS
ELECTROCONVULSIVE THERAPY DISCHARGE INSTRUCTIONS         1. Activity - Rest today. The anesthetic agents you have received can make you feel drowsy or tired.  A responsible person must drive you home and stay with you for 8 hours after discharge from the Hospital. Do not drive a motor vehicle or operate any machinery for 24 hours. .   *Do not make any complex decisions or execute any legal documents until 3 weeks AFTER your last treatment.  If you have any questions regarding this, speak with your psychiatrist first.*    2. Diet - Nothing to eat or drink after midnight the night of your ECT treatment. After ECT, start with clear liquids, if you can drink without coughing, you may proceed with gradually progressing to your usual diet.    No alcoholic beverages for 24 hours.    3. Medications - Take your daily medications as prescribed, after you return home from today's ECT. If you take a Beta Blocker or have been prescribed Imitrex, you may be instructed to take these medications the morning of ECT. If you are unsure please ask the physician.     Take with these medication the morning of ECT with one Tablespoon of water.   Tylenol 650 mg  All blood pressure medications  ***    4. You may experience the following after your treatment:   a. Poor memory   b. Poor balance   c. Headaches   d. Confusion   e. Muscle soreness   f. Nausea      5. Special Precautions - Contact you physician immediately if these occur:   a. Signs of infection: redness, swelling, heat, red streaks at the site of the IV   b. Excessive pain unrelieved by prescription pain medications   c. Nausea and vomiting that persists beyond the first day after your procedure    6. Next Procedure Date:   Your next ECT treatment is scheduled for 7:50 am on Friday, August 1, 2025.  Please arrive to [facility] surgery center by 6:20 am.      If you feel you are having a problem or complication from your ECT treatments and it is during normal business

## 2025-07-30 DIAGNOSIS — F20.1 DISORGANIZED SCHIZOPHRENIA (HCC): Primary | ICD-10-CM

## 2025-07-31 ENCOUNTER — ANESTHESIA EVENT (OUTPATIENT)
Dept: POSTOP/PACU | Age: 30
End: 2025-07-31
Payer: MEDICARE

## 2025-08-01 ENCOUNTER — HOSPITAL ENCOUNTER (OUTPATIENT)
Dept: POSTOP/PACU | Age: 30
Discharge: HOME | End: 2025-08-01
Payer: MEDICARE

## 2025-08-01 ENCOUNTER — ANESTHESIA (OUTPATIENT)
Dept: POSTOP/PACU | Age: 30
End: 2025-08-01
Payer: MEDICARE

## 2025-08-01 VITALS
OXYGEN SATURATION: 98 % | SYSTOLIC BLOOD PRESSURE: 122 MMHG | DIASTOLIC BLOOD PRESSURE: 82 MMHG | RESPIRATION RATE: 15 BRPM | TEMPERATURE: 98.4 F | HEART RATE: 90 BPM

## 2025-08-01 PROCEDURE — 2500000003 HC RX 250 WO HCPCS: Performed by: NURSE ANESTHETIST, CERTIFIED REGISTERED

## 2025-08-01 PROCEDURE — 3700000000 HC ANESTHESIA ATTENDED CARE: Performed by: ANESTHESIOLOGY

## 2025-08-01 PROCEDURE — 90870 ELECTROCONVULSIVE THERAPY: CPT | Performed by: PSYCHIATRY & NEUROLOGY

## 2025-08-01 PROCEDURE — 6360000002 HC RX W HCPCS: Performed by: ANESTHESIOLOGY

## 2025-08-01 PROCEDURE — 90870 ELECTROCONVULSIVE THERAPY: CPT | Performed by: ANESTHESIOLOGY

## 2025-08-01 PROCEDURE — 7100000001 HC PACU RECOVERY - ADDTL 15 MIN: Performed by: ANESTHESIOLOGY

## 2025-08-01 PROCEDURE — 7100000000 HC PACU RECOVERY - FIRST 15 MIN: Performed by: ANESTHESIOLOGY

## 2025-08-01 PROCEDURE — 3700000001 HC ADD 15 MINUTES (ANESTHESIA): Performed by: ANESTHESIOLOGY

## 2025-08-01 PROCEDURE — 6360000002 HC RX W HCPCS: Performed by: NURSE ANESTHETIST, CERTIFIED REGISTERED

## 2025-08-01 PROCEDURE — 2580000003 HC RX 258: Performed by: ANESTHESIOLOGY

## 2025-08-01 RX ORDER — SUCCINYLCHOLINE/SOD CL,ISO/PF 200MG/10ML
SYRINGE (ML) INTRAVENOUS
Status: DISCONTINUED | OUTPATIENT
Start: 2025-08-01 | End: 2025-08-01 | Stop reason: SDUPTHER

## 2025-08-01 RX ORDER — METHOHEXITAL IN WATER/PF 100MG/10ML
SYRINGE (ML) INTRAVENOUS
Status: COMPLETED
Start: 2025-08-01 | End: 2025-08-01

## 2025-08-01 RX ORDER — SODIUM CHLORIDE 0.9 % (FLUSH) 0.9 %
5-40 SYRINGE (ML) INJECTION PRN
Status: DISCONTINUED | OUTPATIENT
Start: 2025-08-01 | End: 2025-08-02 | Stop reason: HOSPADM

## 2025-08-01 RX ORDER — KETOROLAC TROMETHAMINE 30 MG/ML
INJECTION, SOLUTION INTRAMUSCULAR; INTRAVENOUS
Status: DISCONTINUED | OUTPATIENT
Start: 2025-08-01 | End: 2025-08-01 | Stop reason: SDUPTHER

## 2025-08-01 RX ORDER — SUCCINYLCHOLINE/SOD CL,ISO/PF 200MG/10ML
SYRINGE (ML) INTRAVENOUS
Status: COMPLETED
Start: 2025-08-01 | End: 2025-08-01

## 2025-08-01 RX ORDER — SODIUM CHLORIDE, SODIUM LACTATE, POTASSIUM CHLORIDE, CALCIUM CHLORIDE 600; 310; 30; 20 MG/100ML; MG/100ML; MG/100ML; MG/100ML
INJECTION, SOLUTION INTRAVENOUS CONTINUOUS
Status: DISCONTINUED | OUTPATIENT
Start: 2025-08-01 | End: 2025-08-02 | Stop reason: HOSPADM

## 2025-08-01 RX ORDER — METHOHEXITAL IN WATER/PF 100MG/10ML
SYRINGE (ML) INTRAVENOUS
Status: DISCONTINUED | OUTPATIENT
Start: 2025-08-01 | End: 2025-08-01 | Stop reason: SDUPTHER

## 2025-08-01 RX ORDER — SODIUM CHLORIDE 9 MG/ML
INJECTION, SOLUTION INTRAVENOUS PRN
Status: DISCONTINUED | OUTPATIENT
Start: 2025-08-01 | End: 2025-08-02 | Stop reason: HOSPADM

## 2025-08-01 RX ORDER — SODIUM CHLORIDE 0.9 % (FLUSH) 0.9 %
5-40 SYRINGE (ML) INJECTION EVERY 12 HOURS SCHEDULED
Status: DISCONTINUED | OUTPATIENT
Start: 2025-08-01 | End: 2025-08-02 | Stop reason: HOSPADM

## 2025-08-01 RX ORDER — LIDOCAINE HYDROCHLORIDE 10 MG/ML
1 INJECTION, SOLUTION EPIDURAL; INFILTRATION; INTRACAUDAL; PERINEURAL
Status: COMPLETED | OUTPATIENT
Start: 2025-08-01 | End: 2025-08-01

## 2025-08-01 RX ORDER — ONDANSETRON 2 MG/ML
INJECTION INTRAMUSCULAR; INTRAVENOUS
Status: DISCONTINUED | OUTPATIENT
Start: 2025-08-01 | End: 2025-08-01 | Stop reason: SDUPTHER

## 2025-08-01 RX ADMIN — KETOROLAC TROMETHAMINE 30 MG: 30 INJECTION, SOLUTION INTRAMUSCULAR at 07:06

## 2025-08-01 RX ADMIN — ONDANSETRON 4 MG: 2 INJECTION, SOLUTION INTRAMUSCULAR; INTRAVENOUS at 07:06

## 2025-08-01 RX ADMIN — Medication 100 MG: at 07:10

## 2025-08-01 RX ADMIN — LIDOCAINE HYDROCHLORIDE 1 ML: 10 INJECTION, SOLUTION EPIDURAL; INFILTRATION; INTRACAUDAL; PERINEURAL at 06:42

## 2025-08-01 RX ADMIN — Medication 120 MG: at 07:11

## 2025-08-01 RX ADMIN — SODIUM CHLORIDE, SODIUM LACTATE, POTASSIUM CHLORIDE, AND CALCIUM CHLORIDE: .6; .31; .03; .02 INJECTION, SOLUTION INTRAVENOUS at 06:42

## 2025-08-01 ASSESSMENT — PAIN - FUNCTIONAL ASSESSMENT: PAIN_FUNCTIONAL_ASSESSMENT: NONE - DENIES PAIN

## 2025-08-01 NOTE — PROGRESS NOTES
Pre ECT Assessment Note  Psychiatry  08/01/25       Alistair Hutchison  1995  [id number]      Subjective:     Patient is a 30 y.o.  male seen for an evaluation prior to today's electroconvulsive therapy treatment. Today is treatment number 38, utilizing bilateral. He received 12 RU treatments during this course prior to transitioning to bilateral. This is course number 2. The patient has previously completed a course of bilateral ECT for a total of 18 treatments that ended on 11/20/2024.     Last ECT treatment 7/25/2025.    William was seen at the bedside prior to ECT treatments with his grandfather present. He was pleasant and cooperative today.  He denies any suicidal thoughts.  Reports mood is \"good\".  He reports continued outbursts at home and can be quite confrontational with his grandfather.  This has been an ongoing issue, and patient did attempt to engage in therapy to help with behavioral component.  However, due to his developmental disability, he was unable to fully comprehend therapy topics and utilize coping mechanisms.  Patient's grandmother states that ECT has been extremely helpful in maintaining agitation as well as keeping auditory and visual hallucinations less severe.  Patient is not engaging as much in self talk and appears to be less internally preoccupied at home.  He has been less physically agitated and has not required inpatient admission to [facility] since December 2024.  In the past, attempts have been made to taper frequency of treatments to once every 2 weeks, but he decompensated.  For now, plan to continue ECT once weekly for safety and stability.    Patient Active Problem List    Diagnosis Date Noted    Developmental delay 03/21/2023    Acute psychosis (HCC) 03/20/2023    Disorganized schizophrenia, subchronic condition (HCC) 12/11/2024    Disorganized schizophrenia (HCC) 09/20/2024    Hyperthyroidism, subclinical 08/22/2024    Moderate mixed hyperlipidemia not requiring statin

## 2025-08-01 NOTE — INTERVAL H&P NOTE
Update History & Physical    The patient's History and Physical of July 18, 2025 was reviewed with the patient and I examined the patient. There was no change. The surgical site was confirmed by the patient and me.       Alistair Hutchison is 30 y.o.,  male, here for ECT treatment.     Last ECT treatment was 7/25/2025. Pt tolerated last treatment well. Patient reports that his symptoms are improving.  Patient has complaints of some short term memory loss    Pt has had multiple failed treatment modalities. Pt has history of Disorganized schizophrenia    Mood is rated at  10/10.  Denies symptoms of: hopelessness, helplessness, low energy.   Patient's sleep has been good. Appetite has been good.   Pt is not suicidal. Pt is not homicidal. Pt denies auditory/visual hallucinations.   Pt has been taking psychiatric medications as prescribed.   Pt does not have any other somatic complaints at this time.        NPO status: NPO since MN  Anticoagulation status: Denies     Denies personal hx of blood clots.  Denies personal hx of MRSA infection.  PONV.  Denies any personal or family hx of previous complications w/anesthesia.  Medical history reviewed  Cardiopulmonary assessment completed: unchanged  See nursing flowsheet for vital signs    Electronically signed by GIORGI Rust CNP on 8/1/2025 at 6:34 AM

## 2025-08-01 NOTE — ANESTHESIA POSTPROCEDURE EVALUATION
Department of Anesthesiology  Postprocedure Note    Patient: Alistair Hutchison  MRN: [id number]  YOB: 1995  Date of evaluation: 8/1/2025    Procedure Summary       Date: 08/01/25 Room / Location: [facility] PACU    Anesthesia Start: 0704 Anesthesia Stop: 0720    Procedure: ECT W/ ANESTHESIA Diagnosis: Disorganized schizophrenia (HCC)    Scheduled Providers:  Responsible Provider: Barby Leonard MD    Anesthesia Type: General ASA Status: 3            Anesthesia Type: General    Amalia Phase I: Amalia Score: 10    Amalia Phase II:      Anesthesia Post Evaluation    Comments: POST- ANESTHESIA EVALUATION       Pt Name: Alistair Hutchison  MRN: [id number]  YOB: 1995  Date of evaluation: 8/1/2025  Time:  8:31 AM      /82   Pulse 90   Temp 98.4 °F (36.9 °C)   Resp 15   SpO2 98%      Consciousness Level  Awake  Cardiopulmonary Status  Stable  Pain Adequately Treated YES  Nausea / Vomiting  NO  Adequate Hydration  YES  Anesthesia Related Complications NONE      Electronically signed by Barby Leonard MD on 8/1/2025 at 8:31 AM           No notable events documented.

## 2025-08-01 NOTE — ANESTHESIA PRE PROCEDURE
Department of Anesthesiology  Preprocedure Note       Name:  Alistair Hutchison   Age:  30 y.o.  :  1995                                          MRN:  [id number]         Date:  2025      Surgeon: Dr Drake    Procedure: ECT    Medications prior to admission:   Prior to Admission medications    Medication Sig Start Date End Date Taking? Authorizing Provider   paliperidone palmitate ER (INVEGA SUSTENNA) 234 MG/1.5ML FRANCHESKA IM injection Inject 234 mg into the muscle every 21 days   Yes Shan Curran MD   propranolol (INDERAL) 40 MG tablet Take 1 tablet by mouth 2 times daily Takes at 9am and 3:00 pm   Yes Shan Curran MD   hyoscyamine (LEVBID) 0.375 MG extended release tablet Take 1 tablet by mouth 2 times daily   Yes Shan Curran MD   acetaminophen (TYLENOL) 325 MG tablet Take by mouth every 6 hours as needed for Pain   Yes Shan Curran MD   hydrOXYzine HCl (ATARAX) 50 MG tablet Take 1 tablet by mouth 2 times daily Pt takes at noon and 6 pm   Yes Shan Curran MD   cloZAPine (CLOZARIL) 100 MG tablet Take 1 tablet by mouth 3 times daily 24  Yes Corby Drake MD   traZODone (DESYREL) 50 MG tablet Take 1 tablet by mouth nightly as needed for Sleep 10/2/24  Yes Corby Drake MD   Paliperidone (INVEGA PO) Take 1 tablet by mouth daily as needed 3 mg daily prn  Patient not taking: Reported on 2025    Shan Curran MD       Current medications:    Current Outpatient Medications   Medication Sig Dispense Refill    paliperidone palmitate ER (INVEGA SUSTENNA) 234 MG/1.5ML FRANCHESKA IM injection Inject 234 mg into the muscle every 21 days      propranolol (INDERAL) 40 MG tablet Take 1 tablet by mouth 2 times daily Takes at 9am and 3:00 pm      hyoscyamine (LEVBID) 0.375 MG extended release tablet Take 1 tablet by mouth 2 times daily      acetaminophen (TYLENOL) 325 MG tablet Take by mouth every 6 hours as needed for Pain      hydrOXYzine HCl (ATARAX) 50

## 2025-08-01 NOTE — PROCEDURES
Bilateral ECT Procedure Report  Alistair Hutchison   8/1/2025  1995         Attending:Corby Drake MD  Preprocedure diagnosis: disorganized schizophrenia  Postprocedure diagnosis: SAME AS PRE-PROCEDURE DIAGNOSIS  Treatment Number: This is treatment #38    Patient Status: Outpatient   Type of ECT: Bilateral Brief Pulse  Medications  Preprocedure: Tylenol 650mg and none  Anesthetic: Methohexital 100 mg  Paralytic: Succinylcholine 120 mg  Post procedure: none needed   Cuff placement: Left Lower Extremity    MECTA Settings 1st Stimulus:     Pulse width: 1.0 ms   Frequency:  60 hz   Duration:   3.0 seconds   Static Impedance: 556 ohms             Dynamic Impedance: 212 ohms             Motor Seizure Duration: 25 seconds  EEG Seizure Duration: 25 seconds             The patient's ECT treatment was performed using MECTA machine  .  Timeout:  Time out was performed using two patient identifiers and confirmation of procedure.     Patient Preparation: Preprocedure documentation, labs, H&P were all verified and reviewed.  The patient was placed in supine position.  EEG leads were placed for monitoring of seizure.   Tenriism areas bilaterally cleaned and prepped.  Conductive gel  was applied over stimulus electrode site.   The patient was pre-oxygenated.       Procedure in detail: Medications were administered by anesthesia using intravenous access at the doses listed previously in this summary.  Anesthetic administered and once confirmation the patient is anesthetized, the patient's blood pressure cuff on lower extremity was inflated to 100mmHg in excess of patient's blood pressure.  At this time, the neuromuscular blockade was administered intravenously by anesthesia.  Upper extremity fasciculation were verified followed by lower extremity fasciculation.  Once fasciculations terminated, confirmation of affective neuromuscular blockade demonstrated by absence of withdrawal reflex.  Bite blocks were put in place.

## 2025-08-04 DIAGNOSIS — F20.1 DISORGANIZED SCHIZOPHRENIA (HCC): Primary | ICD-10-CM

## 2025-08-06 DIAGNOSIS — F20.1 DISORGANIZED SCHIZOPHRENIA (HCC): Primary | ICD-10-CM

## 2025-08-08 ENCOUNTER — HOSPITAL ENCOUNTER (OUTPATIENT)
Dept: POSTOP/PACU | Age: 30
Discharge: HOME | End: 2025-08-08

## 2025-08-08 VITALS
DIASTOLIC BLOOD PRESSURE: 79 MMHG | OXYGEN SATURATION: 96 % | WEIGHT: 199 LBS | TEMPERATURE: 98.1 F | BODY MASS INDEX: 33.15 KG/M2 | RESPIRATION RATE: 18 BRPM | SYSTOLIC BLOOD PRESSURE: 126 MMHG | HEIGHT: 65 IN

## 2025-08-08 RX ORDER — SODIUM CHLORIDE, SODIUM LACTATE, POTASSIUM CHLORIDE, CALCIUM CHLORIDE 600; 310; 30; 20 MG/100ML; MG/100ML; MG/100ML; MG/100ML
INJECTION, SOLUTION INTRAVENOUS CONTINUOUS
Status: DISCONTINUED | OUTPATIENT
Start: 2025-08-08 | End: 2025-08-09 | Stop reason: HOSPADM

## 2025-08-08 RX ORDER — ONDANSETRON 2 MG/ML
INJECTION INTRAMUSCULAR; INTRAVENOUS
Status: DISPENSED
Start: 2025-08-08 | End: 2025-08-08

## 2025-08-08 RX ORDER — SODIUM CHLORIDE 9 MG/ML
INJECTION, SOLUTION INTRAVENOUS PRN
Status: DISCONTINUED | OUTPATIENT
Start: 2025-08-08 | End: 2025-08-09 | Stop reason: HOSPADM

## 2025-08-08 RX ORDER — LIDOCAINE HYDROCHLORIDE 10 MG/ML
1 INJECTION, SOLUTION EPIDURAL; INFILTRATION; INTRACAUDAL; PERINEURAL
Status: DISCONTINUED | OUTPATIENT
Start: 2025-08-08 | End: 2025-08-09 | Stop reason: HOSPADM

## 2025-08-08 RX ORDER — SODIUM CHLORIDE 0.9 % (FLUSH) 0.9 %
5-40 SYRINGE (ML) INJECTION EVERY 12 HOURS SCHEDULED
Status: DISCONTINUED | OUTPATIENT
Start: 2025-08-08 | End: 2025-08-09 | Stop reason: HOSPADM

## 2025-08-08 RX ORDER — METHOHEXITAL IN WATER/PF 100MG/10ML
SYRINGE (ML) INTRAVENOUS
Status: DISPENSED
Start: 2025-08-08 | End: 2025-08-08

## 2025-08-08 RX ORDER — KETOROLAC TROMETHAMINE 30 MG/ML
INJECTION, SOLUTION INTRAMUSCULAR; INTRAVENOUS
Status: DISPENSED
Start: 2025-08-08 | End: 2025-08-08

## 2025-08-08 RX ORDER — SUCCINYLCHOLINE/SOD CL,ISO/PF 200MG/10ML
SYRINGE (ML) INTRAVENOUS
Status: DISPENSED
Start: 2025-08-08 | End: 2025-08-08

## 2025-08-08 RX ORDER — SODIUM CHLORIDE 0.9 % (FLUSH) 0.9 %
5-40 SYRINGE (ML) INJECTION PRN
Status: DISCONTINUED | OUTPATIENT
Start: 2025-08-08 | End: 2025-08-09 | Stop reason: HOSPADM

## 2025-08-08 ASSESSMENT — PAIN - FUNCTIONAL ASSESSMENT: PAIN_FUNCTIONAL_ASSESSMENT: 0-10

## 2025-08-14 ENCOUNTER — ANESTHESIA EVENT (OUTPATIENT)
Dept: POSTOP/PACU | Age: 30
End: 2025-08-14
Payer: MEDICARE

## 2025-08-15 ENCOUNTER — ANESTHESIA (OUTPATIENT)
Dept: POSTOP/PACU | Age: 30
End: 2025-08-15
Payer: MEDICARE

## 2025-08-15 ENCOUNTER — HOSPITAL ENCOUNTER (OUTPATIENT)
Dept: POSTOP/PACU | Age: 30
Discharge: HOME | End: 2025-08-15
Payer: MEDICARE

## 2025-08-15 VITALS
TEMPERATURE: 98.1 F | HEIGHT: 65 IN | OXYGEN SATURATION: 99 % | SYSTOLIC BLOOD PRESSURE: 137 MMHG | RESPIRATION RATE: 16 BRPM | HEART RATE: 85 BPM | WEIGHT: 199 LBS | BODY MASS INDEX: 33.15 KG/M2 | DIASTOLIC BLOOD PRESSURE: 81 MMHG

## 2025-08-15 PROCEDURE — 2580000003 HC RX 258: Performed by: ANESTHESIOLOGY

## 2025-08-15 PROCEDURE — 6360000002 HC RX W HCPCS: Performed by: ANESTHESIOLOGY

## 2025-08-15 PROCEDURE — 2500000003 HC RX 250 WO HCPCS: Performed by: NURSE ANESTHETIST, CERTIFIED REGISTERED

## 2025-08-15 PROCEDURE — 3700000000 HC ANESTHESIA ATTENDED CARE: Performed by: ANESTHESIOLOGY

## 2025-08-15 PROCEDURE — 7100000001 HC PACU RECOVERY - ADDTL 15 MIN: Performed by: ANESTHESIOLOGY

## 2025-08-15 PROCEDURE — 7100000000 HC PACU RECOVERY - FIRST 15 MIN: Performed by: ANESTHESIOLOGY

## 2025-08-15 PROCEDURE — 90870 ELECTROCONVULSIVE THERAPY: CPT | Performed by: ANESTHESIOLOGY

## 2025-08-15 PROCEDURE — 90870 ELECTROCONVULSIVE THERAPY: CPT | Performed by: PSYCHIATRY & NEUROLOGY

## 2025-08-15 PROCEDURE — 6360000002 HC RX W HCPCS: Performed by: NURSE ANESTHETIST, CERTIFIED REGISTERED

## 2025-08-15 RX ORDER — METHOHEXITAL IN WATER/PF 100MG/10ML
SYRINGE (ML) INTRAVENOUS
Status: DISCONTINUED | OUTPATIENT
Start: 2025-08-15 | End: 2025-08-15 | Stop reason: SDUPTHER

## 2025-08-15 RX ORDER — METHOHEXITAL IN WATER/PF 100MG/10ML
SYRINGE (ML) INTRAVENOUS
Status: COMPLETED
Start: 2025-08-15 | End: 2025-08-15

## 2025-08-15 RX ORDER — SODIUM CHLORIDE 0.9 % (FLUSH) 0.9 %
5-40 SYRINGE (ML) INJECTION PRN
Status: DISCONTINUED | OUTPATIENT
Start: 2025-08-15 | End: 2025-08-16 | Stop reason: HOSPADM

## 2025-08-15 RX ORDER — KETOROLAC TROMETHAMINE 30 MG/ML
INJECTION, SOLUTION INTRAMUSCULAR; INTRAVENOUS
Status: COMPLETED
Start: 2025-08-15 | End: 2025-08-15

## 2025-08-15 RX ORDER — SODIUM CHLORIDE, SODIUM LACTATE, POTASSIUM CHLORIDE, CALCIUM CHLORIDE 600; 310; 30; 20 MG/100ML; MG/100ML; MG/100ML; MG/100ML
INJECTION, SOLUTION INTRAVENOUS CONTINUOUS
Status: DISCONTINUED | OUTPATIENT
Start: 2025-08-15 | End: 2025-08-16 | Stop reason: HOSPADM

## 2025-08-15 RX ORDER — ONDANSETRON 2 MG/ML
INJECTION INTRAMUSCULAR; INTRAVENOUS
Status: DISCONTINUED | OUTPATIENT
Start: 2025-08-15 | End: 2025-08-15 | Stop reason: SDUPTHER

## 2025-08-15 RX ORDER — ONDANSETRON 2 MG/ML
INJECTION INTRAMUSCULAR; INTRAVENOUS
Status: COMPLETED
Start: 2025-08-15 | End: 2025-08-15

## 2025-08-15 RX ORDER — SODIUM CHLORIDE 0.9 % (FLUSH) 0.9 %
5-40 SYRINGE (ML) INJECTION EVERY 12 HOURS SCHEDULED
Status: DISCONTINUED | OUTPATIENT
Start: 2025-08-15 | End: 2025-08-16 | Stop reason: HOSPADM

## 2025-08-15 RX ORDER — KETOROLAC TROMETHAMINE 30 MG/ML
INJECTION, SOLUTION INTRAMUSCULAR; INTRAVENOUS
Status: DISCONTINUED | OUTPATIENT
Start: 2025-08-15 | End: 2025-08-15 | Stop reason: SDUPTHER

## 2025-08-15 RX ORDER — LIDOCAINE HYDROCHLORIDE 10 MG/ML
1 INJECTION, SOLUTION EPIDURAL; INFILTRATION; INTRACAUDAL; PERINEURAL
Status: COMPLETED | OUTPATIENT
Start: 2025-08-15 | End: 2025-08-15

## 2025-08-15 RX ORDER — SUCCINYLCHOLINE/SOD CL,ISO/PF 200MG/10ML
SYRINGE (ML) INTRAVENOUS
Status: DISCONTINUED | OUTPATIENT
Start: 2025-08-15 | End: 2025-08-15 | Stop reason: SDUPTHER

## 2025-08-15 RX ORDER — SODIUM CHLORIDE 9 MG/ML
INJECTION, SOLUTION INTRAVENOUS PRN
Status: DISCONTINUED | OUTPATIENT
Start: 2025-08-15 | End: 2025-08-16 | Stop reason: HOSPADM

## 2025-08-15 RX ADMIN — ONDANSETRON 4 MG: 2 INJECTION, SOLUTION INTRAMUSCULAR; INTRAVENOUS at 07:41

## 2025-08-15 RX ADMIN — Medication 120 MG: at 07:43

## 2025-08-15 RX ADMIN — LIDOCAINE HYDROCHLORIDE 1 ML: 10 INJECTION, SOLUTION EPIDURAL; INFILTRATION; INTRACAUDAL; PERINEURAL at 06:56

## 2025-08-15 RX ADMIN — SODIUM CHLORIDE, POTASSIUM CHLORIDE, SODIUM LACTATE AND CALCIUM CHLORIDE: 600; 310; 30; 20 INJECTION, SOLUTION INTRAVENOUS at 06:56

## 2025-08-15 RX ADMIN — KETOROLAC TROMETHAMINE 30 MG: 30 INJECTION, SOLUTION INTRAMUSCULAR at 07:41

## 2025-08-15 RX ADMIN — Medication 100 MG: at 07:42

## 2025-08-15 ASSESSMENT — PAIN - FUNCTIONAL ASSESSMENT
PAIN_FUNCTIONAL_ASSESSMENT: 0-10
PAIN_FUNCTIONAL_ASSESSMENT: 0-10

## 2025-08-21 ENCOUNTER — ANESTHESIA EVENT (OUTPATIENT)
Dept: POSTOP/PACU | Age: 30
End: 2025-08-21
Payer: MEDICARE

## 2025-08-22 ENCOUNTER — HOSPITAL ENCOUNTER (OUTPATIENT)
Dept: POSTOP/PACU | Age: 30
Discharge: HOME | End: 2025-08-22
Payer: MEDICARE

## 2025-08-22 ENCOUNTER — ANESTHESIA (OUTPATIENT)
Dept: POSTOP/PACU | Age: 30
End: 2025-08-22
Payer: MEDICARE

## 2025-08-22 VITALS
SYSTOLIC BLOOD PRESSURE: 132 MMHG | TEMPERATURE: 97.8 F | BODY MASS INDEX: 33.15 KG/M2 | HEIGHT: 65 IN | RESPIRATION RATE: 15 BRPM | DIASTOLIC BLOOD PRESSURE: 92 MMHG | WEIGHT: 199 LBS | OXYGEN SATURATION: 99 % | HEART RATE: 95 BPM

## 2025-08-22 DIAGNOSIS — F20.1 DISORGANIZED SCHIZOPHRENIA (HCC): Primary | ICD-10-CM

## 2025-08-22 PROCEDURE — 6360000002 HC RX W HCPCS: Performed by: ANESTHESIOLOGY

## 2025-08-22 PROCEDURE — 3700000001 HC ADD 15 MINUTES (ANESTHESIA): Performed by: ANESTHESIOLOGY

## 2025-08-22 PROCEDURE — 3700000000 HC ANESTHESIA ATTENDED CARE: Performed by: ANESTHESIOLOGY

## 2025-08-22 PROCEDURE — 6360000002 HC RX W HCPCS: Performed by: NURSE ANESTHETIST, CERTIFIED REGISTERED

## 2025-08-22 PROCEDURE — 7100000000 HC PACU RECOVERY - FIRST 15 MIN: Performed by: ANESTHESIOLOGY

## 2025-08-22 PROCEDURE — 7100000001 HC PACU RECOVERY - ADDTL 15 MIN: Performed by: ANESTHESIOLOGY

## 2025-08-22 PROCEDURE — 2580000003 HC RX 258: Performed by: ANESTHESIOLOGY

## 2025-08-22 PROCEDURE — 90870 ELECTROCONVULSIVE THERAPY: CPT | Performed by: ANESTHESIOLOGY

## 2025-08-22 PROCEDURE — 2500000003 HC RX 250 WO HCPCS: Performed by: NURSE ANESTHETIST, CERTIFIED REGISTERED

## 2025-08-22 RX ORDER — ONDANSETRON 2 MG/ML
INJECTION INTRAMUSCULAR; INTRAVENOUS
Status: COMPLETED
Start: 2025-08-22 | End: 2025-08-22

## 2025-08-22 RX ORDER — SUCCINYLCHOLINE/SOD CL,ISO/PF 200MG/10ML
SYRINGE (ML) INTRAVENOUS
Status: DISCONTINUED | OUTPATIENT
Start: 2025-08-22 | End: 2025-08-22 | Stop reason: SDUPTHER

## 2025-08-22 RX ORDER — METHOHEXITAL IN WATER/PF 100MG/10ML
SYRINGE (ML) INTRAVENOUS
Status: COMPLETED
Start: 2025-08-22 | End: 2025-08-22

## 2025-08-22 RX ORDER — SODIUM CHLORIDE 0.9 % (FLUSH) 0.9 %
5-40 SYRINGE (ML) INJECTION PRN
Status: DISCONTINUED | OUTPATIENT
Start: 2025-08-22 | End: 2025-08-23 | Stop reason: HOSPADM

## 2025-08-22 RX ORDER — ONDANSETRON 2 MG/ML
INJECTION INTRAMUSCULAR; INTRAVENOUS
Status: DISCONTINUED | OUTPATIENT
Start: 2025-08-22 | End: 2025-08-22 | Stop reason: SDUPTHER

## 2025-08-22 RX ORDER — LIDOCAINE HYDROCHLORIDE 10 MG/ML
1 INJECTION, SOLUTION EPIDURAL; INFILTRATION; INTRACAUDAL; PERINEURAL
Status: COMPLETED | OUTPATIENT
Start: 2025-08-22 | End: 2025-08-22

## 2025-08-22 RX ORDER — SODIUM CHLORIDE 9 MG/ML
INJECTION, SOLUTION INTRAVENOUS PRN
Status: DISCONTINUED | OUTPATIENT
Start: 2025-08-22 | End: 2025-08-23 | Stop reason: HOSPADM

## 2025-08-22 RX ORDER — KETOROLAC TROMETHAMINE 30 MG/ML
INJECTION, SOLUTION INTRAMUSCULAR; INTRAVENOUS
Status: DISCONTINUED | OUTPATIENT
Start: 2025-08-22 | End: 2025-08-22 | Stop reason: SDUPTHER

## 2025-08-22 RX ORDER — SUCCINYLCHOLINE/SOD CL,ISO/PF 200MG/10ML
SYRINGE (ML) INTRAVENOUS
Status: COMPLETED
Start: 2025-08-22 | End: 2025-08-22

## 2025-08-22 RX ORDER — METHOHEXITAL IN WATER/PF 100MG/10ML
SYRINGE (ML) INTRAVENOUS
Status: DISCONTINUED | OUTPATIENT
Start: 2025-08-22 | End: 2025-08-22 | Stop reason: SDUPTHER

## 2025-08-22 RX ORDER — SODIUM CHLORIDE, SODIUM LACTATE, POTASSIUM CHLORIDE, CALCIUM CHLORIDE 600; 310; 30; 20 MG/100ML; MG/100ML; MG/100ML; MG/100ML
INJECTION, SOLUTION INTRAVENOUS CONTINUOUS
Status: DISCONTINUED | OUTPATIENT
Start: 2025-08-22 | End: 2025-08-23 | Stop reason: HOSPADM

## 2025-08-22 RX ORDER — SODIUM CHLORIDE 0.9 % (FLUSH) 0.9 %
5-40 SYRINGE (ML) INJECTION EVERY 12 HOURS SCHEDULED
Status: DISCONTINUED | OUTPATIENT
Start: 2025-08-22 | End: 2025-08-23 | Stop reason: HOSPADM

## 2025-08-22 RX ADMIN — LIDOCAINE HYDROCHLORIDE 1 ML: 10 INJECTION, SOLUTION EPIDURAL; INFILTRATION; INTRACAUDAL; PERINEURAL at 08:03

## 2025-08-22 RX ADMIN — Medication 100 MG: at 08:15

## 2025-08-22 RX ADMIN — KETOROLAC TROMETHAMINE 15 MG: 30 INJECTION, SOLUTION INTRAMUSCULAR at 08:12

## 2025-08-22 RX ADMIN — ONDANSETRON 4 MG: 2 INJECTION, SOLUTION INTRAMUSCULAR; INTRAVENOUS at 08:14

## 2025-08-22 RX ADMIN — Medication 120 MG: at 08:15

## 2025-08-22 RX ADMIN — SODIUM CHLORIDE, SODIUM LACTATE, POTASSIUM CHLORIDE, AND CALCIUM CHLORIDE: .6; .31; .03; .02 INJECTION, SOLUTION INTRAVENOUS at 08:02

## 2025-08-22 ASSESSMENT — PAIN SCALES - GENERAL
PAINLEVEL_OUTOF10: 0
PAINLEVEL_OUTOF10: 0

## 2025-08-22 ASSESSMENT — ENCOUNTER SYMPTOMS
BACK PAIN: 0
COUGH: 0
SORE THROAT: 0
APNEA: 0
GASTROINTESTINAL NEGATIVE: 1
SHORTNESS OF BREATH: 0
TROUBLE SWALLOWING: 0
EYES NEGATIVE: 1
ABDOMINAL PAIN: 0

## 2025-08-22 ASSESSMENT — PAIN - FUNCTIONAL ASSESSMENT
PAIN_FUNCTIONAL_ASSESSMENT: 0-10
PAIN_FUNCTIONAL_ASSESSMENT: 0-10

## 2025-08-28 ENCOUNTER — ANESTHESIA EVENT (OUTPATIENT)
Dept: POSTOP/PACU | Age: 30
End: 2025-08-28
Payer: MEDICARE

## 2025-08-29 ENCOUNTER — ANESTHESIA (OUTPATIENT)
Dept: POSTOP/PACU | Age: 30
End: 2025-08-29
Payer: MEDICARE

## 2025-08-29 ENCOUNTER — HOSPITAL ENCOUNTER (OUTPATIENT)
Dept: POSTOP/PACU | Age: 30
Discharge: HOME | End: 2025-08-29
Payer: MEDICARE

## 2025-08-29 VITALS
OXYGEN SATURATION: 99 % | SYSTOLIC BLOOD PRESSURE: 128 MMHG | TEMPERATURE: 98.5 F | HEART RATE: 97 BPM | RESPIRATION RATE: 18 BRPM | DIASTOLIC BLOOD PRESSURE: 102 MMHG

## 2025-08-29 DIAGNOSIS — F20.1 DISORGANIZED SCHIZOPHRENIA (HCC): Primary | ICD-10-CM

## 2025-08-29 PROCEDURE — 6360000002 HC RX W HCPCS: Performed by: ANESTHESIOLOGY

## 2025-08-29 PROCEDURE — APPNB15 APP NON BILLABLE TIME 0-15 MINS: Performed by: NURSE PRACTITIONER

## 2025-08-29 PROCEDURE — 2580000003 HC RX 258: Performed by: ANESTHESIOLOGY

## 2025-08-29 PROCEDURE — 3700000000 HC ANESTHESIA ATTENDED CARE: Performed by: ANESTHESIOLOGY

## 2025-08-29 PROCEDURE — 7100000000 HC PACU RECOVERY - FIRST 15 MIN: Performed by: ANESTHESIOLOGY

## 2025-08-29 PROCEDURE — 90870 ELECTROCONVULSIVE THERAPY: CPT | Performed by: ANESTHESIOLOGY

## 2025-08-29 PROCEDURE — 7100000001 HC PACU RECOVERY - ADDTL 15 MIN: Performed by: ANESTHESIOLOGY

## 2025-08-29 PROCEDURE — 2500000003 HC RX 250 WO HCPCS: Performed by: NURSE ANESTHETIST, CERTIFIED REGISTERED

## 2025-08-29 PROCEDURE — 6360000002 HC RX W HCPCS: Performed by: NURSE ANESTHETIST, CERTIFIED REGISTERED

## 2025-08-29 RX ORDER — ONDANSETRON 2 MG/ML
INJECTION INTRAMUSCULAR; INTRAVENOUS
Status: DISCONTINUED | OUTPATIENT
Start: 2025-08-29 | End: 2025-08-29 | Stop reason: SDUPTHER

## 2025-08-29 RX ORDER — ONDANSETRON 2 MG/ML
INJECTION INTRAMUSCULAR; INTRAVENOUS
Status: COMPLETED
Start: 2025-08-29 | End: 2025-08-29

## 2025-08-29 RX ORDER — SODIUM CHLORIDE 9 MG/ML
INJECTION, SOLUTION INTRAVENOUS PRN
Status: DISCONTINUED | OUTPATIENT
Start: 2025-08-29 | End: 2025-08-30 | Stop reason: HOSPADM

## 2025-08-29 RX ORDER — KETOROLAC TROMETHAMINE 30 MG/ML
INJECTION, SOLUTION INTRAMUSCULAR; INTRAVENOUS
Status: DISCONTINUED | OUTPATIENT
Start: 2025-08-29 | End: 2025-08-29 | Stop reason: SDUPTHER

## 2025-08-29 RX ORDER — SUCCINYLCHOLINE/SOD CL,ISO/PF 200MG/10ML
SYRINGE (ML) INTRAVENOUS
Status: DISCONTINUED | OUTPATIENT
Start: 2025-08-29 | End: 2025-08-29 | Stop reason: SDUPTHER

## 2025-08-29 RX ORDER — METHOHEXITAL IN WATER/PF 100MG/10ML
SYRINGE (ML) INTRAVENOUS
Status: DISCONTINUED | OUTPATIENT
Start: 2025-08-29 | End: 2025-08-29 | Stop reason: SDUPTHER

## 2025-08-29 RX ORDER — SODIUM CHLORIDE 0.9 % (FLUSH) 0.9 %
5-40 SYRINGE (ML) INJECTION PRN
Status: DISCONTINUED | OUTPATIENT
Start: 2025-08-29 | End: 2025-08-30 | Stop reason: HOSPADM

## 2025-08-29 RX ORDER — SUCCINYLCHOLINE/SOD CL,ISO/PF 200MG/10ML
SYRINGE (ML) INTRAVENOUS
Status: COMPLETED
Start: 2025-08-29 | End: 2025-08-29

## 2025-08-29 RX ORDER — METHOHEXITAL IN WATER/PF 100MG/10ML
SYRINGE (ML) INTRAVENOUS
Status: COMPLETED
Start: 2025-08-29 | End: 2025-08-29

## 2025-08-29 RX ORDER — SODIUM CHLORIDE, SODIUM LACTATE, POTASSIUM CHLORIDE, CALCIUM CHLORIDE 600; 310; 30; 20 MG/100ML; MG/100ML; MG/100ML; MG/100ML
INJECTION, SOLUTION INTRAVENOUS CONTINUOUS
Status: DISCONTINUED | OUTPATIENT
Start: 2025-08-29 | End: 2025-08-30 | Stop reason: HOSPADM

## 2025-08-29 RX ORDER — SODIUM CHLORIDE 0.9 % (FLUSH) 0.9 %
5-40 SYRINGE (ML) INJECTION EVERY 12 HOURS SCHEDULED
Status: DISCONTINUED | OUTPATIENT
Start: 2025-08-29 | End: 2025-08-30 | Stop reason: HOSPADM

## 2025-08-29 RX ORDER — LIDOCAINE HYDROCHLORIDE 10 MG/ML
1 INJECTION, SOLUTION EPIDURAL; INFILTRATION; INTRACAUDAL; PERINEURAL
Status: COMPLETED | OUTPATIENT
Start: 2025-08-29 | End: 2025-08-29

## 2025-08-29 RX ORDER — KETOROLAC TROMETHAMINE 30 MG/ML
INJECTION, SOLUTION INTRAMUSCULAR; INTRAVENOUS
Status: COMPLETED
Start: 2025-08-29 | End: 2025-08-29

## 2025-08-29 RX ADMIN — LIDOCAINE HYDROCHLORIDE 1 ML: 10 INJECTION, SOLUTION EPIDURAL; INFILTRATION; INTRACAUDAL; PERINEURAL at 07:58

## 2025-08-29 RX ADMIN — KETOROLAC TROMETHAMINE 15 MG: 30 INJECTION, SOLUTION INTRAMUSCULAR at 08:46

## 2025-08-29 RX ADMIN — ONDANSETRON 4 MG: 2 INJECTION, SOLUTION INTRAMUSCULAR; INTRAVENOUS at 08:46

## 2025-08-29 RX ADMIN — SODIUM CHLORIDE, POTASSIUM CHLORIDE, SODIUM LACTATE AND CALCIUM CHLORIDE: 600; 310; 30; 20 INJECTION, SOLUTION INTRAVENOUS at 07:58

## 2025-08-29 RX ADMIN — Medication 120 MG: at 08:46

## 2025-08-29 RX ADMIN — Medication 100 MG: at 08:46

## 2025-08-29 ASSESSMENT — PAIN - FUNCTIONAL ASSESSMENT
PAIN_FUNCTIONAL_ASSESSMENT: 0-10
PAIN_FUNCTIONAL_ASSESSMENT: ADULT NONVERBAL PAIN SCALE (NPVS)

## 2025-08-29 ASSESSMENT — PAIN SCALES - GENERAL: PAINLEVEL_OUTOF10: 0

## 2025-09-03 DIAGNOSIS — F20.1 DISORGANIZED SCHIZOPHRENIA (HCC): Primary | ICD-10-CM

## 2025-09-04 ENCOUNTER — ANESTHESIA EVENT (OUTPATIENT)
Dept: POSTOP/PACU | Age: 30
End: 2025-09-04
Payer: MEDICARE

## 2025-09-05 ENCOUNTER — ANESTHESIA (OUTPATIENT)
Dept: POSTOP/PACU | Age: 30
End: 2025-09-05
Payer: MEDICARE

## 2025-09-05 ENCOUNTER — HOSPITAL ENCOUNTER (OUTPATIENT)
Dept: POSTOP/PACU | Age: 30
Discharge: HOME | End: 2025-09-05
Payer: MEDICARE

## 2025-09-05 VITALS
BODY MASS INDEX: 33.15 KG/M2 | HEART RATE: 97 BPM | TEMPERATURE: 98.5 F | SYSTOLIC BLOOD PRESSURE: 133 MMHG | OXYGEN SATURATION: 100 % | HEIGHT: 65 IN | WEIGHT: 199 LBS | RESPIRATION RATE: 20 BRPM | DIASTOLIC BLOOD PRESSURE: 90 MMHG

## 2025-09-05 PROCEDURE — 90870 ELECTROCONVULSIVE THERAPY: CPT | Performed by: ANESTHESIOLOGY

## 2025-09-05 PROCEDURE — 7100000000 HC PACU RECOVERY - FIRST 15 MIN: Performed by: ANESTHESIOLOGY

## 2025-09-05 PROCEDURE — 3700000000 HC ANESTHESIA ATTENDED CARE: Performed by: ANESTHESIOLOGY

## 2025-09-05 PROCEDURE — 6360000002 HC RX W HCPCS: Performed by: NURSE ANESTHETIST, CERTIFIED REGISTERED

## 2025-09-05 PROCEDURE — 2580000003 HC RX 258: Performed by: ANESTHESIOLOGY

## 2025-09-05 PROCEDURE — 2500000003 HC RX 250 WO HCPCS: Performed by: NURSE ANESTHETIST, CERTIFIED REGISTERED

## 2025-09-05 PROCEDURE — 7100000001 HC PACU RECOVERY - ADDTL 15 MIN: Performed by: ANESTHESIOLOGY

## 2025-09-05 RX ORDER — LIDOCAINE HYDROCHLORIDE 10 MG/ML
1 INJECTION, SOLUTION EPIDURAL; INFILTRATION; INTRACAUDAL; PERINEURAL
Status: DISCONTINUED | OUTPATIENT
Start: 2025-09-05 | End: 2025-09-06 | Stop reason: HOSPADM

## 2025-09-05 RX ORDER — SUCCINYLCHOLINE/SOD CL,ISO/PF 200MG/10ML
SYRINGE (ML) INTRAVENOUS
Status: COMPLETED
Start: 2025-09-05 | End: 2025-09-05

## 2025-09-05 RX ORDER — ONDANSETRON 2 MG/ML
INJECTION INTRAMUSCULAR; INTRAVENOUS
Status: DISCONTINUED | OUTPATIENT
Start: 2025-09-05 | End: 2025-09-05 | Stop reason: SDUPTHER

## 2025-09-05 RX ORDER — SODIUM CHLORIDE 9 MG/ML
INJECTION, SOLUTION INTRAVENOUS PRN
Status: DISCONTINUED | OUTPATIENT
Start: 2025-09-05 | End: 2025-09-06 | Stop reason: HOSPADM

## 2025-09-05 RX ORDER — METHOHEXITAL IN WATER/PF 100MG/10ML
SYRINGE (ML) INTRAVENOUS
Status: COMPLETED
Start: 2025-09-05 | End: 2025-09-05

## 2025-09-05 RX ORDER — KETOROLAC TROMETHAMINE 30 MG/ML
INJECTION, SOLUTION INTRAMUSCULAR; INTRAVENOUS
Status: COMPLETED
Start: 2025-09-05 | End: 2025-09-05

## 2025-09-05 RX ORDER — SUCCINYLCHOLINE/SOD CL,ISO/PF 200MG/10ML
SYRINGE (ML) INTRAVENOUS
Status: DISCONTINUED | OUTPATIENT
Start: 2025-09-05 | End: 2025-09-05 | Stop reason: SDUPTHER

## 2025-09-05 RX ORDER — KETOROLAC TROMETHAMINE 30 MG/ML
INJECTION, SOLUTION INTRAMUSCULAR; INTRAVENOUS
Status: DISCONTINUED | OUTPATIENT
Start: 2025-09-05 | End: 2025-09-05 | Stop reason: SDUPTHER

## 2025-09-05 RX ORDER — METHOHEXITAL IN WATER/PF 100MG/10ML
SYRINGE (ML) INTRAVENOUS
Status: DISCONTINUED | OUTPATIENT
Start: 2025-09-05 | End: 2025-09-05 | Stop reason: SDUPTHER

## 2025-09-05 RX ORDER — SODIUM CHLORIDE 0.9 % (FLUSH) 0.9 %
5-40 SYRINGE (ML) INJECTION PRN
Status: DISCONTINUED | OUTPATIENT
Start: 2025-09-05 | End: 2025-09-06 | Stop reason: HOSPADM

## 2025-09-05 RX ORDER — SODIUM CHLORIDE 0.9 % (FLUSH) 0.9 %
5-40 SYRINGE (ML) INJECTION EVERY 12 HOURS SCHEDULED
Status: DISCONTINUED | OUTPATIENT
Start: 2025-09-05 | End: 2025-09-06 | Stop reason: HOSPADM

## 2025-09-05 RX ORDER — SODIUM CHLORIDE, SODIUM LACTATE, POTASSIUM CHLORIDE, CALCIUM CHLORIDE 600; 310; 30; 20 MG/100ML; MG/100ML; MG/100ML; MG/100ML
INJECTION, SOLUTION INTRAVENOUS CONTINUOUS
Status: DISCONTINUED | OUTPATIENT
Start: 2025-09-05 | End: 2025-09-06 | Stop reason: HOSPADM

## 2025-09-05 RX ADMIN — SODIUM CHLORIDE, POTASSIUM CHLORIDE, SODIUM LACTATE AND CALCIUM CHLORIDE: 600; 310; 30; 20 INJECTION, SOLUTION INTRAVENOUS at 07:46

## 2025-09-05 RX ADMIN — Medication 100 MG: at 08:16

## 2025-09-05 RX ADMIN — ONDANSETRON 4 MG: 2 INJECTION, SOLUTION INTRAMUSCULAR; INTRAVENOUS at 08:14

## 2025-09-05 RX ADMIN — KETOROLAC TROMETHAMINE 30 MG: 30 INJECTION, SOLUTION INTRAMUSCULAR at 08:14

## 2025-09-05 RX ADMIN — Medication 120 MG: at 08:17

## 2025-09-05 ASSESSMENT — PAIN SCALES - GENERAL: PAINLEVEL_OUTOF10: 0

## 2025-09-05 ASSESSMENT — PAIN - FUNCTIONAL ASSESSMENT
PAIN_FUNCTIONAL_ASSESSMENT: 0-10
PAIN_FUNCTIONAL_ASSESSMENT: ADULT NONVERBAL PAIN SCALE (NPVS)